# Patient Record
Sex: MALE | Race: WHITE | NOT HISPANIC OR LATINO | Employment: OTHER | URBAN - METROPOLITAN AREA
[De-identification: names, ages, dates, MRNs, and addresses within clinical notes are randomized per-mention and may not be internally consistent; named-entity substitution may affect disease eponyms.]

---

## 2019-12-03 ENCOUNTER — APPOINTMENT (INPATIENT)
Dept: RADIOLOGY | Facility: HOSPITAL | Age: 84
DRG: 871 | End: 2019-12-03
Payer: COMMERCIAL

## 2019-12-03 ENCOUNTER — APPOINTMENT (EMERGENCY)
Dept: RADIOLOGY | Facility: HOSPITAL | Age: 84
DRG: 871 | End: 2019-12-03
Payer: COMMERCIAL

## 2019-12-03 ENCOUNTER — HOSPITAL ENCOUNTER (INPATIENT)
Facility: HOSPITAL | Age: 84
LOS: 8 days | DRG: 871 | End: 2019-12-11
Attending: EMERGENCY MEDICINE | Admitting: ANESTHESIOLOGY
Payer: COMMERCIAL

## 2019-12-03 DIAGNOSIS — E44.0 MODERATE PROTEIN-CALORIE MALNUTRITION (HCC): ICD-10-CM

## 2019-12-03 DIAGNOSIS — R65.21 SEPTIC SHOCK (HCC): ICD-10-CM

## 2019-12-03 DIAGNOSIS — A41.9 SEPTIC SHOCK (HCC): ICD-10-CM

## 2019-12-03 DIAGNOSIS — J18.9 PNEUMONIA: ICD-10-CM

## 2019-12-03 DIAGNOSIS — R06.03 RESPIRATORY DISTRESS: ICD-10-CM

## 2019-12-03 DIAGNOSIS — I48.91 RAPID ATRIAL FIBRILLATION (HCC): ICD-10-CM

## 2019-12-03 DIAGNOSIS — J90 PLEURAL EFFUSION ON RIGHT: Primary | ICD-10-CM

## 2019-12-03 DIAGNOSIS — E87.1 HYPONATREMIA: ICD-10-CM

## 2019-12-03 DIAGNOSIS — J96.01 ACUTE RESPIRATORY FAILURE WITH HYPOXIA (HCC): ICD-10-CM

## 2019-12-03 PROBLEM — I48.20 CHRONIC ATRIAL FIBRILLATION (HCC): Status: ACTIVE | Noted: 2019-12-03

## 2019-12-03 PROBLEM — N17.9 AKI (ACUTE KIDNEY INJURY) (HCC): Status: ACTIVE | Noted: 2019-12-03

## 2019-12-03 PROBLEM — R79.89 POSITIVE D DIMER: Status: ACTIVE | Noted: 2019-12-03

## 2019-12-03 PROBLEM — R74.01 TRANSAMINITIS: Status: ACTIVE | Noted: 2019-12-03

## 2019-12-03 LAB
ALBUMIN SERPL BCP-MCNC: 2.1 G/DL (ref 3.5–5)
ALP SERPL-CCNC: 186 U/L (ref 46–116)
ALT SERPL W P-5'-P-CCNC: 18 U/L (ref 12–78)
AMORPH URATE CRY URNS QL MICRO: ABNORMAL /HPF
ANION GAP SERPL CALCULATED.3IONS-SCNC: 19 MMOL/L (ref 4–13)
APPEARANCE FLD: ABNORMAL
APTT PPP: 41 SECONDS (ref 23–37)
AST SERPL W P-5'-P-CCNC: 24 U/L (ref 5–45)
ATRIAL RATE: 166 BPM
BACTERIA UR QL AUTO: ABNORMAL /HPF
BASE EXCESS BLDA CALC-SCNC: -11 MMOL/L (ref -2–3)
BASOPHILS # BLD MANUAL: 0 THOUSAND/UL (ref 0–0.1)
BASOPHILS NFR MAR MANUAL: 0 % (ref 0–1)
BILIRUB DIRECT SERPL-MCNC: 2.03 MG/DL (ref 0–0.2)
BILIRUB SERPL-MCNC: 2.6 MG/DL (ref 0.2–1)
BILIRUB UR QL STRIP: ABNORMAL
BUN SERPL-MCNC: 55 MG/DL (ref 5–25)
CA-I BLD-SCNC: 1.03 MMOL/L (ref 1.12–1.32)
CALCIUM SERPL-MCNC: 8.9 MG/DL (ref 8.3–10.1)
CHLORIDE SERPL-SCNC: 90 MMOL/L (ref 100–108)
CLARITY UR: ABNORMAL
CO2 SERPL-SCNC: 19 MMOL/L (ref 21–32)
COLOR FLD: YELLOW
COLOR UR: ABNORMAL
CREAT SERPL-MCNC: 3.87 MG/DL (ref 0.6–1.3)
D DIMER PPP FEU-MCNC: 10 UG/ML FEU
EOSINOPHIL # BLD MANUAL: 0 THOUSAND/UL (ref 0–0.4)
EOSINOPHIL NFR BLD MANUAL: 0 % (ref 0–6)
ERYTHROCYTE [DISTWIDTH] IN BLOOD BY AUTOMATED COUNT: 13.6 % (ref 11.6–15.1)
FLUAV RNA NPH QL NAA+PROBE: NORMAL
FLUBV RNA NPH QL NAA+PROBE: NORMAL
GFR SERPL CREATININE-BSD FRML MDRD: 14 ML/MIN/1.73SQ M
GLUCOSE SERPL-MCNC: 119 MG/DL (ref 65–140)
GLUCOSE SERPL-MCNC: 120 MG/DL (ref 65–140)
GLUCOSE UR STRIP-MCNC: NEGATIVE MG/DL
HCO3 BLDA-SCNC: 11.6 MMOL/L (ref 24–30)
HCT VFR BLD AUTO: 54.9 % (ref 36.5–49.3)
HCT VFR BLD CALC: 52 % (ref 36.5–49.3)
HGB BLD-MCNC: 17.8 G/DL (ref 12–17)
HGB BLDA-MCNC: 17.7 G/DL (ref 12–17)
HGB UR QL STRIP.AUTO: ABNORMAL
HYALINE CASTS #/AREA URNS LPF: ABNORMAL /LPF
INR PPP: 2.09 (ref 0.84–1.19)
KETONES UR STRIP-MCNC: ABNORMAL MG/DL
L PNEUMO1 AG UR QL IA.RAPID: NEGATIVE
LACTATE SERPL-SCNC: 4.6 MMOL/L (ref 0.5–2)
LACTATE SERPL-SCNC: 6.7 MMOL/L (ref 0.5–2)
LEUKOCYTE ESTERASE UR QL STRIP: ABNORMAL
LYMPHOCYTES # BLD AUTO: 0.58 THOUSAND/UL (ref 0.6–4.47)
LYMPHOCYTES # BLD AUTO: 1 % (ref 14–44)
LYMPHOCYTES NFR BLD AUTO: 8 %
MAGNESIUM SERPL-MCNC: 2.2 MG/DL (ref 1.6–2.6)
MCH RBC QN AUTO: 31.4 PG (ref 26.8–34.3)
MCHC RBC AUTO-ENTMCNC: 32.4 G/DL (ref 31.4–37.4)
MCV RBC AUTO: 97 FL (ref 82–98)
MONOCYTES # BLD AUTO: 2.31 THOUSAND/UL (ref 0–1.22)
MONOCYTES NFR BLD: 4 % (ref 4–12)
MUCOUS THREADS UR QL AUTO: ABNORMAL
NEUTROPHILS # BLD MANUAL: 54.82 THOUSAND/UL (ref 1.85–7.62)
NEUTS BAND NFR BLD MANUAL: 28 % (ref 0–8)
NEUTS BAND NFR FLD MANUAL: 3 %
NEUTS SEG NFR BLD AUTO: 67 % (ref 43–75)
NEUTS SEG NFR BLD AUTO: 89 %
NITRITE UR QL STRIP: NEGATIVE
NON-SQ EPI CELLS URNS QL MICRO: ABNORMAL /HPF
NRBC BLD AUTO-RTO: 0 /100 WBCS
NT-PROBNP SERPL-MCNC: ABNORMAL PG/ML
PCO2 BLD: 12 MMOL/L (ref 21–32)
PCO2 BLD: 20.7 MM HG (ref 42–50)
PH BLD: 7.36 [PH] (ref 7.3–7.4)
PH BODY FLUID: 6.3
PH UR STRIP.AUTO: 5.5 [PH]
PLATELET # BLD AUTO: 342 THOUSANDS/UL (ref 149–390)
PLATELET BLD QL SMEAR: ADEQUATE
PMV BLD AUTO: 9.6 FL (ref 8.9–12.7)
PO2 BLD: 183 MM HG (ref 35–45)
POTASSIUM BLD-SCNC: 3.5 MMOL/L (ref 3.5–5.3)
POTASSIUM SERPL-SCNC: 4.2 MMOL/L (ref 3.5–5.3)
PROT SERPL-MCNC: 7.6 G/DL (ref 6.4–8.2)
PROT UR STRIP-MCNC: ABNORMAL MG/DL
PROTHROMBIN TIME: 23.7 SECONDS (ref 11.6–14.5)
QRS AXIS: 85 DEGREES
QRSD INTERVAL: 82 MS
QT INTERVAL: 300 MS
QTC INTERVAL: 479 MS
RBC # BLD AUTO: 5.66 MILLION/UL (ref 3.88–5.62)
RBC # FLD MANUAL: NORMAL /UL
RBC #/AREA URNS AUTO: ABNORMAL /HPF
RSV RNA NPH QL NAA+PROBE: NORMAL
S PNEUM AG UR QL: NEGATIVE
SAO2 % BLD FROM PO2: 100 % (ref 60–85)
SITE: ABNORMAL
SODIUM BLD-SCNC: 127 MMOL/L (ref 136–145)
SODIUM SERPL-SCNC: 128 MMOL/L (ref 136–145)
SP GR UR STRIP.AUTO: >=1.03 (ref 1–1.03)
SPECIMEN SOURCE: ABNORMAL
T WAVE AXIS: 74 DEGREES
TOTAL CELLS COUNTED SPEC: 100
TOTAL CELLS COUNTED SPEC: 100
TROPONIN I SERPL-MCNC: <0.02 NG/ML
UROBILINOGEN UR QL STRIP.AUTO: 1 E.U./DL
VENTRICULAR RATE: 153 BPM
WBC # BLD AUTO: 57.7 THOUSAND/UL (ref 4.31–10.16)
WBC # FLD MANUAL: ABNORMAL /UL
WBC #/AREA URNS AUTO: ABNORMAL /HPF

## 2019-12-03 PROCEDURE — 89050 BODY FLUID CELL COUNT: CPT | Performed by: EMERGENCY MEDICINE

## 2019-12-03 PROCEDURE — 84295 ASSAY OF SERUM SODIUM: CPT

## 2019-12-03 PROCEDURE — 87631 RESP VIRUS 3-5 TARGETS: CPT | Performed by: EMERGENCY MEDICINE

## 2019-12-03 PROCEDURE — 83880 ASSAY OF NATRIURETIC PEPTIDE: CPT | Performed by: EMERGENCY MEDICINE

## 2019-12-03 PROCEDURE — 80076 HEPATIC FUNCTION PANEL: CPT | Performed by: EMERGENCY MEDICINE

## 2019-12-03 PROCEDURE — 83605 ASSAY OF LACTIC ACID: CPT | Performed by: PHYSICIAN ASSISTANT

## 2019-12-03 PROCEDURE — 99291 CRITICAL CARE FIRST HOUR: CPT | Performed by: PHYSICIAN ASSISTANT

## 2019-12-03 PROCEDURE — 82803 BLOOD GASES ANY COMBINATION: CPT

## 2019-12-03 PROCEDURE — 87449 NOS EACH ORGANISM AG IA: CPT | Performed by: PHYSICIAN ASSISTANT

## 2019-12-03 PROCEDURE — 93010 ELECTROCARDIOGRAM REPORT: CPT | Performed by: INTERNAL MEDICINE

## 2019-12-03 PROCEDURE — 84484 ASSAY OF TROPONIN QUANT: CPT | Performed by: EMERGENCY MEDICINE

## 2019-12-03 PROCEDURE — 96360 HYDRATION IV INFUSION INIT: CPT

## 2019-12-03 PROCEDURE — 87070 CULTURE OTHR SPECIMN AEROBIC: CPT | Performed by: EMERGENCY MEDICINE

## 2019-12-03 PROCEDURE — 87205 SMEAR GRAM STAIN: CPT | Performed by: EMERGENCY MEDICINE

## 2019-12-03 PROCEDURE — 84157 ASSAY OF PROTEIN OTHER: CPT | Performed by: EMERGENCY MEDICINE

## 2019-12-03 PROCEDURE — 32554 ASPIRATE PLEURA W/O IMAGING: CPT | Performed by: EMERGENCY MEDICINE

## 2019-12-03 PROCEDURE — 84145 PROCALCITONIN (PCT): CPT | Performed by: PHYSICIAN ASSISTANT

## 2019-12-03 PROCEDURE — 87147 CULTURE TYPE IMMUNOLOGIC: CPT | Performed by: EMERGENCY MEDICINE

## 2019-12-03 PROCEDURE — 36415 COLL VENOUS BLD VENIPUNCTURE: CPT | Performed by: EMERGENCY MEDICINE

## 2019-12-03 PROCEDURE — 82330 ASSAY OF CALCIUM: CPT | Performed by: PHYSICIAN ASSISTANT

## 2019-12-03 PROCEDURE — 83986 ASSAY PH BODY FLUID NOS: CPT | Performed by: EMERGENCY MEDICINE

## 2019-12-03 PROCEDURE — 83615 LACTATE (LD) (LDH) ENZYME: CPT | Performed by: EMERGENCY MEDICINE

## 2019-12-03 PROCEDURE — 80048 BASIC METABOLIC PNL TOTAL CA: CPT | Performed by: EMERGENCY MEDICINE

## 2019-12-03 PROCEDURE — 80053 COMPREHEN METABOLIC PANEL: CPT | Performed by: PHYSICIAN ASSISTANT

## 2019-12-03 PROCEDURE — 94760 N-INVAS EAR/PLS OXIMETRY 1: CPT

## 2019-12-03 PROCEDURE — 87040 BLOOD CULTURE FOR BACTERIA: CPT | Performed by: EMERGENCY MEDICINE

## 2019-12-03 PROCEDURE — 84300 ASSAY OF URINE SODIUM: CPT | Performed by: PHYSICIAN ASSISTANT

## 2019-12-03 PROCEDURE — 94660 CPAP INITIATION&MGMT: CPT

## 2019-12-03 PROCEDURE — 85730 THROMBOPLASTIN TIME PARTIAL: CPT | Performed by: EMERGENCY MEDICINE

## 2019-12-03 PROCEDURE — 82945 GLUCOSE OTHER FLUID: CPT | Performed by: EMERGENCY MEDICINE

## 2019-12-03 PROCEDURE — 83605 ASSAY OF LACTIC ACID: CPT | Performed by: EMERGENCY MEDICINE

## 2019-12-03 PROCEDURE — 85379 FIBRIN DEGRADATION QUANT: CPT | Performed by: EMERGENCY MEDICINE

## 2019-12-03 PROCEDURE — 82570 ASSAY OF URINE CREATININE: CPT | Performed by: PHYSICIAN ASSISTANT

## 2019-12-03 PROCEDURE — 83735 ASSAY OF MAGNESIUM: CPT | Performed by: EMERGENCY MEDICINE

## 2019-12-03 PROCEDURE — 84132 ASSAY OF SERUM POTASSIUM: CPT

## 2019-12-03 PROCEDURE — 85027 COMPLETE CBC AUTOMATED: CPT | Performed by: EMERGENCY MEDICINE

## 2019-12-03 PROCEDURE — 89051 BODY FLUID CELL COUNT: CPT | Performed by: EMERGENCY MEDICINE

## 2019-12-03 PROCEDURE — 82330 ASSAY OF CALCIUM: CPT

## 2019-12-03 PROCEDURE — 82947 ASSAY GLUCOSE BLOOD QUANT: CPT

## 2019-12-03 PROCEDURE — 99285 EMERGENCY DEPT VISIT HI MDM: CPT

## 2019-12-03 PROCEDURE — 81001 URINALYSIS AUTO W/SCOPE: CPT | Performed by: EMERGENCY MEDICINE

## 2019-12-03 PROCEDURE — 71045 X-RAY EXAM CHEST 1 VIEW: CPT

## 2019-12-03 PROCEDURE — 85007 BL SMEAR W/DIFF WBC COUNT: CPT | Performed by: EMERGENCY MEDICINE

## 2019-12-03 PROCEDURE — 85014 HEMATOCRIT: CPT

## 2019-12-03 PROCEDURE — 99291 CRITICAL CARE FIRST HOUR: CPT | Performed by: EMERGENCY MEDICINE

## 2019-12-03 PROCEDURE — 85610 PROTHROMBIN TIME: CPT | Performed by: EMERGENCY MEDICINE

## 2019-12-03 PROCEDURE — 84100 ASSAY OF PHOSPHORUS: CPT | Performed by: PHYSICIAN ASSISTANT

## 2019-12-03 PROCEDURE — 93005 ELECTROCARDIOGRAM TRACING: CPT

## 2019-12-03 RX ORDER — HEPARIN SODIUM 5000 [USP'U]/ML
5000 INJECTION, SOLUTION INTRAVENOUS; SUBCUTANEOUS EVERY 8 HOURS SCHEDULED
Status: DISCONTINUED | OUTPATIENT
Start: 2019-12-03 | End: 2019-12-04

## 2019-12-03 RX ORDER — SODIUM CHLORIDE, SODIUM GLUCONATE, SODIUM ACETATE, POTASSIUM CHLORIDE, MAGNESIUM CHLORIDE, SODIUM PHOSPHATE, DIBASIC, AND POTASSIUM PHOSPHATE .53; .5; .37; .037; .03; .012; .00082 G/100ML; G/100ML; G/100ML; G/100ML; G/100ML; G/100ML; G/100ML
1000 INJECTION, SOLUTION INTRAVENOUS ONCE
Status: DISCONTINUED | OUTPATIENT
Start: 2019-12-03 | End: 2019-12-03

## 2019-12-03 RX ORDER — LIDOCAINE 40 MG/G
CREAM TOPICAL ONCE
Status: COMPLETED | OUTPATIENT
Start: 2019-12-03 | End: 2019-12-03

## 2019-12-03 RX ORDER — SODIUM CHLORIDE, SODIUM GLUCONATE, SODIUM ACETATE, POTASSIUM CHLORIDE, MAGNESIUM CHLORIDE, SODIUM PHOSPHATE, DIBASIC, AND POTASSIUM PHOSPHATE .53; .5; .37; .037; .03; .012; .00082 G/100ML; G/100ML; G/100ML; G/100ML; G/100ML; G/100ML; G/100ML
125 INJECTION, SOLUTION INTRAVENOUS CONTINUOUS
Status: DISCONTINUED | OUTPATIENT
Start: 2019-12-03 | End: 2019-12-08

## 2019-12-03 RX ORDER — AMOXICILLIN 250 MG
1 CAPSULE ORAL
Status: DISCONTINUED | OUTPATIENT
Start: 2019-12-03 | End: 2019-12-11 | Stop reason: HOSPADM

## 2019-12-03 RX ORDER — CHLORHEXIDINE GLUCONATE 0.12 MG/ML
15 RINSE ORAL EVERY 12 HOURS SCHEDULED
Status: DISCONTINUED | OUTPATIENT
Start: 2019-12-03 | End: 2019-12-11

## 2019-12-03 RX ADMIN — CEFEPIME HYDROCHLORIDE 2000 MG: 2 INJECTION, POWDER, FOR SOLUTION INTRAVENOUS at 18:23

## 2019-12-03 RX ADMIN — SODIUM CHLORIDE 1000 ML: 0.9 INJECTION, SOLUTION INTRAVENOUS at 16:42

## 2019-12-03 RX ADMIN — CHLORHEXIDINE GLUCONATE 0.12% ORAL RINSE 15 ML: 1.2 LIQUID ORAL at 23:55

## 2019-12-03 RX ADMIN — HEPARIN SODIUM 5000 UNITS: 5000 INJECTION INTRAVENOUS; SUBCUTANEOUS at 23:55

## 2019-12-03 RX ADMIN — AMIODARONE HYDROCHLORIDE 1 MG/MIN: 50 INJECTION, SOLUTION INTRAVENOUS at 21:02

## 2019-12-03 RX ADMIN — LIDOCAINE: 4 CREAM TOPICAL at 22:12

## 2019-12-03 RX ADMIN — NOREPINEPHRINE BITARTRATE 4 MCG/MIN: 1 INJECTION INTRAVENOUS at 20:52

## 2019-12-03 RX ADMIN — VANCOMYCIN HYDROCHLORIDE 1250 MG: 5 INJECTION, POWDER, LYOPHILIZED, FOR SOLUTION INTRAVENOUS at 18:56

## 2019-12-03 RX ADMIN — SODIUM CHLORIDE 2000 ML: 0.9 INJECTION, SOLUTION INTRAVENOUS at 18:26

## 2019-12-03 RX ADMIN — SODIUM CHLORIDE, SODIUM GLUCONATE, SODIUM ACETATE, POTASSIUM CHLORIDE, MAGNESIUM CHLORIDE, SODIUM PHOSPHATE, DIBASIC, AND POTASSIUM PHOSPHATE 125 ML/HR: .53; .5; .37; .037; .03; .012; .00082 INJECTION, SOLUTION INTRAVENOUS at 23:59

## 2019-12-03 RX ADMIN — DEXTROSE 150 MG: 50 INJECTION, SOLUTION INTRAVENOUS at 20:45

## 2019-12-03 NOTE — RESPIRATORY THERAPY NOTE
12/03/19 2610   Non-Invasive Information   Interface HFNC prongs   Non-Invasive Ventilation Mode HFNC   $ CPAP/BiPAP Charge - Initial & Daily Mgmt Yes   SpO2 91 %   $ Pulse Oximetry Spot Check Charge Completed   Resp Comments Initiated HFNC at md request   Non-Invasive Settings   FiO2 (%) 95   Flow (lpm) 55   Temperature (Set) 31   Non-Invasive Readings   Heater Temperature (Obs) 31   Skin Intervention Skin intact

## 2019-12-03 NOTE — ED NOTES
Critical care PA, Sofya Ascencio, informed to hold isolyte and levophed at this time       Milagro Cordon RN  12/03/19 Kody Rose

## 2019-12-03 NOTE — ED PROVIDER NOTES
History  Chief Complaint   Patient presents with    Shortness of Breath     pt was c/o cough, SOB for past few weeks  Pt went to PCP today and was reported to have "blue extremities" with 02 sat in low 80's on RA, with "HR in 160s" pt has hx of Afib      Patient is an 70-year-old male with past medical history of atrial fibrillation on Eliquis, hypothyroidism, hypertension, hyperlipidemia, presents to the emergency department by ambulance for respiratory distress  According to EMS, patient was at his PCPs office and 911 was called immediately due to patient being tachycardic with heart rate in the 150s and rhythm showing atrial fibrillation  He was hypoxic in the 70s to 80s on room air and when EMS arrived they placed him on a non-rebreather and his O2 sat improved into the high 80s  Per EMS, patient was diffusely cyanotic on their arrival however with administration of oxygen, the cyanosis had improved  He also gave a 1 L bolus which improved his heart rate  Per the patient's daughter and patient he has been sick with coughing for about 2 weeks but over the past 4 days he has been feeling progressively worsening dyspnea  He was unaware of any fevers, chills  He denies any headaches, dizziness or near syncope, other URI symptoms other than coughing, chest pain, palpitations, abdominal pain, nausea, vomiting, diarrhea, blood per rectum or melena, urinary symptoms, skin rash or color change, extremity swelling or pain, extremity weakness or paresthesia or other focal neurologic deficits  Daughter denies any known history of pneumonia  No prior history of heart failure  On arrival to the ED, patient tachycardic with heart rate in the 140-160 bpm range showing rapid A-fib on cardiac monitor  A 2nd IV was established and IVF were hung  He was hypoxic to 88% on NRB so respiratory therapy was called to start high-flow nasal cannula  Patient's O2 sat improved into the low 90s with high-flow oxygen    His blood pressure was soft around 624 systolic on arrival but at times would drop into the 91W systolic  Due to hypotension, rate control medication was not given at this time for rapid AFib  On lung exam, patient had clear lungs other than decreased breath sounds on the right side  On stat portable chest XR, there appears to be a large right pleural effusion and possible underlying pneumonia  History provided by:  Patient, EMS personnel and relative   used: No        None       Past Medical History:   Diagnosis Date    A-fib ([facility] [address] )     Disease of thyroid gland     Hyperlipidemia     Hypertension     Optic neuropathy        History reviewed  No pertinent surgical history  History reviewed  No pertinent family history  I have reviewed and agree with the history as documented  Social History     Tobacco Use    Smoking status: Former Smoker    Smokeless tobacco: Never Used   Substance Use Topics    Alcohol use: Yes     Frequency: Never     Comment: occasional    Drug use: Never        Review of Systems   Constitutional: Negative for chills and fever  HENT: Negative for congestion, ear pain, rhinorrhea and sore throat  Respiratory: Positive for cough and shortness of breath  Negative for chest tightness and wheezing  Cardiovascular: Negative for chest pain, palpitations and leg swelling  Gastrointestinal: Negative for abdominal pain, blood in stool, constipation, diarrhea, nausea and vomiting  Genitourinary: Negative for dysuria, flank pain, frequency and hematuria  Musculoskeletal: Negative for back pain, neck pain and neck stiffness  Skin: Negative for color change, pallor, rash and wound  Allergic/Immunologic: Negative for immunocompromised state  Neurological: Negative for dizziness, syncope, weakness, light-headedness, numbness and headaches  Hematological: Negative for adenopathy  Bruises/bleeds easily     Psychiatric/Behavioral: Negative for confusion, decreased concentration and sleep disturbance  All other systems reviewed and are negative  Physical Exam  Physical Exam   Constitutional: He is oriented to person, place, and time  He appears well-developed and well-nourished  He appears distressed  Patient has increased work of breathing appears to be in mild distress  HENT:   Head: Normocephalic and atraumatic  Mouth/Throat: Oropharynx is clear and moist  No oropharyngeal exudate  Eyes: Pupils are equal, round, and reactive to light  Conjunctivae and EOM are normal    Neck: Normal range of motion  Neck supple  No JVD present  Cardiovascular: Normal heart sounds and intact distal pulses  Exam reveals no gallop and no friction rub  No murmur heard  Irregularly irregular rhythm with heart rate in the 150's  2+ radial and DP pulses  Pulmonary/Chest: Breath sounds normal  He is in respiratory distress  He has no wheezes  He has no rales  He exhibits no tenderness  Patient presents in mild respiratory distress  He is on a non-rebreather and satting 88%  He has mild increased work of breathing and abdominal accessory muscle use only when lying flat, resolved when sitting upright  Decreased breath sounds right side  Cyanosis noted to the lips and fingertips  Abdominal: Soft  Bowel sounds are normal  He exhibits no distension  There is no tenderness  There is no rebound and no guarding  Musculoskeletal: Normal range of motion  He exhibits no edema or tenderness  Lymphadenopathy:     He has no cervical adenopathy  Neurological: He is alert and oriented to person, place, and time  No gross focal motor or sensory deficits  Skin: Skin is warm and dry  No rash noted  He is not diaphoretic  No pallor  Psychiatric: He has a normal mood and affect  His behavior is normal    Nursing note and vitals reviewed        Vital Signs  ED Triage Vitals   Temperature Pulse Respirations Blood Pressure SpO2   12/03/19 1619 12/03/19 1619 12/03/19 1619 12/03/19 1619 12/03/19 1619   (!) 96 7 °F (35 9 °C) (!) 127 (!) 28 113/73 (!) 88 %      Temp Source Heart Rate Source Patient Position - Orthostatic VS BP Location FiO2 (%)   12/03/19 1619 12/03/19 1619 12/03/19 1619 12/03/19 1619 12/03/19 1657   Rectal Monitor Lying Left arm 90      Pain Score       --                Vitals:    12/03/19 1845 12/03/19 1854 12/03/19 1856 12/03/19 1945   BP: 101/61 (!) 76/52 91/56 (!) 87/59   BP Location: Left arm Left arm Left arm Left arm   Pulse: (!) 144 (!) 151 (!) 139 (!) 137   Resp: (!) 26 (!) 28 (!) 27 (!) 28   Temp:       TempSrc:       SpO2: 96% 97% 97% 98%   Weight:           Visual Acuity      ED Medications  Medications   vancomycin (VANCOCIN) 1,250 mg in sodium chloride 0 9 % 250 mL IVPB (1,250 mg Intravenous New Bag 12/3/19 1856)   multi-electrolyte (PLASMALYTE-A/ISOLYTE-S PH 7 4) IV solution (has no administration in time range)   chlorhexidine (PERIDEX) 0 12 % oral rinse 15 mL (has no administration in time range)   heparin (porcine) subcutaneous injection 5,000 Units (has no administration in time range)   norepinephrine (LEVOPHED) 4 mg (STANDARD CONCENTRATION) IV in sodium chloride 0 9% 250 mL (has no administration in time range)   sodium chloride 0 9 % bolus 2,000 mL (2,000 mL Intravenous New Bag 12/3/19 1826)   amiodarone (CORDARONE) 900 mg in dextrose 5 % 500 mL infusion (has no administration in time range)   amiodarone 150 mg in dextrose 5 % 100 mL IV bolus (has no administration in time range)   sodium chloride 0 9 % bolus 1,000 mL (0 mL Intravenous Stopped 12/3/19 1817)   cefepime (MAXIPIME) 2,000 mg in dextrose 5 % 50 mL IVPB (0 mg Intravenous Stopped 12/3/19 1851)       Diagnostic Studies  Results Reviewed     Procedure Component Value Units Date/Time    Blood gas, arterial [[id number]]     Lab Status:  No result Specimen:  Blood, Arterial     Lactic acid, plasma [[id number]] Collected:  12/03/19 1952    Lab Status:   In process Specimen:  Blood from Arm, Right Updated:  12/03/19 1954    Body Fluid Diff [[id number]] Collected:  12/03/19 1811    Lab Status:  Final result Specimen: Body Fluid from Pleural, Right Updated:  12/03/19 1929     Total Counted 100     Neutrophils % (Fluid) 89 %      Lymphs % (Fluid) 8 %      Bands % (Fluid) 3 %     Narrative:       INNUMNERABLE BACTERIA SEEN: COCCI IN CHAINS    Influenza A/B and RSV PCR [[id number]]  (Normal) Collected:  12/03/19 1843    Lab Status:  Final result Specimen:  Nasopharyngeal Swab Updated:  12/03/19 1925     INFLUENZA A PCR None Detected     INFLUENZA B PCR None Detected     RSV PCR None Detected    Red Cell Count,Body Fluid [[id number]] Collected:  12/03/19 1811    Lab Status:  Final result Specimen: Body Fluid from Pleural, Right Updated:  12/03/19 1908     RBC, Fluid 140,000 /uL     Body fluid white cell count with differential [[id number]]  (Abnormal) Collected:  12/03/19 1811    Lab Status:  Final result Specimen: Body Fluid from Pleural, Right Updated:  12/03/19 1906     Site Pleural, Right      Color, Fluid Yellow     Clarity, Fluid Cloudy     WBC, Fluid 71,653 /ul     pH, body fluid [[id number]] Collected:  12/03/19 1811    Lab Status:  Final result Specimen: Body Fluid Updated:  12/03/19 1837     PH BODY FLUID 6 3    Body fluid culture (Pleural Fluid Culture) and Gram stain [[id number]] Collected:  12/03/19 1823    Lab Status: In process Specimen: Body Fluid from Pleural, Right Updated:  12/03/19 1824    Glucose, body fluid [[id number]] Collected:  12/03/19 1811    Lab Status: In process Specimen: Body Fluid Updated:  12/03/19 1811    Lactate dehydrogenase, body fluid [[id number]] Collected:  12/03/19 1811    Lab Status: In process Specimen: Body Fluid Updated:  12/03/19 1811    Protein, body fluid [[id number]] Collected:  12/03/19 1811    Lab Status: In process Specimen: Body Fluid Updated:  12/03/19 1811    Leukemia/Lymphoma flow cytometry [[id number]] Collected:  12/03/19 1811    Lab Status:   In process Specimen: Body Fluid from Pleural, Right Updated:  12/03/19 1811    Platelet count [[id number]]     Lab Status:  No result Specimen:  Blood     Strep Pneumoniae, Urine [[id number]]     Lab Status:  No result Specimen:  Urine     Legionella antigen, urine [[id number]]     Lab Status:  No result Specimen:  Urine     CBC and differential [[id number]]  (Abnormal) Collected:  12/03/19 1640    Lab Status:  Final result Specimen:  Blood from Arm, Left Updated:  12/03/19 1736     WBC 57 70 Thousand/uL      RBC 5 66 Million/uL      Hemoglobin 17 8 g/dL      Hematocrit 54 9 %      MCV 97 fL      MCH 31 4 pg      MCHC 32 4 g/dL      RDW 13 6 %      MPV 9 6 fL      Platelets 942 Thousands/uL      nRBC 0 /100 WBCs     Narrative: This is an appended report  These results have been appended to a previously verified report  Lactic acid, plasma [[id number]]  (Abnormal) Collected:  12/03/19 1640    Lab Status:  Final result Specimen:  Blood from Arm, Left Updated:  12/03/19 1726     LACTIC ACID 6 7 mmol/L     Narrative:       Result may be elevated if tourniquet was used during collection      NT-BNP PRO [[id number]]  (Abnormal) Collected:  12/03/19 1640    Lab Status:  Final result Specimen:  Blood from Arm, Left Updated:  12/03/19 1717     NT-proBNP 17,738 pg/mL     Basic metabolic panel [[id number]]  (Abnormal) Collected:  12/03/19 1640    Lab Status:  Final result Specimen:  Blood from Arm, Left Updated:  12/03/19 1717     Sodium 128 mmol/L      Potassium 4 2 mmol/L      Chloride 90 mmol/L      CO2 19 mmol/L      ANION GAP 19 mmol/L      BUN 55 mg/dL      Creatinine 3 87 mg/dL      Glucose 120 mg/dL      Calcium 8 9 mg/dL      eGFR 14 ml/min/1 73sq m     Narrative:       [place] guidelines for Chronic Kidney Disease (CKD):     Stage 1 with normal or high GFR (GFR > 90 mL/min/1 73 square meters)    Stage 2 Mild CKD (GFR = 60-89 mL/min/1 73 square meters)    Stage 3A Moderate CKD (GFR = 45-59 mL/min/1 73 square meters)    Stage 3B Moderate CKD (GFR = 30-44 mL/min/1 73 square meters)    Stage 4 Severe CKD (GFR = 15-29 mL/min/1 73 square meters)    Stage 5 End Stage CKD (GFR <15 mL/min/1 73 square meters)  Note: GFR calculation is accurate only with a steady state creatinine    Hepatic function panel [[id number]]  (Abnormal) Collected:  12/03/19 1640    Lab Status:  Final result Specimen:  Blood from Arm, Left Updated:  12/03/19 1717     Total Bilirubin 2 60 mg/dL      Bilirubin, Direct 2 03 mg/dL      Alkaline Phosphatase 186 U/L      AST 24 U/L      ALT 18 U/L      Total Protein 7 6 g/dL      Albumin 2 1 g/dL     Magnesium [[id number]]  (Normal) Collected:  12/03/19 1640    Lab Status:  Final result Specimen:  Blood from Arm, Left Updated:  12/03/19 1717     Magnesium 2 2 mg/dL     D-Dimer [[id number]]  (Abnormal) Collected:  12/03/19 1647    Lab Status:  Final result Specimen:  Blood Updated:  12/03/19 1714     D-Dimer, Quant 10 00 ug/ml FEU     Protime-INR [[id number]]  (Abnormal) Collected:  12/03/19 1640    Lab Status:  Final result Specimen:  Blood from Arm, Left Updated:  12/03/19 1710     Protime 23 7 seconds      INR 2 09    APTT [[id number]]  (Abnormal) Collected:  12/03/19 1640    Lab Status:  Final result Specimen:  Blood from Arm, Left Updated:  12/03/19 1710     PTT 41 seconds     Troponin I [[id number]]  (Normal) Collected:  12/03/19 1640    Lab Status:  Final result Specimen:  Blood from Arm, Left Updated:  12/03/19 1710     Troponin I <0 02 ng/mL     POCT Blood Gas (CG8+) [[id number]]  (Abnormal) Collected:  12/03/19 1653    Lab Status:  Final result Specimen:  Venous Updated:  12/03/19 1657     ph, [place] ISTAT 7 356     pCO2, [place] i-STAT 20 7 mm HG      pO2, [place] i-STAT 183 0 mm HG      BE, i-STAT -11 mmol/L      HCO3, [place] i-STAT 11 6 mmol/L      CO2, i-STAT 12 mmol/L      O2 Sat, i-STAT 100 %      SODIUM, I-STAT 127 mmol/l      Potassium, i-STAT 3 5 mmol/L      Calcium, Ionized i-STAT 1 03 mmol/L      Hct, i-STAT 52 %      Hgb, i-STAT 17 7 g/dl      Glucose, i-STAT 119 mg/dl      Specimen Type VENOUS    Blood culture #1 [[id number]] Collected:  12/03/19 1646    Lab Status: In process Specimen:  Blood from Arm, Right Updated:  12/03/19 1649    Blood culture #2 [[id number]] Collected:  12/03/19 1640    Lab Status: In process Specimen:  Blood from Arm, Left Updated:  12/03/19 1644    Blood gas, venous [[id number]]     Lab Status:  No result Specimen:  Blood     Blood gas, arterial [[id number]]     Lab Status:  No result Specimen:  Blood, Arterial     UA (URINE) with reflex to Scope [[id number]]     Lab Status:  No result Specimen:  Urine                  XR chest portable   ED Interpretation by Sherryle Mound, DO (12/03 1846)   Decreased size of right pleural effusion status post thoracentesis  No obvious pneumothorax  XR chest 1 view portable   Final Result by Tray Romo MD (12/03 1643)      Large right-sided pleural effusion with subjacent consolidation  Workstation performed: [id number]                    Procedures  ECG 12 Lead Documentation Only  Date/Time: 12/3/2019 6:28 PM  Performed by: Sherryle Mound, DO  Authorized by: Sherryle Mound, DO     ECG reviewed by me, the ED Provider: yes    Patient location:  ED  Previous ECG:     Previous ECG:  Unavailable  Interpretation:     Interpretation: abnormal    Rate:     ECG rate:  153    ECG rate assessment: tachycardic    Rhythm:     Rhythm: atrial fibrillation      Rhythm comment:  With RVR  Ectopy:     Ectopy: PVCs      PVCs:  Infrequent  QRS:     QRS axis:  Normal    QRS intervals:  Normal  Conduction:     Conduction: normal    ST segments:     ST segments:  Non-specific  T waves:     T waves: non-specific    Thoracentesis  Date/Time: 12/3/2019 7:10 PM  Performed by: Sherryle Mound, DO  Authorized by: Sherryle Mound, DO     Patient location:  ED  Other Assisting Provider: Yes (comment) (Dr Harley Hudson (IR attending)   Dr Keyonna Hernandez (ICU attending))    Consent:     Consent obtained:  Verbal and written    Consent given by:  Patient and healthcare agent (Patient's daughter signed consent form)    Risks discussed:  Bleeding, incomplete drainage, nerve damage, infection, pain and pneumothorax    Alternatives discussed:  Delayed treatment  Universal protocol:     Procedure explained and questions answered to patient or proxy's satisfaction: yes      Relevant documents present and verified: yes      Test results available and properly labeled: yes      Radiology Images displayed and confirmed  If images not available, report reviewed: yes      Required blood products, implants, devices and special equipment available: yes      Site/side marked: yes      Immediately prior to procedure a time out was called: yes      Patient identity confirmed:  Verbally with patient and arm band  Indications:     Procedure Purpose: therapeutic      Indications: pleural effusion    Anesthesia (see MAR for exact dosages): Anesthesia method:  Local infiltration    Local anesthetic:  Lidocaine 1% w/o epi  Procedure details:     Preparation: Patient was prepped and draped in usual sterile fashion      Standard thoracentesis cath kit used: Yes      Patient position:  Sitting    Laterality:  Right    Location:  Posterior    Puncture method:  Over-the-needle catheter    Ultrasound guidance: yes      Reason for ultrasound: Identify fluid collection and guide cathetar placement  Indwelling catheter placed: no      Number of attempts:  2    Needle gauge:  16    Drainage color:  Yellow    Drainage characteristics:  Purulent    Fluid removed amount:  1200 mL  Post-procedure details:     Chest x-ray performed: yes      Chest x-ray findings:  Pleural effusion improved    Patient tolerance of procedure:   Tolerated well, no immediate complications  CriticalCare Time  Performed by: Marcial Corrigan DO  Authorized by: Marcial Corrigan DO     Critical care provider statement: Critical care time (minutes):  75    Critical care time was exclusive of:  Separately billable procedures and treating other patients and teaching time    Critical care was necessary to treat or prevent imminent or life-threatening deterioration of the following conditions:  Circulatory failure, shock, sepsis, respiratory failure, renal failure, dehydration and cardiac failure    Critical care was time spent personally by me on the following activities:  Blood draw for specimens, obtaining history from patient or surrogate, development of treatment plan with patient or surrogate, discussions with consultants, discussions with primary provider, evaluation of patient's response to treatment, examination of patient, re-evaluation of patient's condition, ordering and review of radiographic studies, ordering and review of laboratory studies, ordering and performing treatments and interventions and interpretation of cardiac output measurements    I assumed direction of critical care for this patient from another provider in my specialty: no               ED Course  ED Course as of Dec 03 2024   Tue Dec 03, 2019   Pedro Turner Patient's blood pressure fluctuating between 80A and 887 systolic  Patient's current BP in the 70 systolic so will start patient on Levophed peripherally for short time until he gets to ICU  Initial Sepsis Screening     Row Name 12/03/19 4769                Is the patient's history suggestive of a new or worsening infection? (!) Yes (Proceed)  -MA        Suspected source of infection  pneumonia  -MA        Are two or more of the following signs & symptoms of infection both present and new to the patient? (!) Yes (Proceed)  -MA        Indicate SIRS criteria  Tachycardia > 90 bpm;Leukocytosis (WBC > 88269 IJL); Hypothermia < 36C (96 8F)  -MA        If the answer is yes to both questions, suspicion of sepsis is present          If severe sepsis is present AND tissue hypoperfusion perists in the hour after fluid resuscitation or lactate > 4, the patient meets criteria for SEPTIC SHOCK          Are any of the following organ dysfunction criteria present within 6 hours of suspected infection and SIRS criteria that are NOT considered to be chronic conditions? (!) Yes  -MA        Organ dysfunction  Creatinine > 2 0 mg/dL; Lactate >/equal 4 0 mmol/L  -MA        Date of presentation of severe sepsis  12/03/19  -MA        Time of presentation of severe sepsis  1851  -MA        Tissue hypoperfusion persists in the hour after crystalloid fluid administration, evidenced, by either:  SBP < 90 mm/Hg ( ___ mm/Hg in comment field)  -MA        Was hypotension present within one hour of the conclusion of crystalloid fluid administration?   Yes  -MA        Date of presentation of septic shock  12/03/19  -MA        Time of presentation of septic shock  1851  -MA          User Key  (r) = Recorded By, (t) = Taken By, (c) = Cosigned By    Initials Name Provider Type    LEONIE Warner DO Physician           Default Flowsheet Data (last 720 hours)      Sepsis Reassess     Row Name 12/03/19 1851                   Repeat Volume Status and Tissue Perfusion Assessment Performed    Repeat Volume Status and Tissue Perfusion Assessment Performed             Volume Status and Tissue Perfusion Post Fluid Resuscitation * Must Document All *    Vital Signs Reviewed (HR, RR, BP, T)  Yes  -MA        Shock Index Reviewed  Yes  -MA        Arterial Oxygen Saturation Reviewed (POx, SaO2 or SpO2)  Yes (comment %)  -MA        Cardio  (!) Tachycardia;Irregular rhythm  -MA        Pulmonary  Clear to auscultation  -MA        Capillary Refill  Sluggish  -MA        Peripheral Pulses  Radial;Dorsalis Pedis  -MA        Peripheral Pulse  +2  -MA        Dorsalis Pedis  +2  -MA        Skin  Warm  -MA        Urine output assessed  Decreased  -MA           *OR*   Intensive Monitoring- Must Document One of the Following Four *:    Vital Signs Reviewed          * Central Venous Pressure (CVP or RAP)          * Central Venous Oxygen (SVO2, ScvO2 or Oxygen saturation via central catheter)          * Bedside Cardiovascular US in IVC diameter and % collapse          * Passive Leg Raise OR Crystalloid Challenge            User Key  (r) = Recorded By, (t) = Taken By, (c) = Cosigned By    Initials Name Provider Type    LEONIE Yoder,  Physician                MDM  Number of Diagnoses or Management Options  Hyponatremia:   Pleural effusion on right:   Pneumonia:   Rapid atrial fibrillation [facility]):   Respiratory distress:   Septic shock [facility]):   Diagnosis management comments: 53-year-old male presents to the ED in acute respiratory distress  He presents in rapid atrial fibrillation, low/normal blood pressure, hypoxia despite non-rebreather  Patient transition to high-flow nasal cannula oxygen and has improvement in his O2 sat and work of breathing  He does get distressed when lying flat, consistent with right pleural effusion seen on stat portable chest x-ray  Most likely patient septic from pneumonia and will start broad-spectrum IV antibiotics as well as IV fluid resuscitation  Will perform urgent thoracentesis for right pleural effusion drainage due to respiratory distress and hypoxia  Patient to be admitted to ICU  Discussed code status with patient and daughter and patient is full code         Amount and/or Complexity of Data Reviewed  Clinical lab tests: ordered and reviewed  Tests in the radiology section of CPT®: ordered and reviewed  Tests in the medicine section of CPT®: ordered and reviewed  Obtain history from someone other than the patient: yes  Independent visualization of images, tracings, or specimens: yes          Disposition  Final diagnoses:   Pleural effusion on right   Respiratory distress   Pneumonia   Septic shock ([facility] [address] )   Rapid atrial fibrillation ([facility] [address] )   Hyponatremia     Time reflects when diagnosis was documented in both MDM as applicable and the Disposition within this note     Time User Action Codes Description Comment    12/3/2019  6:20 PM Rory Kenneth E Add [J90] Pleural effusion on right     12/3/2019  6:20 PM Rory Kenneth E Add [R06 03] Respiratory distress     12/3/2019  6:20 PM Rory Kenneth E Add [J18 9] Pneumonia     12/3/2019  6:20 PM Rory Kenneth E Add [A41 9,  R65 21] Septic shock ([facility] [address] )     12/3/2019  6:20 PM Rory Kenneth E Add [I48 91] Rapid atrial fibrillation ([facility] [address] )     12/3/2019  8:02 PM Rory Kenneth E Add [[id number]] Hyponatremia       ED Disposition     ED Disposition Condition Date/Time Comment    Admit Stable Tue Dec 3, 2019  6:20 PM Case was discussed with Hannah Good and the patient's admission status was agreed to be Admission Status: inpatient status to the service of Dr Hannah Good   Follow-up Information    None         Patient's Medications    No medications on file     No discharge procedures on file      ED Provider  Electronically Signed by           Ronel Pappas DO  12/03/19 2024

## 2019-12-03 NOTE — SEPSIS NOTE
Sepsis Note   Anitashanta Arnold 82 y o  male MRN: [id number]  Unit/Bed#: ED 08 Encounter: [id number]      qSOFA     Row Name 12/03/19 1845 12/03/19 1830 12/03/19 18:18:24 12/03/19 18:18:07 12/03/19 18:15:07    Altered mental status GCS < 15              Respiratory Rate > / =22  1  1  1  1  1    Systolic BP < / =840  0  0  0        Q Sofa Score  1  1  1  2  2    Row Name 12/03/19 18:12:07 12/03/19 18:09:07 12/03/19 18:06:07 12/03/19 18:02:47 12/03/19 18:00:07    Altered mental status GCS < 15              Respiratory Rate > / =22  1  1  1  1  1    Systolic BP < / =994    Nemia Sicard Sofa Score  2  2  2  2  2    Row Name 12/03/19 1800 12/03/19 17:56:47 12/03/19 17:54:07 12/03/19 1754 12/03/19 17:51:17    Altered mental status GCS < 15              Respiratory Rate > / =22    1  1    1    Systolic BP < / =711  1      1      Q Sofa Score  2  1  1  2  1    Row Name 12/03/19 17:48:47 12/03/19 1746 12/03/19 17:45:07 12/03/19 17:42:07 12/03/19 17:39:07    Altered mental status GCS < 15              Respiratory Rate > / =22  1    1  1  1    Systolic BP < / =908    1          Q Sofa Score  1  2  1  1  1    Row Name 12/03/19 17:38:26 12/03/19 17:36:17 12/03/19 17:33:47 12/03/19 1731 12/03/19 17:30:07    Altered mental status GCS < 15              Respiratory Rate > / =22  1  1  1    1    Systolic BP < / =712  0      0      Q Sofa Score  1  1  1  1  2    Row Name 12/03/19 17:27:07 12/03/19 1657 12/03/19 1627 12/03/19 1619       Altered mental status GCS < 15      0       Respiratory Rate > / =22  1  1    1     Systolic BP < / =181    1    0     Q Sofa Score  2  2  1  1         Initial Sepsis Screening     Row Name 12/03/19 7308                Is the patient's history suggestive of a new or worsening infection?   (!) Yes (Proceed)  -MA        Suspected source of infection  pneumonia  -MA        Are two or more of the following signs & symptoms of infection both present and new to the patient? (!) Yes (Proceed)  -MA        Indicate SIRS criteria  Tachycardia > 90 bpm;Leukocytosis (WBC > 85286 IJL); Hypothermia < 36C (96 8F)  -MA        If the answer is yes to both questions, suspicion of sepsis is present          If severe sepsis is present AND tissue hypoperfusion perists in the hour after fluid resuscitation or lactate > 4, the patient meets criteria for SEPTIC SHOCK          Are any of the following organ dysfunction criteria present within 6 hours of suspected infection and SIRS criteria that are NOT considered to be chronic conditions? (!) Yes  -MA        Organ dysfunction  Creatinine > 2 0 mg/dL; Lactate >/equal 4 0 mmol/L  -MA        Date of presentation of severe sepsis  12/03/19  -MA        Time of presentation of severe sepsis  1851  -MA        Tissue hypoperfusion persists in the hour after crystalloid fluid administration, evidenced, by either:  SBP < 90 mm/Hg ( ___ mm/Hg in comment field)  -MA        Was hypotension present within one hour of the conclusion of crystalloid fluid administration?   Yes  -MA        Date of presentation of septic shock  12/03/19  -MA        Time of presentation of septic shock  1851  -MA          User Key  (r) = Recorded By, (t) = Taken By, (c) = Cosigned By    [address] Name Provider Type    MA Ed Mow, DO Physician               Default Flowsheet Data (last 720 hours)      Sepsis Reassess     Row Name 12/03/19 1851                   Repeat Volume Status and Tissue Perfusion Assessment Performed    Repeat Volume Status and Tissue Perfusion Assessment Performed             Volume Status and Tissue Perfusion Post Fluid Resuscitation * Must Document All *    Vital Signs Reviewed (HR, RR, BP, T)  Yes  -MA        Shock Index Reviewed  Yes  -MA        Arterial Oxygen Saturation Reviewed (POx, SaO2 or SpO2)  Yes (comment %)  -MA        Cardio  (!) Tachycardia;Irregular rhythm  -MA        Pulmonary  Clear to auscultation  -MA        Capillary Refill  Sluggish  -MA        Peripheral Pulses  Radial;Dorsalis Pedis  -MA        Peripheral Pulse  +2  -MA        Dorsalis Pedis  +2  -MA        Skin  Warm  -MA        Urine output assessed  Decreased  -MA           *OR*   Intensive Monitoring- Must Document One of the Following Four *:    Vital Signs Reviewed          * Central Venous Pressure (CVP or RAP)          * Central Venous Oxygen (SVO2, ScvO2 or Oxygen saturation via central catheter)          * Bedside Cardiovascular US in IVC diameter and % collapse          * Passive Leg Raise OR Crystalloid Challenge            User Key  (r) = Recorded By, (t) = Taken By, (c) = Cosigned By    Initials Name Provider Type    LEONIE Acevedo DO Physician

## 2019-12-03 NOTE — SEPSIS NOTE
Sepsis Note   Raina Hardin 82 y o  male MRN: [id number]  Unit/Bed#: ED 08 Encounter: [id number]      qSOFA     Row Name 12/03/19 1845 12/03/19 1830 12/03/19 18:18:24 12/03/19 18:18:07 12/03/19 18:15:07    Altered mental status GCS < 15              Respiratory Rate > / =22  1  1  1  1  1    Systolic BP < / =700  0  0  0        Q Sofa Score  1  1  1  2  2    Row Name 12/03/19 18:12:07 12/03/19 18:09:07 12/03/19 18:06:07 12/03/19 18:02:47 12/03/19 18:00:07    Altered mental status GCS < 15              Respiratory Rate > / =22  1  1  1  1  1    Systolic BP < / =227    Patricia Ricky Sofa Score  2  2  2  2  2    Row Name 12/03/19 1800 12/03/19 17:56:47 12/03/19 17:54:07 12/03/19 1754 12/03/19 17:51:17    Altered mental status GCS < 15              Respiratory Rate > / =22    1  1    1    Systolic BP < / =483  1      1      Q Sofa Score  2  1  1  2  1    Row Name 12/03/19 17:48:47 12/03/19 1746 12/03/19 17:45:07 12/03/19 17:42:07 12/03/19 17:39:07    Altered mental status GCS < 15              Respiratory Rate > / =22  1    1  1  1    Systolic BP < / =502    1          Q Sofa Score  1  2  1  1  1    Row Name 12/03/19 17:38:26 12/03/19 17:36:17 12/03/19 17:33:47 12/03/19 1731 12/03/19 17:30:07    Altered mental status GCS < 15              Respiratory Rate > / =22  1  1  1    1    Systolic BP < / =799  0      0      Q Sofa Score  1  1  1  1  2    Row Name 12/03/19 17:27:07 12/03/19 1657 12/03/19 1627 12/03/19 1619       Altered mental status GCS < 15      0       Respiratory Rate > / =22  1  1    1     Systolic BP < / =794    1    0     Q Sofa Score  2  2  1  1         Initial Sepsis Screening     Row Name 12/03/19 8174                Is the patient's history suggestive of a new or worsening infection?   (!) Yes (Proceed)  -MA        Suspected source of infection  pneumonia  -MA        Are two or more of the following signs & symptoms of infection both present and new to the patient? (!) Yes (Proceed)  -MA        Indicate SIRS criteria  Tachycardia > 90 bpm;Leukocytosis (WBC > 89720 IJL); Hypothermia < 36C (96 8F)  -MA        If the answer is yes to both questions, suspicion of sepsis is present          If severe sepsis is present AND tissue hypoperfusion perists in the hour after fluid resuscitation or lactate > 4, the patient meets criteria for SEPTIC SHOCK          Are any of the following organ dysfunction criteria present within 6 hours of suspected infection and SIRS criteria that are NOT considered to be chronic conditions? (!) Yes  -MA        Organ dysfunction  Creatinine > 2 0 mg/dL; Lactate >/equal 4 0 mmol/L  -MA        Date of presentation of severe sepsis  12/03/19  -MA        Time of presentation of severe sepsis  1851  -MA        Tissue hypoperfusion persists in the hour after crystalloid fluid administration, evidenced, by either:  SBP < 90 mm/Hg ( ___ mm/Hg in comment field)  -MA        Was hypotension present within one hour of the conclusion of crystalloid fluid administration?   Yes  -MA        Date of presentation of septic shock  12/03/19  -MA        Time of presentation of septic shock  1851  -MA          User Key  (r) = Recorded By, (t) = Taken By, (c) = Cosigned By    [address] Name Provider Type    LEONIE Corrigan DO Physician

## 2019-12-04 ENCOUNTER — APPOINTMENT (INPATIENT)
Dept: CT IMAGING | Facility: HOSPITAL | Age: 84
DRG: 871 | End: 2019-12-04
Payer: COMMERCIAL

## 2019-12-04 ENCOUNTER — APPOINTMENT (INPATIENT)
Dept: RADIOLOGY | Facility: HOSPITAL | Age: 84
DRG: 871 | End: 2019-12-04
Payer: COMMERCIAL

## 2019-12-04 PROBLEM — E87.20 LACTIC ACIDOSIS: Status: ACTIVE | Noted: 2019-12-04

## 2019-12-04 PROBLEM — E87.2 LACTIC ACIDOSIS: Status: ACTIVE | Noted: 2019-12-04

## 2019-12-04 PROBLEM — E44.0 MODERATE PROTEIN-CALORIE MALNUTRITION (HCC): Status: ACTIVE | Noted: 2019-12-04

## 2019-12-04 LAB
ALBUMIN SERPL BCP-MCNC: 1.8 G/DL (ref 3.5–5)
ALBUMIN SERPL BCP-MCNC: 1.8 G/DL (ref 3.5–5)
ALP SERPL-CCNC: 131 U/L (ref 46–116)
ALP SERPL-CCNC: 153 U/L (ref 46–116)
ALT SERPL W P-5'-P-CCNC: 11 U/L (ref 12–78)
ALT SERPL W P-5'-P-CCNC: 15 U/L (ref 12–78)
ANION GAP SERPL CALCULATED.3IONS-SCNC: 18 MMOL/L (ref 4–13)
ANION GAP SERPL CALCULATED.3IONS-SCNC: 19 MMOL/L (ref 4–13)
APTT PPP: 40 SECONDS (ref 23–37)
AST SERPL W P-5'-P-CCNC: 19 U/L (ref 5–45)
AST SERPL W P-5'-P-CCNC: 22 U/L (ref 5–45)
BASE EXCESS BLDA CALC-SCNC: -11.6 MMOL/L
BASE EXCESS BLDA CALC-SCNC: -9.9 MMOL/L
BASOPHILS # BLD MANUAL: 0 THOUSAND/UL (ref 0–0.1)
BASOPHILS NFR MAR MANUAL: 0 % (ref 0–1)
BILIRUB SERPL-MCNC: 2.5 MG/DL (ref 0.2–1)
BILIRUB SERPL-MCNC: 2.7 MG/DL (ref 0.2–1)
BODY TEMPERATURE: 98.1 DEGREES FEHRENHEIT
BODY TEMPERATURE: 98.4 DEGREES FEHRENHEIT
BUN SERPL-MCNC: 59 MG/DL (ref 5–25)
BUN SERPL-MCNC: 59 MG/DL (ref 5–25)
CA-I BLD-SCNC: 0.93 MMOL/L (ref 1.12–1.32)
CA-I BLD-SCNC: 1.11 MMOL/L (ref 1.12–1.32)
CALCIUM SERPL-MCNC: 7.7 MG/DL (ref 8.3–10.1)
CALCIUM SERPL-MCNC: 8.2 MG/DL (ref 8.3–10.1)
CHLORIDE SERPL-SCNC: 96 MMOL/L (ref 100–108)
CHLORIDE SERPL-SCNC: 98 MMOL/L (ref 100–108)
CO2 SERPL-SCNC: 13 MMOL/L (ref 21–32)
CO2 SERPL-SCNC: 14 MMOL/L (ref 21–32)
CREAT SERPL-MCNC: 3.02 MG/DL (ref 0.6–1.3)
CREAT SERPL-MCNC: 3.26 MG/DL (ref 0.6–1.3)
CREAT UR-MCNC: 258 MG/DL
EOSINOPHIL # BLD MANUAL: 0 THOUSAND/UL (ref 0–0.4)
EOSINOPHIL NFR BLD MANUAL: 0 % (ref 0–6)
ERYTHROCYTE [DISTWIDTH] IN BLOOD BY AUTOMATED COUNT: 13.7 % (ref 11.6–15.1)
GFR SERPL CREATININE-BSD FRML MDRD: 17 ML/MIN/1.73SQ M
GFR SERPL CREATININE-BSD FRML MDRD: 18 ML/MIN/1.73SQ M
GLUCOSE FLD-MCNC: 5 MG/DL
GLUCOSE SERPL-MCNC: 107 MG/DL (ref 65–140)
GLUCOSE SERPL-MCNC: 87 MG/DL (ref 65–140)
HCO3 BLDA-SCNC: 11 MMOL/L (ref 22–28)
HCO3 BLDA-SCNC: 13 MMOL/L (ref 22–28)
HCT VFR BLD AUTO: 47.4 % (ref 36.5–49.3)
HFNC FLOW LPM: 45
HFNC FLOW LPM: 70
HGB BLD-MCNC: 16.2 G/DL (ref 12–17)
LACTATE SERPL-SCNC: 3.4 MMOL/L (ref 0.5–2)
LACTATE SERPL-SCNC: 3.8 MMOL/L (ref 0.5–2)
LACTATE SERPL-SCNC: 3.9 MMOL/L (ref 0.5–2)
LDH FLD L TO P-CCNC: NORMAL U/L
LYMPHOCYTES # BLD AUTO: 2.46 THOUSAND/UL (ref 0.6–4.47)
LYMPHOCYTES # BLD AUTO: 5 % (ref 14–44)
MAGNESIUM SERPL-MCNC: 1.9 MG/DL (ref 1.6–2.6)
MCH RBC QN AUTO: 32.5 PG (ref 26.8–34.3)
MCHC RBC AUTO-ENTMCNC: 34.2 G/DL (ref 31.4–37.4)
MCV RBC AUTO: 95 FL (ref 82–98)
METAMYELOCYTES NFR BLD MANUAL: 2 % (ref 0–1)
MONOCYTES # BLD AUTO: 2.46 THOUSAND/UL (ref 0–1.22)
MONOCYTES NFR BLD: 5 % (ref 4–12)
NEUTROPHILS # BLD MANUAL: 42.8 THOUSAND/UL (ref 1.85–7.62)
NEUTS BAND NFR BLD MANUAL: 12 % (ref 0–8)
NEUTS SEG NFR BLD AUTO: 75 % (ref 43–75)
NON VENT HFNC FIO2: 45
NON VENT HFNC FIO2: 75
NON VENT TYPE HFNC: ABNORMAL
NON VENT TYPE HFNC: ABNORMAL
NRBC BLD AUTO-RTO: 0 /100 WBCS
O2 CT BLDA-SCNC: 22.3 ML/DL (ref 16–23)
O2 CT BLDA-SCNC: 22.9 ML/DL (ref 16–23)
OXYHGB MFR BLDA: 94.6 % (ref 94–97)
OXYHGB MFR BLDA: 96.4 % (ref 94–97)
PCO2 BLDA: 19.9 MM HG (ref 36–44)
PCO2 BLDA: 23.3 MM HG (ref 36–44)
PH BLDA: 7.36 [PH] (ref 7.35–7.45)
PH BLDA: 7.36 [PH] (ref 7.35–7.45)
PHOSPHATE SERPL-MCNC: 4.4 MG/DL (ref 2.3–4.1)
PHOSPHATE SERPL-MCNC: 4.9 MG/DL (ref 2.3–4.1)
PLATELET # BLD AUTO: 300 THOUSANDS/UL (ref 149–390)
PLATELET BLD QL SMEAR: ADEQUATE
PMV BLD AUTO: 9.5 FL (ref 8.9–12.7)
PO2 BLDA: 79 MM HG (ref 75–129)
PO2 BLDA: 93.1 MM HG (ref 75–129)
POTASSIUM SERPL-SCNC: 3.9 MMOL/L (ref 3.5–5.3)
POTASSIUM SERPL-SCNC: 4 MMOL/L (ref 3.5–5.3)
PROCALCITONIN SERPL-MCNC: 6.55 NG/ML
PROCALCITONIN SERPL-MCNC: 7.02 NG/ML
PROT FLD-MCNC: 5.1 G/DL
PROT SERPL-MCNC: 6 G/DL (ref 6.4–8.2)
PROT SERPL-MCNC: 6.6 G/DL (ref 6.4–8.2)
RBC # BLD AUTO: 4.98 MILLION/UL (ref 3.88–5.62)
SODIUM 24H UR-SCNC: 18 MOL/L
SODIUM SERPL-SCNC: 128 MMOL/L (ref 136–145)
SODIUM SERPL-SCNC: 130 MMOL/L (ref 136–145)
SPECIMEN SOURCE: ABNORMAL
SPECIMEN SOURCE: ABNORMAL
TOTAL CELLS COUNTED SPEC: 100
VARIANT LYMPHS # BLD AUTO: 1 %
WBC # BLD AUTO: 49.19 THOUSAND/UL (ref 4.31–10.16)

## 2019-12-04 PROCEDURE — 87070 CULTURE OTHR SPECIMN AEROBIC: CPT | Performed by: PHYSICIAN ASSISTANT

## 2019-12-04 PROCEDURE — 85730 THROMBOPLASTIN TIME PARTIAL: CPT | Performed by: NURSE PRACTITIONER

## 2019-12-04 PROCEDURE — 4A133B1 MONITORING OF ARTERIAL PRESSURE, PERIPHERAL, PERCUTANEOUS APPROACH: ICD-10-PCS | Performed by: ANESTHESIOLOGY

## 2019-12-04 PROCEDURE — 99223 1ST HOSP IP/OBS HIGH 75: CPT | Performed by: INTERNAL MEDICINE

## 2019-12-04 PROCEDURE — 83735 ASSAY OF MAGNESIUM: CPT | Performed by: PHYSICIAN ASSISTANT

## 2019-12-04 PROCEDURE — 87081 CULTURE SCREEN ONLY: CPT | Performed by: PHYSICIAN ASSISTANT

## 2019-12-04 PROCEDURE — 82805 BLOOD GASES W/O2 SATURATION: CPT | Performed by: PHYSICIAN ASSISTANT

## 2019-12-04 PROCEDURE — 4A133J1 MONITORING OF ARTERIAL PULSE, PERIPHERAL, PERCUTANEOUS APPROACH: ICD-10-PCS | Performed by: ANESTHESIOLOGY

## 2019-12-04 PROCEDURE — 03HY32Z INSERTION OF MONITORING DEVICE INTO UPPER ARTERY, PERCUTANEOUS APPROACH: ICD-10-PCS | Performed by: ANESTHESIOLOGY

## 2019-12-04 PROCEDURE — 87205 SMEAR GRAM STAIN: CPT | Performed by: PHYSICIAN ASSISTANT

## 2019-12-04 PROCEDURE — 94760 N-INVAS EAR/PLS OXIMETRY 1: CPT

## 2019-12-04 PROCEDURE — 85007 BL SMEAR W/DIFF WBC COUNT: CPT | Performed by: PHYSICIAN ASSISTANT

## 2019-12-04 PROCEDURE — 87147 CULTURE TYPE IMMUNOLOGIC: CPT | Performed by: PHYSICIAN ASSISTANT

## 2019-12-04 PROCEDURE — 71250 CT THORAX DX C-: CPT

## 2019-12-04 PROCEDURE — NC001 PR NO CHARGE: Performed by: ANESTHESIOLOGY

## 2019-12-04 PROCEDURE — 71045 X-RAY EXAM CHEST 1 VIEW: CPT

## 2019-12-04 PROCEDURE — 36620 INSERTION CATHETER ARTERY: CPT | Performed by: PHYSICIAN ASSISTANT

## 2019-12-04 PROCEDURE — 99292 CRITICAL CARE ADDL 30 MIN: CPT | Performed by: ANESTHESIOLOGY

## 2019-12-04 PROCEDURE — 85027 COMPLETE CBC AUTOMATED: CPT | Performed by: PHYSICIAN ASSISTANT

## 2019-12-04 PROCEDURE — 94660 CPAP INITIATION&MGMT: CPT

## 2019-12-04 PROCEDURE — 82330 ASSAY OF CALCIUM: CPT | Performed by: PHYSICIAN ASSISTANT

## 2019-12-04 PROCEDURE — 0W9930Z DRAINAGE OF RIGHT PLEURAL CAVITY WITH DRAINAGE DEVICE, PERCUTANEOUS APPROACH: ICD-10-PCS | Performed by: ANESTHESIOLOGY

## 2019-12-04 PROCEDURE — 84100 ASSAY OF PHOSPHORUS: CPT | Performed by: PHYSICIAN ASSISTANT

## 2019-12-04 PROCEDURE — 83605 ASSAY OF LACTIC ACID: CPT | Performed by: PHYSICIAN ASSISTANT

## 2019-12-04 PROCEDURE — NC001 PR NO CHARGE: Performed by: PHYSICIAN ASSISTANT

## 2019-12-04 PROCEDURE — 74176 CT ABD & PELVIS W/O CONTRAST: CPT

## 2019-12-04 PROCEDURE — 99291 CRITICAL CARE FIRST HOUR: CPT | Performed by: ANESTHESIOLOGY

## 2019-12-04 PROCEDURE — 32551 INSERTION OF CHEST TUBE: CPT | Performed by: ANESTHESIOLOGY

## 2019-12-04 PROCEDURE — 87186 SC STD MICRODIL/AGAR DIL: CPT | Performed by: PHYSICIAN ASSISTANT

## 2019-12-04 PROCEDURE — 84145 PROCALCITONIN (PCT): CPT | Performed by: PHYSICIAN ASSISTANT

## 2019-12-04 PROCEDURE — 80053 COMPREHEN METABOLIC PANEL: CPT | Performed by: PHYSICIAN ASSISTANT

## 2019-12-04 PROCEDURE — 83605 ASSAY OF LACTIC ACID: CPT | Performed by: ANESTHESIOLOGY

## 2019-12-04 RX ORDER — POTASSIUM CHLORIDE 14.9 MG/ML
20 INJECTION INTRAVENOUS ONCE
Status: COMPLETED | OUTPATIENT
Start: 2019-12-04 | End: 2019-12-04

## 2019-12-04 RX ORDER — DIGOXIN 0.25 MG/ML
250 INJECTION INTRAMUSCULAR; INTRAVENOUS ONCE
Status: COMPLETED | OUTPATIENT
Start: 2019-12-04 | End: 2019-12-04

## 2019-12-04 RX ORDER — CEFAZOLIN SODIUM 1 G/50ML
1000 SOLUTION INTRAVENOUS EVERY 8 HOURS
Status: DISCONTINUED | OUTPATIENT
Start: 2019-12-04 | End: 2019-12-06

## 2019-12-04 RX ORDER — MIDAZOLAM HYDROCHLORIDE 2 MG/2ML
INJECTION, SOLUTION INTRAMUSCULAR; INTRAVENOUS
Status: COMPLETED
Start: 2019-12-04 | End: 2019-12-04

## 2019-12-04 RX ORDER — ALBUMIN, HUMAN INJ 5% 5 %
12.5 SOLUTION INTRAVENOUS ONCE
Status: COMPLETED | OUTPATIENT
Start: 2019-12-04 | End: 2019-12-04

## 2019-12-04 RX ORDER — FENTANYL CITRATE 50 UG/ML
INJECTION, SOLUTION INTRAMUSCULAR; INTRAVENOUS
Status: COMPLETED
Start: 2019-12-04 | End: 2019-12-04

## 2019-12-04 RX ORDER — CLINDAMYCIN PHOSPHATE 900 MG/50ML
900 INJECTION INTRAVENOUS EVERY 8 HOURS
Status: COMPLETED | OUTPATIENT
Start: 2019-12-04 | End: 2019-12-06

## 2019-12-04 RX ORDER — MIDAZOLAM HYDROCHLORIDE 2 MG/2ML
1 INJECTION, SOLUTION INTRAMUSCULAR; INTRAVENOUS ONCE
Status: COMPLETED | OUTPATIENT
Start: 2019-12-04 | End: 2019-12-04

## 2019-12-04 RX ORDER — LEVOTHYROXINE SODIUM 0.05 MG/1
75 TABLET ORAL
COMMUNITY

## 2019-12-04 RX ORDER — HEPARIN SODIUM 10000 [USP'U]/100ML
3-20 INJECTION, SOLUTION INTRAVENOUS
Status: DISCONTINUED | OUTPATIENT
Start: 2019-12-04 | End: 2019-12-11 | Stop reason: HOSPADM

## 2019-12-04 RX ORDER — DIGOXIN 0.25 MG/ML
125 INJECTION INTRAMUSCULAR; INTRAVENOUS EVERY 6 HOURS
Status: COMPLETED | OUTPATIENT
Start: 2019-12-04 | End: 2019-12-04

## 2019-12-04 RX ORDER — HYDROCHLOROTHIAZIDE 25 MG/1
25 TABLET ORAL DAILY
Status: ON HOLD | COMMUNITY
End: 2021-10-31 | Stop reason: CLARIF

## 2019-12-04 RX ORDER — LIDOCAINE HYDROCHLORIDE 10 MG/ML
INJECTION, SOLUTION EPIDURAL; INFILTRATION; INTRACAUDAL; PERINEURAL
Status: COMPLETED
Start: 2019-12-04 | End: 2019-12-04

## 2019-12-04 RX ORDER — FENTANYL CITRATE 50 UG/ML
50 INJECTION, SOLUTION INTRAMUSCULAR; INTRAVENOUS ONCE
Status: COMPLETED | OUTPATIENT
Start: 2019-12-04 | End: 2019-12-04

## 2019-12-04 RX ORDER — LIDOCAINE HYDROCHLORIDE 10 MG/ML
INJECTION, SOLUTION EPIDURAL; INFILTRATION; INTRACAUDAL; PERINEURAL
Status: DISPENSED
Start: 2019-12-04 | End: 2019-12-05

## 2019-12-04 RX ORDER — AMLODIPINE BESYLATE 10 MG/1
10 TABLET ORAL DAILY
Status: ON HOLD | COMMUNITY
End: 2021-10-31 | Stop reason: CLARIF

## 2019-12-04 RX ORDER — OMEGA-3-ACID ETHYL ESTERS 1 G/1
2 CAPSULE, LIQUID FILLED ORAL 2 TIMES DAILY
Status: ON HOLD | COMMUNITY
End: 2019-12-12 | Stop reason: ALTCHOICE

## 2019-12-04 RX ORDER — HEPARIN SODIUM 1000 [USP'U]/ML
4000 INJECTION, SOLUTION INTRAVENOUS; SUBCUTANEOUS AS NEEDED
Status: DISCONTINUED | OUTPATIENT
Start: 2019-12-04 | End: 2019-12-11 | Stop reason: HOSPADM

## 2019-12-04 RX ORDER — HEPARIN SODIUM 1000 [USP'U]/ML
2000 INJECTION, SOLUTION INTRAVENOUS; SUBCUTANEOUS AS NEEDED
Status: DISCONTINUED | OUTPATIENT
Start: 2019-12-04 | End: 2019-12-11 | Stop reason: HOSPADM

## 2019-12-04 RX ORDER — METOPROLOL SUCCINATE 50 MG/1
25 TABLET, EXTENDED RELEASE ORAL 2 TIMES DAILY
COMMUNITY

## 2019-12-04 RX ORDER — MAGNESIUM SULFATE HEPTAHYDRATE 40 MG/ML
2 INJECTION, SOLUTION INTRAVENOUS ONCE
Status: COMPLETED | OUTPATIENT
Start: 2019-12-04 | End: 2019-12-04

## 2019-12-04 RX ADMIN — DIGOXIN 125 MCG: 0.25 INJECTION INTRAMUSCULAR; INTRAVENOUS at 21:06

## 2019-12-04 RX ADMIN — CHLORHEXIDINE GLUCONATE 0.12% ORAL RINSE 15 ML: 1.2 LIQUID ORAL at 10:00

## 2019-12-04 RX ADMIN — SODIUM CHLORIDE 1000 ML: 0.9 INJECTION, SOLUTION INTRAVENOUS at 07:03

## 2019-12-04 RX ADMIN — FENTANYL CITRATE 50 MCG: 50 INJECTION, SOLUTION INTRAMUSCULAR; INTRAVENOUS at 17:18

## 2019-12-04 RX ADMIN — CLINDAMYCIN PHOSPHATE 900 MG: 900 INJECTION, SOLUTION INTRAVENOUS at 20:19

## 2019-12-04 RX ADMIN — POTASSIUM CHLORIDE 20 MEQ: 14.9 INJECTION, SOLUTION INTRAVENOUS at 01:57

## 2019-12-04 RX ADMIN — SENNOSIDES AND DOCUSATE SODIUM 1 TABLET: 8.6; 5 TABLET ORAL at 21:06

## 2019-12-04 RX ADMIN — DIGOXIN 250 MCG: 0.25 INJECTION INTRAMUSCULAR; INTRAVENOUS at 10:02

## 2019-12-04 RX ADMIN — CALCIUM GLUCONATE 3 G: 98 INJECTION, SOLUTION INTRAVENOUS at 01:57

## 2019-12-04 RX ADMIN — METRONIDAZOLE 500 MG: 500 INJECTION, SOLUTION INTRAVENOUS at 10:05

## 2019-12-04 RX ADMIN — CHLORHEXIDINE GLUCONATE 0.12% ORAL RINSE 15 ML: 1.2 LIQUID ORAL at 21:06

## 2019-12-04 RX ADMIN — CEFAZOLIN SODIUM 1000 MG: 1 SOLUTION INTRAVENOUS at 11:58

## 2019-12-04 RX ADMIN — MAGNESIUM SULFATE HEPTAHYDRATE 2 G: 40 INJECTION, SOLUTION INTRAVENOUS at 07:33

## 2019-12-04 RX ADMIN — CLINDAMYCIN PHOSPHATE 900 MG: 900 INJECTION, SOLUTION INTRAVENOUS at 13:08

## 2019-12-04 RX ADMIN — DIGOXIN 125 MCG: 0.25 INJECTION INTRAMUSCULAR; INTRAVENOUS at 17:15

## 2019-12-04 RX ADMIN — ALBUMIN (HUMAN) 12.5 G: 12.5 SOLUTION INTRAVENOUS at 02:53

## 2019-12-04 RX ADMIN — SODIUM CHLORIDE, SODIUM GLUCONATE, SODIUM ACETATE, POTASSIUM CHLORIDE, MAGNESIUM CHLORIDE, SODIUM PHOSPHATE, DIBASIC, AND POTASSIUM PHOSPHATE 125 ML/HR: .53; .5; .37; .037; .03; .012; .00082 INJECTION, SOLUTION INTRAVENOUS at 19:35

## 2019-12-04 RX ADMIN — CEFAZOLIN SODIUM 1000 MG: 1 SOLUTION INTRAVENOUS at 19:32

## 2019-12-04 RX ADMIN — LIDOCAINE HYDROCHLORIDE 20 MG: 10 INJECTION, SOLUTION EPIDURAL; INFILTRATION; INTRACAUDAL; PERINEURAL at 16:45

## 2019-12-04 RX ADMIN — HEPARIN SODIUM AND DEXTROSE 11.8 UNITS/KG/HR: 10000; 5 INJECTION INTRAVENOUS at 10:12

## 2019-12-04 RX ADMIN — MIDAZOLAM HYDROCHLORIDE 1 MG: 2 INJECTION, SOLUTION INTRAMUSCULAR; INTRAVENOUS at 17:17

## 2019-12-04 RX ADMIN — SODIUM CHLORIDE, SODIUM GLUCONATE, SODIUM ACETATE, POTASSIUM CHLORIDE, MAGNESIUM CHLORIDE, SODIUM PHOSPHATE, DIBASIC, AND POTASSIUM PHOSPHATE 125 ML/HR: .53; .5; .37; .037; .03; .012; .00082 INJECTION, SOLUTION INTRAVENOUS at 11:10

## 2019-12-04 RX ADMIN — MIDAZOLAM 1 MG: 1 INJECTION INTRAMUSCULAR; INTRAVENOUS at 17:17

## 2019-12-04 NOTE — RESPIRATORY THERAPY NOTE
RT Ventilator Management Note  Raina Hardin 82 y o  male MRN: [id number]  Unit/Bed#:  Encounter: [id number]      Daily Screen     No data found in the last 10 encounters  Physical Exam:          Resp Comments: pt remains on HFNC  found on 75% and 45L  No changes made at this time

## 2019-12-04 NOTE — RESPIRATORY THERAPY NOTE
Pt continues on HFNC of 70% and 45L, pt has not been weaned due to SpO2 of 94  Pt to go to CAT scan today and will have a chest xray at 0600  Continue to wean as tolerated         12/04/19 0420   Non-Invasive Information   Interface HFNC prongs   Non-Invasive Ventilation Mode HFNC   $ Pulse Oximetry Spot Check Charge Completed   Non-Invasive Settings   FiO2 (%) 70   Flow (lpm) 45   Temperature (Set) 31   Non-Invasive Readings   Heater Temperature (Obs) 31   Skin Intervention Skin intact

## 2019-12-04 NOTE — UTILIZATION REVIEW
Initial Clinical Review    Admission: Date/Time/Statement: Inpatient Admission Orders (From admission, onward)     Ordered        12/03/19 1748  Inpatient Admission  Once                   Orders Placed This Encounter   Procedures    Inpatient Admission     Standing Status:   Standing     Number of Occurrences:   1     Order Specific Question:   Admitting Physician     Answer:   Forest Watts [[id number]]     Order Specific Question:   Level of Care     Answer:   Critical Care [15]     Order Specific Question:   Estimated length of stay     Answer:   More than 2 Midnights     Order Specific Question:   Certification     Answer:   I certify that inpatient services are medically necessary for this patient for a duration of greater than two midnights  See H&P and MD Progress Notes for additional information about the patient's course of treatment  ED Arrival Information     Expected Arrival Acuity Means of Arrival Escorted By Service Admission Type    - 12/3/2019 16:07 Immediate Ambulance [address] Emergency    Arrival Complaint    SOB        Chief Complaint   Patient presents with    Shortness of Breath     pt was c/o cough, SOB for past few weeks  Pt went to PCP today and was reported to have "blue extremities" with 02 sat in low 80's on RA, with "HR in 160s" pt has hx of Afib      Assessment/Plan: 81 yo male to ED from home via EMS  w/ resp distress and septic shock   He had a large R pleural effusion for which bedside thoracentesis was performed that revealed dark yellow purulent fluid concerning for infection  Reports SOB and cough for 2 weeks  Admitted IP status to critical care unit w/ plueral effusion , acute resp failure, SHREYA and + d dimer  Will repeat CXR in am , tele, replete mg and K and monitor       PE : distressed, dry mm , scattered rhonchi   ED Triage Vitals   Temperature Pulse Respirations Blood Pressure SpO2   12/03/19 1619 12/03/19 1619 12/03/19 1619 12/03/19 1619 12/03/19 1619   (!) 96 7 °F (35 9 °C) (!) 127 (!) 28 113/73 (!) 88 %      Temp Source Heart Rate Source Patient Position - Orthostatic VS BP Location FiO2 (%)   12/03/19 1619 12/03/19 1619 12/03/19 1619 12/03/19 1619 12/03/19 1657   Rectal Monitor Lying Left arm 90      Pain Score       --               Wt Readings from Last 1 Encounters:   12/03/19 85 9 kg (189 lb 6 oz)     Additional Vital Signs:   12/04/19 1000  97 9 °F (36 6 °C)  128Abnormal   30Abnormal       103/65  77 mmHg  94 %       12/04/19 0800  97 5 °F (36 4 °C)  131Abnormal   25Abnormal   122/60  83  82/51  64 mmHg  93 %       12/04/19 0759  96 5 °F (35 8 °C)Abnormal   128Abnormal   24Abnormal   123/66        94 %  Simple mask  Lying   12/04/19 0754                93 %       12/04/19 0700  98 2 °F (36 8 °C)  139Abnormal   30Abnormal   109/58  77  103/63  77 mmHg  93 %       12/04/19 0600  98 2 °F (36 8 °C)  130Abnormal   28Abnormal   90/69  77  109/64  78 mmHg  94 %       12/04/19 0500  98 4 °F (36 9 °C)  140Abnormal   28Abnormal   101/63  77  95/58  68 mmHg  94 %       12/04/19 0400  97 9 °F (36 6 °C)  140Abnormal   30Abnormal       104/63  75 mmHg  90 %       12/04/19 0300  98 4 °F (36 9 °C)  139Abnormal   27Abnormal   94/76  81  88/52  63 mmHg  94 %       12/04/19 0200  98 4 °F (36 9 °C)  141Abnormal   29Abnormal       86/50  61 mmHg  94 %       12/04/19 0100  98 4 °F (36 9 °C)  141Abnormal   33Abnormal       81/43  55 mmHg  92 %       12/04/19 0000  99 °F (37 2 °C)  136Abnormal   30Abnormal       85/47  57 mmHg  92 %       12/03/19 2300  98 8 °F (37 1 °C)  143Abnormal   33Abnormal   90/49Abnormal         93 %       12/03/19 2200  98 8 °F (37 1 °C)  147Abnormal   35Abnormal   96/50  62      91 %       12/03/19 2100  98 8 °F (37 1 °C)  144Abnormal   32Abnormal   85/66Abnormal   72      92 %       12/03/19 2045  98 8 °F (37 1 °C)  147Abnormal   32Abnormal   74/55Abnormal   60      93 %     12/03/19 2031                93 %       12/03/19 1945    137Abnormal   28Abnormal   87/59Abnormal         98 %  High flow nasal cannula  Sitting   12/03/19 1856    139Abnormal   27Abnormal   91/56        97 %  None (Room air)  Sitting   12/03/19 1854    151Abnormal   28Abnormal   76/52Abnormal          97 %  High flow nasal cannula  Sitting   BP: pt's BP fluctuating  Dr Jairo Byrd made aware of low BP at this time   at 12/03/19 1854   12/03/19 1845    144Abnormal   26Abnormal   101/61        96 %  High flow nasal cannula  Sitting   12/03/19 1830    152Abnormal   28Abnormal   106/64        96 %  High flow nasal cannula  Sitting   12/03/19 18:18:24  97 °F (36 1 °C)Abnormal   144Abnormal   26Abnormal   104/60        96 %  High flow nasal cannula  Sitting   12/03/19 18:18:07    158Abnormal   23Abnormal                  12/03/19 18:15:07    148Abnormal   31Abnormal                  12/03/19 18:12:07    147Abnormal   35Abnormal                  12/03/19 18:09:07    142Abnormal   32Abnormal                  12/03/19 18:06:07    145Abnormal   34Abnormal                  12/03/19 18:02:47    145Abnormal   32Abnormal                  12/03/19 18:00:07    142Abnormal   33Abnormal                  12/03/19 1800        83/66Abnormal                12/03/19 17:56:47    152Abnormal   35Abnormal                  12/03/19 17:54:07    153Abnormal   32Abnormal     Gurpreet Cavazos       12/03/19 1754        83/62Abnormal                12/03/19 17:51:17    144Abnormal   33Abnormal                  12/03/19 17:48:47    145Abnormal   35Abnormal                  12/03/19 1746        84/55Abnormal                12/03/19 17:45:07    149Abnormal   34Abnormal                  12/03/19 17:42:07    155Abnormal   33Abnormal                  12/03/19 17:39:07    153Abnormal   35Abnormal      Carmen Groom       12/03/19 17:38:26    143Abnormal   28Abnormal   118/68        93 %       12/03/19 17:36:17    149Abnormal   32Abnormal                  12/03/19 17:33:47    153Abnormal   33Abnormal           93 %       12/03/19 1731        120/78        94 %  High flow nasal cannula  Sitting   12/03/19 17:30:07    150Abnormal   33Abnormal                  12/03/19 17:27:07    150Abnormal   30Abnormal     Carmen Groom         12/03/19 1657    145Abnormal   26Abnormal   97/57        91 %  High flow nasal cannula  Sitting   12/03/19 1630                           Pertinent Labs/Diagnostic Test Results:   12/3 PCXR Large right-sided pleural effusion with subjacent consolidation  12/3 CXR - 1  No significant pneumothorax  2   Decrease in size of right-sided pleural effusion and improved pulmonary vascular congestion  3   Improvement in right lower lung field opacity  4   Slightly increased opacity at the left lung base may be due to atelectasis or trace left pleural effusion    12/4 CXR - No significant interval change since yesterday   12/4 CT abd - pending   12/3 EKG- afib w    RVR   12/3 Thoracentesis 1200 ml removed   Results from last 7 days   Lab Units 12/04/19  0435 12/03/19  1653 12/03/19  1640   WBC Thousand/uL 49 19*  --  57 70*   HEMOGLOBIN g/dL 16 2  --  17 8*   I STAT HEMOGLOBIN g/dl  --  17 7*  --    HEMATOCRIT % 47 4  --  54 9*   HEMATOCRIT, ISTAT %  --  52*  --    PLATELETS Thousands/uL 300  --  342   BANDS PCT % 12*  --  28*     Results from last 7 days   Lab Units 12/04/19  0435 12/03/19  2348 12/03/19  1653 12/03/19  1640   SODIUM mmol/L 130* 128*  --  128*   POTASSIUM mmol/L 4 0 3 9  --  4 2   CHLORIDE mmol/L 98* 96*  --  90*   CO2 mmol/L 14* 13*  --  19*   CO2, I-STAT mmol/L  --   --  12*  --    ANION GAP mmol/L 18* 19*  --  19*   BUN mg/dL 59* 59*  --  55*   CREATININE mg/dL 3 02* 3 26*  --  3 87*   EGFR ml/min/1 73sq m 18 17  --  14   CALCIUM mg/dL 8  2* 7 7*  --  8 9   CALCIUM, IONIZED mmol/L 1 11* 0 93*  --   --    CALCIUM, IONIZED, ISTAT mmol/L  --   --  1 03*  --    MAGNESIUM mg/dL 1 9  --   --  2 2   PHOSPHORUS mg/dL 4 4* 4 9*  --   --      Results from last 7 days   Lab Units 12/04/19  0435 12/03/19  2348 12/03/19  1640   AST U/L 19 22 24   ALT U/L 11* 15 18   ALK PHOS U/L 131* 153* 186*   TOTAL PROTEIN g/dL 6 0* 6 6 7 6   ALBUMIN g/dL 1 8* 1 8* 2 1*   TOTAL BILIRUBIN mg/dL 2 50* 2 70* 2 60*   BILIRUBIN DIRECT mg/dL  --   --  2 03*     Results from last 7 days   Lab Units 12/04/19 0435 12/03/19  2348 12/03/19  1640   GLUCOSE RANDOM mg/dL 87 107 120      Results from last 7 days   Lab Units 12/04/19  0550 12/04/19  0209   PH ART  7 361 7 363   PCO2 ART mm Hg 19 9* 23 3*   PO2 ART mm Hg 93 1 79 0   HCO3 ART mmol/L 11 0* 13 0*   BASE EXC ART mmol/L -11 6 -9 9   O2 CONTENT ART mL/dL 22 3 22 9   O2 HGB, ARTERIAL % 96 4 94 6   ABG SOURCE  Line, Arterial Line, Arterial   NON VENT TYPE HFNC  HFNC Flow HFNC Flow   HFNC FLOW LPM  45 70     Results from last 7 days   Lab Units 12/03/19  1653   PH, OLIMPIA I-STAT  7 356   PCO2, OLIMPIA ISTAT mm HG 20 7*   PO2, OLIMPIA ISTAT mm  0*   HCO3, OLIMPIA ISTAT mmol/L 11 6*   I STAT BASE EXC mmol/L -11*   I STAT O2 SAT % 100*     Results from last 7 days   Lab Units 12/03/19  1640   TROPONIN I ng/mL <0 02     Results from last 7 days   Lab Units 12/03/19  1647   D-DIMER QUANTITATIVE ug/ml FEU 10 00*     Results from last 7 days   Lab Units 12/03/19  1640   PROTIME seconds 23 7*   INR  2 09*   PTT seconds 41*     Results from last 7 days   Lab Units 12/04/19  0435 12/03/19  2348   PROCALCITONIN ng/ml 6 55* 7 02*     Results from last 7 days   Lab Units 12/04/19  0435 12/04/19  0209 12/03/19  2348 12/03/19  1952 12/03/19  1640   LACTIC ACID mmol/L 3 4* 3 8* 3 9* 4 6* 6 7*     Results from last 7 days   Lab Units 12/03/19  1640   NT-PRO BNP pg/mL 17,738*     Results from last 7 days   Lab Units 12/03/19  2126   CLARITY UA  Cloudy   COLOR UA  Shae   SPEC GRAV UA  >=1 030   PH UA  5 5   GLUCOSE UA mg/dl Negative   KETONES UA mg/dl Trace*   BLOOD UA  Large*   PROTEIN UA mg/dl 100 (2+)*   NITRITE UA  Negative   BILIRUBIN UA  Moderate*   UROBILINOGEN UA E U /dl 1 0   LEUKOCYTES UA  Trace*   WBC UA /hpf 20-30*   RBC UA /hpf 4-10*   BACTERIA UA /hpf Occasional   EPITHELIAL CELLS WET PREP /hpf Occasional   MUCUS THREADS  Occasional*   SODIUM UR  18   CREATININE UR mg/dL 258 0     Results from last 7 days   Lab Units 12/03/19  2126 12/03/19  1843   STREP PNEUMONIAE ANTIGEN, URINE  Negative  --    LEGIONELLA URINARY ANTIGEN  Negative  --    INFLUENZA A PCR   --  None Detected   RSV PCR   --  None Detected     Results from last 7 days   Lab Units 12/03/19  1823 12/03/19  1646 12/03/19  1640   BLOOD CULTURE   --  Received in Microbiology Lab  Culture in Progress  Received in Microbiology Lab  Culture in Progress     GRAM STAIN RESULT  3+ Polys*  4+ Gram positive cocci in chains*  --   --      Results from last 7 days   Lab Units 12/04/19  0435 12/03/19  1811 12/03/19  1640   TOTAL COUNTED  100 100 100   WBC FLUID /ul  --  71,653  --        ED Treatment:   Medication Administration from 12/03/2019 1607 to 12/03/2019 2041       Date/Time Order Dose Route Action     12/03/2019 1642 sodium chloride 0 9 % bolus 1,000 mL 1,000 mL Intravenous New Bag     12/03/2019 1823 cefepime (MAXIPIME) 2,000 mg in dextrose 5 % 50 mL IVPB 2,000 mg Intravenous New Bag     12/03/2019 1856 vancomycin (VANCOCIN) 1,250 mg in sodium chloride 0 9 % 250 mL IVPB 1,250 mg Intravenous New Bag     12/03/2019 1826 sodium chloride 0 9 % bolus 2,000 mL 2,000 mL Intravenous New Bag        Past Medical History:   Diagnosis Date    [facility]     Disease of thyroid gland     Hyperlipidemia     Hypertension     Optic neuropathy      Present on Admission:   Acute respiratory failure with hypoxia (HCC)      Admitting Diagnosis: Hyponatremia [E87 1]  Pneumonia [J18 9]  Respiratory distress [R06 03]  SOB (shortness of breath) [R06 02]  Rapid atrial fibrillation (HCC) [I48 91]  Pleural effusion on right [J90]  Septic shock (HCC) [A41 9, R65 21]  Age/Sex: 82 y o  male  Admission Orders:  Scheduled Medications:    Medications:  cefepime 2,000 mg Intravenous Q24H   chlorhexidine 15 mL Swish & Spit Q12H [facility]   digoxin 125 mcg Intravenous Q6H   metroNIDAZOLE 500 mg Intravenous Q8H   senna-docusate sodium 1 tablet Oral HS   vancomycin 10 mg/kg Intravenous Q24H     Continuous IV Infusions:    heparin (porcine) 3-20 Units/kg/hr (Order-Specific) Intravenous Titrated   multi-electrolyte 125 mL/hr Intravenous Continuous   norepinephrine 1-30 mcg/min Intravenous Titrated     PRN Meds:    heparin (porcine) 2,000 Units Intravenous PRN   heparin (porcine) 4,000 Units Intravenous PRN     Clear liq diet   A-line  SCD  Neuro checks q4hr   I&O   Fall precautions  Tele   IP CONSULT TO CASE MANAGEMENT  IP CONSULT TO PHARMACY  IP CONSULT TO INFECTIOUS DISEASES    Network Utilization Review Department  [email or web address]  ATTENTION: Please call with any questions or concerns to [phone] and carefully listen to the prompts so that you are directed to the right person  All voicemails are confidential   Dmitri Castro all requests for admission clinical reviews, approved or denied determinations and any other requests to dedicated fax number below belonging to the campus where the patient is receiving treatment   List of dedicated fax numbers for the Facilities:  FACILITY NAME UR FAX NUMBER   ADMISSION DENIALS (Administrative/Medical Necessity) [phone]   PARENT CHILD HEALTH (Maternity/NICU/Pediatrics) [phone]   Krystal Jones [phone]   Philip Siddiqui [phone]   Van Love [address] [place] [address] [address] [phone] [address] [phone]

## 2019-12-04 NOTE — PLAN OF CARE
Problem: Prexisting or High Potential for Compromised Skin Integrity  Goal: Skin integrity is maintained or improved  Description  INTERVENTIONS:  - Identify patients at risk for skin breakdown  - Assess and monitor skin integrity  - Assess and monitor nutrition and hydration status  - Monitor labs   - Assess for incontinence   - Turn and reposition patient  - Assist with mobility/ambulation  - Relieve pressure over bony prominences  - Avoid friction and shearing  - Provide appropriate hygiene as needed including keeping skin clean and dry  - Evaluate need for skin moisturizer/barrier cream  - Collaborate with interdisciplinary team   - Patient/family teaching  - Consider wound care consult   Outcome: Progressing     Problem: Potential for Falls  Goal: Patient will remain free of falls  Description  INTERVENTIONS:  - Assess patient frequently for physical needs  -  Identify cognitive and physical deficits and behaviors that affect risk of falls    -  [place] fall precautions as indicated by assessment   - Educate patient/family on patient safety including physical limitations  - Instruct patient to call for assistance with activity based on assessment  - Modify environment to reduce risk of injury  - Consider OT/PT consult to assist with strengthening/mobility  Outcome: Progressing     Problem: CARDIOVASCULAR - ADULT  Goal: Maintains optimal cardiac output and hemodynamic stability  Description  INTERVENTIONS:  - Monitor I/O, vital signs and rhythm  - Monitor for S/S and trends of decreased cardiac output  - Administer and titrate ordered vasoactive medications to optimize hemodynamic stability  - Assess quality of pulses, skin color and temperature  - Assess for signs of decreased coronary artery perfusion  - Instruct patient to report change in severity of symptoms  Outcome: Progressing  Goal: Absence of cardiac dysrhythmias or at baseline rhythm  Description  INTERVENTIONS:  - Continuous cardiac monitoring, vital signs, obtain 12 lead EKG if ordered  - Administer antiarrhythmic and heart rate control medications as ordered  - Monitor electrolytes and administer replacement therapy as ordered  Outcome: Progressing     Problem: GENITOURINARY - ADULT  Goal: Maintains or returns to baseline urinary function  Description  INTERVENTIONS:  - Assess urinary function  - Encourage oral fluids to ensure adequate hydration if ordered  - Administer IV fluids as ordered to ensure adequate hydration  - Administer ordered medications as needed  - Offer frequent toileting  - Follow urinary retention protocol if ordered  Outcome: Progressing  Goal: Absence of urinary retention  Description  INTERVENTIONS:  - Assess patients ability to void and empty bladder  - Monitor I/O  - Bladder scan as needed  - Discuss with physician/AP medications to alleviate retention as needed  - Discuss catheterization for long term situations as appropriate  Outcome: Progressing  Goal: Urinary catheter remains patent  Description  INTERVENTIONS:  - Assess patency of urinary catheter  - If patient has a chronic le, consider changing catheter if non-functioning  - Follow guidelines for intermittent irrigation of non-functioning urinary catheter  Outcome: Progressing     Problem: Nutrition/Hydration-ADULT  Goal: Nutrient/Hydration intake appropriate for improving, restoring or maintaining nutritional needs  Description  Monitor and assess patient's nutrition/hydration status for malnutrition  Collaborate with interdisciplinary team and initiate plan and interventions as ordered  Monitor patient's weight and dietary intake as ordered or per policy  Utilize nutrition screening tool and intervene as necessary  Determine patient's food preferences and provide high-protein, high-caloric foods as appropriate       INTERVENTIONS:  - Monitor oral intake, urinary output, labs, and treatment plans  - Assess nutrition and hydration status and recommend course of action  - Evaluate amount of meals eaten  - Assist patient with eating if necessary   - Allow adequate time for meals  - Recommend/ encourage appropriate diets, oral nutritional supplements, and vitamin/mineral supplements  - Order, calculate, and assess calorie counts as needed  - Recommend, monitor, and adjust tube feedings based on assessed needs  - Assess need for intravenous fluids  - Provide nutrition/hydration education as appropriate  - Include patient/family/caregiver in decisions related to nutrition   Outcome: Progressing     Problem: RESPIRATORY - ADULT  Goal: Achieves optimal ventilation and oxygenation  Description  INTERVENTIONS:  - Assess for changes in respiratory status  - Assess for changes in mentation and behavior  - Position to facilitate oxygenation and minimize respiratory effort  - Oxygen administered by appropriate delivery if ordered  - Initiate smoking cessation education as indicated  - Encourage broncho-pulmonary hygiene including cough, deep breathe, Incentive Spirometry  - Assess the need for suctioning and aspirate as needed  - Assess and instruct to report SOB or any respiratory difficulty  - Respiratory Therapy support as indicated  Outcome: Progressing     Problem: HEMATOLOGIC - ADULT  Goal: Maintains hematologic stability  Description  INTERVENTIONS  - Assess for signs and symptoms of bleeding or hemorrhage  - Monitor labs  - Administer supportive blood products/factors as ordered and appropriate  Outcome: Progressing

## 2019-12-04 NOTE — ASSESSMENT & PLAN NOTE
- s/p thoracentesis in ED  - preliminary cx growth w WBCs, bandemia  - repeat CXR in AM  - can obtain CT c/a/p now that respiratory status has improved

## 2019-12-04 NOTE — CONSULTS
Consultation - Infectious Disease   Reymundo Aguirre 82 y o  male MRN: [id number]  Unit/Bed#:  Encounter: [id number]      IMPRESSION & RECOMMENDATIONS:     53-year-old male patient admitted for septic shock secondary to severe pneumonia and right-sided empyema    1- septic shock:  Hypotension, tachycardia and severe leukocytosis on admission, associated with  lactic acidosis and acute kidney injury  Septic shock is likely secondary to severe pneumonia and empyema  Consideration for associated bacteremia  - supportive care as per ICU team  - antibiotics as below  - repeat CBC in a m   - follow-up blood culture result collected 12/03/2019    2- severe GAS pneumonia complicated by empyema:  Two weeks of cough and shortness of breath  Large right-sided pleural effusion seen on chest x-ray on admission  WBC count [id number]  Thoracentesis was diagnostic for empyema; 1200 mL of purulent fluid was removed; fluid WBC count 71,000, LDH more than 10,000, pH 6 3, glucose 5 mg/dL  Fluid culture group A strep  - stop vancomycin, stop cefepime  - start cefazolin 1 g IV q 8h and clindamycin 900 mg IV q 8h  - follow-up blood culture result collected 12/03/2019  - repeat CBC in a m   - close clinical monitoring  - follow-up CT scan chest done today  Pleural fluid analysis shows some indicators that empyema might require drainage and chest tube insertion    Pulmonary follow-up    3- atrial fibrillation:  Patient remains tachycardic at 128/minute this morning  History cardia is related to his sepsis  - continue supportive care  - rate control and anticoagulation per ICU team    4- acute kidney injury  Creatinine now slowly improving, creatinine 3 02 mg/dL  - IV antibiotics doses mentioned above have been adjusted to creatinine clearance  - repeat BMP, avoid nephrotoxic medications      5- acute respiratory failure:  Secondary to problem #1 and 2  - continue antibiotics as above  - supportive care as per ICU team    Plan mentioned above discussed with patient  Case discussed with ICU team        HISTORY OF PRESENT ILLNESS:  Reason for Consult:  Acute respiratory failure, sepsis  HPI: Dianne Guerrero is a 82y o  year old male with history of atrial fibrillation, hypertension, admitted 12/03/2019 for 2 weeks of shortness of breath and fatigue  Patient reports being usually in good health  Two weeks ago started feeling tired, along with shortness of breath and dyspnea on exertion  Symptoms were associated with cough and yellow phlegm production  No preceding URI symptoms  Symptoms progressively worsened  On admission, patient was found tachycardic, hypotensive, with acute kidney injury, and severe leukocytosis  Patient was placed on high-flow nasal cannula, workup revealed a large right-sided pleural effusion  A thoracentesis was done showing pleural effusion consistent with empyema, with a pH of 6 3, WBC count of 71,000, very low glucose at 5 mg/dL, and an LDH more than 10,000  Patient started empirically on vancomycin cefepime, and admitted to the ICU  This morning, patient remains on high-flow nasal cannula  He reports significant improvement compared to yesterday  A CT scan abdomen chest abdomen pelvis was done this morning to evaluate for remaining effusion, fluid loculation, or abscess  Patient is afebrile today, hemodynamically stable, still complaining of cough and phlegm production  Patient lives with his granddaughter  No sick contacts, no recent travel  He recalls taking flu vaccine this year        REVIEW OF SYSTEMS:  A complete 12 point system-based review of systems is negative other than that noted in the HPI  PAST MEDICAL HISTORY:  Past Medical History:   Diagnosis Date    A-fib ([facility] [address] )     Disease of thyroid gland     Hyperlipidemia     Hypertension     Optic neuropathy      History reviewed  No pertinent surgical history      FAMILY HISTORY:  Non-contributory    SOCIAL HISTORY:  Social History   Social History     Substance and Sexual Activity   Alcohol Use Yes    Frequency: Never    Comment: occasional     Social History     Substance and Sexual Activity   Drug Use Never     Social History     Tobacco Use   Smoking Status Former Smoker   Smokeless Tobacco Never Used       ALLERGIES:  No Known Allergies    MEDICATIONS:  All current active medications have been reviewed  PHYSICAL EXAM:  Temp:  [96 5 °F (35 8 °C)-99 °F (37 2 °C)] 97 9 °F (36 6 °C)  HR:  [127-158] 128  Resp:  [23-35] 30  BP: ()/(49-78) 122/60  SpO2:  [88 %-98 %] 94 %  Temp (24hrs), Av 1 °F (36 7 °C), Min:96 5 °F (35 8 °C), Max:99 °F (37 2 °C)  Current: Temperature: 97 9 °F (36 6 °C)    Intake/Output Summary (Last 24 hours) at 2019 1109  Last data filed at 2019 0800  Gross per 24 hour   Intake 6433 75 ml   Output 310 ml   Net 6123 75 ml       General Appearance:  awake and alert, breathing through high-flow nasal cannula, looks tired  Head:  Normocephalic, without obvious abnormality, atraumatic   Eyes:  Conjunctiva pink and sclera anicteric, both eyes   Nose: Nares normal, mucosa normal, no drainage   Throat: Oropharynx moist without lesions   Neck: Supple, symmetrical, no adenopathy, no tenderness/mass/nodules   Back:   Symmetric, no curvature, ROM normal, no CVA tenderness   Lungs:   Decreased air entry at the right base, respirations unlabored   Chest Wall:  No tenderness or deformity   Heart:  RRR; no murmur, rub or gallop   Abdomen:   Soft, non-tender, non-distended, positive bowel sounds    Extremities: No cyanosis, clubbing or edema   Skin: No rashes or lesions  No draining wounds noted  Lymph nodes: Cervical, supraclavicular nodes normal   Neurologic: Alert and oriented times 3, extremity strength 5/5 and symmetric       LABS, IMAGING, & OTHER STUDIES:  Lab Results:  I have personally reviewed pertinent labs    Results from last 7 days   Lab Units 19  0435 19  1653 19  1640   WBC Thousand/uL 49 19*  --  57 70*   HEMOGLOBIN g/dL 16 2  --  17 8*   I STAT HEMOGLOBIN g/dl  --  17 7*  --    PLATELETS Thousands/uL 300  --  342     Results from last 7 days   Lab Units 12/04/19  0435 12/03/19  2348 12/03/19  1653 12/03/19  1640   SODIUM mmol/L 130* 128*  --  128*   POTASSIUM mmol/L 4 0 3 9  --  4 2   CHLORIDE mmol/L 98* 96*  --  90*   CO2 mmol/L 14* 13*  --  19*   CO2, I-STAT mmol/L  --   --  12*  --    BUN mg/dL 59* 59*  --  55*   CREATININE mg/dL 3 02* 3 26*  --  3 87*   EGFR ml/min/1 73sq m 18 17  --  14   GLUCOSE, ISTAT mg/dl  --   --  119  --    CALCIUM mg/dL 8 2* 7 7*  --  8 9   AST U/L 19 22  --  24   ALT U/L 11* 15  --  18   ALK PHOS U/L 131* 153*  --  186*     Results from last 7 days   Lab Units 12/03/19  2126 12/03/19  1823 12/03/19  1646 12/03/19  1640   BLOOD CULTURE   --   --  Received in Microbiology Lab  Culture in Progress  Received in Microbiology Lab  Culture in Progress  GRAM STAIN RESULT   --  3+ Polys*  4+ Gram positive cocci in chains*  --   --    BODY FLUID CULTURE, STERILE   --  4+ Growth of Beta Hemolytic Streptococcus Group A*  --   --    LEGIONELLA URINARY ANTIGEN  Negative  --   --   --      Results from last 7 days   Lab Units 12/04/19  0435 12/03/19  2348   PROCALCITONIN ng/ml 6 55* 7 02*       Imaging Studies:   I have personally reviewed pertinent imaging study reports and images in PACS  Chest x-ray done on admission showing large right-sided pleural effusion and adjacent consolidation    Other Studies:   I have personally reviewed pertinent reports

## 2019-12-04 NOTE — ASSESSMENT & PLAN NOTE
- wean HFNC, currently 70%, 45L  - pulmonary toilet  - respiratory protocol   - accepts intubation if needed

## 2019-12-04 NOTE — UTILIZATION REVIEW
Notification of Inpatient Admission/Inpatient Authorization Request   This is a Notification of Inpatient Admission for [address]  Be advised that this patient was admitted to our facility under Inpatient Status  Contact Rogelio Postal at [phone] for additional admission information  [address] [facility] DEPT DEDICATED Chris Soler [phone]  Patient Name:   Nakia Esquivel   YOB: 1935       [address]   [address]:   [address]    Tax ID: [id number]  NPI: [id number] Attending Provider/NPI: Anat Gonzalez [[id number]]   Place of Service Code: 24     [facility]   Start Date: 12/3/19 1748     Discharge Date & Time: No discharge date for patient encounter  Type of Admission: Inpatient Status Discharge Disposition   (if discharged): Final discharge disposition not confirmed   Patient Diagnoses: Hyponatremia [E87 1]  Pneumonia [J18 9]  Respiratory distress [R06 03]  SOB (shortness of breath) [R06 02]  Rapid atrial fibrillation (HCC) [I48 91]  Pleural effusion on right [J90]  Septic shock ([facility] [address] ) [A41 9, R65 21]     Orders: Admission Orders (From admission, onward)     Ordered        12/03/19 1748  Inpatient Admission  Once                    Assigned Utilization Review Contact: Rogelio Meléndez  Utilization   Network Utilization Review Department  Phone: [phone]; Fax [phone]  Email: [email or web address]   ATTENTION PAYERS: Please call the assigned Utilization  directly with any questions or concerns ALL voicemails in the department are confidential  Send all requests for admission clinical reviews, approved or denied determinations and any other requests to dedicated fax number belonging to the campus where the patient is receiving treatment

## 2019-12-04 NOTE — H&P
History and Physical Gale Sparks 1935, 82 y o  male MRN: [id number]    Unit/Bed#:  Encounter: [id number]    Primary Care Provider: No primary care provider on file  Date and time admitted to hospital: 12/3/2019  4:08 PM    81 yo M w PMH HTN, HLD, AF, thyroid disease admitted to ICU w septic shock, respiratory distress, AF w RVR, new large R pleural effusion s/p bedside thoracentesis       Positive D dimer  Assessment & Plan  - to be suspected given septic shock  - CXR supports etiology for his sx and hypoxia  - at this time hold on CTA given his SHREYA and difficulty to recline flat and tolerate CT     Transaminitis  Assessment & Plan  - monitor LFTs  - likely reactive secondary underlying sepsis  - no abdominal pain c/o or any pain on exam    Chronic atrial fibrillation  Assessment & Plan  - on eliquis per daughter  - hold for now as may require additional thoracentesis  - can transition to heparin gtt if additional procedures anticipated  - can start amio given hemodynamic instability    SHREYA (acute kidney injury) ([facility] [address] )  Assessment & Plan  - baseline Cr around 1 1 - 1 3  - today is tripled, likely 2/2 severe dehydration  - repeat labs now after receiving large volume fluid resuscitation   - replete Mg / K as needed  - telemetry / hemodynamic monitoring     Pleural effusion on right  Assessment & Plan  - s/p thoracentesis in ED  - likely infected, follow up culture growth  - albumin if needed  - repeat CXR in AM    Acute respiratory failure with hypoxia ([facility] [address] )  Assessment & Plan  - continue HFNC   - pulmonary toilet  - respiratory protocol   - code status discussed w pt and daughter; accepts intubation if needed, full code    -------------------------------------------------------------------------------------------------------------  Chief Complaint: shortness of breath    History of Present Illness   HX and PE limited by: not limited  Gale Sparks is a 82 y o  male w PMH chronic AF on eliquis, HTN, HLD, thyroid disease who presents to the ED today with respiratory distress and septic shock  He had a large R pleural effusion for which bedside thoracentesis was performed that revealed dark yellow purulent fluid concerning for infection  He was noted to be in AF w RVR w HR 140s - 150s, hypotensive, w severe leukocytosis and bandemia, SHREYA, transaminitis, and hyponatremia  He reports not eating or drinking for the past few days bc he has not been feeling well  Denies cp but reports shortness of breath with cough ongoing for about 2 weeks now  He reports subjective f/c over that time frame  History obtained from chart review, the patient and patients daughter  -------------------------------------------------------------------------------------------------------------  Dispo: Admit to Critical Care    Code Status: Level 1 - Full Code  --------------------------------------------------------------------------------------------------------------  Review of Systems   Constitutional: Positive for activity change, appetite change, chills, fatigue and fever  Negative for diaphoresis  HENT: Positive for congestion  Negative for rhinorrhea  Eyes: Negative for pain  Respiratory: Positive for cough and shortness of breath  Negative for chest tightness and wheezing  Cardiovascular: Negative for chest pain and palpitations  Gastrointestinal: Negative for abdominal distention, constipation, diarrhea, nausea and vomiting  Genitourinary: Negative for difficulty urinating and dysuria  Musculoskeletal: Negative for arthralgias and myalgias  Neurological: Positive for weakness  Negative for dizziness, light-headedness and headaches  Psychiatric/Behavioral: The patient is not nervous/anxious  A 12-point, complete review of systems was reviewed and negative except as stated above     Physical Exam   Constitutional: He is oriented to person, place, and time  He appears well-developed and well-nourished  He appears distressed  Overall appears sickly   HENT:   Head: Normocephalic and atraumatic  Severely dry mucous membranes   Eyes: Pupils are equal, round, and reactive to light  Neck: Neck supple  No tracheal deviation present  Cardiovascular: Intact distal pulses  Exam reveals no gallop and no friction rub  No murmur heard  IRIR HR 140s - 150s   Pulmonary/Chest: Effort normal and breath sounds normal  No respiratory distress  He has no wheezes  He has no rales  On HFNC, 90% FIO2  Reports feeling much more comfortable following thoracentesis  Some scattered rhonchi  Sitting upright to support breathing but able to lean forward w some assistance for pulmonary exam   Abdominal: Soft  Bowel sounds are normal  He exhibits no distension and no mass  There is no tenderness  There is no guarding  Musculoskeletal: He exhibits no edema or deformity  Neurological: He is alert and oriented to person, place, and time  Awake, alert and oriented, talkative, responsive to all questions   Skin: Skin is warm and dry  He is not diaphoretic  Extremely dry skin    Psychiatric: He has a normal mood and affect  His behavior is normal    Nursing note and vitals reviewed  --------------------------------------------------------------------------------------------------------------  Historical Information   Past Medical History:   Diagnosis Date    [facility]     Disease of thyroid gland     Hyperlipidemia     Hypertension     Optic neuropathy      History reviewed  No pertinent surgical history  Social History   Social History     Substance and Sexual Activity   Alcohol Use Yes    Frequency: Never    Comment: occasional     Social History     Substance and Sexual Activity   Drug Use Never     Social History     Tobacco Use   Smoking Status Former Smoker   Smokeless Tobacco Never Used     Exercise History: unknown  Family History:   History reviewed  No pertinent family history    Family history unknown    Vitals:   Vitals:    12/03/19 2300 12/04/19 0000 12/04/19 0100 12/04/19 0200   BP:       BP Location:       Pulse: (!) 143 (!) 136 (!) 141 (!) 141   Resp: (!) 33 (!) 30 (!) 33 (!) 29   Temp: 98 8 °F (37 1 °C) 99 °F (37 2 °C) 98 4 °F (36 9 °C) 98 4 °F (36 9 °C)   TempSrc:       SpO2: 93% 92% 92% 94%   Weight:       Height:         Temp  Min: 96 7 °F (35 9 °C)  Max: 99 °F (37 2 °C)  IBW: 70 7 kg  Height: 5' 9" (175 3 cm)  Body mass index is 27 97 kg/m²        Medications:  Current Facility-Administered Medications   Medication Dose Route Frequency    amiodarone (CORDARONE) 900 mg in dextrose 5 % 500 mL infusion  1 mg/min Intravenous Continuous    calcium gluconate 3 g in sodium chloride 0 9 % 100 mL IVPB  3 g Intravenous Once    cefepime (MAXIPIME) 2,000 mg in dextrose 5 % 50 mL IVPB  2,000 mg Intravenous Q24H    chlorhexidine (PERIDEX) 0 12 % oral rinse 15 mL  15 mL Swish & Spit Q12H [facility]    heparin (porcine) subcutaneous injection 5,000 Units  5,000 Units Subcutaneous Q8H [facility]    multi-electrolyte (PLASMALYTE-A/ISOLYTE-S PH 7 4) IV solution  125 mL/hr Intravenous Continuous    norepinephrine (LEVOPHED) 4 mg (STANDARD CONCENTRATION) IV in sodium chloride 0 9% 250 mL  1-30 mcg/min Intravenous Titrated    potassium chloride 20 mEq IVPB (premix)  20 mEq Intravenous Once    senna-docusate sodium (SENOKOT S) 8 6-50 mg per tablet 1 tablet  1 tablet Oral HS    vancomycin (VANCOCIN) IVPB (premix) 750 mg  10 mg/kg Intravenous Q24H     Home medications:  None     Allergies:  No Known Allergies      Laboratory and Diagnostics:  Results from last 7 days   Lab Units 12/03/19  1653 12/03/19  1640   WBC Thousand/uL  --  57 70*   HEMOGLOBIN g/dL  --  17 8*   I STAT HEMOGLOBIN g/dl 17 7*  --    HEMATOCRIT %  --  54 9*   HEMATOCRIT, ISTAT % 52*  --    PLATELETS Thousands/uL  --  342   BANDS PCT %  --  28*   MONO PCT %  --  4     Results from last 7 days   Lab Units 12/03/19  2348 12/03/19  1653 12/03/19  1640 SODIUM mmol/L 128*  --  128*   POTASSIUM mmol/L 3 9  --  4 2   CHLORIDE mmol/L 96*  --  90*   CO2 mmol/L 13*  --  19*   CO2, I-STAT mmol/L  --  12*  --    ANION GAP mmol/L 19*  --  19*   BUN mg/dL 59*  --  55*   CREATININE mg/dL 3 26*  --  3 87*   CALCIUM mg/dL 7 7*  --  8 9   GLUCOSE RANDOM mg/dL 107  --  120   ALT U/L 15  --  18   AST U/L 22  --  24   ALK PHOS U/L 153*  --  186*   ALBUMIN g/dL 1 8*  --  2 1*   TOTAL BILIRUBIN mg/dL 2 70*  --  2 60*     Results from last 7 days   Lab Units 12/03/19  2348 12/03/19  1640   MAGNESIUM mg/dL  --  2 2   PHOSPHORUS mg/dL 4 9*  --       Results from last 7 days   Lab Units 12/03/19  1640   INR  2 09*   PTT seconds 41*      Results from last 7 days   Lab Units 12/03/19  1640   TROPONIN I ng/mL <0 02     Results from last 7 days   Lab Units 12/03/19  2348 12/03/19  1952 12/03/19  1640   LACTIC ACID mmol/L 3 9* 4 6* 6 7*     ABG:  Results from last 7 days   Lab Units 12/04/19  0209   PH ART  7 363   PCO2 ART mm Hg 23 3*   PO2 ART mm Hg 79 0   HCO3 ART mmol/L 13 0*   BASE EXC ART mmol/L -9 9   ABG SOURCE  Line, Arterial     VBG:  Results from last 7 days   Lab Units 12/04/19  0209   ABG SOURCE  Line, Arterial           Micro:  Results from last 7 days   Lab Units 12/03/19 2126 12/03/19  1646 12/03/19  1640   BLOOD CULTURE   --  Received in Microbiology Lab  Culture in Progress  Received in Microbiology Lab  Culture in Progress  LEGIONELLA URINARY ANTIGEN  Negative  --   --    STREP PNEUMONIAE ANTIGEN, URINE  Negative  --   --          EKG: as ortiz telemetry is in AF   Imaging: I have personally reviewed pertinent reports     and I have personally reviewed pertinent films in PACS    ------------------------------------------------------------------------------------------------------------  Advance Directive and Living Will:      Power of :    POLST: ------------------------------------------------------------------------------------------------------------  Anticipated Length of Stay is > 2 midnights    Counseling / Coordination of Care  Total Critical Care time spent 48 minutes excluding procedures, teaching and family updates  Pedrito Grider PA-C        Portions of the record may have been created with voice recognition software  Occasional wrong word or "sound a like" substitutions may have occurred due to the inherent limitations of voice recognition software    Read the chart carefully and recognize, using context, where substitutions have occurred

## 2019-12-04 NOTE — MALNUTRITION/BMI
This medical record reflects one or more clinical indicators suggestive of malnutrition  Malnutrition Findings:   Malnutrition type: Chronic illness  Degree of Malnutrition: Malnutrition of moderate degree  Malnutrition Characteristics: Weight loss, Fat loss, Muscle loss(related to inadequate energy intake as evidenced by dark orbital, slightly depressed temples, 26 lb, 12% wt loss 5/21-12/3/19  Intervention: advance diet to regular diet, monitor need for medical food supplement  )    BMI Findings: Body mass index is 27 97 kg/m²  See Nutrition note dated 12/4/19 for additional details  Completed nutrition assessment is viewable in the nutrition documentation

## 2019-12-04 NOTE — PROCEDURES
Chest Tube Insertion  Date/Time: 12/4/2019 4:23 PM  Performed by: [employer], DO  Authorized by: [employer]      Patient location:  Bedside  Consent:     Consent obtained:  Written    Consent given by:  Patient and guardian    Risks discussed:  Bleeding, incomplete drainage, damage to surrounding structures, infection and pain    Alternatives discussed:  No treatment, delayed treatment, alternative treatment and referral  Universal protocol:     Procedure explained and questions answered to patient or proxy's satisfaction: yes      Relevant documents present and verified: yes      Test results available and properly labeled: yes      Radiology Images displayed and confirmed  If images not available, report reviewed: yes      Required blood products, implants, devices, and special equipment available: yes      Site/side marked: yes      Immediately prior to procedure a time out was called: yes      Patient identity confirmed:  Hospital-assigned identification number, verbally with patient, arm band and provided demographic data  Pre-procedure details:     Skin preparation:  ChloraPrep  Indications:     Indications: other (comment)    Sedation:     Sedation type: Anxiolysis  Anesthesia (see MAR for exact dosages):      Anesthesia method:  Local infiltration    Local anesthetic:  Lidocaine 1% w/o epi  Procedure details:     Placement location:  Lateral    Laterality:  Right    Approach:  Percutaneous    Thal-Quick Chest Tube Kit:  20FR    Tube size (Fr):  20    Ultrasound guidance: yes      Tension pneumothorax: no      Needle Decompression: no      Tube connected to:  Suction    Drainage characteristics:  Purulent, cloudy and yellow    Suture material:  0 silk    Dressing:  4x4 sterile gauze      CXR P

## 2019-12-04 NOTE — ASSESSMENT & PLAN NOTE
- s/p thoracentesis in ED  - likely infected, follow up culture growth  - albumin if needed  - repeat CXR in AM

## 2019-12-04 NOTE — ASSESSMENT & PLAN NOTE
- takes eliquis as outpatient, continue AC w either eliquis or heparin (if additional procedures planned)  - takes toprol XL 25mg QD per old records (need up to date med list)   - received 150 mg amio bolus, continue gtt   - hold BBs while hypotensive  - consider renal dosed digoxin  - has been cardioverted at least twice in the past, may require additional cardioversion today

## 2019-12-04 NOTE — PROGRESS NOTES
Progress Note - Danny Ponce 1935, 82 y o  male MRN: [id number]    Unit/Bed#:  Encounter: [id number]    Primary Care Provider: No primary care provider on file  Date and time admitted to hospital: 12/3/2019  4:08 PM    81 yo M w PMH chronic AF on eliquis, HTN, HLD, thyroid disease who is maintained in the ICU with septic shock 2/2 likely empyema w acute hypoxic respiratory failure and AF w RVR       Positive D dimer  Assessment & Plan  - to be suspected given septic shock  - CXR supports etiology for his sx and hypoxia  - hold on CTA given his SHREYA and alternate source to explain elevation    Transaminitis  Assessment & Plan  - monitor LFTs  - consider thoraco-biliary fistula  - slight RUQ pain on exam    Chronic atrial fibrillation  Assessment & Plan  - takes eliquis as outpatient, continue AC w either eliquis or heparin (if additional procedures planned)  - takes toprol XL 25mg QD per old records (need up to date med list)   - received 150 mg amio bolus, continue gtt   - hold BBs while hypotensive  - consider renal dosed digoxin  - has been cardioverted at least twice in the past, may require additional cardioversion today    SHREYA (acute kidney injury) ([facility] [address] )  Assessment & Plan  - baseline Cr around 1 1 - 1 3  - likely 2/2 severe dehydration, sepsis  - fluid resuscitate   - le placed for strict I/Os, UO slowly improving    Hyponatremia  Assessment & Plan  - normal Na is in the 130s   - continue isolyte infusion  - serial BMPs to monitor Na    Septic shock (HCC)  Assessment & Plan  - likely secondary to empyema, possible UTI  - follow up culture growth  - continue cefepime, vanc  - fluid resuscitate   - trend endpoints  - wean levo    Pleural effusion on right  Assessment & Plan  - s/p thoracentesis in ED  - preliminary cx growth w WBCs, bandemia  - repeat CXR in AM  - can obtain CT c/a/p now that respiratory status has improved    Acute respiratory failure with hypoxia ([facility] [address] )  Assessment & Plan  - wean HFNC, currently 70%, 45L  - pulmonary toilet  - respiratory protocol   - accepts intubation if needed      ----------------------------------------------------------------------------------------  HPI/24hr events: started on amio gtt, HFNC weaned slightly, patient feeling much improved     Disposition: Continue Critical Care   Code Status: Level 1 - Full Code  ---------------------------------------------------------------------------------------  SUBJECTIVE  Patient feels much better  Feels able to breathe more easily  Review of Systems   Constitutional: Negative for activity change, chills, diaphoresis, fatigue and fever  HENT: Negative for congestion and rhinorrhea  Eyes: Negative for pain  Respiratory: Positive for shortness of breath  Negative for cough, chest tightness and wheezing  Cardiovascular: Negative for chest pain and palpitations  Gastrointestinal: Negative for abdominal distention, constipation, diarrhea, nausea and vomiting  Genitourinary: Negative for difficulty urinating and dysuria  Musculoskeletal: Negative for arthralgias and myalgias  Neurological: Negative for dizziness, weakness, light-headedness and headaches  Psychiatric/Behavioral: The patient is not nervous/anxious  Review of systems was reviewed and negative unless stated above in HPI/24-hour events   ---------------------------------------------------------------------------------------  OBJECTIVE    Physical Exam   Constitutional: He is oriented to person, place, and time  He appears well-developed and well-nourished  No distress  HENT:   Head: Normocephalic and atraumatic  Eyes: Pupils are equal, round, and reactive to light  Neck: Normal range of motion  Neck supple  No tracheal deviation present  Cardiovascular: Normal heart sounds and intact distal pulses  Exam reveals no gallop and no friction rub  No murmur heard    IRIR, tachycardia    Pulmonary/Chest: Effort normal and breath sounds normal  No respiratory distress  He has no wheezes  He has no rales  Abdominal: Soft  Bowel sounds are normal  He exhibits no distension and no mass  There is no tenderness  There is no guarding  Genitourinary:   Genitourinary Comments: Ines bonilla UO 25cc/hr   Musculoskeletal: He exhibits no edema or deformity  Neurological: He is alert and oriented to person, place, and time  Alert, oriented, sitting up in bed, awake, talking and following commands   Skin: Skin is warm and dry  He is not diaphoretic  Psychiatric: He has a normal mood and affect  His behavior is normal    Nursing note and vitals reviewed  Vitals   Vitals:    19 0000 19 0100 19 0200 19 0300   BP:    94/76   BP Location:       Pulse: (!) 136 (!) 141 (!) 141 (!) 139   Resp: (!) 30 (!) 33 (!) 29 (!) 27   Temp: 99 °F (37 2 °C) 98 4 °F (36 9 °C) 98 4 °F (36 9 °C) 98 4 °F (36 9 °C)   TempSrc:       SpO2: 92% 92% 94% 94%   Weight:       Height:         Temp (24hrs), Av 3 °F (36 8 °C), Min:96 7 °F (35 9 °C), Max:99 °F (37 2 °C)  Current: Temperature: 98 4 °F (36 9 °C)  Arterial Line BP: 88/52  Arterial Line MAP (mmHg): 63 mmHg    Invasive/non-invasive ventilation settings   Respiratory    Lab Data (Last 4 hours)       0209            pH, Arterial       7 363           pCO2, Arterial       23 3           pO2, Arterial       79 0           HCO3, Arterial       13 0           Base Excess, Arterial       -9 9                O2/Vent Data           Most Recent        Non-Invasive Ventilation Mode   HFNC                  Height and Weights   Height: 5' 9" (175 3 cm)  IBW: 70 7 kg  Body mass index is 27 97 kg/m²    Weight (last 2 days)     Date/Time   Weight    19 1817   85 9 (189 38)                Intake and Output  I/O        07 -  0700  07 -  0700    I V  (mL/kg)  508 4 (5 9)    IV Piggyback  3357 8    Total Intake(mL/kg)  3866 2 (45)    Urine (mL/kg/hr)  120    Total Output  120    Net +3746 2                Nutrition       Diet Orders   (From admission, onward)             Start     Ordered    12/03/19 1749  Diet NPO  Diet effective now     Question Answer Comment   Diet Type NPO    RD to adjust diet per protocol?  Yes        12/03/19 1748                Laboratory and Diagnostics:  Results from last 7 days   Lab Units 12/03/19  1653 12/03/19  1640   WBC Thousand/uL  --  57 70*   HEMOGLOBIN g/dL  --  17 8*   I STAT HEMOGLOBIN g/dl 17 7*  --    HEMATOCRIT %  --  54 9*   HEMATOCRIT, ISTAT % 52*  --    PLATELETS Thousands/uL  --  342   BANDS PCT %  --  28*   MONO PCT %  --  4     Results from last 7 days   Lab Units 12/03/19  2348 12/03/19  1653 12/03/19  1640   SODIUM mmol/L 128*  --  128*   POTASSIUM mmol/L 3 9  --  4 2   CHLORIDE mmol/L 96*  --  90*   CO2 mmol/L 13*  --  19*   CO2, I-STAT mmol/L  --  12*  --    ANION GAP mmol/L 19*  --  19*   BUN mg/dL 59*  --  55*   CREATININE mg/dL 3 26*  --  3 87*   CALCIUM mg/dL 7 7*  --  8 9   GLUCOSE RANDOM mg/dL 107  --  120   ALT U/L 15  --  18   AST U/L 22  --  24   ALK PHOS U/L 153*  --  186*   ALBUMIN g/dL 1 8*  --  2 1*   TOTAL BILIRUBIN mg/dL 2 70*  --  2 60*     Results from last 7 days   Lab Units 12/03/19  2348 12/03/19  1640   MAGNESIUM mg/dL  --  2 2   PHOSPHORUS mg/dL 4 9*  --       Results from last 7 days   Lab Units 12/03/19  1640   INR  2 09*   PTT seconds 41*      Results from last 7 days   Lab Units 12/03/19  1640   TROPONIN I ng/mL <0 02     Results from last 7 days   Lab Units 12/04/19  0209 12/03/19  2348 12/03/19 1952 12/03/19  1640   LACTIC ACID mmol/L 3 8* 3 9* 4 6* 6 7*     ABG:  Results from last 7 days   Lab Units 12/04/19  0209   PH ART  7 363   PCO2 ART mm Hg 23 3*   PO2 ART mm Hg 79 0   HCO3 ART mmol/L 13 0*   BASE EXC ART mmol/L -9 9   ABG SOURCE  Line, Arterial     VBG:  Results from last 7 days   Lab Units 12/04/19  0209   ABG SOURCE  Line, Arterial           Micro  Results from last 7 days   Lab Units 12/03/19  9548 12/03/19  1646 12/03/19  1640   BLOOD CULTURE   --  Received in Microbiology Lab  Culture in Progress  Received in Microbiology Lab  Culture in Progress  LEGIONELLA URINARY ANTIGEN  Negative  --   --    STREP PNEUMONIAE ANTIGEN, URINE  Negative  --   --        EKG: as per tele AF w RVR   Imaging: I have personally reviewed pertinent reports  and I have personally reviewed pertinent films in PACS    Active Medications  Scheduled Meds:    Current Facility-Administered Medications:  amiodarone 1 mg/min Intravenous Continuous Sharmila Agosto PA-C Last Rate: 1 mg/min (12/03/19 2102)   calcium gluconate 3 g Intravenous Once Sharmila Agosto PA-C Last Rate: 3 g (12/04/19 0157)   cefepime 2,000 mg Intravenous Q24H Sharmila Agosto PA-C    chlorhexidine 15 mL Swish & Spit Q12H [facility] Juana Liu PA-C    heparin (porcine) 5,000 Units Subcutaneous [facility] Maldonado Hagan    multi-electrolyte 125 mL/hr Intravenous Continuous Maldonado Hagan Last Rate: 125 mL/hr (12/03/19 2359)   norepinephrine 1-30 mcg/min Intravenous Titrated Juana Liu PA-C Last Rate: 3 mcg/min (12/04/19 9988)   potassium chloride 20 mEq Intravenous Once Sharmila Agosto PA-C Last Rate: 20 mEq (12/04/19 0157)   senna-docusate sodium 1 tablet Oral HS Juana Liu PA-C    vancomycin 10 mg/kg Intravenous Q24H Juana Liu PA-C      Continuous Infusions:    amiodarone 1 mg/min Last Rate: 1 mg/min (12/03/19 2102)   multi-electrolyte 125 mL/hr Last Rate: 125 mL/hr (12/03/19 2359)   norepinephrine 1-30 mcg/min Last Rate: 3 mcg/min (12/04/19 1463)     PRN Meds:        ---------------------------------------------------------------------------------------  Advance Directive and Living Will:      Power of :    POLST:    ---------------------------------------------------------------------------------------  Counseling / Coordination of Care  Total Critical Care time spent 47 minutes excluding procedures, teaching and family updates          Sharmila Agosto, PA-C        Portions of the record may have been created with voice recognition software  Occasional wrong word or "sound a like" substitutions may have occurred due to the inherent limitations of voice recognition software    Read the chart carefully and recognize, using context, where substitutions have occurred

## 2019-12-04 NOTE — ASSESSMENT & PLAN NOTE
- continue HFNC   - pulmonary toilet  - respiratory protocol   - code status discussed w pt and daughter; accepts intubation if needed, full code

## 2019-12-04 NOTE — ASSESSMENT & PLAN NOTE
- likely secondary to empyema, possible UTI  - follow up culture growth  - continue cefepime, vanc  - fluid resuscitate   - trend endpoints  - wean levo

## 2019-12-04 NOTE — ASSESSMENT & PLAN NOTE
Malnutrition Findings:   Malnutrition type: Chronic illness  Degree of Malnutrition: Malnutrition of moderate degree    BMI Findings: Body mass index is 27 97 kg/m²       Will consult nutrition

## 2019-12-04 NOTE — SOCIAL WORK
CM met with pt at bedside  Pt lives in [facility] [place] with his granddaughter Mely Noriega  It is a bilevel house with 4 MAURO and then 7 steps with railing to reach the main level  Pt is able to navigate steps and is independent with ADL's  He uses no DME's  He has never been in rehab or used [place] services  Denies substance abuse or mental health issues  He quit smoking in 1964  His PCP is Dr Porfirio Núñez  Pt uses [facility] and has no problem with co-pays  He has an Advanced Directive and his POA is daughter Wilner Partida  Pt was working as a  up until 2 wks ago, but will not be returning to this job on discharge  Pt drives, but Mely Noriega will be transporting home when he is medically cleared  CM discussed d/c needs including HH, but pt does not feel this will be needed at this time  CM will continue to follow and assess needs as hospitalization progresses  CM reviewed discharge planning process including the following: identifying help at home, patient preference for discharge planning needs, pharmacy preference, and availability of treatment team to discuss questions or concerns patient and/or family may have regarding understanding medications and recognizing signs and symptoms once discharged  CM also encouraged patient to follow up with all recommended appointments after discharge  Patient advised of importance for patient and family to participate in managing patients medical well being

## 2019-12-04 NOTE — RESPIRATORY THERAPY NOTE
Received patient in the ICU  Pt was switched from NRB back to the HFNC at 70%/45L  Will continue to titrate the patient's settings down as tolerated, pt denies any pulmonary history or the use of any pulmonary medications at this time  Pt is diminished with some scattered coarseness at this time         12/03/19 2031   Non-Invasive Information   Interface HFNC prongs   Non-Invasive Ventilation Mode HFNC   SpO2 93 %   $ Pulse Oximetry Spot Check Charge Completed   Resp Comments pt was transported to the ICU on NRB, pt was placed back on HFNC at this time   Non-Invasive Settings   FiO2 (%) 70   Flow (lpm) 45   Temperature (Set) 31   Non-Invasive Readings   Heater Temperature (Obs) 31   Skin Intervention Skin intact

## 2019-12-04 NOTE — ASSESSMENT & PLAN NOTE
- to be suspected given septic shock  - CXR supports etiology for his sx and hypoxia  - hold on CTA given his SHREYA and alternate source to explain elevation

## 2019-12-04 NOTE — PROGRESS NOTES
Vancomycin Assessment    Autumn Espinoza is a 82 y o  male who is currently receiving vancomycin 1250 mg IV once for Pneumonia     Relevant clinical data and objective history reviewed:  Creatinine   Date Value Ref Range Status   12/03/2019 3 87 (H) 0 60 - 1 30 mg/dL Final     Comment:     Standardized to IDMS reference method     BP (!) 87/59 (BP Location: Left arm)   Pulse (!) 137   Temp (!) 97 °F (36 1 °C) (Rectal)   Resp (!) 28   Ht 5' 9" (1 753 m)   Wt 85 9 kg (189 lb 6 oz)   SpO2 93%   BMI 27 97 kg/m²   No intake/output data recorded  Lab Results   Component Value Date/Time    BUN 55 (H) 12/03/2019 04:40 PM    WBC 57 70 (HH) 12/03/2019 04:40 PM    HGB 17 7 (H) 12/03/2019 04:53 PM    HGB 17 8 (H) 12/03/2019 04:40 PM    HCT 52 (H) 12/03/2019 04:53 PM    HCT 54 9 (H) 12/03/2019 04:40 PM    MCV 97 12/03/2019 04:40 PM     12/03/2019 04:40 PM     Temp Readings from Last 3 Encounters:   12/03/19 (!) 97 °F (36 1 °C) (Rectal)     Vancomycin Days of Therapy: 1    Assessment/Plan  The patient is currently on vancomycin utilizing scheduled dosing based on actual body weight  Baseline risks associated with therapy include: pre-existing renal impairment, concomitant nephrotoxic medications, advanced age and dehydration  The patient is currently receiving 1250 mg IV once and after clinical evaluation will be changed to 750 mg IV q 24 H  Pharmacy will also follow closely for s/sx of nephrotoxicity, infusion reactions and appropriateness of therapy  BMP and CBC will be ordered per protocol  Plan for trough as patient approaches steady state, prior to the 4th  dose at approximately 1800 on 12/6/19  Due to infection severity, will target a trough of 15-20 (appropriate for most indications)   Pharmacy will continue to follow the patients culture results and clinical progress daily      Alethea Duncan, Pharmacist 14-Aug-2017 22:05

## 2019-12-04 NOTE — PROGRESS NOTES
Vancomycin IV Pharmacy-to-Dose Consultation    Gale Sparks is a 82 y o   who is currently receiving Vancomycin IV with management by the Pharmacy Consult service  Assessment/Plan:  The patient was reviewed  Renal function is stable and no signs or symptoms of nephrotoxicity and/or infusion reactions were documented in the chart  Based on todays assessment, continue current vancomycin day # 1 of regimen 750 mg q24h,  with a a plan for trough to be drawn at 1800 on 12/6  Total Days of Vancomycin therapy received to date: 2 days  We will continue to follow the patients culture results and clinical progress daily      Lilly Lawton, Pharmacist

## 2019-12-04 NOTE — PROCEDURES
Arterial Line Insertion  Date/Time: 12/4/2019 3:31 AM  Performed by: Daniel Lynch PA-C  Authorized by: Daniel Lynch PA-C     Patient location:  Bedside  Other Assisting Provider: No    Consent:     Consent obtained:  Verbal    Consent given by:  Patient    Risks discussed:  Bleeding, infection, pain, ischemia and repeat procedure  Universal protocol:     Procedure explained and questions answered to patient or proxy's satisfaction: yes      Immediately prior to procedure a time out was called: yes      Patient identity confirmed:  Verbally with patient  Indications:     Indications: hemodynamic monitoring, multiple ABGs, continuous blood pressure monitoring and frequent labs / infusion    Pre-procedure details:     Skin preparation:  Chlorhexidine    Preparation: Patient was prepped and draped in sterile fashion    Anesthesia (see MAR for exact dosages): Anesthesia method: LMX  Procedure details:     Location / Tip of Catheter:  Radial    Laterality:  Left    Needle gauge:  20 G    Placement technique:  Ultrasound guided    Number of attempts:  3    Successful placement: yes      Transducer: waveform confirmed    Post-procedure details:     Post-procedure:  Sterile dressing applied    CMS:  Normal    Patient tolerance of procedure: Tolerated well, no immediate complications  Comments:      First two attempts unsuccessful as unable to pass guidewire completely (only 75% length of wire passed) and was kinked upon removal  Evaluated with US and visible thrombus blocking catheter passage  Repeat attempt proximal to the occlusion was successful

## 2019-12-04 NOTE — ASSESSMENT & PLAN NOTE
- baseline Cr around 1 1 - 1 3  - likely 2/2 severe dehydration, sepsis  - fluid resuscitate   - le placed for strict I/Os, UO slowly improving

## 2019-12-05 ENCOUNTER — APPOINTMENT (INPATIENT)
Dept: NON INVASIVE DIAGNOSTICS | Facility: HOSPITAL | Age: 84
DRG: 871 | End: 2019-12-05
Payer: COMMERCIAL

## 2019-12-05 ENCOUNTER — APPOINTMENT (INPATIENT)
Dept: RADIOLOGY | Facility: HOSPITAL | Age: 84
DRG: 871 | End: 2019-12-05
Payer: COMMERCIAL

## 2019-12-05 PROBLEM — E87.2 LACTIC ACIDOSIS: Status: RESOLVED | Noted: 2019-12-04 | Resolved: 2019-12-05

## 2019-12-05 PROBLEM — E87.20 LACTIC ACIDOSIS: Status: RESOLVED | Noted: 2019-12-04 | Resolved: 2019-12-05

## 2019-12-05 LAB
ALBUMIN SERPL BCP-MCNC: 1.5 G/DL (ref 3.5–5)
ALP SERPL-CCNC: 117 U/L (ref 46–116)
ALT SERPL W P-5'-P-CCNC: 27 U/L (ref 12–78)
ANION GAP SERPL CALCULATED.3IONS-SCNC: 17 MMOL/L (ref 4–13)
APTT PPP: 50 SECONDS (ref 23–37)
APTT PPP: 52 SECONDS (ref 23–37)
APTT PPP: 56 SECONDS (ref 23–37)
AST SERPL W P-5'-P-CCNC: 78 U/L (ref 5–45)
BACTERIA SPEC BFLD CULT: ABNORMAL
BASOPHILS # BLD MANUAL: 0 THOUSAND/UL (ref 0–0.1)
BASOPHILS NFR MAR MANUAL: 0 % (ref 0–1)
BILIRUB DIRECT SERPL-MCNC: 2.38 MG/DL (ref 0–0.2)
BILIRUB SERPL-MCNC: 2.8 MG/DL (ref 0.2–1)
BUN SERPL-MCNC: 60 MG/DL (ref 5–25)
CALCIUM SERPL-MCNC: 7.5 MG/DL (ref 8.3–10.1)
CHLORIDE SERPL-SCNC: 97 MMOL/L (ref 100–108)
CO2 SERPL-SCNC: 16 MMOL/L (ref 21–32)
CREAT SERPL-MCNC: 2.11 MG/DL (ref 0.6–1.3)
EOSINOPHIL # BLD MANUAL: 0 THOUSAND/UL (ref 0–0.4)
EOSINOPHIL NFR BLD MANUAL: 0 % (ref 0–6)
ERYTHROCYTE [DISTWIDTH] IN BLOOD BY AUTOMATED COUNT: 13.7 % (ref 11.6–15.1)
GFR SERPL CREATININE-BSD FRML MDRD: 28 ML/MIN/1.73SQ M
GLUCOSE SERPL-MCNC: 106 MG/DL (ref 65–140)
GLUCOSE SERPL-MCNC: 113 MG/DL (ref 65–140)
GLUCOSE SERPL-MCNC: 89 MG/DL (ref 65–140)
GRAM STN SPEC: ABNORMAL
GRAM STN SPEC: ABNORMAL
HCT VFR BLD AUTO: 44.7 % (ref 36.5–49.3)
HGB BLD-MCNC: 15.3 G/DL (ref 12–17)
LACTATE SERPL-SCNC: 1.7 MMOL/L (ref 0.5–2)
LYMPHOCYTES # BLD AUTO: 1.3 THOUSAND/UL (ref 0.6–4.47)
LYMPHOCYTES # BLD AUTO: 4 % (ref 14–44)
MAGNESIUM SERPL-MCNC: 2.8 MG/DL (ref 1.6–2.6)
MCH RBC QN AUTO: 32.1 PG (ref 26.8–34.3)
MCHC RBC AUTO-ENTMCNC: 34.2 G/DL (ref 31.4–37.4)
MCV RBC AUTO: 94 FL (ref 82–98)
MONOCYTES # BLD AUTO: 1.3 THOUSAND/UL (ref 0–1.22)
MONOCYTES NFR BLD: 4 % (ref 4–12)
MRSA NOSE QL CULT: NORMAL
NEUTROPHILS # BLD MANUAL: 29.8 THOUSAND/UL (ref 1.85–7.62)
NEUTS BAND NFR BLD MANUAL: 5 % (ref 0–8)
NEUTS SEG NFR BLD AUTO: 87 % (ref 43–75)
NRBC BLD AUTO-RTO: 0 /100 WBCS
PLATELET # BLD AUTO: 256 THOUSANDS/UL (ref 149–390)
PLATELET BLD QL SMEAR: ADEQUATE
PMV BLD AUTO: 9.4 FL (ref 8.9–12.7)
POTASSIUM SERPL-SCNC: 2.8 MMOL/L (ref 3.5–5.3)
PROT SERPL-MCNC: 5.6 G/DL (ref 6.4–8.2)
RBC # BLD AUTO: 4.77 MILLION/UL (ref 3.88–5.62)
SODIUM SERPL-SCNC: 130 MMOL/L (ref 136–145)
TOTAL CELLS COUNTED SPEC: 100
WBC # BLD AUTO: 32.39 THOUSAND/UL (ref 4.31–10.16)

## 2019-12-05 PROCEDURE — 94760 N-INVAS EAR/PLS OXIMETRY 1: CPT

## 2019-12-05 PROCEDURE — 80048 BASIC METABOLIC PNL TOTAL CA: CPT | Performed by: NURSE PRACTITIONER

## 2019-12-05 PROCEDURE — 80076 HEPATIC FUNCTION PANEL: CPT | Performed by: PHYSICIAN ASSISTANT

## 2019-12-05 PROCEDURE — 99233 SBSQ HOSP IP/OBS HIGH 50: CPT | Performed by: INTERNAL MEDICINE

## 2019-12-05 PROCEDURE — 82948 REAGENT STRIP/BLOOD GLUCOSE: CPT

## 2019-12-05 PROCEDURE — 83605 ASSAY OF LACTIC ACID: CPT | Performed by: PHYSICIAN ASSISTANT

## 2019-12-05 PROCEDURE — 71045 X-RAY EXAM CHEST 1 VIEW: CPT

## 2019-12-05 PROCEDURE — 85007 BL SMEAR W/DIFF WBC COUNT: CPT | Performed by: PHYSICIAN ASSISTANT

## 2019-12-05 PROCEDURE — C8929 TTE W OR WO FOL WCON,DOPPLER: HCPCS

## 2019-12-05 PROCEDURE — 93306 TTE W/DOPPLER COMPLETE: CPT | Performed by: INTERNAL MEDICINE

## 2019-12-05 PROCEDURE — 85730 THROMBOPLASTIN TIME PARTIAL: CPT | Performed by: ANESTHESIOLOGY

## 2019-12-05 PROCEDURE — 83735 ASSAY OF MAGNESIUM: CPT | Performed by: PHYSICIAN ASSISTANT

## 2019-12-05 PROCEDURE — 85027 COMPLETE CBC AUTOMATED: CPT | Performed by: PHYSICIAN ASSISTANT

## 2019-12-05 PROCEDURE — 94660 CPAP INITIATION&MGMT: CPT

## 2019-12-05 PROCEDURE — 99233 SBSQ HOSP IP/OBS HIGH 50: CPT | Performed by: ANESTHESIOLOGY

## 2019-12-05 RX ORDER — POTASSIUM CHLORIDE 14.9 MG/ML
20 INJECTION INTRAVENOUS ONCE
Status: COMPLETED | OUTPATIENT
Start: 2019-12-05 | End: 2019-12-05

## 2019-12-05 RX ORDER — POTASSIUM CHLORIDE 20 MEQ/1
40 TABLET, EXTENDED RELEASE ORAL 3 TIMES DAILY
Status: COMPLETED | OUTPATIENT
Start: 2019-12-05 | End: 2019-12-05

## 2019-12-05 RX ADMIN — CEFAZOLIN SODIUM 1000 MG: 1 SOLUTION INTRAVENOUS at 12:07

## 2019-12-05 RX ADMIN — POTASSIUM CHLORIDE 40 MEQ: 1500 TABLET, EXTENDED RELEASE ORAL at 21:45

## 2019-12-05 RX ADMIN — SENNOSIDES AND DOCUSATE SODIUM 1 TABLET: 8.6; 5 TABLET ORAL at 21:45

## 2019-12-05 RX ADMIN — SODIUM CHLORIDE, SODIUM GLUCONATE, SODIUM ACETATE, POTASSIUM CHLORIDE, MAGNESIUM CHLORIDE, SODIUM PHOSPHATE, DIBASIC, AND POTASSIUM PHOSPHATE 125 ML/HR: .53; .5; .37; .037; .03; .012; .00082 INJECTION, SOLUTION INTRAVENOUS at 19:08

## 2019-12-05 RX ADMIN — CLINDAMYCIN PHOSPHATE 900 MG: 900 INJECTION, SOLUTION INTRAVENOUS at 03:52

## 2019-12-05 RX ADMIN — HEPARIN SODIUM AND DEXTROSE 15.8 UNITS/KG/HR: 10000; 5 INJECTION INTRAVENOUS at 11:27

## 2019-12-05 RX ADMIN — CLINDAMYCIN PHOSPHATE 900 MG: 900 INJECTION, SOLUTION INTRAVENOUS at 12:48

## 2019-12-05 RX ADMIN — CLINDAMYCIN PHOSPHATE 900 MG: 900 INJECTION, SOLUTION INTRAVENOUS at 20:58

## 2019-12-05 RX ADMIN — PERFLUTREN 0.6 ML/MIN: 6.52 INJECTION, SUSPENSION INTRAVENOUS at 15:05

## 2019-12-05 RX ADMIN — CHLORHEXIDINE GLUCONATE 0.12% ORAL RINSE 15 ML: 1.2 LIQUID ORAL at 21:45

## 2019-12-05 RX ADMIN — SODIUM CHLORIDE, SODIUM GLUCONATE, SODIUM ACETATE, POTASSIUM CHLORIDE, MAGNESIUM CHLORIDE, SODIUM PHOSPHATE, DIBASIC, AND POTASSIUM PHOSPHATE 125 ML/HR: .53; .5; .37; .037; .03; .012; .00082 INJECTION, SOLUTION INTRAVENOUS at 02:58

## 2019-12-05 RX ADMIN — SODIUM CHLORIDE 1000 ML: 0.9 INJECTION, SOLUTION INTRAVENOUS at 08:15

## 2019-12-05 RX ADMIN — HEPARIN SODIUM 2000 UNITS: 1000 INJECTION INTRAVENOUS; SUBCUTANEOUS at 19:06

## 2019-12-05 RX ADMIN — POTASSIUM CHLORIDE 40 MEQ: 1500 TABLET, EXTENDED RELEASE ORAL at 10:52

## 2019-12-05 RX ADMIN — HEPARIN SODIUM 2000 UNITS: 1000 INJECTION INTRAVENOUS; SUBCUTANEOUS at 11:45

## 2019-12-05 RX ADMIN — NOREPINEPHRINE BITARTRATE 5 MCG/MIN: 1 INJECTION INTRAVENOUS at 12:14

## 2019-12-05 RX ADMIN — CHLORHEXIDINE GLUCONATE 0.12% ORAL RINSE 15 ML: 1.2 LIQUID ORAL at 09:15

## 2019-12-05 RX ADMIN — POTASSIUM CHLORIDE 20 MEQ: 14.9 INJECTION, SOLUTION INTRAVENOUS at 08:14

## 2019-12-05 RX ADMIN — CEFAZOLIN SODIUM 1000 MG: 1 SOLUTION INTRAVENOUS at 20:13

## 2019-12-05 RX ADMIN — CEFAZOLIN SODIUM 1000 MG: 1 SOLUTION INTRAVENOUS at 02:45

## 2019-12-05 RX ADMIN — POTASSIUM CHLORIDE 40 MEQ: 1500 TABLET, EXTENDED RELEASE ORAL at 17:00

## 2019-12-05 RX ADMIN — HEPARIN SODIUM 2000 UNITS: 1000 INJECTION INTRAVENOUS; SUBCUTANEOUS at 02:46

## 2019-12-05 NOTE — RESPIRATORY THERAPY NOTE
12/05/19 1113   Non-Invasive Information   Interface Full face mask   Non-Invasive Ventilation Mode BiPAP   $ Pulse Oximetry Spot Check Charge Completed   Resp Comments transitioned pt to bipap to assist in lung recruitment   Non-Invasive Settings   FiO2 (%) 50   Temperature (Set) 31   IPAP (cm) 12 cm   EPAP (cm) 10 cm   Rate (Set) 14   Pressure Support (cm H2O) 2   Rise Time 2   Inspiratory Time (Set) 1   Non-Invasive Readings   Heater Temperature (Obs) 31   Skin Intervention Skin intact   Total Rate 23   MV (Mech) 13 8   Peak Pressure (Obs) 14   Spontaneous Vt (mL) 506   Leak (lpm) 69

## 2019-12-05 NOTE — ASSESSMENT & PLAN NOTE
- baseline Cr around 1 1 - 1 3  - Cr improving  - UO improving  - continue to monitor  - likely 2/2 dehydration / sepsis

## 2019-12-05 NOTE — PROGRESS NOTES
Progress Note - Infectious Disease   Tita Gerardo 82 y o  male MRN: [id number]  Unit/Bed#:  Encounter: [id number]      Impression/Plan:  22-year-old male patient admitted for septic shock secondary to severe group A strep pneumonia and right-sided empyema     1- septic shock:  Hypotension, tachycardia and severe leukocytosis on admission, associated with  lactic acidosis and acute kidney injury  Septic shock is likely secondary to severe pneumonia and empyema  Consideration for associated bacteremia  - supportive care as per ICU team  - antibiotics as below  - repeat CBC in a m   - follow-up blood culture result collected 12/03/2019; blood culture is negative to date     2- severe GAS pneumonia complicated by empyema:  Two weeks of cough and shortness of breath  Large right-sided pleural effusion seen on chest x-ray on admission  WBC count [id number]  Thoracentesis was diagnostic for empyema; 1200 mL of purulent fluid was removed; fluid WBC count 71,000, LDH more than 10,000, pH 6 3, glucose 5 mg/dL  Fluid culture group A strep  Patient has a chest tube inserted yesterday 12/04/2019  His leukocytosis is trending down  He reports feeling better this morning    He remains on high-flow nasal cannula  -  continue cefazolin 1 g IV q 8h and clindamycin 900 mg IV q 8h  - follow-up blood culture result collected 12/03/2019  - repeat CBC in a m , trend procalcitonin  - close clinical monitoring  -  chest tube management as per pulmonary team  - if patient continues to improve over the next 24-48 hours, will stop clindamycin and continue cefazolin     3- atrial fibrillation:  Tachycardia has resolved  - continue supportive care  - rate control and anticoagulation per ICU team     4- acute kidney injury  Creatinine now slowly improving, creatinine 2 1 mg/dL  - IV antibiotics doses mentioned above have been adjusted to creatinine clearance  - repeat BMP, avoid nephrotoxic medications        5- acute respiratory failure:  Secondary to problem #1 and 2  - continue antibiotics as above  - supportive care as per ICU team     Plan mentioned above discussed with patient  Case discussed with ICU team    Antibiotics:  Cefazolin day 2  Clindamycin day 2    Subjective:  Patient has no fever, chills, sweats; no nausea, vomiting, diarrhea; no cough, shortness of breath; no pain  No new symptoms  Objective:  Vitals:  Temp:  [95 2 °F (35 1 °C)-98 1 °F (36 7 °C)] 97 7 °F (36 5 °C)  HR:  [68-95] 70  Resp:  [16-28] 23  SpO2:  [91 %-98 %] 95 %  Temp (24hrs), Av 2 °F (36 2 °C), Min:95 2 °F (35 1 °C), Max:98 1 °F (36 7 °C)  Current: Temperature: 97 7 °F (36 5 °C)    Physical Exam:   General Appearance:  Alert, interactive, nontoxic, no acute distress  Throat: Oropharynx moist without lesions  Lungs:   Clear to auscultation bilaterally; no wheezes, rhonchi or rales; respirations unlabored  Right side chest tube in place, minimal tenderness surrounding exit site, no erythema   Heart:  RRR; no murmur, rub or gallop   Abdomen:   Soft, non-tender, non-distended, positive bowel sounds  Extremities: No clubbing, cyanosis or edema   Skin: No new rashes or lesions  No draining wounds noted         Labs, Imaging, & Other studies:   All pertinent labs and imaging studies were personally reviewed  Results from last 7 days   Lab Units 19  0508 19  0435 19  1653 19  1640   WBC Thousand/uL 32 39* 49 19*  --  57 70*   HEMOGLOBIN g/dL 15 3 16 2  --  17 8*   I STAT HEMOGLOBIN g/dl  --   --  17 7*  --    PLATELETS Thousands/uL 256 300  --  342     Results from last 7 days   Lab Units 19  0508 19  0435 19  2348 19  1653   SODIUM mmol/L 130* 130* 128*  --    POTASSIUM mmol/L 2 8* 4 0 3 9  --    CHLORIDE mmol/L 97* 98* 96*  --    CO2 mmol/L 16* 14* 13*  --    CO2, I-STAT mmol/L  --   --   --  12*   BUN mg/dL 60* 59* 59*  --    CREATININE mg/dL 2 11* 3 02* 3 26*  --    EGFR ml/min/1 73sq m 28 18 17  -- GLUCOSE, ISTAT mg/dl  --   --   --  119   CALCIUM mg/dL 7 5* 8 2* 7 7*  --    AST U/L 78* 19 22  --    ALT U/L 27 11* 15  --    ALK PHOS U/L 117* 131* 153*  --      Results from last 7 days   Lab Units 12/03/19  2126 12/03/19  1823 12/03/19  1646 12/03/19  1640   BLOOD CULTURE   --   --  No Growth at 24 hrs  No Growth at 24 hrs     GRAM STAIN RESULT   --  3+ Polys*  4+ Gram positive cocci in chains*  --   --    BODY FLUID CULTURE, STERILE   --  4+ Growth of Beta Hemolytic Streptococcus Group A*  --   --    LEGIONELLA URINARY ANTIGEN  Negative  --   --   --      Results from last 7 days   Lab Units 12/04/19  0435 12/03/19  2348   PROCALCITONIN ng/ml 6 55* 7 02*

## 2019-12-05 NOTE — RESPIRATORY THERAPY NOTE
Attempting to slowly wean patient's HFNC  Pt states he feels much better  SpO2 greatly improved post chest tube   Continue to titrate HFNC as tolerated to maintain a sat greater than 95        12/05/19 0426   Non-Invasive Information   Interface HFNC prongs   Non-Invasive Ventilation Mode HFNC   $ Pulse Oximetry Spot Check Charge Completed   Resp Comments water bag changed, flow and FiO2 titrated slightly down   Non-Invasive Settings   FiO2 (%) 55   Flow (lpm) 40   Temperature (Set) 31   Non-Invasive Readings   Heater Temperature (Obs) 31   Skin Intervention Skin intact

## 2019-12-05 NOTE — ASSESSMENT & PLAN NOTE
- s/p bedside ED thoracentesis (12/3)  - s/p R CT placement (12/4)  - pleural fluid cx w GAS  - AM CXR  - pleural rind on imaging, thoracics involvement if inadequate CT drainage

## 2019-12-05 NOTE — ASSESSMENT & PLAN NOTE
- likely secondary to empyema, possible UTI  - follow up culture growth  - abx deescalated to cefepime and clind  -monitor hemodynamics closely off levo

## 2019-12-05 NOTE — PROGRESS NOTES
Progress Note - Lucy Preston 1935, 82 y o  male MRN: [id number]    Unit/Bed#:  Encounter: [id number]    Primary Care Provider: No primary care provider on file  Date and time admitted to hospital: 12/3/2019  4:08 PM    81 yo M w PMH HTN, HLD, AF on eliquis, toprol, thyroid disease maintained in ICU w acute hypoxic respiratory fx and sepsis 2/2 empyema w associated shock liver, SHREYA, AF w RVR       Moderate protein-calorie malnutrition ([facility] [address] )  Assessment & Plan  - nutrition consultation  - advance diet as able      Lactic acidosis  Assessment & Plan  - monitor   - fluid resuscitate     Positive D dimer  Assessment & Plan  - to be suspected given septic shock  - CXR supports etiology for his sx and hypoxia  - hold on CTA given his SHREYA and alternate source to explain elevation    Transaminitis  Assessment & Plan  - monitor LFTs  - suspect shock liver  - no sx thoraco-abd fistula on imaging    Chronic atrial fibrillation  Assessment & Plan  - takes eliquis as outpatient, held now given need for procedures  - heparin gtt   - takes toprol XL 50mg QD   - received gtt d/c   - successful dig load  - cardiology consultation once able to transfer to [facility]    SHREYA (acute kidney injury) ([facility] [address] )  Assessment & Plan  - baseline Cr around 1 1 - 1 3  - Cr improving  - UO improving  - continue to monitor  - likely 2/2 dehydration / sepsis    Hyponatremia  Assessment & Plan  - normal Na is in the 130s   - improving slowly w fluid resuscitation     Septic shock ([facility] [address] )  Assessment & Plan  - likely secondary to empyema, possible UTI  - follow up culture growth  - abx deescalated to cefepime and clind  -monitor hemodynamics closely off levo    Pleural effusion on right  Assessment & Plan  - s/p bedside ED thoracentesis (12/3)  - s/p R CT placement (12/4)  - pleural fluid cx w GAS  - AM CXR  - pleural rind on imaging, thoracics involvement if inadequate CT drainage    * Acute respiratory failure with hypoxia (HCC)  Assessment & Plan  - wean HFNC, currently 60%, 45L  - pulmonary toilet  - respiratory protocol         ----------------------------------------------------------------------------------------  HPI/24hr events: CT placed yesterday for additional drainage, abx de-escalated     Disposition: Continue Critical Care   Code Status: Level 1 - Full Code  ---------------------------------------------------------------------------------------  SUBJECTIVE  No complaints, feels much better  Review of Systems   Constitutional: Negative for activity change, chills, diaphoresis, fatigue and fever  HENT: Negative for congestion and rhinorrhea  Eyes: Negative for pain  Respiratory: Negative for cough, chest tightness, shortness of breath and wheezing  Cardiovascular: Negative for chest pain and palpitations  Gastrointestinal: Negative for abdominal distention, constipation, diarrhea, nausea and vomiting  Genitourinary: Negative for difficulty urinating and dysuria  Musculoskeletal: Negative for arthralgias and myalgias  Neurological: Negative for dizziness, weakness, light-headedness and headaches  Psychiatric/Behavioral: The patient is not nervous/anxious  Review of systems was reviewed and negative unless stated above in HPI/24-hour events   ---------------------------------------------------------------------------------------  OBJECTIVE    Physical Exam   Constitutional: He is oriented to person, place, and time  He appears well-developed and well-nourished  No distress  Comfortable, resting   HENT:   Head: Normocephalic and atraumatic  Eyes: Pupils are equal, round, and reactive to light  Neck: Normal range of motion  Neck supple  No tracheal deviation present  Cardiovascular: Normal rate, regular rhythm, normal heart sounds and intact distal pulses  Exam reveals no gallop and no friction rub  No murmur heard    Irregular   Rate now controlled    Pulmonary/Chest: Effort normal and breath sounds normal  No respiratory distress  He has no wheezes  He has no rales  CT w 120 cc dark yellow output  HFNC 60% and 45L   Abdominal: Soft  Bowel sounds are normal  He exhibits no distension and no mass  There is no tenderness  There is no guarding  Genitourinary:   Genitourinary Comments: [place] w bright yellow urine   Musculoskeletal: He exhibits no edema or deformity  Neurological: He is alert and oriented to person, place, and time  Skin: Skin is warm and dry  He is not diaphoretic  Psychiatric: He has a normal mood and affect  His behavior is normal    Nursing note and vitals reviewed  Vitals   Vitals:    19 0000 19 0100 19 0200 19 0300   BP:       BP Location:       Pulse: 75 76 75 74   Resp: 18 16 18 21   Temp: (!) 97 3 °F (36 3 °C) (!) 97 2 °F (36 2 °C) (!) 97 °F (36 1 °C) (!) 96 6 °F (35 9 °C)   TempSrc: Bladder Bladder Bladder Bladder   SpO2: 97% 97% 96% 97%   Weight:       Height:         Temp (24hrs), Av 6 °F (36 4 °C), Min:96 5 °F (35 8 °C), Max:98 4 °F (36 9 °C)  Current: Temperature: (!) 96 6 °F (35 9 °C)  Arterial Line BP: 100/50  Arterial Line MAP (mmHg): 65 mmHg    Invasive/non-invasive ventilation settings   Respiratory    Lab Data (Last 4 hours)    None         O2/Vent Data (Last 4 hours)       0426        Non-Invasive Ventilation Mode HFNC HFNC                  Height and Weights   Height: 5' 9" (175 3 cm)  IBW: 70 7 kg  Body mass index is 27 97 kg/m²    Weight (last 2 days)     Date/Time   Weight    19 1817   85 9 (189 38)                Intake and Output  I/O       701 -  07 -  07    I V  (mL/kg) 923 9 (10 8) 3029 2 (35 3)    IV Piggyback 4009 8 1193 3    Total Intake(mL/kg) 4933 8 (57 4) 4222 5 (49 2)    Urine (mL/kg/hr) 310 1725 (0 8)    Chest Tube  120    Total Output 310 1845    Net +4623 8 +2377 5                Nutrition       Diet Orders   (From admission, onward)             Start     Ordered 12/04/19 0920  Diet Clear Liquid  Diet effective now     Question Answer Comment   Diet Type Clear Liquid    RD to adjust diet per protocol?  Yes        12/04/19 0919                Laboratory and Diagnostics:  Results from last 7 days   Lab Units 12/04/19  0435 12/03/19  1653 12/03/19  1640   WBC Thousand/uL 49 19*  --  57 70*   HEMOGLOBIN g/dL 16 2  --  17 8*   I STAT HEMOGLOBIN g/dl  --  17 7*  --    HEMATOCRIT % 47 4  --  54 9*   HEMATOCRIT, ISTAT %  --  52*  --    PLATELETS Thousands/uL 300  --  342   BANDS PCT % 12*  --  28*   MONO PCT % 5  --  4     Results from last 7 days   Lab Units 12/04/19  0435 12/03/19  2348 12/03/19  1653 12/03/19  1640   SODIUM mmol/L 130* 128*  --  128*   POTASSIUM mmol/L 4 0 3 9  --  4 2   CHLORIDE mmol/L 98* 96*  --  90*   CO2 mmol/L 14* 13*  --  19*   CO2, I-STAT mmol/L  --   --  12*  --    ANION GAP mmol/L 18* 19*  --  19*   BUN mg/dL 59* 59*  --  55*   CREATININE mg/dL 3 02* 3 26*  --  3 87*   CALCIUM mg/dL 8 2* 7 7*  --  8 9   GLUCOSE RANDOM mg/dL 87 107  --  120   ALT U/L 11* 15  --  18   AST U/L 19 22  --  24   ALK PHOS U/L 131* 153*  --  186*   ALBUMIN g/dL 1 8* 1 8*  --  2 1*   TOTAL BILIRUBIN mg/dL 2 50* 2 70*  --  2 60*     Results from last 7 days   Lab Units 12/04/19  0435 12/03/19  2348 12/03/19  1640   MAGNESIUM mg/dL 1 9  --  2 2   PHOSPHORUS mg/dL 4 4* 4 9*  --       Results from last 7 days   Lab Units 12/05/19  0136 12/04/19  1745 12/03/19  1640   INR   --   --  2 09*   PTT seconds 50* 40* 41*      Results from last 7 days   Lab Units 12/03/19  1640   TROPONIN I ng/mL <0 02     Results from last 7 days   Lab Units 12/04/19  0435 12/04/19  0209 12/03/19  2348 12/03/19  1952 12/03/19  1640   LACTIC ACID mmol/L 3 4* 3 8* 3 9* 4 6* 6 7*     ABG:  Results from last 7 days   Lab Units 12/04/19  0550   PH ART  7 361   PCO2 ART mm Hg 19 9*   PO2 ART mm Hg 93 1   HCO3 ART mmol/L 11 0*   BASE EXC ART mmol/L -11 6   ABG SOURCE  Line, Arterial     VBG:  Results from last 7 days   Lab Units 12/04/19  0550   ABG SOURCE  Line, Arterial     Results from last 7 days   Lab Units 12/04/19  0435 12/03/19  2348   PROCALCITONIN ng/ml 6 55* 7 02*       Micro  Results from last 7 days   Lab Units 12/03/19  2126 12/03/19  1823 12/03/19  1646 12/03/19  1640   BLOOD CULTURE   --   --  No Growth at 24 hrs  No Growth at 24 hrs  GRAM STAIN RESULT   --  3+ Polys*  4+ Gram positive cocci in chains*  --   --    BODY FLUID CULTURE, STERILE   --  4+ Growth of Beta Hemolytic Streptococcus Group A*  --   --    LEGIONELLA URINARY ANTIGEN  Negative  --   --   --    STREP PNEUMONIAE ANTIGEN, URINE  Negative  --   --   --        EKG: as per tele AF w controlled VR  Imaging: I have personally reviewed pertinent reports     and I have personally reviewed pertinent films in PACS    Active Medications  Scheduled Meds:  Current Facility-Administered Medications:  cefazolin 1,000 mg Intravenous Q8H Mario Alberto Toro MD Last Rate: Stopped (12/05/19 0329)   chlorhexidine 15 mL Swish & Spit Q12H [address] Margaux Haro PA-C    clindamycin 900 mg Intravenous Q8H Lawrence Calderón MD Last Rate: 900 mg (12/05/19 0352)   heparin (porcine) 3-20 Units/kg/hr (Order-Specific) Intravenous Titrated ANNA MARIE Chowdary Last Rate: 13 8 Units/kg/hr (12/05/19 0246)   heparin (porcine) 2,000 Units Intravenous PRN ANNA MARIE Chowdary    heparin (porcine) 4,000 Units Intravenous PRN ANNA MARIE Chowdary    multi-electrolyte 125 mL/hr Intravenous Continuous Margaux Haro PA-C Last Rate: 125 mL/hr (12/05/19 0258)   senna-docusate sodium 1 tablet Oral HS Margaux Haro PA-C      Continuous Infusions:    heparin (porcine) 3-20 Units/kg/hr (Order-Specific) Last Rate: 13 8 Units/kg/hr (12/05/19 0246)   multi-electrolyte 125 mL/hr Last Rate: 125 mL/hr (12/05/19 0258)     PRN Meds:     heparin (porcine) 2,000 Units PRN   heparin (porcine) 4,000 Units PRN ---------------------------------------------------------------------------------------  Advance Directive and Living Will:      Power of :    POLST:    ---------------------------------------------------------------------------------------  Counseling / Coordination of Care  Total Critical Care time spent 21 minutes excluding procedures, teaching and family updates  Pedrito Grider PA-C        Portions of the record may have been created with voice recognition software  Occasional wrong word or "sound a like" substitutions may have occurred due to the inherent limitations of voice recognition software    Read the chart carefully and recognize, using context, where substitutions have occurred

## 2019-12-05 NOTE — RESPIRATORY THERAPY NOTE
12/05/19 1606   Non-Invasive Information   Interface Full face mask   Non-Invasive Ventilation Mode BiPAP   SpO2 97 %   $ Pulse Oximetry Spot Check Charge Completed   Resp Comments Pt remains on BiPAP 12/10, 50% at this time  Pt tolerating BiPAP well  Will continue to monitor pt  Plan: continue current support until further orders     Non-Invasive Settings   FiO2 (%) 50   Flow (lpm) 64 1   Temperature (Set) 31   IPAP (cm) 12 cm   EPAP (cm) 10 cm   Rate (Set) 8   Pressure Support (cm H2O) 2   Rise Time 2   Inspiratory Time (Set) 1   Humidification   (heater)   Non-Invasive Readings   Heater Temperature (Obs) 31 4   Skin Intervention Skin intact   Total Rate 24   MV (Mech) 15 4   Peak Pressure (Obs) 13   Spontaneous Vt (mL) 642   Leak (lpm) 50   Non-Invasive Alarms   Insp Pressure High (cm H20) 25   Insp Pressure Low (cm H20) 5   Low Insp Pressure Time (sec) 20 sec   MV High (L/min) 3   Vt High (mL) 1200   Vt Low (mL) 200   High Resp Rate (BPM) 50 BPM   Low Resp Rate (BPM) 8 BPM   Apnea Interval (sec) 20   Apnea Rate 8   Apnea Pressure 12

## 2019-12-05 NOTE — PLAN OF CARE
Problem: Prexisting or High Potential for Compromised Skin Integrity  Goal: Skin integrity is maintained or improved  Description  INTERVENTIONS:  - Identify patients at risk for skin breakdown  - Assess and monitor skin integrity  - Assess and monitor nutrition and hydration status  - Monitor labs   - Assess for incontinence   - Turn and reposition patient  - Assist with mobility/ambulation  - Relieve pressure over bony prominences  - Avoid friction and shearing  - Provide appropriate hygiene as needed including keeping skin clean and dry  - Evaluate need for skin moisturizer/barrier cream  - Collaborate with interdisciplinary team   - Patient/family teaching  - Consider wound care consult   Outcome: Progressing     Problem: Potential for Falls  Goal: Patient will remain free of falls  Description  INTERVENTIONS:  - Assess patient frequently for physical needs  -  Identify cognitive and physical deficits and behaviors that affect risk of falls    -  [place] fall precautions as indicated by assessment   - Educate patient/family on patient safety including physical limitations  - Instruct patient to call for assistance with activity based on assessment  - Modify environment to reduce risk of injury  - Consider OT/PT consult to assist with strengthening/mobility  Outcome: Progressing     Problem: CARDIOVASCULAR - ADULT  Goal: Maintains optimal cardiac output and hemodynamic stability  Description  INTERVENTIONS:  - Monitor I/O, vital signs and rhythm  - Monitor for S/S and trends of decreased cardiac output  - Administer and titrate ordered vasoactive medications to optimize hemodynamic stability  - Assess quality of pulses, skin color and temperature  - Assess for signs of decreased coronary artery perfusion  - Instruct patient to report change in severity of symptoms  Outcome: Progressing  Goal: Absence of cardiac dysrhythmias or at baseline rhythm  Description  INTERVENTIONS:  - Continuous cardiac monitoring, vital signs, obtain 12 lead EKG if ordered  - Administer antiarrhythmic and heart rate control medications as ordered  - Monitor electrolytes and administer replacement therapy as ordered  Outcome: Progressing     Problem: GENITOURINARY - ADULT  Goal: Maintains or returns to baseline urinary function  Description  INTERVENTIONS:  - Assess urinary function  - Encourage oral fluids to ensure adequate hydration if ordered  - Administer IV fluids as ordered to ensure adequate hydration  - Administer ordered medications as needed  - Offer frequent toileting  - Follow urinary retention protocol if ordered  Outcome: Progressing  Goal: Absence of urinary retention  Description  INTERVENTIONS:  - Assess patients ability to void and empty bladder  - Monitor I/O  - Bladder scan as needed  - Discuss with physician/AP medications to alleviate retention as needed  - Discuss catheterization for long term situations as appropriate  Outcome: Progressing  Goal: Urinary catheter remains patent  Description  INTERVENTIONS:  - Assess patency of urinary catheter  - If patient has a chronic le, consider changing catheter if non-functioning  - Follow guidelines for intermittent irrigation of non-functioning urinary catheter  Outcome: Progressing     Problem: Nutrition/Hydration-ADULT  Goal: Nutrient/Hydration intake appropriate for improving, restoring or maintaining nutritional needs  Description  Monitor and assess patient's nutrition/hydration status for malnutrition  Collaborate with interdisciplinary team and initiate plan and interventions as ordered  Monitor patient's weight and dietary intake as ordered or per policy  Utilize nutrition screening tool and intervene as necessary  Determine patient's food preferences and provide high-protein, high-caloric foods as appropriate       INTERVENTIONS:  - Monitor oral intake, urinary output, labs, and treatment plans  - Assess nutrition and hydration status and recommend course of action  - Evaluate amount of meals eaten  - Assist patient with eating if necessary   - Allow adequate time for meals  - Recommend/ encourage appropriate diets, oral nutritional supplements, and vitamin/mineral supplements  - Order, calculate, and assess calorie counts as needed  - Recommend, monitor, and adjust tube feedings based on assessed needs  - Assess need for intravenous fluids  - Provide nutrition/hydration education as appropriate  - Include patient/family/caregiver in decisions related to nutrition   Outcome: Progressing     Problem: RESPIRATORY - ADULT  Goal: Achieves optimal ventilation and oxygenation  Description  INTERVENTIONS:  - Assess for changes in respiratory status  - Assess for changes in mentation and behavior  - Position to facilitate oxygenation and minimize respiratory effort  - Oxygen administered by appropriate delivery if ordered  - Encourage broncho-pulmonary hygiene including cough, deep breathe, Incentive Spirometry  - Assess the need for suctioning and aspirate as needed  - Assess and instruct to report SOB or any respiratory difficulty  - Respiratory Therapy support as indicated  Outcome: Progressing     Problem: HEMATOLOGIC - ADULT  Goal: Maintains hematologic stability  Description  INTERVENTIONS  - Assess for signs and symptoms of bleeding or hemorrhage  - Monitor labs  - Administer supportive blood products/factors as ordered and appropriate  Outcome: Progressing

## 2019-12-05 NOTE — ASSESSMENT & PLAN NOTE
- takes eliquis as outpatient, held now given need for procedures  - heparin gtt   - takes toprol XL 50mg QD   - received gtt d/c   - successful dig load  - cardiology consultation once able to transfer to [facility] [address]

## 2019-12-05 NOTE — RESPIRATORY THERAPY NOTE
Pt remains on HFNC at this time, pt now has a chest tube  Will continue to titrate HFNC settings as tolerated by the patient          12/04/19 1926   Non-Invasive Information   Interface HFNC prongs   Non-Invasive Ventilation Mode HFNC   $ Pulse Oximetry Spot Check Charge Completed   Resp Comments pt remains on HFNC at this time, titrating settings as tolerated   Non-Invasive Settings   FiO2 (%) 60   Flow (lpm) 45   Temperature (Set) 31   Non-Invasive Readings   Heater Temperature (Obs) 31   Skin Intervention Skin intact

## 2019-12-06 ENCOUNTER — APPOINTMENT (INPATIENT)
Dept: RADIOLOGY | Facility: HOSPITAL | Age: 84
DRG: 871 | End: 2019-12-06
Payer: COMMERCIAL

## 2019-12-06 ENCOUNTER — APPOINTMENT (INPATIENT)
Dept: ULTRASOUND IMAGING | Facility: HOSPITAL | Age: 84
DRG: 871 | End: 2019-12-06
Payer: COMMERCIAL

## 2019-12-06 LAB
ALBUMIN SERPL BCP-MCNC: 1.5 G/DL (ref 3.5–5)
ALP SERPL-CCNC: 141 U/L (ref 46–116)
ALT SERPL W P-5'-P-CCNC: 52 U/L (ref 12–78)
ANION GAP SERPL CALCULATED.3IONS-SCNC: 13 MMOL/L (ref 4–13)
APTT PPP: 63 SECONDS (ref 23–37)
APTT PPP: 73 SECONDS (ref 23–37)
AST SERPL W P-5'-P-CCNC: 161 U/L (ref 5–45)
BACTERIA SPT RESP CULT: ABNORMAL
BACTERIA SPT RESP CULT: ABNORMAL
BILIRUB SERPL-MCNC: 3.6 MG/DL (ref 0.2–1)
BUN SERPL-MCNC: 46 MG/DL (ref 5–25)
CALCIUM SERPL-MCNC: 7.1 MG/DL (ref 8.3–10.1)
CHLORIDE SERPL-SCNC: 100 MMOL/L (ref 100–108)
CO2 SERPL-SCNC: 19 MMOL/L (ref 21–32)
CREAT SERPL-MCNC: 1.68 MG/DL (ref 0.6–1.3)
ERYTHROCYTE [DISTWIDTH] IN BLOOD BY AUTOMATED COUNT: 13.7 % (ref 11.6–15.1)
GFR SERPL CREATININE-BSD FRML MDRD: 37 ML/MIN/1.73SQ M
GLUCOSE SERPL-MCNC: 105 MG/DL (ref 65–140)
GLUCOSE SERPL-MCNC: 110 MG/DL (ref 65–140)
GRAM STN SPEC: ABNORMAL
HCT VFR BLD AUTO: 46.3 % (ref 36.5–49.3)
HGB BLD-MCNC: 16 G/DL (ref 12–17)
MCH RBC QN AUTO: 32.3 PG (ref 26.8–34.3)
MCHC RBC AUTO-ENTMCNC: 34.6 G/DL (ref 31.4–37.4)
MCV RBC AUTO: 94 FL (ref 82–98)
PLATELET # BLD AUTO: 254 THOUSANDS/UL (ref 149–390)
PMV BLD AUTO: 9.4 FL (ref 8.9–12.7)
POTASSIUM SERPL-SCNC: 3.4 MMOL/L (ref 3.5–5.3)
PROT SERPL-MCNC: 5.9 G/DL (ref 6.4–8.2)
RBC # BLD AUTO: 4.95 MILLION/UL (ref 3.88–5.62)
SODIUM SERPL-SCNC: 132 MMOL/L (ref 136–145)
VANCOMYCIN TROUGH SERPL-MCNC: 3.6 UG/ML (ref 10–20)
WBC # BLD AUTO: 30.29 THOUSAND/UL (ref 4.31–10.16)

## 2019-12-06 PROCEDURE — 99291 CRITICAL CARE FIRST HOUR: CPT | Performed by: NURSE PRACTITIONER

## 2019-12-06 PROCEDURE — 85730 THROMBOPLASTIN TIME PARTIAL: CPT | Performed by: ANESTHESIOLOGY

## 2019-12-06 PROCEDURE — 76705 ECHO EXAM OF ABDOMEN: CPT

## 2019-12-06 PROCEDURE — 85027 COMPLETE CBC AUTOMATED: CPT | Performed by: NURSE PRACTITIONER

## 2019-12-06 PROCEDURE — 80053 COMPREHEN METABOLIC PANEL: CPT | Performed by: NURSE PRACTITIONER

## 2019-12-06 PROCEDURE — 80202 ASSAY OF VANCOMYCIN: CPT | Performed by: PHYSICIAN ASSISTANT

## 2019-12-06 PROCEDURE — 82533 TOTAL CORTISOL: CPT | Performed by: NURSE PRACTITIONER

## 2019-12-06 PROCEDURE — 99233 SBSQ HOSP IP/OBS HIGH 50: CPT | Performed by: INTERNAL MEDICINE

## 2019-12-06 PROCEDURE — 94760 N-INVAS EAR/PLS OXIMETRY 1: CPT

## 2019-12-06 PROCEDURE — 71045 X-RAY EXAM CHEST 1 VIEW: CPT

## 2019-12-06 PROCEDURE — 85730 THROMBOPLASTIN TIME PARTIAL: CPT | Performed by: NURSE PRACTITIONER

## 2019-12-06 PROCEDURE — 82948 REAGENT STRIP/BLOOD GLUCOSE: CPT

## 2019-12-06 RX ORDER — CEFAZOLIN SODIUM 2 G/50ML
2000 SOLUTION INTRAVENOUS EVERY 8 HOURS
Status: DISCONTINUED | OUTPATIENT
Start: 2019-12-06 | End: 2019-12-11 | Stop reason: HOSPADM

## 2019-12-06 RX ORDER — MIDODRINE HYDROCHLORIDE 5 MG/1
5 TABLET ORAL
Status: DISCONTINUED | OUTPATIENT
Start: 2019-12-06 | End: 2019-12-08

## 2019-12-06 RX ADMIN — CLINDAMYCIN PHOSPHATE 900 MG: 900 INJECTION, SOLUTION INTRAVENOUS at 12:49

## 2019-12-06 RX ADMIN — CHLORHEXIDINE GLUCONATE 0.12% ORAL RINSE 15 ML: 1.2 LIQUID ORAL at 10:00

## 2019-12-06 RX ADMIN — CEFAZOLIN SODIUM 2000 MG: 2 SOLUTION INTRAVENOUS at 19:42

## 2019-12-06 RX ADMIN — MIDODRINE HYDROCHLORIDE 5 MG: 5 TABLET ORAL at 18:00

## 2019-12-06 RX ADMIN — CEFAZOLIN SODIUM 2000 MG: 2 SOLUTION INTRAVENOUS at 11:18

## 2019-12-06 RX ADMIN — HEPARIN SODIUM AND DEXTROSE 15.1 UNITS/KG/HR: 10000; 5 INJECTION INTRAVENOUS at 08:03

## 2019-12-06 RX ADMIN — SODIUM CHLORIDE, SODIUM GLUCONATE, SODIUM ACETATE, POTASSIUM CHLORIDE, MAGNESIUM CHLORIDE, SODIUM PHOSPHATE, DIBASIC, AND POTASSIUM PHOSPHATE 125 ML/HR: .53; .5; .37; .037; .03; .012; .00082 INJECTION, SOLUTION INTRAVENOUS at 12:52

## 2019-12-06 RX ADMIN — CLINDAMYCIN PHOSPHATE 900 MG: 900 INJECTION, SOLUTION INTRAVENOUS at 20:15

## 2019-12-06 RX ADMIN — SODIUM CHLORIDE, SODIUM GLUCONATE, SODIUM ACETATE, POTASSIUM CHLORIDE, MAGNESIUM CHLORIDE, SODIUM PHOSPHATE, DIBASIC, AND POTASSIUM PHOSPHATE 125 ML/HR: .53; .5; .37; .037; .03; .012; .00082 INJECTION, SOLUTION INTRAVENOUS at 04:12

## 2019-12-06 RX ADMIN — SODIUM CHLORIDE, SODIUM GLUCONATE, SODIUM ACETATE, POTASSIUM CHLORIDE, MAGNESIUM CHLORIDE, SODIUM PHOSPHATE, DIBASIC, AND POTASSIUM PHOSPHATE 125 ML/HR: .53; .5; .37; .037; .03; .012; .00082 INJECTION, SOLUTION INTRAVENOUS at 22:26

## 2019-12-06 RX ADMIN — CHLORHEXIDINE GLUCONATE 0.12% ORAL RINSE 15 ML: 1.2 LIQUID ORAL at 21:17

## 2019-12-06 RX ADMIN — HEPARIN SODIUM AND DEXTROSE 17.8 UNITS/KG/HR: 10000; 5 INJECTION INTRAVENOUS at 23:25

## 2019-12-06 RX ADMIN — NOREPINEPHRINE BITARTRATE 6 MCG/MIN: 1 INJECTION INTRAVENOUS at 12:26

## 2019-12-06 RX ADMIN — CEFAZOLIN SODIUM 1000 MG: 1 SOLUTION INTRAVENOUS at 03:16

## 2019-12-06 RX ADMIN — NOREPINEPHRINE BITARTRATE 6 MCG/MIN: 1 INJECTION INTRAVENOUS at 00:22

## 2019-12-06 RX ADMIN — CLINDAMYCIN PHOSPHATE 900 MG: 900 INJECTION, SOLUTION INTRAVENOUS at 04:12

## 2019-12-06 RX ADMIN — SENNOSIDES AND DOCUSATE SODIUM 1 TABLET: 8.6; 5 TABLET ORAL at 21:17

## 2019-12-06 NOTE — RESPIRATORY THERAPY NOTE
12/06/19 0810   Oxygen Therapy/Pulse Ox   O2 Device High flow nasal cannula   O2 Therapy Oxygen humidified   FiO2 (%) 43  (found pt on 43%)   O2 Flow Rate (L/min) 40 L/min  (pt found on 40L)   SpO2 96 %   SpO2 Activity At Rest   $ Pulse Oximetry Spot Check Charge Completed   RT Ventilator Management Note  Sacha Gill 82 y o  male MRN: [id number]  Unit/Bed#:  Encounter: [id number]      Daily Screen     No data found in the last 10 encounters              Physical Exam:

## 2019-12-06 NOTE — PLAN OF CARE
Problem: Prexisting or High Potential for Compromised Skin Integrity  Goal: Skin integrity is maintained or improved  Description  INTERVENTIONS:  - Identify patients at risk for skin breakdown  - Assess and monitor skin integrity  - Assess and monitor nutrition and hydration status  - Monitor labs   - Assess for incontinence   - Turn and reposition patient  - Assist with mobility/ambulation  - Relieve pressure over bony prominences  - Avoid friction and shearing  - Provide appropriate hygiene as needed including keeping skin clean and dry  - Evaluate need for skin moisturizer/barrier cream  - Collaborate with interdisciplinary team   - Patient/family teaching  - Consider wound care consult   Outcome: Progressing     Problem: Potential for Falls  Goal: Patient will remain free of falls  Description  INTERVENTIONS:  - Assess patient frequently for physical needs  -  Identify cognitive and physical deficits and behaviors that affect risk of falls    -  [place] fall precautions as indicated by assessment   - Educate patient/family on patient safety including physical limitations  - Instruct patient to call for assistance with activity based on assessment  - Modify environment to reduce risk of injury  - Consider OT/PT consult to assist with strengthening/mobility  Outcome: Progressing     Problem: CARDIOVASCULAR - ADULT  Goal: Maintains optimal cardiac output and hemodynamic stability  Description  INTERVENTIONS:  - Monitor I/O, vital signs and rhythm  - Monitor for S/S and trends of decreased cardiac output  - Administer and titrate ordered vasoactive medications to optimize hemodynamic stability  - Assess quality of pulses, skin color and temperature  - Assess for signs of decreased coronary artery perfusion  - Instruct patient to report change in severity of symptoms  Outcome: Progressing  Goal: Absence of cardiac dysrhythmias or at baseline rhythm  Description  INTERVENTIONS:  - Continuous cardiac monitoring, vital signs, obtain 12 lead EKG if ordered  - Administer antiarrhythmic and heart rate control medications as ordered  - Monitor electrolytes and administer replacement therapy as ordered  Outcome: Progressing     Problem: GENITOURINARY - ADULT  Goal: Maintains or returns to baseline urinary function  Description  INTERVENTIONS:  - Assess urinary function  - Encourage oral fluids to ensure adequate hydration if ordered  - Administer IV fluids as ordered to ensure adequate hydration  - Administer ordered medications as needed  - Offer frequent toileting  - Follow urinary retention protocol if ordered  Outcome: Progressing  Goal: Absence of urinary retention  Description  INTERVENTIONS:  - Assess patients ability to void and empty bladder  - Monitor I/O  - Bladder scan as needed  - Discuss with physician/AP medications to alleviate retention as needed  - Discuss catheterization for long term situations as appropriate  Outcome: Progressing  Goal: Urinary catheter remains patent  Description  INTERVENTIONS:  - Assess patency of urinary catheter  - If patient has a chronic le, consider changing catheter if non-functioning  - Follow guidelines for intermittent irrigation of non-functioning urinary catheter  Outcome: Progressing     Problem: Nutrition/Hydration-ADULT  Goal: Nutrient/Hydration intake appropriate for improving, restoring or maintaining nutritional needs  Description  Monitor and assess patient's nutrition/hydration status for malnutrition  Collaborate with interdisciplinary team and initiate plan and interventions as ordered  Monitor patient's weight and dietary intake as ordered or per policy  Determine patient's food preferences and provide high-protein, high-caloric foods as appropriate       INTERVENTIONS:  - Monitor oral intake, urinary output, labs, and treatment plans  - Assess nutrition and hydration status and recommend course of action  - Evaluate amount of meals eaten  - Assist patient with eating if necessary   - Allow adequate time for meals  - Recommend/ encourage appropriate diets, oral nutritional supplements, and vitamin/mineral supplements  - Order, calculate, and assess calorie counts as needed  - Recommend, monitor, and adjust tube feedings based on assessed needs  - Assess need for intravenous fluids  - Provide nutrition/hydration education as appropriate  - Include patient/family/caregiver in decisions related to nutrition    Outcome: Progressing     Problem: RESPIRATORY - ADULT  Goal: Achieves optimal ventilation and oxygenation  Description  INTERVENTIONS:  - Assess for changes in respiratory status  - Assess for changes in mentation and behavior  - Position to facilitate oxygenation and minimize respiratory effort  - Oxygen administered by appropriate delivery if ordered  - Initiate smoking cessation education as indicated  - Encourage broncho-pulmonary hygiene including cough, deep breathe, Incentive Spirometry  - Assess the need for suctioning and aspirate as needed  - Assess and instruct to report SOB or any respiratory difficulty  - Respiratory Therapy support as indicated  Outcome: Progressing     Problem: HEMATOLOGIC - ADULT  Goal: Maintains hematologic stability  Description  INTERVENTIONS  - Assess for signs and symptoms of bleeding or hemorrhage  - Monitor labs  - Administer supportive blood products/factors as ordered and appropriate  Outcome: Progressing

## 2019-12-06 NOTE — PROGRESS NOTES
Progress Note - Infectious Disease   Nakia Esquivel 82 y o  male MRN: [id number]  Unit/Bed#:  Encounter: [id number]      Impression/Plan:    72-year-old male patient admitted for septic shock secondary to severe group A strep pneumonia and right-sided empyema     1- septic shock:  Hypotension, tachycardia and severe leukocytosis on admission, associated with  lactic acidosis and acute kidney injury  Septic shock is secondary to severe GAS pneumonia and empyema  Patient now on Levophed  - supportive care as per ICU team  - antibiotics as below  - repeat CBC and procalcitonin  - follow-up blood culture result collected 12/03/2019; blood culture is negative to date     2- severe GAS pneumonia complicated by empyema:  Two weeks of cough and shortness of breath  Large right-sided pleural effusion seen on chest x-ray on admission  WBC count [id number]  Thoracentesis was diagnostic for empyema; 1200 mL of purulent fluid was removed; fluid WBC count 71,000, LDH more than 10,000, pH 6 3, glucose 5 mg/dL   Fluid culture group A strep  Patient has a chest tube inserted yesterday 12/04/2019  His leukocytosis is trending down  He reports feeling better this morning    He remains on high-flow nasal cannula; though he had an episode of hypotension overnight requiring initiation of Levophed  -  continue cefazolin 2 g IV q 8h and clindamycin 900 mg IV q 8h  - follow-up blood culture result collected 12/03/2019  - repeat CBC in a m , trend procalcitonin  - close clinical monitoring  -  chest tube management as per pulmonary team  - if patient continues to improve over the next 24-48 hours, will stop clindamycin and continue cefazolin     3- atrial fibrillation:  Tachycardia has resolved  - continue supportive care  - rate control and anticoagulation per ICU team     4- acute kidney injury  Creatinine now slowly improving, creatinine 1 68 mg/dL  - IV antibiotics doses mentioned above have been adjusted to creatinine clearance  - repeat BMP, avoid nephrotoxic medications        5- acute respiratory failure:  Secondary to problem #1 and 2  - continue antibiotics as above  - supportive care as per ICU team     Plan mentioned above discussed with patient  Case discussed with ICU team     Antibiotics:  Cefazolin day 3  Clindamycin day 3      Subjective:  Patient has no fever, chills, sweats; no nausea, vomiting, diarrhea;  Patient denies pain, denies new symptoms  Patient had an episode of hypotension overnight requiring initiation of Levophed     Objective:  Vitals:  Temp:  [97 4 °F (36 3 °C)-98 1 °F (36 7 °C)] 97 5 °F (36 4 °C)  HR:  [] 79  Resp:  [18-46] 26  BP: (100-114)/(54-62) 114/58  SpO2:  [95 %-98 %] 96 %  Temp (24hrs), Av 7 °F (36 5 °C), Min:97 4 °F (36 3 °C), Max:98 1 °F (36 7 °C)  Current: Temperature: 97 5 °F (36 4 °C)    Physical Exam:   General Appearance:  Alert, interactive, nontoxic, no acute distress  Throat: Oropharynx moist without lesions  Lungs:   Clear to auscultation bilaterally; no wheezes, rhonchi or rales; respirations unlabored  Chest tube in place draining right-sided pleural effusion  Output of more than 750 mL in past 24 hours   Heart:  RRR; no murmur, rub or gallop   Abdomen:   Soft, non-tender, non-distended, positive bowel sounds  Extremities: No clubbing, cyanosis or edema   Skin: No new rashes or lesions  No draining wounds noted         Labs, Imaging, & Other studies:   All pertinent labs and imaging studies were personally reviewed  Results from last 7 days   Lab Units 19  0540 19  0508 19  0435   WBC Thousand/uL 30 29* 32 39* 49 19*   HEMOGLOBIN g/dL 16 0 15 3 16 2   PLATELETS Thousands/uL 254 256 300     Results from last 7 days   Lab Units 19  0540 19  0508 19  0435  19  1653   SODIUM mmol/L 132* 130* 130*   < >  --    POTASSIUM mmol/L 3 4* 2 8* 4 0   < >  --    CHLORIDE mmol/L 100 97* 98*   < >  --    CO2 mmol/L 19* 16* 14*   < > --    CO2, I-STAT mmol/L  --   --   --   --  12*   BUN mg/dL 46* 60* 59*   < >  --    CREATININE mg/dL 1 68* 2 11* 3 02*   < >  --    EGFR ml/min/1 73sq m 37 28 18   < >  --    GLUCOSE, ISTAT mg/dl  --   --   --   --  119   CALCIUM mg/dL 7 1* 7 5* 8 2*   < >  --    AST U/L 161* 78* 19   < >  --    ALT U/L 52 27 11*   < >  --    ALK PHOS U/L 141* 117* 131*   < >  --     < > = values in this interval not displayed  Results from last 7 days   Lab Units 12/04/19  1931 12/04/19  1154 12/03/19  2126 12/03/19  1823 12/03/19  1646 12/03/19  1640   BLOOD CULTURE   --   --   --   --  No Growth at 48 hrs  No Growth at 48 hrs     SPUTUM CULTURE  2+ Growth of Staphylococcus aureus*  3+ Growth of Beta Hemolytic Streptococcus Group A*  --   --   --   --   --    GRAM STAIN RESULT  2+ Epithelial cells per low power field*  1+ Polys*  2+ Gram positive cocci in pairs, chains and clusters*  1+ Gram positive rods*  1+ Gram negative rods*  --   --  3+ Polys*  4+ Gram positive cocci in chains*  --   --    BODY FLUID CULTURE, STERILE   --   --   --  4+ Growth of Beta Hemolytic Streptococcus Group A*  --   --    MRSA CULTURE ONLY   --  No Methicillin Resistant Staphlyococcus aureus (MRSA) isolated  --   --   --   --    LEGIONELLA URINARY ANTIGEN   --   --  Negative  --   --   --      Results from last 7 days   Lab Units 12/04/19  0435 12/03/19  2348   PROCALCITONIN ng/ml 6 55* 7 02*

## 2019-12-06 NOTE — RESPIRATORY THERAPY NOTE
12/06/19 1241   Oxygen Therapy/Pulse Ox   O2 Device Nasal cannula  (pt placed on 6L NC; HFNC remains stanby at bedside)   O2 Therapy Oxygen humidified   O2 Flow Rate (L/min) 6 L/min   SpO2 96 %   SpO2 Activity At Rest   Oxygen On/Off (RCP) Continued     Pt appears to be in no distress at this time  Will continue to monitor

## 2019-12-06 NOTE — ASSESSMENT & PLAN NOTE
-s/p thoracentesis and 12 hours of BIPAP therapy    -wean HFNC, currently 60%, 45L  -pulmonary toilet  -respiratory protocol

## 2019-12-06 NOTE — RESPIRATORY THERAPY NOTE
Pt currently on HFNC 40L 43%  No distress noted  SpO2 96%  Water bag for humidification purposes full  Will continue to monitor

## 2019-12-06 NOTE — PROGRESS NOTES
Progress Note - Gale Sparks 1935, 82 y o  male MRN: [id number]    Unit/Bed#:  Encounter: [id number]    Primary Care Provider: Kwabena Veronica MD   Date and time admitted to hospital: 12/3/2019  4:08 PM    Moderate protein-calorie malnutrition ([facility] [address] )  Assessment & Plan  - nutrition consultation  - advance diet as able      Positive D dimer  Assessment & Plan  - to be suspected given septic shock  - CXR supports etiology for his sx and hypoxia  - hold on CTA given his SHREYA and alternate source to explain elevation    Transaminitis  Assessment & Plan  - monitor LFTs  - suspect shock liver      Chronic atrial fibrillation  Assessment & Plan  - takes eliquis as outpatient, held now given need for procedures  - heparin gtt     SHREYA (acute kidney injury) ([facility] )  Assessment & Plan  - baseline Cr around 1 1 - 1 3  - Cr improving  - UO improving  - continue to monitor  - likely 2/2 dehydration / sepsis    Hyponatremia  Assessment & Plan  - normal Na is in the 130s   - improving slowly w fluid resuscitation     Septic shock ([facility] [address] )  Assessment & Plan  - likely secondary to empyema, possible UTI  - follow up culture growth  - abx deescalated to cefepime and clind  -monitor hemodynamics closely off levo    Pleural effusion on right  Assessment & Plan  - s/p bedside ED thoracentesis (12/3)  - s/p R CT placement (12/4)  - pleural fluid cx w GAS  - AM CXR  - pleural rind on imaging, thoracics involvement if inadequate CT drainage    * Acute respiratory failure with hypoxia (HCC)  Assessment & Plan  -s/p thoracentesis and 12 hours of BIPAP therapy    -wean HFNC, currently 60%, 45L  -pulmonary toilet  -respiratory protocol         ----------------------------------------------------------------------------------------  HPI/24hr events: 12 hour elective BIPAP for recruitment, transitioned back to high flow nasal cannula at 10 pm  No acute events overnight      Disposition: Continue Critical Care   Code Status: Level 1 - Full Code  ---------------------------------------------------------------------------------------  SUBJECTIVE  Coughing overnight     Review of Systems  Review of systems was reviewed and negative unless stated above in HPI/24-hour events   ---------------------------------------------------------------------------------------  OBJECTIVE    Physical Exam   Constitutional: He is oriented to person, place, and time  He appears well-developed and well-nourished  He appears ill  HENT:   Head: Normocephalic and atraumatic  Eyes: Pupils are equal, round, and reactive to light  Conjunctivae, EOM and lids are normal  Lids are everted and swept, no foreign bodies found  Neck: Trachea normal, normal range of motion and full passive range of motion without pain  Cardiovascular: Normal rate, normal heart sounds and normal pulses  An irregular rhythm present  Pulmonary/Chest: He has rhonchi  Abdominal: Soft  Normal appearance  Neurological: He is alert and oriented to person, place, and time  GCS eye subscore is 4  GCS verbal subscore is 5  GCS motor subscore is 6  Vitals   Vitals:    19 0000 19 0100 19 0200 19 0300   BP:       BP Location:       Pulse: 90 100 91 89   Resp: (!) 23 (!) 26 (!) 26 (!) 46   Temp:       TempSrc:       SpO2: 95% 95% 95% 95%   Weight:       Height:         Temp (24hrs), Av 4 °F (36 3 °C), Min:95 2 °F (35 1 °C), Max:98 1 °F (36 7 °C)  Current: Temperature: 98 °F (36 7 °C)  Arterial Line BP: 106/58  Arterial Line MAP (mmHg): 74 mmHg    Invasive/non-invasive ventilation settings   Respiratory    Lab Data (Last 4 hours)    None         O2/Vent Data (Last 4 hours)       0447          Non-Invasive Ventilation Mode HFNC                   Height and Weights   Height: 5' 9" (175 3 cm)  IBW: 70 7 kg  Body mass index is 27 97 kg/m²  Weight (last 2 days)     None            Intake and Output  I/O       701 -  07 -  07    I  V  (mL/kg) 3582 6 (41 7) 3020 9 (35 2)    IV Piggyback 1253 3 1050    Total Intake(mL/kg) 4835 9 (56 3) 4070 9 (47 4)    Urine (mL/kg/hr) 1925 (0 9) 1100 (0 5)    Chest Tube 160 575    Total Output 2085 1675    Net +2750 9 +2395 9                Nutrition       Diet Orders   (From admission, onward)             Start     Ordered    12/05/19 1321  Dietary nutrition supplements  Once     Question Answer Comment   Select Supplement: Ensure Compact-Vanilla    Frequency Breakfast, Dinner        12/05/19 1320    12/05/19 0446  Diet Regular; Regular House  Diet effective now     Question Answer Comment   Diet Type Regular    Regular Regular House    RD to adjust diet per protocol?  Yes        12/05/19 0446                Laboratory and Diagnostics:  Results from last 7 days   Lab Units 12/05/19  0508 12/04/19  0435 12/03/19  1653 12/03/19  1640   WBC Thousand/uL 32 39* 49 19*  --  57 70*   HEMOGLOBIN g/dL 15 3 16 2  --  17 8*   I STAT HEMOGLOBIN g/dl  --   --  17 7*  --    HEMATOCRIT % 44 7 47 4  --  54 9*   HEMATOCRIT, ISTAT %  --   --  52*  --    PLATELETS Thousands/uL 256 300  --  342   BANDS PCT % 5 12*  --  28*   MONO PCT % 4 5  --  4     Results from last 7 days   Lab Units 12/05/19  0508 12/04/19  0435 12/03/19  2348 12/03/19  1653 12/03/19  1640   SODIUM mmol/L 130* 130* 128*  --  128*   POTASSIUM mmol/L 2 8* 4 0 3 9  --  4 2   CHLORIDE mmol/L 97* 98* 96*  --  90*   CO2 mmol/L 16* 14* 13*  --  19*   CO2, I-STAT mmol/L  --   --   --  12*  --    ANION GAP mmol/L 17* 18* 19*  --  19*   BUN mg/dL 60* 59* 59*  --  55*   CREATININE mg/dL 2 11* 3 02* 3 26*  --  3 87*   CALCIUM mg/dL 7 5* 8 2* 7 7*  --  8 9   GLUCOSE RANDOM mg/dL 89 87 107  --  120   ALT U/L 27 11* 15  --  18   AST U/L 78* 19 22  --  24   ALK PHOS U/L 117* 131* 153*  --  186*   ALBUMIN g/dL 1 5* 1 8* 1 8*  --  2 1*   TOTAL BILIRUBIN mg/dL 2 80* 2 50* 2 70*  --  2 60*     Results from last 7 days   Lab Units 12/05/19  0508 12/04/19  0435 12/03/19  2341 12/03/19  1640   MAGNESIUM mg/dL 2 8* 1 9  --  2 2   PHOSPHORUS mg/dL  --  4 4* 4 9*  --       Results from last 7 days   Lab Units 12/06/19  0058 12/05/19  1811 12/05/19  1040 12/05/19  0136 12/04/19  1745 12/03/19  1640   INR   --   --   --   --   --  2 09*   PTT seconds 73* 56* 52* 50* 40* 41*      Results from last 7 days   Lab Units 12/03/19  1640   TROPONIN I ng/mL <0 02     Results from last 7 days   Lab Units 12/05/19  0508 12/04/19  0435 12/04/19  0209 12/03/19  2348 12/03/19  1952 12/03/19  1640   LACTIC ACID mmol/L 1 7 3 4* 3 8* 3 9* 4 6* 6 7*     ABG:  Results from last 7 days   Lab Units 12/04/19  0550   PH ART  7 361   PCO2 ART mm Hg 19 9*   PO2 ART mm Hg 93 1   HCO3 ART mmol/L 11 0*   BASE EXC ART mmol/L -11 6   ABG SOURCE  Line, Arterial     VBG:  Results from last 7 days   Lab Units 12/04/19  0550   ABG SOURCE  Line, Arterial     Results from last 7 days   Lab Units 12/04/19  0435 12/03/19  2348   PROCALCITONIN ng/ml 6 55* 7 02*       Micro  Results from last 7 days   Lab Units 12/04/19  1931 12/04/19  1154 12/03/19  2126 12/03/19  1823 12/03/19  1646 12/03/19  [facility]   BLOOD CULTURE   --   --   --   --  No Growth at 48 hrs  No Growth at 48 hrs     SPUTUM CULTURE  Culture too young- will reincubate  --   --   --   --   --    GRAM STAIN RESULT  2+ Epithelial cells per low power field*  1+ Polys*  2+ Gram positive cocci in pairs, chains and clusters*  1+ Gram positive rods*  1+ Gram negative rods*  --   --  3+ Polys*  4+ Gram positive cocci in chains*  --   --    BODY FLUID CULTURE, STERILE   --   --   --  4+ Growth of Beta Hemolytic Streptococcus Group A*  --   --    MRSA CULTURE ONLY   --  No Methicillin Resistant Staphlyococcus aureus (MRSA) isolated  --   --   --   --    LEGIONELLA URINARY ANTIGEN   --   --  Negative  --   --   --    STREP PNEUMONIAE ANTIGEN, URINE   --   --  Negative  --   --   --        EKG: atrial fibrillation  Imaging:   XR chest portable ICU   Final Result      Persistent central vascular congestion small right and possible left pleural effusions  No pneumothorax  Workstation performed: [id number]         XR chest portable ICU   Final Result      Placement of right sided chest tube with partially diminished size of right pleural effusion  Minimal lateral pleural space air  Workstation performed: [id number]         CT chest abdomen pelvis wo contrast   Final Result      Small to moderate layering right pleural effusion with subtle thickening of pleura throughout the right hemithorax suggests the possibility that there is empyema in this patient with sepsis  Nonspecific mediastinal and hilar lymphadenopathy slightly more notable on the right than the left, possibly reactive  Consolidative airspace opacity at the lung bases more notable on the right than on the left with a CT appearance is more suspicious for compressive atelectasis than for pneumonia  The possibility of pneumonia, especially at the right lung base,    difficult to exclude with certainty  Findings suggestive of mild pulmonary vascular interstitial edema  Cardiomegaly  Mild thickening of the pericardium with pericardial calcification is a nonspecific finding but can indicate the sequela of prior viral pericarditis  Nodular hepatic contour suggestive of hepatic cirrhosis  The study was marked in EPIC for significant notification  Workstation performed: [id number]         XR chest portable ICU   Final Result      No significant interval change since yesterday            Workstation performed: [id number]         XR chest portable   ED Interpretation   Decreased size of right pleural effusion status post thoracentesis  No obvious pneumothorax  Final Result      1  No significant pneumothorax  2   Decrease in size of right-sided pleural effusion and improved pulmonary vascular congestion  3   Improvement in right lower lung field opacity     4  Slightly increased opacity at the left lung base may be due to atelectasis or trace left pleural effusion  Workstation performed: [id number]         XR chest 1 view portable   Final Result      Large right-sided pleural effusion with subjacent consolidation              Workstation performed: [id number]         XR chest portable ICU    (Results Pending)       Active Medications  Scheduled Meds:  Current Facility-Administered Medications:  cefazolin 1,000 mg Intravenous Q8H Lawrence Calderón MD Last Rate: 1,000 mg (12/06/19 0316)   chlorhexidine 15 mL Swish & Spit Q12H [address] Justus Cancel, PA-C    clindamycin 900 mg Intravenous Q8H Lawrence Calderón MD Last Rate: 900 mg (12/06/19 0412)   heparin (porcine) 3-20 Units/kg/hr (Order-Specific) Intravenous Titrated Hildy Reus, CRNP Last Rate: 17 8 Units/kg/hr (12/05/19 1900)   heparin (porcine) 2,000 Units Intravenous PRN Hildy Reus, CRNP    heparin (porcine) 4,000 Units Intravenous PRN Hildy Reus, CRNP    multi-electrolyte 125 mL/hr Intravenous Continuous Justus Cancel, PA-C Last Rate: 125 mL/hr (12/06/19 0412)   norepinephrine 1-30 mcg/min Intravenous Titrated Hildy Reus, CRNP Last Rate: 6 mcg/min (12/06/19 0022)   senna-docusate sodium 1 tablet Oral HS Justus Cancel, PA-C      Continuous Infusions:    heparin (porcine) 3-20 Units/kg/hr (Order-Specific) Last Rate: 17 8 Units/kg/hr (12/05/19 1900)   multi-electrolyte 125 mL/hr Last Rate: 125 mL/hr (12/06/19 0412)   norepinephrine 1-30 mcg/min Last Rate: 6 mcg/min (12/06/19 0022)     PRN Meds:     heparin (porcine) 2,000 Units PRN   heparin (porcine) 4,000 Units PRN       ---------------------------------------------------------------------------------------  Advance Directive and Living Will:      Power of :    POLST:    ---------------------------------------------------------------------------------------  Counseling / Coordination of Care  Total Critical Care time spent 36 minutes excluding procedures, teaching and family updates  ANNA MARIE Reece        Portions of the record may have been created with voice recognition software  Occasional wrong word or "sound a like" substitutions may have occurred due to the inherent limitations of voice recognition software    Read the chart carefully and recognize, using context, where substitutions have occurred

## 2019-12-06 NOTE — RESPIRATORY THERAPY NOTE
Pt remains on HFNC  Pt is tolerating well  Continue to titrate patient as tolerated          12/06/19 7345   Non-Invasive Information   Interface HFNC prongs   Non-Invasive Ventilation Mode HFNC   $ Pulse Oximetry Spot Check Charge Completed   Non-Invasive Settings   FiO2 (%) 50   Flow (lpm) 40   Temperature (Set) 31   Non-Invasive Readings   Heater Temperature (Obs) 30 6   Skin Intervention Skin intact

## 2019-12-07 ENCOUNTER — APPOINTMENT (INPATIENT)
Dept: RADIOLOGY | Facility: HOSPITAL | Age: 84
DRG: 871 | End: 2019-12-07
Payer: COMMERCIAL

## 2019-12-07 ENCOUNTER — APPOINTMENT (INPATIENT)
Dept: CT IMAGING | Facility: HOSPITAL | Age: 84
DRG: 871 | End: 2019-12-07
Payer: COMMERCIAL

## 2019-12-07 LAB
ALBUMIN SERPL BCP-MCNC: 1.5 G/DL (ref 3.5–5)
ALP SERPL-CCNC: 180 U/L (ref 46–116)
ALT SERPL W P-5'-P-CCNC: 24 U/L (ref 12–78)
ANION GAP SERPL CALCULATED.3IONS-SCNC: 12 MMOL/L (ref 4–13)
APTT PPP: 80 SECONDS (ref 23–37)
AST SERPL W P-5'-P-CCNC: 62 U/L (ref 5–45)
BASOPHILS # BLD MANUAL: 0 THOUSAND/UL (ref 0–0.1)
BASOPHILS NFR MAR MANUAL: 0 % (ref 0–1)
BILIRUB SERPL-MCNC: 3.3 MG/DL (ref 0.2–1)
BUN SERPL-MCNC: 34 MG/DL (ref 5–25)
CA-I BLD-SCNC: 0.98 MMOL/L (ref 1.12–1.32)
CALCIUM SERPL-MCNC: 7.2 MG/DL (ref 8.3–10.1)
CHLORIDE SERPL-SCNC: 98 MMOL/L (ref 100–108)
CO2 SERPL-SCNC: 23 MMOL/L (ref 21–32)
CORTIS SERPL-MCNC: 22.5 UG/DL
CREAT SERPL-MCNC: 1.3 MG/DL (ref 0.6–1.3)
EOSINOPHIL # BLD MANUAL: 0 THOUSAND/UL (ref 0–0.4)
EOSINOPHIL NFR BLD MANUAL: 0 % (ref 0–6)
ERYTHROCYTE [DISTWIDTH] IN BLOOD BY AUTOMATED COUNT: 14 % (ref 11.6–15.1)
GFR SERPL CREATININE-BSD FRML MDRD: 51 ML/MIN/1.73SQ M
GLUCOSE SERPL-MCNC: 109 MG/DL (ref 65–140)
HCT VFR BLD AUTO: 46.7 % (ref 36.5–49.3)
HGB BLD-MCNC: 16 G/DL (ref 12–17)
INR PPP: 1.58 (ref 0.84–1.19)
LYMPHOCYTES # BLD AUTO: 22 % (ref 14–44)
LYMPHOCYTES # BLD AUTO: 6.38 THOUSAND/UL (ref 0.6–4.47)
MAGNESIUM SERPL-MCNC: 2.4 MG/DL (ref 1.6–2.6)
MCH RBC QN AUTO: 31.9 PG (ref 26.8–34.3)
MCHC RBC AUTO-ENTMCNC: 34.3 G/DL (ref 31.4–37.4)
MCV RBC AUTO: 93 FL (ref 82–98)
MONOCYTES # BLD AUTO: 1.45 THOUSAND/UL (ref 0–1.22)
MONOCYTES NFR BLD: 5 % (ref 4–12)
NEUTROPHILS # BLD MANUAL: 21.18 THOUSAND/UL (ref 1.85–7.62)
NEUTS SEG NFR BLD AUTO: 73 % (ref 43–75)
NRBC BLD AUTO-RTO: 0 /100 WBCS
PHOSPHATE SERPL-MCNC: 2.8 MG/DL (ref 2.3–4.1)
PLATELET # BLD AUTO: 231 THOUSANDS/UL (ref 149–390)
PLATELET BLD QL SMEAR: ADEQUATE
PMV BLD AUTO: 9.3 FL (ref 8.9–12.7)
POTASSIUM SERPL-SCNC: 3 MMOL/L (ref 3.5–5.3)
PROT SERPL-MCNC: 6 G/DL (ref 6.4–8.2)
PROTHROMBIN TIME: 18.9 SECONDS (ref 11.6–14.5)
RBC # BLD AUTO: 5.02 MILLION/UL (ref 3.88–5.62)
SODIUM SERPL-SCNC: 133 MMOL/L (ref 136–145)
TOTAL CELLS COUNTED SPEC: 100
WBC # BLD AUTO: 29.02 THOUSAND/UL (ref 4.31–10.16)

## 2019-12-07 PROCEDURE — 85610 PROTHROMBIN TIME: CPT | Performed by: NURSE PRACTITIONER

## 2019-12-07 PROCEDURE — 85007 BL SMEAR W/DIFF WBC COUNT: CPT | Performed by: NURSE PRACTITIONER

## 2019-12-07 PROCEDURE — 83735 ASSAY OF MAGNESIUM: CPT | Performed by: NURSE PRACTITIONER

## 2019-12-07 PROCEDURE — 84100 ASSAY OF PHOSPHORUS: CPT | Performed by: NURSE PRACTITIONER

## 2019-12-07 PROCEDURE — 82330 ASSAY OF CALCIUM: CPT | Performed by: NURSE PRACTITIONER

## 2019-12-07 PROCEDURE — 71250 CT THORAX DX C-: CPT

## 2019-12-07 PROCEDURE — 71045 X-RAY EXAM CHEST 1 VIEW: CPT

## 2019-12-07 PROCEDURE — 02HV33Z INSERTION OF INFUSION DEVICE INTO SUPERIOR VENA CAVA, PERCUTANEOUS APPROACH: ICD-10-PCS | Performed by: NURSE PRACTITIONER

## 2019-12-07 PROCEDURE — NC001 PR NO CHARGE: Performed by: NURSE PRACTITIONER

## 2019-12-07 PROCEDURE — 99291 CRITICAL CARE FIRST HOUR: CPT | Performed by: NURSE PRACTITIONER

## 2019-12-07 PROCEDURE — 85730 THROMBOPLASTIN TIME PARTIAL: CPT | Performed by: NURSE PRACTITIONER

## 2019-12-07 PROCEDURE — 36556 INSERT NON-TUNNEL CV CATH: CPT | Performed by: NURSE PRACTITIONER

## 2019-12-07 PROCEDURE — 85027 COMPLETE CBC AUTOMATED: CPT | Performed by: NURSE PRACTITIONER

## 2019-12-07 PROCEDURE — 80053 COMPREHEN METABOLIC PANEL: CPT | Performed by: NURSE PRACTITIONER

## 2019-12-07 RX ORDER — LANOLIN ALCOHOL/MO/W.PET/CERES
3 CREAM (GRAM) TOPICAL
Status: DISCONTINUED | OUTPATIENT
Start: 2019-12-07 | End: 2019-12-11 | Stop reason: HOSPADM

## 2019-12-07 RX ORDER — CLINDAMYCIN PHOSPHATE 900 MG/50ML
900 INJECTION INTRAVENOUS EVERY 8 HOURS
Status: DISCONTINUED | OUTPATIENT
Start: 2019-12-07 | End: 2019-12-11

## 2019-12-07 RX ADMIN — SENNOSIDES AND DOCUSATE SODIUM 1 TABLET: 8.6; 5 TABLET ORAL at 21:26

## 2019-12-07 RX ADMIN — CHLORHEXIDINE GLUCONATE 0.12% ORAL RINSE 15 ML: 1.2 LIQUID ORAL at 09:36

## 2019-12-07 RX ADMIN — NYSTATIN 500000 UNITS: 100000 SUSPENSION ORAL at 14:45

## 2019-12-07 RX ADMIN — NOREPINEPHRINE BITARTRATE 6 MCG/MIN: 1 INJECTION INTRAVENOUS at 00:22

## 2019-12-07 RX ADMIN — CLINDAMYCIN PHOSPHATE 900 MG: 900 INJECTION, SOLUTION INTRAVENOUS at 14:00

## 2019-12-07 RX ADMIN — SODIUM CHLORIDE, SODIUM GLUCONATE, SODIUM ACETATE, POTASSIUM CHLORIDE, MAGNESIUM CHLORIDE, SODIUM PHOSPHATE, DIBASIC, AND POTASSIUM PHOSPHATE 125 ML/HR: .53; .5; .37; .037; .03; .012; .00082 INJECTION, SOLUTION INTRAVENOUS at 06:28

## 2019-12-07 RX ADMIN — CEFAZOLIN SODIUM 2000 MG: 2 SOLUTION INTRAVENOUS at 11:53

## 2019-12-07 RX ADMIN — MIDODRINE HYDROCHLORIDE 5 MG: 5 TABLET ORAL at 17:00

## 2019-12-07 RX ADMIN — NYSTATIN 500000 UNITS: 100000 SUSPENSION ORAL at 17:55

## 2019-12-07 RX ADMIN — CHLORHEXIDINE GLUCONATE 0.12% ORAL RINSE 15 ML: 1.2 LIQUID ORAL at 21:26

## 2019-12-07 RX ADMIN — MIDODRINE HYDROCHLORIDE 5 MG: 5 TABLET ORAL at 09:36

## 2019-12-07 RX ADMIN — NYSTATIN 500000 UNITS: 100000 SUSPENSION ORAL at 21:27

## 2019-12-07 RX ADMIN — CEFAZOLIN SODIUM 2000 MG: 2 SOLUTION INTRAVENOUS at 04:10

## 2019-12-07 RX ADMIN — NOREPINEPHRINE BITARTRATE 6 MCG/MIN: 1 INJECTION INTRAVENOUS at 23:25

## 2019-12-07 RX ADMIN — MIDODRINE HYDROCHLORIDE 5 MG: 5 TABLET ORAL at 11:57

## 2019-12-07 RX ADMIN — MELATONIN 3 MG: 3 TAB ORAL at 21:26

## 2019-12-07 RX ADMIN — NOREPINEPHRINE BITARTRATE 6 MCG/MIN: 1 INJECTION INTRAVENOUS at 12:31

## 2019-12-07 RX ADMIN — CLINDAMYCIN PHOSPHATE 900 MG: 900 INJECTION, SOLUTION INTRAVENOUS at 21:27

## 2019-12-07 RX ADMIN — HEPARIN SODIUM AND DEXTROSE 17.8 UNITS/KG/HR: 10000; 5 INJECTION INTRAVENOUS at 22:29

## 2019-12-07 RX ADMIN — CEFAZOLIN SODIUM 2000 MG: 2 SOLUTION INTRAVENOUS at 20:22

## 2019-12-07 RX ADMIN — SODIUM CHLORIDE, SODIUM GLUCONATE, SODIUM ACETATE, POTASSIUM CHLORIDE, MAGNESIUM CHLORIDE, SODIUM PHOSPHATE, DIBASIC, AND POTASSIUM PHOSPHATE 125 ML/HR: .53; .5; .37; .037; .03; .012; .00082 INJECTION, SOLUTION INTRAVENOUS at 15:30

## 2019-12-07 RX ADMIN — SODIUM CHLORIDE, SODIUM GLUCONATE, SODIUM ACETATE, POTASSIUM CHLORIDE, MAGNESIUM CHLORIDE, SODIUM PHOSPHATE, DIBASIC, AND POTASSIUM PHOSPHATE 125 ML/HR: .53; .5; .37; .037; .03; .012; .00082 INJECTION, SOLUTION INTRAVENOUS at 22:28

## 2019-12-07 RX ADMIN — HEPARIN SODIUM AND DEXTROSE 17.8 UNITS/KG/HR: 10000; 5 INJECTION INTRAVENOUS at 15:49

## 2019-12-07 NOTE — ASSESSMENT & PLAN NOTE
- s/p bedside ED thoracentesis (12/3)  - s/p R CT placement (12/4)  - pleural fluid cx w GAS  - CT scan pending

## 2019-12-07 NOTE — PROGRESS NOTES
Progress Note - Danny Ponce 1935, 82 y o  male MRN: [id number]    Unit/Bed#:  Encounter: [id number]    Primary Care Provider: Lady Vilma MD   Date and time admitted to hospital: 12/3/2019  4:08 PM        Moderate protein-calorie malnutrition ([facility] [address] )  Assessment & Plan  - nutrition consultation  - continue diet      Positive D dimer  Assessment & Plan  - to be suspected given septic shock  - CXR supports etiology for his sx and hypoxia  - hold on CTA given his SHREYA and alternate source to explain elevation    Transaminitis  Assessment & Plan  - monitor LFTs  - suspect shock liver  -RUQ US-no acute cholecystitis, hepatomegaly      Chronic atrial fibrillation  Assessment & Plan  - takes eliquis as outpatient, held now given need for procedures  - heparin gtt     SHREYA (acute kidney injury) ([facility] )  Assessment & Plan  - baseline Cr around 1 1 - 1 3  - Cr improving  - UO improving  - continue to monitor  - likely 2/2 dehydration / sepsis    Hyponatremia  Assessment & Plan  - normal Na is in the 130s   - improving slowly w fluid resuscitation     Septic shock ([facility] [address] )  Assessment & Plan  - likely secondary to empyema, possible UTI  - follow up culture growth  - abx deescalated to cefepime and clinda  -monitor hemodynamics closely     Pleural effusion on right  Assessment & Plan  - s/p bedside ED thoracentesis (12/3)  - s/p R CT placement (12/4)  - pleural fluid cx w GAS  - CT scan pending      * Acute respiratory failure with hypoxia (HCC)  Assessment & Plan  -s/p thoracentesis and 12 hours of BIPAP therapy    -HFNC off, wean NC as able  -pulmonary toilet  -respiratory protocol           ----------------------------------------------------------------------------------------  HPI/24hr events: WEaned off HFNC      Disposition: Cont ICU level care  Code Status: Level 1 - Full Code  ---------------------------------------------------------------------------------------  SUBJECTIVE  Pt resting comfortably OOB in chair    Review of Systems   Constitutional: Negative for chills and fever  HENT: Negative  Eyes: Negative  Respiratory: Negative for cough, shortness of breath, wheezing and stridor  Cardiovascular: Negative for chest pain, palpitations and leg swelling  Gastrointestinal: Negative for abdominal distention, abdominal pain, blood in stool, diarrhea, nausea and vomiting  Endocrine: Negative  Genitourinary: Negative for dysuria, flank pain, frequency and urgency  Musculoskeletal: Negative  Skin: Negative for rash and wound  Allergic/Immunologic: Negative  Neurological: Negative for dizziness, syncope, facial asymmetry, weakness, light-headedness, numbness and headaches  Hematological: Negative for adenopathy  Does not bruise/bleed easily  Psychiatric/Behavioral: Negative  Review of systems was reviewed and negative unless stated above in HPI/24-hour events   ---------------------------------------------------------------------------------------  OBJECTIVE    Physical Exam   Constitutional: He is oriented to person, place, and time  He appears well-developed and well-nourished  No distress  HENT:   Head: Normocephalic and atraumatic  Eyes: Pupils are equal, round, and reactive to light  EOM are normal    Neck: Normal range of motion  Neck supple  No JVD present  Cardiovascular: Normal rate, normal heart sounds and intact distal pulses  An irregular rhythm present  No murmur heard  Pulmonary/Chest: Effort normal  No respiratory distress  He has decreased breath sounds in the right upper field, the right middle field, the right lower field and the left lower field  R sided CT with small amount serosang drainage   Abdominal: Soft  Bowel sounds are normal  He exhibits no distension  There is no tenderness  Musculoskeletal: Normal range of motion  Lymphadenopathy:     He has no cervical adenopathy  Neurological: He is alert and oriented to person, place, and time   No cranial nerve deficit  Skin: Skin is warm and dry  No rash noted  Psychiatric: He has a normal mood and affect  Nursing note and vitals reviewed  Vitals   Vitals:    19 0500 19 0600 19 0718 19 1106   BP:   111/57 103/57   BP Location:    Left arm   Pulse: 72 78 74 74   Resp:  (!) 23   Temp:   97 6 °F (36 4 °C) (!) 97 4 °F (36 3 °C)   TempSrc:   Oral Oral   SpO2: 95% 96% 95% 94%   Weight:  95 7 kg (210 lb 15 7 oz)     Height:         Temp (24hrs), Av 6 °F (36 4 °C), Min:97 4 °F (36 3 °C), Max:97 7 °F (36 5 °C)  Current: Temperature: (!) 97 4 °F (36 3 °C)  Arterial Line BP: 109/53  Arterial Line MAP (mmHg): 73 mmHg    Invasive/non-invasive ventilation settings   Respiratory    Lab Data (Last 4 hours)    None         O2/Vent Data (Last 4 hours)    None                Height and Weights   Height: 5' 9" (175 3 cm)  IBW: 70 7 kg  Body mass index is 31 16 kg/m²  Weight (last 2 days)     Date/Time   Weight    19 0600   95 7 (210 98)    19 0543   96 9 (213 63)                Intake and Output  I/O        07 -  0700  07 -  0700  07 -  0700    P  O   120     I V  (mL/kg) 3712 (38 3) 3866 5 (40 4)     IV Piggyback 1050 50     Total Intake(mL/kg) 4762 (49 1) 4036 5 (42 2)     Urine (mL/kg/hr) 1300 (0 6) 2100 (0 9) 150 (0 2)    Chest Tube 775 305     Total Output 2075 2405 150    Net +2687 +1631 5 -150                 Nutrition       Diet Orders   (From admission, onward)             Start     Ordered    19 1321  Dietary nutrition supplements  Once     Question Answer Comment   Select Supplement: Ensure Compact-Vanilla    Frequency Breakfast, Dinner        19 1320    19 0446  Diet Regular; Regular House  Diet effective now     Question Answer Comment   Diet Type Regular    Regular Regular House    RD to adjust diet per protocol?  Yes        19 6606                Laboratory and Diagnostics:  Results from last 7 days   Lab Units 12/07/19  0535 12/06/19  0540 12/05/19  0508 12/04/19  0435 12/03/19  1653 12/03/19  1640   WBC Thousand/uL 29 02* 30 29* 32 39* 49 19*  --  57 70*   HEMOGLOBIN g/dL 16 0 16 0 15 3 16 2  --  17 8*   I STAT HEMOGLOBIN g/dl  --   --   --   --  17 7*  --    HEMATOCRIT % 46 7 46 3 44 7 47 4  --  54 9*   HEMATOCRIT, ISTAT %  --   --   --   --  52*  --    PLATELETS Thousands/uL 231 254 256 300  --  342   BANDS PCT %  --   --  5 12*  --  28*   MONO PCT % 5  --  4 5  --  4     Results from last 7 days   Lab Units 12/07/19  0535 12/06/19  0540 12/05/19  0508 12/04/19  0435 12/03/19  2348 12/03/19  1653 12/03/19  1640   SODIUM mmol/L 133* 132* 130* 130* 128*  --  128*   POTASSIUM mmol/L 3 0* 3 4* 2 8* 4 0 3 9  --  4 2   CHLORIDE mmol/L 98* 100 97* 98* 96*  --  90*   CO2 mmol/L 23 19* 16* 14* 13*  --  19*   CO2, I-STAT mmol/L  --   --   --   --   --  12*  --    ANION GAP mmol/L 12 13 17* 18* 19*  --  19*   BUN mg/dL 34* 46* 60* 59* 59*  --  55*   CREATININE mg/dL 1 30 1 68* 2 11* 3 02* 3 26*  --  3 87*   CALCIUM mg/dL 7 2* 7 1* 7 5* 8 2* 7 7*  --  8 9   GLUCOSE RANDOM mg/dL 109 105 89 87 107  --  120   ALT U/L 24 52 27 11* 15  --  18   AST U/L 62* 161* 78* 19 22  --  24   ALK PHOS U/L 180* 141* 117* 131* 153*  --  186*   ALBUMIN g/dL 1 5* 1 5* 1 5* 1 8* 1 8*  --  2 1*   TOTAL BILIRUBIN mg/dL 3 30* 3 60* 2 80* 2 50* 2 70*  --  2 60*     Results from last 7 days   Lab Units 12/07/19  0535 12/05/19  0508 12/04/19  0435 12/03/19  2348 12/03/19  1640   MAGNESIUM mg/dL 2 4 2 8* 1 9  --  2 2   PHOSPHORUS mg/dL 2 8  --  4 4* 4 9*  --       Results from last 7 days   Lab Units 12/07/19  0535 12/06/19  0809 12/06/19  0058 12/05/19  1811 12/05/19  1040 12/05/19  0136 12/04/19  1745 12/03/19  1640   INR  1 58*  --   --   --   --   --   --  2 09*   PTT seconds 80* 63* 73* 56* 52* 50* 40* 41*      Results from last 7 days   Lab Units 12/03/19  1640   TROPONIN I ng/mL <0 02     Results from last 7 days   Lab Units 12/05/19  0508 12/04/19  0435 12/04/19  0209 12/03/19  2348 12/03/19  1952 12/03/19  1640   LACTIC ACID mmol/L 1 7 3 4* 3 8* 3 9* 4 6* 6 7*     ABG:  Results from last 7 days   Lab Units 12/04/19  0550   PH ART  7 361   PCO2 ART mm Hg 19 9*   PO2 ART mm Hg 93 1   HCO3 ART mmol/L 11 0*   BASE EXC ART mmol/L -11 6   ABG SOURCE  Line, Arterial     VBG:  Results from last 7 days   Lab Units 12/04/19  0550   ABG SOURCE  Line, Arterial     Results from last 7 days   Lab Units 12/04/19  0435 12/03/19  2348   PROCALCITONIN ng/ml 6 55* 7 02*       Micro  Results from last 7 days   Lab Units 12/04/19  1931 12/04/19  1154 12/03/19  2126 12/03/19  1823 12/03/19  1646 12/03/19  1640   BLOOD CULTURE   --   --   --   --  No Growth at 72 hrs  No Growth at 72 hrs  SPUTUM CULTURE  2+ Growth of Staphylococcus aureus*  3+ Growth of Beta Hemolytic Streptococcus Group A*  --   --   --   --   --    GRAM STAIN RESULT  2+ Epithelial cells per low power field*  1+ Polys*  2+ Gram positive cocci in pairs, chains and clusters*  1+ Gram positive rods*  1+ Gram negative rods*  --   --  3+ Polys*  4+ Gram positive cocci in chains*  --   --    BODY FLUID CULTURE, STERILE   --   --   --  4+ Growth of Beta Hemolytic Streptococcus Group A*  --   --    MRSA CULTURE ONLY   --  No Methicillin Resistant Staphlyococcus aureus (MRSA) isolated  --   --   --   --    LEGIONELLA URINARY ANTIGEN   --   --  Negative  --   --   --    STREP PNEUMONIAE ANTIGEN, URINE   --   --  Negative  --   --   --        EKG: afib  Imaging: I have personally reviewed pertinent reports        Active Medications  Scheduled Meds:  Current Facility-Administered Medications:  cefazolin 2,000 mg Intravenous Q8H Lawrence Calderón MD Last Rate: 2,000 mg (12/07/19 1153)   chlorhexidine 15 mL Swish & Spit Q12H [address] Donnie Weiss PA-C    clindamycin 900 mg Intravenous Q8H ANNA MARIE Etienne Last Rate: 900 mg (12/07/19 1400)   heparin (porcine) 3-20 Units/kg/hr (Order-Specific) Intravenous Titrated ANNA MARIE Menard Last Rate: 17 8 Units/kg/hr (12/07/19 1549)   heparin (porcine) 2,000 Units Intravenous PRN ANNA MARIE Menard    heparin (porcine) 4,000 Units Intravenous PRN ANNA MARIE Menard    melatonin 3 mg Oral HS ANNA MARIE Etienne    midodrine 5 mg Oral TID AC ANNA MARIE Woodward    multi-electrolyte 125 mL/hr Intravenous Continuous Trudy Ba PA-C Last Rate: 125 mL/hr (12/07/19 1530)   norepinephrine 1-30 mcg/min Intravenous Titrated ANNA MARIE Menard Last Rate: 6 mcg/min (12/07/19 1231)   nystatin 500,000 Units Swish & Swallow 4x Daily ANNA MARIE Etienne    senna-docusate sodium 1 tablet Oral HS Trudy Ba PA-C      Continuous Infusions:    heparin (porcine) 3-20 Units/kg/hr (Order-Specific) Last Rate: 17 8 Units/kg/hr (12/07/19 1549)   multi-electrolyte 125 mL/hr Last Rate: 125 mL/hr (12/07/19 1530)   norepinephrine 1-30 mcg/min Last Rate: 6 mcg/min (12/07/19 1231)     PRN Meds:     heparin (porcine) 2,000 Units PRN   heparin (porcine) 4,000 Units PRN       ---------------------------------------------------------------------------------------  Advance Directive and Living Will:      Power of :    POLST:    ---------------------------------------------------------------------------------------  Counseling / Coordination of Care  Total Critical Care time spent 31 minutes excluding procedures, teaching and family updates  ANNA MARIE Woodward        Portions of the record may have been created with voice recognition software  Occasional wrong word or "sound a like" substitutions may have occurred due to the inherent limitations of voice recognition software    Read the chart carefully and recognize, using context, where substitutions have occurred

## 2019-12-07 NOTE — RESPIRATORY THERAPY NOTE
12/06/19 1946   Oxygen Therapy/Pulse Ox   O2 Device Nasal cannula   O2 Therapy Oxygen humidified   O2 Flow Rate (L/min) 4 L/min  (decreased from 5l/m rn made aware, fabian well HFNC on BY )   SpO2 96 %   SpO2 Activity At Rest   $ Pulse Oximetry Spot Check Charge Completed

## 2019-12-07 NOTE — ASSESSMENT & PLAN NOTE
-s/p thoracentesis and 12 hours of BIPAP therapy    -HFNC off, wean NC as able  -pulmonary toilet  -respiratory protocol

## 2019-12-07 NOTE — ASSESSMENT & PLAN NOTE
- likely secondary to empyema, possible UTI  - follow up culture growth  - abx deescalated to cefepime and clinda  -monitor hemodynamics closely

## 2019-12-07 NOTE — PLAN OF CARE
Problem: Prexisting or High Potential for Compromised Skin Integrity  Goal: Skin integrity is maintained or improved  Description  INTERVENTIONS:  - Identify patients at risk for skin breakdown  - Assess and monitor skin integrity  - Assess and monitor nutrition and hydration status  - Monitor labs   - Assess for incontinence   - Turn and reposition patient  - Assist with mobility/ambulation  - Relieve pressure over bony prominences  - Avoid friction and shearing  - Provide appropriate hygiene as needed including keeping skin clean and dry  - Evaluate need for skin moisturizer/barrier cream  - Collaborate with interdisciplinary team   - Patient/family teaching  - Consider wound care consult   Outcome: Progressing     Problem: Potential for Falls  Goal: Patient will remain free of falls  Description  INTERVENTIONS:  - Assess patient frequently for physical needs  -  Identify cognitive and physical deficits and behaviors that affect risk of falls    -  [place] fall precautions as indicated by assessment   - Educate patient/family on patient safety including physical limitations  - Instruct patient to call for assistance with activity based on assessment  - Modify environment to reduce risk of injury  - Consider OT/PT consult to assist with strengthening/mobility  Outcome: Progressing     Problem: CARDIOVASCULAR - ADULT  Goal: Maintains optimal cardiac output and hemodynamic stability  Description  INTERVENTIONS:  - Monitor I/O, vital signs and rhythm  - Monitor for S/S and trends of decreased cardiac output  - Administer and titrate ordered vasoactive medications to optimize hemodynamic stability  - Assess quality of pulses, skin color and temperature  - Assess for signs of decreased coronary artery perfusion  - Instruct patient to report change in severity of symptoms  Outcome: Progressing  Goal: Absence of cardiac dysrhythmias or at baseline rhythm  Description  INTERVENTIONS:  - Continuous cardiac monitoring, vital signs, obtain 12 lead EKG if ordered  - Administer antiarrhythmic and heart rate control medications as ordered  - Monitor electrolytes and administer replacement therapy as ordered  Outcome: Progressing     Problem: GENITOURINARY - ADULT  Goal: Maintains or returns to baseline urinary function  Description  INTERVENTIONS:  - Assess urinary function  - Encourage oral fluids to ensure adequate hydration if ordered  - Administer IV fluids as ordered to ensure adequate hydration  - Administer ordered medications as needed  - Offer frequent toileting  - Follow urinary retention protocol if ordered  Outcome: Progressing  Goal: Absence of urinary retention  Description  INTERVENTIONS:  - Assess patients ability to void and empty bladder  - Monitor I/O  - Bladder scan as needed  - Discuss with physician/AP medications to alleviate retention as needed  - Discuss catheterization for long term situations as appropriate  Outcome: Progressing  Goal: Urinary catheter remains patent  Description  INTERVENTIONS:  - Assess patency of urinary catheter  - If patient has a chronic le, consider changing catheter if non-functioning  - Follow guidelines for intermittent irrigation of non-functioning urinary catheter  Outcome: Progressing     Problem: Nutrition/Hydration-ADULT  Goal: Nutrient/Hydration intake appropriate for improving, restoring or maintaining nutritional needs  Description  Monitor and assess patient's nutrition/hydration status for malnutrition  Collaborate with interdisciplinary team and initiate plan and interventions as ordered  Monitor patient's weight and dietary intake as ordered or per policy  Determine patient's food preferences and provide high-protein, high-caloric foods as appropriate       INTERVENTIONS:  - Monitor oral intake, urinary output, labs, and treatment plans  - Assess nutrition and hydration status and recommend course of action  - Evaluate amount of meals eaten  - Assist patient with eating if necessary   - Allow adequate time for meals  - Recommend/ encourage appropriate diets, oral nutritional supplements, and vitamin/mineral supplements  - Order, calculate, and assess calorie counts as needed  - Recommend, monitor, and adjust tube feedings based on assessed needs  - Assess need for intravenous fluids  - Provide nutrition/hydration education as appropriate  - Include patient/family/caregiver in decisions related to nutrition    Outcome: Progressing     Problem: RESPIRATORY - ADULT  Goal: Achieves optimal ventilation and oxygenation  Description  INTERVENTIONS:  - Assess for changes in respiratory status  - Assess for changes in mentation and behavior  - Position to facilitate oxygenation and minimize respiratory effort  - Oxygen administered by appropriate delivery if ordered  - Initiate smoking cessation education as indicated  - Encourage broncho-pulmonary hygiene including cough, deep breathe, Incentive Spirometry  - Assess the need for suctioning and aspirate as needed  - Assess and instruct to report SOB or any respiratory difficulty  - Respiratory Therapy support as indicated  Outcome: Progressing     Problem: HEMATOLOGIC - ADULT  Goal: Maintains hematologic stability  Description  INTERVENTIONS  - Assess for signs and symptoms of bleeding or hemorrhage  - Monitor labs  - Administer supportive blood products/factors as ordered and appropriate  Outcome: Progressing

## 2019-12-08 ENCOUNTER — APPOINTMENT (INPATIENT)
Dept: ULTRASOUND IMAGING | Facility: HOSPITAL | Age: 84
DRG: 871 | End: 2019-12-08
Payer: COMMERCIAL

## 2019-12-08 LAB
ALBUMIN SERPL BCP-MCNC: 1.4 G/DL (ref 3.5–5)
ALP SERPL-CCNC: 194 U/L (ref 46–116)
ALT SERPL W P-5'-P-CCNC: 12 U/L (ref 12–78)
ANION GAP SERPL CALCULATED.3IONS-SCNC: 11 MMOL/L (ref 4–13)
ANION GAP SERPL CALCULATED.3IONS-SCNC: 9 MMOL/L (ref 4–13)
ANISOCYTOSIS BLD QL SMEAR: PRESENT
APTT PPP: 101 SECONDS (ref 23–37)
APTT PPP: 81 SECONDS (ref 23–37)
APTT PPP: 85 SECONDS (ref 23–37)
AST SERPL W P-5'-P-CCNC: 35 U/L (ref 5–45)
BACTERIA BLD CULT: NORMAL
BACTERIA BLD CULT: NORMAL
BASOPHILS # BLD MANUAL: 0 THOUSAND/UL (ref 0–0.1)
BASOPHILS NFR MAR MANUAL: 0 % (ref 0–1)
BILIRUB SERPL-MCNC: 2.9 MG/DL (ref 0.2–1)
BUN SERPL-MCNC: 19 MG/DL (ref 5–25)
BUN SERPL-MCNC: 23 MG/DL (ref 5–25)
CA-I BLD-SCNC: 0.99 MMOL/L (ref 1.12–1.32)
CALCIUM SERPL-MCNC: 6.9 MG/DL (ref 8.3–10.1)
CALCIUM SERPL-MCNC: 7 MG/DL (ref 8.3–10.1)
CHLORIDE SERPL-SCNC: 97 MMOL/L (ref 100–108)
CHLORIDE SERPL-SCNC: 97 MMOL/L (ref 100–108)
CO2 SERPL-SCNC: 24 MMOL/L (ref 21–32)
CO2 SERPL-SCNC: 24 MMOL/L (ref 21–32)
CREAT SERPL-MCNC: 1.04 MG/DL (ref 0.6–1.3)
CREAT SERPL-MCNC: 1.09 MG/DL (ref 0.6–1.3)
EOSINOPHIL # BLD MANUAL: 0 THOUSAND/UL (ref 0–0.4)
EOSINOPHIL NFR BLD MANUAL: 0 % (ref 0–6)
ERYTHROCYTE [DISTWIDTH] IN BLOOD BY AUTOMATED COUNT: 14.2 % (ref 11.6–15.1)
GFR SERPL CREATININE-BSD FRML MDRD: 63 ML/MIN/1.73SQ M
GFR SERPL CREATININE-BSD FRML MDRD: 67 ML/MIN/1.73SQ M
GLUCOSE SERPL-MCNC: 114 MG/DL (ref 65–140)
GLUCOSE SERPL-MCNC: 124 MG/DL (ref 65–140)
HCT VFR BLD AUTO: 45.6 % (ref 36.5–49.3)
HGB BLD-MCNC: 15.7 G/DL (ref 12–17)
LYMPHOCYTES # BLD AUTO: 2.52 THOUSAND/UL (ref 0.6–4.47)
LYMPHOCYTES # BLD AUTO: 9 % (ref 14–44)
MAGNESIUM SERPL-MCNC: 1.8 MG/DL (ref 1.6–2.6)
MAGNESIUM SERPL-MCNC: 2 MG/DL (ref 1.6–2.6)
MCH RBC QN AUTO: 32 PG (ref 26.8–34.3)
MCHC RBC AUTO-ENTMCNC: 34.4 G/DL (ref 31.4–37.4)
MCV RBC AUTO: 93 FL (ref 82–98)
MONOCYTES # BLD AUTO: 0.56 THOUSAND/UL (ref 0–1.22)
MONOCYTES NFR BLD: 2 % (ref 4–12)
MYELOCYTES NFR BLD MANUAL: 5 % (ref 0–1)
NEUTROPHILS # BLD MANUAL: 21.82 THOUSAND/UL (ref 1.85–7.62)
NEUTS BAND NFR BLD MANUAL: 4 % (ref 0–8)
NEUTS SEG NFR BLD AUTO: 74 % (ref 43–75)
NRBC BLD AUTO-RTO: 0 /100 WBCS
PHOSPHATE SERPL-MCNC: 2.8 MG/DL (ref 2.3–4.1)
PLATELET # BLD AUTO: 226 THOUSANDS/UL (ref 149–390)
PLATELET BLD QL SMEAR: ADEQUATE
PMV BLD AUTO: 9.6 FL (ref 8.9–12.7)
POLYCHROMASIA BLD QL SMEAR: PRESENT
POTASSIUM SERPL-SCNC: 3 MMOL/L (ref 3.5–5.3)
POTASSIUM SERPL-SCNC: 3.7 MMOL/L (ref 3.5–5.3)
PROT SERPL-MCNC: 5.9 G/DL (ref 6.4–8.2)
RBC # BLD AUTO: 4.91 MILLION/UL (ref 3.88–5.62)
SODIUM SERPL-SCNC: 130 MMOL/L (ref 136–145)
SODIUM SERPL-SCNC: 132 MMOL/L (ref 136–145)
TOTAL CELLS COUNTED SPEC: 100
VARIANT LYMPHS # BLD AUTO: 6 %
WBC # BLD AUTO: 27.97 THOUSAND/UL (ref 4.31–10.16)

## 2019-12-08 PROCEDURE — 93971 EXTREMITY STUDY: CPT

## 2019-12-08 PROCEDURE — 85027 COMPLETE CBC AUTOMATED: CPT | Performed by: NURSE PRACTITIONER

## 2019-12-08 PROCEDURE — 82330 ASSAY OF CALCIUM: CPT | Performed by: NURSE PRACTITIONER

## 2019-12-08 PROCEDURE — 85007 BL SMEAR W/DIFF WBC COUNT: CPT | Performed by: NURSE PRACTITIONER

## 2019-12-08 PROCEDURE — 84100 ASSAY OF PHOSPHORUS: CPT | Performed by: NURSE PRACTITIONER

## 2019-12-08 PROCEDURE — 85730 THROMBOPLASTIN TIME PARTIAL: CPT | Performed by: NURSE PRACTITIONER

## 2019-12-08 PROCEDURE — 99291 CRITICAL CARE FIRST HOUR: CPT | Performed by: ANESTHESIOLOGY

## 2019-12-08 PROCEDURE — 85730 THROMBOPLASTIN TIME PARTIAL: CPT | Performed by: PHYSICIAN ASSISTANT

## 2019-12-08 PROCEDURE — 80053 COMPREHEN METABOLIC PANEL: CPT | Performed by: NURSE PRACTITIONER

## 2019-12-08 PROCEDURE — 83735 ASSAY OF MAGNESIUM: CPT | Performed by: NURSE PRACTITIONER

## 2019-12-08 PROCEDURE — 80048 BASIC METABOLIC PNL TOTAL CA: CPT | Performed by: NURSE PRACTITIONER

## 2019-12-08 PROCEDURE — 93005 ELECTROCARDIOGRAM TRACING: CPT

## 2019-12-08 RX ORDER — POTASSIUM CHLORIDE 14.9 MG/ML
20 INJECTION INTRAVENOUS ONCE
Status: COMPLETED | OUTPATIENT
Start: 2019-12-08 | End: 2019-12-08

## 2019-12-08 RX ORDER — MAGNESIUM SULFATE HEPTAHYDRATE 40 MG/ML
2 INJECTION, SOLUTION INTRAVENOUS ONCE
Status: COMPLETED | OUTPATIENT
Start: 2019-12-08 | End: 2019-12-09

## 2019-12-08 RX ORDER — MIDODRINE HYDROCHLORIDE 5 MG/1
10 TABLET ORAL
Status: DISCONTINUED | OUTPATIENT
Start: 2019-12-08 | End: 2019-12-10

## 2019-12-08 RX ORDER — POTASSIUM CHLORIDE 20 MEQ/1
40 TABLET, EXTENDED RELEASE ORAL
Status: COMPLETED | OUTPATIENT
Start: 2019-12-08 | End: 2019-12-08

## 2019-12-08 RX ORDER — ZOLPIDEM TARTRATE 5 MG/1
5 TABLET ORAL ONCE
Status: COMPLETED | OUTPATIENT
Start: 2019-12-08 | End: 2019-12-08

## 2019-12-08 RX ORDER — DIGOXIN 0.25 MG/ML
125 INJECTION INTRAMUSCULAR; INTRAVENOUS DAILY
Status: DISCONTINUED | OUTPATIENT
Start: 2019-12-10 | End: 2019-12-10

## 2019-12-08 RX ORDER — ZOLPIDEM TARTRATE 5 MG/1
5 TABLET ORAL
Status: DISCONTINUED | OUTPATIENT
Start: 2019-12-08 | End: 2019-12-11 | Stop reason: HOSPADM

## 2019-12-08 RX ORDER — TAMSULOSIN HYDROCHLORIDE 0.4 MG/1
0.4 CAPSULE ORAL
Status: DISCONTINUED | OUTPATIENT
Start: 2019-12-08 | End: 2019-12-11 | Stop reason: HOSPADM

## 2019-12-08 RX ORDER — POTASSIUM CHLORIDE 14.9 MG/ML
20 INJECTION INTRAVENOUS
Status: COMPLETED | OUTPATIENT
Start: 2019-12-08 | End: 2019-12-09

## 2019-12-08 RX ORDER — DIGOXIN 0.25 MG/ML
250 INJECTION INTRAMUSCULAR; INTRAVENOUS EVERY 6 HOURS
Status: COMPLETED | OUTPATIENT
Start: 2019-12-08 | End: 2019-12-09

## 2019-12-08 RX ADMIN — CHLORHEXIDINE GLUCONATE 0.12% ORAL RINSE 15 ML: 1.2 LIQUID ORAL at 21:38

## 2019-12-08 RX ADMIN — CLINDAMYCIN PHOSPHATE 900 MG: 900 INJECTION, SOLUTION INTRAVENOUS at 05:27

## 2019-12-08 RX ADMIN — MIDODRINE HYDROCHLORIDE 10 MG: 5 TABLET ORAL at 11:44

## 2019-12-08 RX ADMIN — POTASSIUM CHLORIDE 20 MEQ: 14.9 INJECTION, SOLUTION INTRAVENOUS at 08:00

## 2019-12-08 RX ADMIN — CLINDAMYCIN PHOSPHATE 900 MG: 900 INJECTION, SOLUTION INTRAVENOUS at 22:38

## 2019-12-08 RX ADMIN — NYSTATIN 500000 UNITS: 100000 SUSPENSION ORAL at 09:04

## 2019-12-08 RX ADMIN — DIGOXIN 250 MCG: 0.25 INJECTION INTRAMUSCULAR; INTRAVENOUS at 22:01

## 2019-12-08 RX ADMIN — MIDODRINE HYDROCHLORIDE 10 MG: 5 TABLET ORAL at 17:34

## 2019-12-08 RX ADMIN — CLINDAMYCIN PHOSPHATE 900 MG: 900 INJECTION, SOLUTION INTRAVENOUS at 14:00

## 2019-12-08 RX ADMIN — NOREPINEPHRINE BITARTRATE 6 MCG/MIN: 1 INJECTION INTRAVENOUS at 09:59

## 2019-12-08 RX ADMIN — SENNOSIDES AND DOCUSATE SODIUM 1 TABLET: 8.6; 5 TABLET ORAL at 21:38

## 2019-12-08 RX ADMIN — CEFAZOLIN SODIUM 2000 MG: 2 SOLUTION INTRAVENOUS at 11:44

## 2019-12-08 RX ADMIN — ZOLPIDEM TARTRATE 5 MG: 5 TABLET, FILM COATED ORAL at 21:38

## 2019-12-08 RX ADMIN — CHLORHEXIDINE GLUCONATE 0.12% ORAL RINSE 15 ML: 1.2 LIQUID ORAL at 09:00

## 2019-12-08 RX ADMIN — ZOLPIDEM TARTRATE 5 MG: 5 TABLET, FILM COATED ORAL at 00:38

## 2019-12-08 RX ADMIN — MAGNESIUM SULFATE HEPTAHYDRATE 2 G: 40 INJECTION, SOLUTION INTRAVENOUS at 23:20

## 2019-12-08 RX ADMIN — NYSTATIN 500000 UNITS: 100000 SUSPENSION ORAL at 17:34

## 2019-12-08 RX ADMIN — MIDODRINE HYDROCHLORIDE 5 MG: 5 TABLET ORAL at 09:03

## 2019-12-08 RX ADMIN — WATER 10 MG: 1 INJECTION INTRAMUSCULAR; INTRAVENOUS; SUBCUTANEOUS at 12:36

## 2019-12-08 RX ADMIN — POTASSIUM CHLORIDE 40 MEQ: 1500 TABLET, EXTENDED RELEASE ORAL at 11:00

## 2019-12-08 RX ADMIN — MELATONIN 3 MG: 3 TAB ORAL at 21:38

## 2019-12-08 RX ADMIN — TAMSULOSIN HYDROCHLORIDE 0.4 MG: 0.4 CAPSULE ORAL at 17:00

## 2019-12-08 RX ADMIN — POTASSIUM CHLORIDE 40 MEQ: 1500 TABLET, EXTENDED RELEASE ORAL at 08:59

## 2019-12-08 RX ADMIN — WATER 5 MG: 1 INJECTION INTRAMUSCULAR; INTRAVENOUS; SUBCUTANEOUS at 12:35

## 2019-12-08 RX ADMIN — CEFAZOLIN SODIUM 2000 MG: 2 SOLUTION INTRAVENOUS at 03:51

## 2019-12-08 RX ADMIN — NYSTATIN 500000 UNITS: 100000 SUSPENSION ORAL at 11:50

## 2019-12-08 RX ADMIN — HEPARIN SODIUM AND DEXTROSE 15.8 UNITS/KG/HR: 10000; 5 INJECTION INTRAVENOUS at 11:56

## 2019-12-08 RX ADMIN — POTASSIUM CHLORIDE 20 MEQ: 14.9 INJECTION, SOLUTION INTRAVENOUS at 23:26

## 2019-12-08 RX ADMIN — NYSTATIN 500000 UNITS: 100000 SUSPENSION ORAL at 21:38

## 2019-12-08 RX ADMIN — CEFAZOLIN SODIUM 2000 MG: 2 SOLUTION INTRAVENOUS at 21:37

## 2019-12-08 NOTE — ASSESSMENT & PLAN NOTE
-s/p thoracentesis, right tube thoracostomy    Persistent effusion, may require VATS  -tolerating conventional nasal cannula, supplemental O2 to maintain saturations greater than 92%  -pulmonary toilet  -respiratory protocol

## 2019-12-08 NOTE — ASSESSMENT & PLAN NOTE
- to be expected given septic shock  - CXR supports etiology for his sx and hypoxia  - hold on CTA given his SHREYA and alternate source to explain elevation

## 2019-12-08 NOTE — PROGRESS NOTES
Progress Note - Devora Parker 1935, 82 y o  male MRN: [id number]    Unit/Bed#:  Encounter: [id number]    Primary Care Provider: Flores Garcia MD   Date and time admitted to hospital: 12/3/2019  4:08 PM        Moderate protein-calorie malnutrition ([facility] [address] )  Assessment & Plan  - nutrition consultation  - continue diet  -encourage p o  Intake    Positive D dimer  Assessment & Plan  - to be expected given septic shock  - CXR supports etiology for his sx and hypoxia  - hold on CTA given his SHREYA and alternate source to explain elevation    Transaminitis  Assessment & Plan  - monitor LFTs  - suspect shock liver  -RUQ US-no acute cholecystitis, hepatomegaly      Chronic atrial fibrillation  Assessment & Plan  - takes eliquis as outpatient, held now given need for procedures  - heparin gtt   -transition back once taking p o   -rate controlled    SHREYA (acute kidney injury) ([facility] [address] )  Assessment & Plan  - baseline Cr around 1 1 - 1 3  -continue to monitor BUN, creatinine, urinary output  - likely 2/2 dehydration / sepsis    Hyponatremia  Assessment & Plan  - normal Na is in the 130s   - improving slowly w fluid resuscitation     Septic shock ([facility] [address] )  Assessment & Plan  - likely secondary to empyema, possible UTI  - follow up culture growth  - abx deescalated to Ancef and clinda  -monitor hemodynamics closely, remains on Levophed  Central line placed last evening for continued infusion   -may require VATS for source control    Pleural effusion on right  Assessment & Plan  - s/p bedside ED thoracentesis (12/3)  - s/p R CT placement (12/4)  - pleural fluid cx w GAS  - CT scan with dense collection, may need VATS      * Acute respiratory failure with hypoxia (HCC)  Assessment & Plan  -s/p thoracentesis, right tube thoracostomy    Persistent effusion, may require VATS  -tolerating conventional nasal cannula, supplemental O2 to maintain saturations greater than 92%  -pulmonary toilet  -respiratory protocol       Daily Progress Note - Critical Care   Anita Arnold 82 y o  male MRN: [id number]  Unit/Bed#:  Encounter: [id number]        ----------------------------------------------------------------------------------------  HPI/24hr events:  Continues to require low-dose Levophed  Central line placed last evening for continued infusion  Patient without complaints of shortness of breath, tolerating nasal cannula overnight     Given persistent, density fusion may require VATS  ---------------------------------------------------------------------------------------  SUBJECTIVE  No complaints offered    Review of Systems  Review of systems was reviewed and negative unless stated above in HPI/24-hour events   ---------------------------------------------------------------------------------------  Disposition: Continue Critical Care   Code Status: Level 1 - Full Code  ---------------------------------------------------------------------------------------  ICU CORE MEASURES    PT/OT when appropriate  Encourage IS, early mobilization to prevent atelectasis    Prophylaxis   VTE Pharmacologic Prophylaxis: Heparin Drip  VTE Mechanical Prophylaxis: sequential compression device  Stress Ulcer Prophylaxis: Prophylaxis Not Indicated     ABCDE Protocol (if indicated)  Plan to perform spontaneous awakening trial today? Not applicable  Plan to perform spontaneous breathing trial today? Not applicable  Obvious barriers to extubation? Not applicable  CAM-ICU: Negative    Invasive Devices Review  Invasive Devices     Central Venous Catheter Line            CVC Central Lines 12/07/19 Triple 16cm less than 1 day          Arterial Line            Arterial Line 12/03/19 Left Radial 4 days          Drain            Chest Tube 1 Right 3 days    Chest Tube Right 3 days              Can any invasive devices be discontinued today?  No (provide explanation):  Central line required for Levophed infusion, chest tube required  ---------------------------------------------------------------------------------------  OBJECTIVE    Physical Exam   Constitutional: He is oriented to person, place, and time  He appears well-developed and well-nourished  No distress  HENT:   Head: Normocephalic and atraumatic  Eyes: Pupils are equal, round, and reactive to light  Conjunctivae and EOM are normal    Neck: Normal range of motion  Neck supple  No JVD present  No tracheal deviation present  Cardiovascular: Normal rate, regular rhythm, normal heart sounds and intact distal pulses  Exam reveals no gallop and no friction rub  No murmur heard  Pulmonary/Chest: Effort normal  No respiratory distress  He exhibits no tenderness  Diminished in the bases bilaterally, right side to mid field  Chest tube intact to water seal, no air leak  Serous drainage with sediment and fibrous matter draining  No subcu air or crepitus noted  Symmetrical excursion  Abdominal: Soft  Bowel sounds are normal  He exhibits no distension  There is no rebound and no guarding  Musculoskeletal: Normal range of motion  Neurological: He is alert and oriented to person, place, and time  No cranial nerve deficit  Coordination normal    Skin: Skin is warm and dry  Capillary refill takes less than 2 seconds  No pallor  Psychiatric: He has a normal mood and affect  His behavior is normal  Judgment and thought content normal    Nursing note and vitals reviewed        Vitals   Vitals:    19 0000 19 0100 19 0200 19 0300   BP:       BP Location:       Pulse: 77 74 76 72   Resp: 18 (!)    Temp:    98 °F (36 7 °C)   TempSrc:       SpO2: 94% 93% 93% 94%   Weight:       Height:         Temp (24hrs), Av 7 °F (36 5 °C), Min:97 4 °F (36 3 °C), Max:98 °F (36 7 °C)  Current: Temperature: 98 °F (36 7 °C)  /66   Pulse 72   Temp 98 °F (36 7 °C)   Resp 19   Ht 5' 9" (1 753 m)   Wt 95 7 kg (210 lb 15 7 oz)   SpO2 94%   BMI 31 16 kg/m²      Invasive/non-invasive ventilation settings   Respiratory    Lab Data (Last 4 hours)    None         O2/Vent Data (Last 4 hours)    None                Height and Weights   Height: 5' 9" (175 3 cm)  IBW: 70 7 kg  Body mass index is 31 16 kg/m²  Weight (last 2 days)     Date/Time   Weight    12/07/19 0600   95 7 (210 98)    12/06/19 0543   96 9 (213 63)                Intake and Output  I/O       12/06 0701 - 12/07 0700 12/07 0701 - 12/08 0700    P  O  120     I V  (mL/kg) 3866 5 (40 4) 3428 1 (35 8)    IV Piggyback 50 100    Total Intake(mL/kg) 4036 5 (42 2) 3528 1 (36 9)    Urine (mL/kg/hr) 2100 (0 9) 1290 (0 6)    Stool  0    Chest Tube 305 175    Total Output 2405 1465    Net +1631 5 +2063 1          Unmeasured Stool Occurrence  1 x            Nutrition       Diet Orders   (From admission, onward)             Start     Ordered    12/05/19 1321  Dietary nutrition supplements  Once     Question Answer Comment   Select Supplement: Ensure Compact-Vanilla    Frequency Breakfast, Dinner        12/05/19 1320    12/05/19 0446  Diet Regular; Regular House  Diet effective now     Question Answer Comment   Diet Type Regular    Regular Regular House    RD to adjust diet per protocol?  Yes        12/05/19 0446                  Laboratory and Diagnostics:  Results from last 7 days   Lab Units 12/07/19  0535 12/06/19  0540 12/05/19  0508 12/04/19  0435 12/03/19  1653 12/03/19  1640   WBC Thousand/uL 29 02* 30 29* 32 39* 49 19*  --  57 70*   HEMOGLOBIN g/dL 16 0 16 0 15 3 16 2  --  17 8*   I STAT HEMOGLOBIN g/dl  --   --   --   --  17 7*  --    HEMATOCRIT % 46 7 46 3 44 7 47 4  --  54 9*   HEMATOCRIT, ISTAT %  --   --   --   --  52*  --    PLATELETS Thousands/uL 231 254 256 300  --  342   BANDS PCT %  --   --  5 12*  --  28*   MONO PCT % 5  --  4 5  --  4     Results from last 7 days   Lab Units 12/07/19  0535 12/06/19  0540 12/05/19  0508 12/04/19  0435 12/03/19  2348 12/03/19  1653 12/03/19  1640   SODIUM mmol/L 133* 132* 130* 130* 128*  --  128*   POTASSIUM mmol/L 3 0* 3 4* 2 8* 4 0 3 9  --  4 2   CHLORIDE mmol/L 98* 100 97* 98* 96*  --  90*   CO2 mmol/L 23 19* 16* 14* 13*  --  19*   CO2, I-STAT mmol/L  --   --   --   --   --  12*  --    ANION GAP mmol/L 12 13 17* 18* 19*  --  19*   BUN mg/dL 34* 46* 60* 59* 59*  --  55*   CREATININE mg/dL 1 30 1 68* 2 11* 3 02* 3 26*  --  3 87*   CALCIUM mg/dL 7 2* 7 1* 7 5* 8 2* 7 7*  --  8 9   GLUCOSE RANDOM mg/dL 109 105 89 87 107  --  120   ALT U/L 24 52 27 11* 15  --  18   AST U/L 62* 161* 78* 19 22  --  24   ALK PHOS U/L 180* 141* 117* 131* 153*  --  186*   ALBUMIN g/dL 1 5* 1 5* 1 5* 1 8* 1 8*  --  2 1*   TOTAL BILIRUBIN mg/dL 3 30* 3 60* 2 80* 2 50* 2 70*  --  2 60*     Results from last 7 days   Lab Units 12/07/19  0535 12/05/19  0508 12/04/19  0435 12/03/19  2348 12/03/19  1640   MAGNESIUM mg/dL 2 4 2 8* 1 9  --  2 2   PHOSPHORUS mg/dL 2 8  --  4 4* 4 9*  --       Results from last 7 days   Lab Units 12/07/19  0535 12/06/19  0809 12/06/19  0058 12/05/19  1811 12/05/19  1040 12/05/19  0136 12/04/19  1745 12/03/19  1640   INR  1 58*  --   --   --   --   --   --  2 09*   PTT seconds 80* 63* 73* 56* 52* 50* 40* 41*      Results from last 7 days   Lab Units 12/03/19  1640   TROPONIN I ng/mL <0 02     Results from last 7 days   Lab Units 12/05/19  0508 12/04/19  0435 12/04/19  0209 12/03/19  2348 12/03/19  1952 12/03/19  1640   LACTIC ACID mmol/L 1 7 3 4* 3 8* 3 9* 4 6* 6 7*     ABG:  Results from last 7 days   Lab Units 12/04/19  0550   PH ART  7 361   PCO2 ART mm Hg 19 9*   PO2 ART mm Hg 93 1   HCO3 ART mmol/L 11 0*   BASE EXC ART mmol/L -11 6   ABG SOURCE  Line, Arterial     VBG:  Results from last 7 days   Lab Units 12/04/19  0550   ABG SOURCE  Line, Arterial     Results from last 7 days   Lab Units 12/04/19  0435 12/03/19  2348   PROCALCITONIN ng/ml 6 55* 7 02*       Micro  Results from last 7 days   Lab Units 12/04/19  1931 12/04/19  1154 12/03/19  2126 12/03/19  1823 12/03/19  1646 12/03/19  1640   BLOOD CULTURE   --   --   --   --  No Growth After 4 Days  No Growth After 4 Days  SPUTUM CULTURE  2+ Growth of Staphylococcus aureus*  3+ Growth of Beta Hemolytic Streptococcus Group A*  --   --   --   --   --    GRAM STAIN RESULT  2+ Epithelial cells per low power field*  1+ Polys*  2+ Gram positive cocci in pairs, chains and clusters*  1+ Gram positive rods*  1+ Gram negative rods*  --   --  3+ Polys*  4+ Gram positive cocci in chains*  --   --    BODY FLUID CULTURE, STERILE   --   --   --  4+ Growth of Beta Hemolytic Streptococcus Group A*  --   --    MRSA CULTURE ONLY   --  No Methicillin Resistant Staphlyococcus aureus (MRSA) isolated  --   --   --   --    LEGIONELLA URINARY ANTIGEN   --   --  Negative  --   --   --    STREP PNEUMONIAE ANTIGEN, URINE   --   --  Negative  --   --   --        EKG:  Atrial fibrillation, controlled rate  Imaging: I have personally reviewed pertinent reports        Active Medications  Scheduled Meds:  Current Facility-Administered Medications:  cefazolin 2,000 mg Intravenous Q8H Lawrence Calderón MD Last Rate: 2,000 mg (12/08/19 0351)   chlorhexidine 15 mL Swish & Spit Q12H [address] Juana Liu PA-C    clindamycin 900 mg Intravenous Q8H ANNA MARIE Etienne Last Rate: Stopped (12/07/19 2157)   heparin (porcine) 3-20 Units/kg/hr (Order-Specific) Intravenous Titrated Nonda Maureen, CRNP Last Rate: 17 8 Units/kg/hr (12/07/19 2229)   heparin (porcine) 2,000 Units Intravenous PRN Nonda Maureen, CRNP    heparin (porcine) 4,000 Units Intravenous PRN Nonda Maureen, CRNP    melatonin 3 mg Oral HS ANNA MARIE Etienne    midodrine 5 mg Oral TID AC Excell Viridiana, ANNA MARIE    multi-electrolyte 125 mL/hr Intravenous Continuous Juana Liu PA-C Last Rate: 125 mL/hr (12/07/19 2228)   norepinephrine 1-30 mcg/min Intravenous Titrated Nonda Maureen, CRNP Last Rate: 6 mcg/min (12/08/19 0354)   nystatin 500,000 Units Swish & Swallow 4x Daily Excell Viridiana, CRNP    senna-docusate sodium 1 tablet Oral HS Joel Emery PA-C      Continuous Infusions:    heparin (porcine) 3-20 Units/kg/hr (Order-Specific) Last Rate: 17 8 Units/kg/hr (12/07/19 2229)   multi-electrolyte 125 mL/hr Last Rate: 125 mL/hr (12/07/19 2228)   norepinephrine 1-30 mcg/min Last Rate: 6 mcg/min (12/08/19 0354)     PRN Meds:     heparin (porcine) 2,000 Units PRN   heparin (porcine) 4,000 Units PRN       Allergies   No Known Allergies    Advance Directive and Living Will:      Power of :    POLST:        Counseling / Coordination of Care  Total Critical Care time spent 36 minutes excluding procedures, teaching and family updates  ANNA MARIE Barrera        Portions of the record may have been created with voice recognition software  Occasional wrong word or "sound a like" substitutions may have occurred due to the inherent limitations of voice recognition software    Read the chart carefully and recognize, using context, where substitutions have occurred

## 2019-12-08 NOTE — ASSESSMENT & PLAN NOTE
- takes eliquis as outpatient, held now given need for procedures  - heparin gtt   -transition back once taking p o   -rate controlled

## 2019-12-08 NOTE — PLAN OF CARE
Problem: Prexisting or High Potential for Compromised Skin Integrity  Goal: Skin integrity is maintained or improved  Description  INTERVENTIONS:  - Identify patients at risk for skin breakdown  - Assess and monitor skin integrity  - Assess and monitor nutrition and hydration status  - Monitor labs   - Assess for incontinence   - Turn and reposition patient  - Assist with mobility/ambulation  - Relieve pressure over bony prominences  - Avoid friction and shearing  - Provide appropriate hygiene as needed including keeping skin clean and dry  - Evaluate need for skin moisturizer/barrier cream  - Collaborate with interdisciplinary team   - Patient/family teaching  - Consider wound care consult   Outcome: Progressing     Problem: Potential for Falls  Goal: Patient will remain free of falls  Description  INTERVENTIONS:  - Assess patient frequently for physical needs  -  Identify cognitive and physical deficits and behaviors that affect risk of falls    -  [place] fall precautions as indicated by assessment   - Educate patient/family on patient safety including physical limitations  - Instruct patient to call for assistance with activity based on assessment  - Modify environment to reduce risk of injury  - Consider OT/PT consult to assist with strengthening/mobility  Outcome: Progressing     Problem: CARDIOVASCULAR - ADULT  Goal: Maintains optimal cardiac output and hemodynamic stability  Description  INTERVENTIONS:  - Monitor I/O, vital signs and rhythm  - Monitor for S/S and trends of decreased cardiac output  - Administer and titrate ordered vasoactive medications to optimize hemodynamic stability  - Assess quality of pulses, skin color and temperature  - Assess for signs of decreased coronary artery perfusion  - Instruct patient to report change in severity of symptoms  Outcome: Progressing  Goal: Absence of cardiac dysrhythmias or at baseline rhythm  Description  INTERVENTIONS:  - Continuous cardiac monitoring, vital signs, obtain 12 lead EKG if ordered  - Administer antiarrhythmic and heart rate control medications as ordered  - Monitor electrolytes and administer replacement therapy as ordered  Outcome: Progressing     Problem: GENITOURINARY - ADULT  Goal: Maintains or returns to baseline urinary function  Description  INTERVENTIONS:  - Assess urinary function  - Encourage oral fluids to ensure adequate hydration if ordered  - Administer IV fluids as ordered to ensure adequate hydration  - Administer ordered medications as needed  - Offer frequent toileting  - Follow urinary retention protocol if ordered  Outcome: Progressing  Goal: Absence of urinary retention  Description  INTERVENTIONS:  - Assess patients ability to void and empty bladder  - Monitor I/O  - Bladder scan as needed  - Discuss with physician/AP medications to alleviate retention as needed  - Discuss catheterization for long term situations as appropriate  Outcome: Progressing  Goal: Urinary catheter remains patent  Description  INTERVENTIONS:  - Assess patency of urinary catheter  - If patient has a chronic le, consider changing catheter if non-functioning  - Follow guidelines for intermittent irrigation of non-functioning urinary catheter  Outcome: Progressing     Problem: Nutrition/Hydration-ADULT  Goal: Nutrient/Hydration intake appropriate for improving, restoring or maintaining nutritional needs  Description  Monitor and assess patient's nutrition/hydration status for malnutrition  Collaborate with interdisciplinary team and initiate plan and interventions as ordered  Monitor patient's weight and dietary intake as ordered or per policy  Determine patient's food preferences and provide high-protein, high-caloric foods as appropriate       INTERVENTIONS:  - Monitor oral intake, urinary output, labs, and treatment plans  - Assess nutrition and hydration status and recommend course of action  - Evaluate amount of meals eaten  - Assist patient with eating if necessary   - Allow adequate time for meals  - Recommend/ encourage appropriate diets, oral nutritional supplements, and vitamin/mineral supplements  - Order, calculate, and assess calorie counts as needed  - Recommend, monitor, and adjust tube feedings based on assessed needs  - Assess need for intravenous fluids  - Provide nutrition/hydration education as appropriate  - Include patient/family/caregiver in decisions related to nutrition    Outcome: Progressing     Problem: RESPIRATORY - ADULT  Goal: Achieves optimal ventilation and oxygenation  Description  INTERVENTIONS:  - Assess for changes in respiratory status  - Assess for changes in mentation and behavior  - Position to facilitate oxygenation and minimize respiratory effort  - Oxygen administered by appropriate delivery if ordered  - Initiate smoking cessation education as indicated  - Encourage broncho-pulmonary hygiene including cough, deep breathe, Incentive Spirometry  - Assess the need for suctioning and aspirate as needed  - Assess and instruct to report SOB or any respiratory difficulty  - Respiratory Therapy support as indicated  Outcome: Progressing     Problem: HEMATOLOGIC - ADULT  Goal: Maintains hematologic stability  Description  INTERVENTIONS  - Assess for signs and symptoms of bleeding or hemorrhage  - Monitor labs  - Administer supportive blood products/factors as ordered and appropriate  Outcome: Progressing

## 2019-12-08 NOTE — UTILIZATION REVIEW
Continued Stay Review    Date:    12/8/2019                          Current Patient Class:   Inpatient  Current Level of Care:   Med surg    HPI:84 y o  male initially admitted on   12/3/2019   With pleural effusion,  Acute respiratory failure and  + D Dimer  Assessment/Plan:   12/8:  Cont  ICU  LOC  Central line placed   12/7  Cont  On low dose  Levophed  Chest  Tube  Inserted  12/4, remains  In  May require  VATS  Remains  On IV  Heparin      Pertinent Labs/Diagnostic Results:   Results from last 7 days   Lab Units 12/08/19  0532 12/07/19  0535 12/06/19  0540 12/05/19  0508 12/04/19  0435   WBC Thousand/uL 27 97* 29 02* 30 29* 32 39* 49 19*   HEMOGLOBIN g/dL 15 7 16 0 16 0 15 3 16 2   HEMATOCRIT % 45 6 46 7 46 3 44 7 47 4   PLATELETS Thousands/uL 226 231 254 256 300   BANDS PCT % 4  --   --  5 12*         Results from last 7 days   Lab Units 12/08/19  0532 12/07/19  0535 12/06/19  0540 12/05/19  0508 12/04/19  0435 12/03/19  2348 12/03/19  1653 12/03/19  1640   SODIUM mmol/L 132* 133* 132* 130* 130* 128*  --  128*   POTASSIUM mmol/L 3 0* 3 0* 3 4* 2 8* 4 0 3 9  --  4 2   CHLORIDE mmol/L 97* 98* 100 97* 98* 96*  --  90*   CO2 mmol/L 24 23 19* 16* 14* 13*  --  19*   CO2, I-STAT mmol/L  --   --   --   --   --   --  12*  --    ANION GAP mmol/L 11 12 13 17* 18* 19*  --  19*   BUN mg/dL 23 34* 46* 60* 59* 59*  --  55*   CREATININE mg/dL 1 09 1 30 1 68* 2 11* 3 02* 3 26*  --  3 87*   EGFR ml/min/1 73sq m 63 51 37 28 18 17  --  14   CALCIUM mg/dL 6 9* 7 2* 7 1* 7 5* 8 2* 7 7*  --  8 9   CALCIUM, IONIZED mmol/L 0 99* 0 98*  --   --  1 11* 0 93*  --   --    CALCIUM, IONIZED, ISTAT mmol/L  --   --   --   --   --   --  1 03*  --    MAGNESIUM mg/dL 2 0 2 4  --  2 8* 1 9  --   --  2 2   PHOSPHORUS mg/dL 2 8 2 8  --   --  4 4* 4 9*  --   --      Results from last 7 days   Lab Units 12/08/19  0532 12/07/19  0535 12/06/19  0540 12/05/19  0508 12/04/19  0435  12/03/19  1640   AST U/L 35 62* 161* 78* 19   < > 24   ALT U/L 12 24 52 27 11*   < > 18   ALK PHOS U/L 194* 180* 141* 117* 131*   < > 186*   TOTAL PROTEIN g/dL 5 9* 6 0* 5 9* 5 6* 6 0*   < > 7 6   ALBUMIN g/dL 1 4* 1 5* 1 5* 1 5* 1 8*   < > 2 1*   TOTAL BILIRUBIN mg/dL 2 90* 3 30* 3 60* 2 80* 2 50*   < > 2 60*   BILIRUBIN DIRECT mg/dL  --   --   --  2 38*  --   --  2 03*    < > = values in this interval not displayed  Results from last 7 days   Lab Units 12/06/19  0018 12/05/19  1748 12/05/19  1152   POC GLUCOSE mg/dl 110 113 106     Results from last 7 days   Lab Units 12/08/19  0532 12/07/19  0535 12/06/19  0540 12/05/19  0508 12/04/19  0435 12/03/19  2348 12/03/19  1640   GLUCOSE RANDOM mg/dL 114 109 105 89 87 107 120          Results from last 7 days   Lab Units 12/04/19  0550 12/04/19  0209   PH ART  7 361 7 363   PCO2 ART mm Hg 19 9* 23 3*   PO2 ART mm Hg 93 1 79 0   HCO3 ART mmol/L 11 0* 13 0*   BASE EXC ART mmol/L -11 6 -9 9   O2 CONTENT ART mL/dL 22 3 22 9   O2 HGB, ARTERIAL % 96 4 94 6   ABG SOURCE  Line, Arterial Line, Arterial   NON VENT TYPE HFNC  HFNC Flow HFNC Flow   HFNC FLOW LPM  45 70         Results from last 7 days   Lab Units 12/03/19  1653   PH, OLIMPIA I-STAT  7 356   PCO2, OLIMPIA ISTAT mm HG 20 7*   PO2, OLIMPIA ISTAT mm  0*   HCO3, OLIMPIA ISTAT mmol/L 11 6*   I STAT BASE EXC mmol/L -11*   I STAT O2 SAT % 100*         Results from last 7 days   Lab Units 12/03/19  1640   TROPONIN I ng/mL <0 02     Results from last 7 days   Lab Units 12/03/19  1647   D-DIMER QUANTITATIVE ug/ml FEU 10 00*     Results from last 7 days   Lab Units 12/08/19  1241 12/08/19  0532 12/07/19  0535  12/03/19  1640   PROTIME seconds  --   --  18 9*  --  23 7*   INR   --   --  1 58*  --  2 09*   PTT seconds 81* 101* 80*   < > 41*    < > = values in this interval not displayed           Results from last 7 days   Lab Units 12/04/19  0435 12/03/19 2348   PROCALCITONIN ng/ml 6 55* 7 02*     Results from last 7 days   Lab Units 12/05/19  0508 12/04/19  0435 12/04/19  0209 12/03/19  2348 12/03/19 1952   LACTIC ACID mmol/L 1 7 3 4* 3 8* 3 9* 4 6*             Results from last 7 days   Lab Units 12/03/19  1640   NT-PRO BNP pg/mL 17,738*           Results from last 7 days   Lab Units 12/03/19 2126   CLARITY UA  Cloudy   COLOR UA  Shae   SPEC GRAV UA  >=1 030   PH UA  5 5   GLUCOSE UA mg/dl Negative   KETONES UA mg/dl Trace*   BLOOD UA  Large*   PROTEIN UA mg/dl 100 (2+)*   NITRITE UA  Negative   BILIRUBIN UA  Moderate*   UROBILINOGEN UA E U /dl 1 0   LEUKOCYTES UA  Trace*   WBC UA /hpf 20-30*   RBC UA /hpf 4-10*   BACTERIA UA /hpf Occasional   EPITHELIAL CELLS WET PREP /hpf Occasional   MUCUS THREADS  Occasional*   SODIUM UR  18   CREATININE UR mg/dL 258 0     Results from last 7 days   Lab Units 12/03/19  2126 12/03/19  1843   STREP PNEUMONIAE ANTIGEN, URINE  Negative  --    LEGIONELLA URINARY ANTIGEN  Negative  --    INFLUENZA A PCR   --  None Detected   RSV PCR   --  None Detected                         Results from last 7 days   Lab Units 12/04/19  1931 12/03/19  1823 12/03/19  1646 12/03/19  1640   BLOOD CULTURE   --   --  No Growth After 4 Days  No Growth After 4 Days     SPUTUM CULTURE  2+ Growth of Staphylococcus aureus*  3+ Growth of Beta Hemolytic Streptococcus Group A*  --   --   --    GRAM STAIN RESULT  2+ Epithelial cells per low power field*  1+ Polys*  2+ Gram positive cocci in pairs, chains and clusters*  1+ Gram positive rods*  1+ Gram negative rods* 3+ Polys*  4+ Gram positive cocci in chains*  --   --    BODY FLUID CULTURE, STERILE   --  4+ Growth of Beta Hemolytic Streptococcus Group A*  --   --      Results from last 7 days   Lab Units 12/08/19  0532 12/07/19  0535 12/05/19  0508 12/04/19  0435 12/03/19  1811 12/03/19  1640   TOTAL COUNTED  100 100 100 100 100 100   WBC FLUID /ul  --   --   --   --  36,975  --            Vital Signs:   97 2 °F (36 2 °C)Abnormal   94  19  106/52          Nasal cannula         Medications:   Scheduled Medications:    Medications:  cefazolin 2,000 mg Intravenous Q8H   chlorhexidine 15 mL Swish & Spit Q12H [address]   clindamycin 900 mg Intravenous Q8H   melatonin 3 mg Oral HS   midodrine 10 mg Oral TID AC   nystatin 500,000 Units Swish & Swallow 4x Daily   senna-docusate sodium 1 tablet Oral HS   tamsulosin 0 4 mg Oral Daily With Dinner     Continuous IV Infusions:    heparin (porcine) 3-20 Units/kg/hr (Order-Specific) Intravenous Titrated   norepinephrine 1-30 mcg/min Intravenous Titrated     PRN Meds:    heparin (porcine) 2,000 Units Intravenous PRN   heparin (porcine) 4,000 Units Intravenous PRN       Discharge Plan:    TBD    Network Utilization Review Department  [email or web address]  ATTENTION: Please call with any questions or concerns to [phone] and carefully listen to the prompts so that you are directed to the right person  All voicemails are confidential   Mat Bicker all requests for admission clinical reviews, approved or denied determinations and any other requests to dedicated fax number below belonging to the campus where the patient is receiving treatment   List of dedicated fax numbers for the Facilities:  [address] DENIALS (Administrative/Medical Necessity) [phone]   [address] [place] [address] (Maternity/NICU/Pediatrics) [phone]   Tashi Richard [phone]   Cherelle Promise [phone]   [facility] [phone]   145 [facility] 1525 [facility] [phone]   Betty Quant [phone]   Charanjit Bounds [address] 1000 [facility] [phone]

## 2019-12-08 NOTE — ASSESSMENT & PLAN NOTE
- s/p bedside ED thoracentesis (12/3)  - s/p R CT placement (12/4)  - pleural fluid cx w GAS  - CT scan with dense collection, may need VATS no dysuria, no frequency, and no hematuria.

## 2019-12-08 NOTE — ASSESSMENT & PLAN NOTE
- likely secondary to empyema, possible UTI  - follow up culture growth  - abx deescalated to Ancef and clinda  -monitor hemodynamics closely, remains on Levophed    Central line placed last evening for continued infusion   -may require VATS for source control

## 2019-12-08 NOTE — PROCEDURES
Central Line Insertion  Date/Time: 12/7/2019 10:12 PM  Performed by: ANNA MARIE Sesay  Authorized by: ANNA MARIE Sesay     Patient location:  Bedside  Consent:     Consent obtained:  Verbal    Consent given by:  Patient    Risks discussed:  Arterial puncture, bleeding, incorrect placement, infection, nerve damage and pneumothorax    Alternatives discussed:  No treatment  Universal protocol:     Procedure explained and questions answered to patient or proxy's satisfaction: yes      Relevant documents present and verified: yes      Test results available and properly labeled: yes      Radiology Images displayed and confirmed  If images not available, report reviewed: yes      Required blood products, implants, devices, and special equipment available: yes      Site/side marked: yes      Immediately prior to procedure, a time out was called: yes      Patient identity confirmed:  Verbally with patient  Pre-procedure details:     Hand hygiene: Hand hygiene performed prior to insertion      Sterile barrier technique: All elements of maximal sterile technique followed      Skin preparation:  2% chlorhexidine    Skin preparation agent: Skin preparation agent completely dried prior to procedure    Indications:     Central line indications: medications requiring central line, hemodynamic monitoring and no peripheral vascular access    Anesthesia (see MAR for exact dosages):      Anesthesia method:  Local infiltration    Local anesthetic:  Lidocaine 1% w/o epi  Procedure details:     Location:  Right internal jugular    Vessel type: vein      Laterality:  Right    Approach: percutaneous technique used      Patient position:  Flat    Catheter type:  Triple lumen 16cm    Catheter size:  7 Fr    Landmarks identified: yes      Ultrasound guidance: yes      Sterile ultrasound techniques: Sterile gel and sterile probe covers were used      Number of attempts:  1    Successful placement: yes Vessel of catheter tip end:  Cavoatrial junction  Post-procedure details:     Post-procedure:  Dressing applied and line sutured    Assessment:  Blood return through all ports, free fluid flow and placement verified by x-ray    Post-procedure complications: none      Patient tolerance of procedure: Tolerated well, no immediate complications    Observer: Yes      Observer name:  Nicho Varela RN

## 2019-12-09 ENCOUNTER — APPOINTMENT (INPATIENT)
Dept: RADIOLOGY | Facility: HOSPITAL | Age: 84
DRG: 871 | End: 2019-12-09
Payer: COMMERCIAL

## 2019-12-09 PROBLEM — R79.89 POSITIVE D DIMER: Status: RESOLVED | Noted: 2019-12-03 | Resolved: 2019-12-09

## 2019-12-09 LAB
ALBUMIN SERPL BCP-MCNC: 1.4 G/DL (ref 3.5–5)
ALP SERPL-CCNC: 203 U/L (ref 46–116)
ALT SERPL W P-5'-P-CCNC: 7 U/L (ref 12–78)
ANION GAP SERPL CALCULATED.3IONS-SCNC: 7 MMOL/L (ref 4–13)
APTT PPP: 84 SECONDS (ref 23–37)
AST SERPL W P-5'-P-CCNC: 34 U/L (ref 5–45)
ATRIAL RATE: 147 BPM
BASE EX.OXY STD BLDV CALC-SCNC: 83.2 % (ref 60–80)
BASE EXCESS BLDV CALC-SCNC: -1.6 MMOL/L
BASOPHILS # BLD MANUAL: 0 THOUSAND/UL (ref 0–0.1)
BASOPHILS NFR MAR MANUAL: 0 % (ref 0–1)
BILIRUB SERPL-MCNC: 2.9 MG/DL (ref 0.2–1)
BUN SERPL-MCNC: 15 MG/DL (ref 5–25)
CA-I BLD-SCNC: 0.96 MMOL/L (ref 1.12–1.32)
CA-I BLD-SCNC: 0.99 MMOL/L (ref 1.12–1.32)
CALCIUM SERPL-MCNC: 7.2 MG/DL (ref 8.3–10.1)
CHLORIDE SERPL-SCNC: 98 MMOL/L (ref 100–108)
CO2 SERPL-SCNC: 22 MMOL/L (ref 21–32)
CREAT SERPL-MCNC: 1.07 MG/DL (ref 0.6–1.3)
EOSINOPHIL # BLD MANUAL: 0.32 THOUSAND/UL (ref 0–0.4)
EOSINOPHIL NFR BLD MANUAL: 1 % (ref 0–6)
ERYTHROCYTE [DISTWIDTH] IN BLOOD BY AUTOMATED COUNT: 14.6 % (ref 11.6–15.1)
GFR SERPL CREATININE-BSD FRML MDRD: 64 ML/MIN/1.73SQ M
GLUCOSE SERPL-MCNC: 158 MG/DL (ref 65–140)
HCO3 BLDV-SCNC: 21.5 MMOL/L (ref 24–30)
HCT VFR BLD AUTO: 43.8 % (ref 36.5–49.3)
HCT VFR BLD AUTO: 46.2 % (ref 36.5–49.3)
HCT VFR BLD AUTO: 47 % (ref 36.5–49.3)
HGB BLD-MCNC: 15.2 G/DL (ref 12–17)
HGB BLD-MCNC: 15.8 G/DL (ref 12–17)
HGB BLD-MCNC: 15.9 G/DL (ref 12–17)
LACTATE SERPL-SCNC: 1.5 MMOL/L (ref 0.5–2)
LG PLATELETS BLD QL SMEAR: PRESENT
LYMPHOCYTES # BLD AUTO: 1.62 THOUSAND/UL (ref 0.6–4.47)
LYMPHOCYTES # BLD AUTO: 5 % (ref 14–44)
MAGNESIUM SERPL-MCNC: 2.2 MG/DL (ref 1.6–2.6)
MCH RBC QN AUTO: 31.8 PG (ref 26.8–34.3)
MCHC RBC AUTO-ENTMCNC: 33.6 G/DL (ref 31.4–37.4)
MCV RBC AUTO: 95 FL (ref 82–98)
METAMYELOCYTES NFR BLD MANUAL: 2 % (ref 0–1)
MONOCYTES # BLD AUTO: 1.62 THOUSAND/UL (ref 0–1.22)
MONOCYTES NFR BLD: 5 % (ref 4–12)
MYELOCYTES NFR BLD MANUAL: 2 % (ref 0–1)
NEUTROPHILS # BLD MANUAL: 26.56 THOUSAND/UL (ref 1.85–7.62)
NEUTS BAND NFR BLD MANUAL: 4 % (ref 0–8)
NEUTS SEG NFR BLD AUTO: 78 % (ref 43–75)
NRBC BLD AUTO-RTO: 0 /100 WBCS
O2 CT BLDV-SCNC: 19.1 ML/DL
OSMOLALITY UR: 604 MMOL/KG
PCO2 BLDV: 32.5 MM HG (ref 42–50)
PH BLDV: 7.44 [PH] (ref 7.3–7.4)
PHOSPHATE SERPL-MCNC: 3.1 MG/DL (ref 2.3–4.1)
PLATELET # BLD AUTO: 206 THOUSANDS/UL (ref 149–390)
PLATELET BLD QL SMEAR: ADEQUATE
PMV BLD AUTO: 9.7 FL (ref 8.9–12.7)
PO2 BLDV: 46.9 MM HG (ref 35–45)
POTASSIUM SERPL-SCNC: 4.9 MMOL/L (ref 3.5–5.3)
PROT SERPL-MCNC: 6 G/DL (ref 6.4–8.2)
QRS AXIS: 70 DEGREES
QRSD INTERVAL: 98 MS
QT INTERVAL: 346 MS
QTC INTERVAL: 476 MS
RBC # BLD AUTO: 4.97 MILLION/UL (ref 3.88–5.62)
SODIUM 24H UR-SCNC: 15 MOL/L
SODIUM SERPL-SCNC: 127 MMOL/L (ref 136–145)
T WAVE AXIS: -71 DEGREES
TOTAL CELLS COUNTED SPEC: 100
VARIANT LYMPHS # BLD AUTO: 3 %
VENTRICULAR RATE: 114 BPM
WBC # BLD AUTO: 32.39 THOUSAND/UL (ref 4.31–10.16)

## 2019-12-09 PROCEDURE — 85014 HEMATOCRIT: CPT | Performed by: PHYSICIAN ASSISTANT

## 2019-12-09 PROCEDURE — 82330 ASSAY OF CALCIUM: CPT | Performed by: NURSE PRACTITIONER

## 2019-12-09 PROCEDURE — 85018 HEMOGLOBIN: CPT | Performed by: PHYSICIAN ASSISTANT

## 2019-12-09 PROCEDURE — 85027 COMPLETE CBC AUTOMATED: CPT | Performed by: NURSE PRACTITIONER

## 2019-12-09 PROCEDURE — 83605 ASSAY OF LACTIC ACID: CPT | Performed by: NURSE PRACTITIONER

## 2019-12-09 PROCEDURE — 85007 BL SMEAR W/DIFF WBC COUNT: CPT | Performed by: NURSE PRACTITIONER

## 2019-12-09 PROCEDURE — 32561 LYSE CHEST FIBRIN INIT DAY: CPT | Performed by: PHYSICIAN ASSISTANT

## 2019-12-09 PROCEDURE — 85014 HEMATOCRIT: CPT | Performed by: NURSE PRACTITIONER

## 2019-12-09 PROCEDURE — 80053 COMPREHEN METABOLIC PANEL: CPT | Performed by: NURSE PRACTITIONER

## 2019-12-09 PROCEDURE — 85018 HEMOGLOBIN: CPT | Performed by: NURSE PRACTITIONER

## 2019-12-09 PROCEDURE — 82805 BLOOD GASES W/O2 SATURATION: CPT | Performed by: NURSE PRACTITIONER

## 2019-12-09 PROCEDURE — 83935 ASSAY OF URINE OSMOLALITY: CPT | Performed by: PHYSICIAN ASSISTANT

## 2019-12-09 PROCEDURE — 84300 ASSAY OF URINE SODIUM: CPT | Performed by: PHYSICIAN ASSISTANT

## 2019-12-09 PROCEDURE — 3E05317 INTRODUCTION OF OTHER THROMBOLYTIC INTO PERIPHERAL ARTERY, PERCUTANEOUS APPROACH: ICD-10-PCS | Performed by: PHYSICIAN ASSISTANT

## 2019-12-09 PROCEDURE — 93010 ELECTROCARDIOGRAM REPORT: CPT | Performed by: INTERNAL MEDICINE

## 2019-12-09 PROCEDURE — 85730 THROMBOPLASTIN TIME PARTIAL: CPT | Performed by: NURSE PRACTITIONER

## 2019-12-09 PROCEDURE — 93971 EXTREMITY STUDY: CPT | Performed by: SURGERY

## 2019-12-09 PROCEDURE — 99233 SBSQ HOSP IP/OBS HIGH 50: CPT | Performed by: INTERNAL MEDICINE

## 2019-12-09 PROCEDURE — 84100 ASSAY OF PHOSPHORUS: CPT | Performed by: NURSE PRACTITIONER

## 2019-12-09 PROCEDURE — 83735 ASSAY OF MAGNESIUM: CPT | Performed by: NURSE PRACTITIONER

## 2019-12-09 PROCEDURE — 99291 CRITICAL CARE FIRST HOUR: CPT | Performed by: ANESTHESIOLOGY

## 2019-12-09 PROCEDURE — 71045 X-RAY EXAM CHEST 1 VIEW: CPT

## 2019-12-09 RX ADMIN — VASOPRESSIN 0.04 UNITS/MIN: 20 INJECTION INTRAVENOUS at 08:27

## 2019-12-09 RX ADMIN — HYDROCORTISONE SODIUM SUCCINATE 100 MG: 100 INJECTION, POWDER, FOR SOLUTION INTRAMUSCULAR; INTRAVENOUS at 16:02

## 2019-12-09 RX ADMIN — CALCIUM GLUCONATE 2 G: 98 INJECTION, SOLUTION INTRAVENOUS at 01:22

## 2019-12-09 RX ADMIN — CEFAZOLIN SODIUM 2000 MG: 2 SOLUTION INTRAVENOUS at 20:11

## 2019-12-09 RX ADMIN — DIGOXIN 250 MCG: 0.25 INJECTION INTRAMUSCULAR; INTRAVENOUS at 03:46

## 2019-12-09 RX ADMIN — MELATONIN 3 MG: 3 TAB ORAL at 21:24

## 2019-12-09 RX ADMIN — CLINDAMYCIN PHOSPHATE 900 MG: 900 INJECTION, SOLUTION INTRAVENOUS at 04:34

## 2019-12-09 RX ADMIN — SENNOSIDES AND DOCUSATE SODIUM 1 TABLET: 8.6; 5 TABLET ORAL at 21:24

## 2019-12-09 RX ADMIN — MIDODRINE HYDROCHLORIDE 10 MG: 5 TABLET ORAL at 11:52

## 2019-12-09 RX ADMIN — CEFAZOLIN SODIUM 2000 MG: 2 SOLUTION INTRAVENOUS at 03:46

## 2019-12-09 RX ADMIN — NYSTATIN 500000 UNITS: 100000 SUSPENSION ORAL at 21:24

## 2019-12-09 RX ADMIN — CEFAZOLIN SODIUM 2000 MG: 2 SOLUTION INTRAVENOUS at 11:52

## 2019-12-09 RX ADMIN — WATER 10 MG: 1 INJECTION INTRAMUSCULAR; INTRAVENOUS; SUBCUTANEOUS at 12:45

## 2019-12-09 RX ADMIN — DIGOXIN 250 MCG: 0.25 INJECTION INTRAMUSCULAR; INTRAVENOUS at 10:01

## 2019-12-09 RX ADMIN — CALCIUM GLUCONATE 2 G: 98 INJECTION, SOLUTION INTRAVENOUS at 05:24

## 2019-12-09 RX ADMIN — HYDROCORTISONE SODIUM SUCCINATE 100 MG: 100 INJECTION, POWDER, FOR SOLUTION INTRAMUSCULAR; INTRAVENOUS at 21:24

## 2019-12-09 RX ADMIN — HYDROCORTISONE SODIUM SUCCINATE 100 MG: 100 INJECTION, POWDER, FOR SOLUTION INTRAMUSCULAR; INTRAVENOUS at 05:29

## 2019-12-09 RX ADMIN — VASOPRESSIN 0.04 UNITS/MIN: 20 INJECTION INTRAVENOUS at 01:42

## 2019-12-09 RX ADMIN — MIDODRINE HYDROCHLORIDE 10 MG: 5 TABLET ORAL at 16:02

## 2019-12-09 RX ADMIN — NOREPINEPHRINE BITARTRATE 12 MCG/MIN: 1 INJECTION INTRAVENOUS at 00:00

## 2019-12-09 RX ADMIN — HYDROCORTISONE SODIUM SUCCINATE 100 MG: 100 INJECTION, POWDER, FOR SOLUTION INTRAMUSCULAR; INTRAVENOUS at 01:37

## 2019-12-09 RX ADMIN — CHLORHEXIDINE GLUCONATE 0.12% ORAL RINSE 15 ML: 1.2 LIQUID ORAL at 20:11

## 2019-12-09 RX ADMIN — NYSTATIN 500000 UNITS: 100000 SUSPENSION ORAL at 11:52

## 2019-12-09 RX ADMIN — CLINDAMYCIN PHOSPHATE 900 MG: 900 INJECTION, SOLUTION INTRAVENOUS at 21:04

## 2019-12-09 RX ADMIN — WATER 5 MG: 1 INJECTION INTRAMUSCULAR; INTRAVENOUS; SUBCUTANEOUS at 12:45

## 2019-12-09 RX ADMIN — CLINDAMYCIN PHOSPHATE 900 MG: 900 INJECTION, SOLUTION INTRAVENOUS at 13:20

## 2019-12-09 RX ADMIN — CHLORHEXIDINE GLUCONATE 0.12% ORAL RINSE 15 ML: 1.2 LIQUID ORAL at 08:01

## 2019-12-09 RX ADMIN — MIDODRINE HYDROCHLORIDE 10 MG: 5 TABLET ORAL at 08:00

## 2019-12-09 RX ADMIN — NOREPINEPHRINE BITARTRATE 6 MCG/MIN: 1 INJECTION INTRAVENOUS at 12:24

## 2019-12-09 RX ADMIN — NYSTATIN 500000 UNITS: 100000 SUSPENSION ORAL at 08:03

## 2019-12-09 RX ADMIN — HEPARIN SODIUM AND DEXTROSE 3 UNITS/KG/HR: 10000; 5 INJECTION INTRAVENOUS at 07:44

## 2019-12-09 RX ADMIN — TAMSULOSIN HYDROCHLORIDE 0.4 MG: 0.4 CAPSULE ORAL at 16:02

## 2019-12-09 RX ADMIN — POTASSIUM CHLORIDE 20 MEQ: 14.9 INJECTION, SOLUTION INTRAVENOUS at 00:22

## 2019-12-09 NOTE — PROGRESS NOTES
Progress Note - Infectious Disease   Regian Hearing 82 y o  male MRN: [id number]  Unit/Bed#:  Encounter: [id number]      Impression/Plan:    82-year-old male patient admitted for septic shock secondary to severe group A strep pneumonia and right-sided empyema     1- Septic shock:  Hypotension, tachycardia and severe leukocytosis on admission, associated with  lactic acidosis and acute kidney injury  Septic shock is secondary to severe GAS pneumonia and empyema  Patient still on Levophed and vasopressin this morning  He has right-sided chest tube in place; repeat CT scan chest showing decreased right-sided pleural effusion  Patient remains afebrile, WBC count stable around 32,000   - supportive care as per ICU team  - antibiotics as below  - repeat CBC and procalcitonin   - pulmonary and CT surgery follow-up; patient is planned for 3 days of tPA, if not improving, possibly needs VATS     2- severe GAS pneumonia complicated by empyema:  Two weeks of cough and shortness of breath  Large right-sided pleural effusion seen on chest x-ray on admission  WBC count 81078  Thoracentesis was diagnostic of empyema; 1200 mL of purulent fluid was removed; fluid WBC count 71,000, LDH more than 10,000, pH 6 3, glucose 5 mg/dL   Fluid culture group A strep  Patient has a chest tube inserted 12/04/2019    His leukocytosis is trending down   He reports feeling better this morning   But he remains on vasopressors and he still have significant leukocytosis of 32,000    -  continue cefazolin 2 g IV q 8h and clindamycin 900 mg IV q 8h  - repeat CBC in a m , trend procalcitonin  - close clinical monitoring  -  chest tube management as per pulmonary team  - will need to monitor closely chest tube output, respiratory status, chest x-ray; if no improvement, patient will need VATS     3- atrial fibrillation:  Tachycardia has resolved  - continue supportive care  - rate control and anticoagulation per ICU team     4- acute kidney injury  Creatinine significantly improved, creatinine today 1 07 mg/dL  - IV antibiotics doses mentioned above have been adjusted to creatinine clearance  - repeat BMP, avoid nephrotoxic medications        5- acute respiratory failure:  Secondary to problem #1 and 2;  ARF improved, patient is now on 2 L/min nasal cannula  - continue antibiotics as above  - supportive care as per ICU team     Plan mentioned above discussed with patient  Case discussed with ICU team     Antibiotics:  Cefazolin day 6  Clindamycin day 6      Subjective:  Patient has no fever, chills, sweats; no nausea, vomiting, diarrhea; no cough, shortness of breath; no pain  No new symptoms  Patient reports poor appetite, he denies pain  He denies cough or phlegm    Objective:  Vitals:  Temp:  [97 2 °F (36 2 °C)-97 6 °F (36 4 °C)] 97 6 °F (36 4 °C)  HR:  [] 101  Resp:  [15-32] 25  BP: ()/(52) 94/52  SpO2:  [92 %-96 %] 95 %  Temp (24hrs), Av 4 °F (36 3 °C), Min:97 2 °F (36 2 °C), Max:97 6 °F (36 4 °C)  Current: Temperature: 97 6 °F (36 4 °C)    Physical Exam:   General Appearance:  Alert, interactive, nontoxic, no acute distress  Throat: Oropharynx moist without lesions  Lungs:   Clear to auscultation bilaterally; no wheezes, rhonchi or rales; respirations unlabored  Right chest tube in place, output of around 1 L of fluid in the past 24 hours   Heart:  RRR; no murmur, rub or gallop   Abdomen:   Soft, non-tender, non-distended, positive bowel sounds  Extremities: No clubbing, cyanosis; lower extremity edema present   Skin: No new rashes or lesions  No draining wounds noted         Labs, Imaging, & Other studies:   All pertinent labs and imaging studies were personally reviewed  Results from last 7 days   Lab Units 19  0433 19  0000 19  0532 19  0535   WBC Thousand/uL 32 39*  --  27 97* 29 02*   HEMOGLOBIN g/dL 15 8 15 9 15 7 16 0   PLATELETS Thousands/uL 206  --  226 231     Results from last 7 days Lab Units 12/09/19  0433 12/08/19  2146 12/08/19  0532 12/07/19  0535  12/03/19  1653   SODIUM mmol/L 127* 130* 132* 133*   < >  --    POTASSIUM mmol/L 4 9 3 7 3 0* 3 0*   < >  --    CHLORIDE mmol/L 98* 97* 97* 98*   < >  --    CO2 mmol/L 22 24 24 23   < >  --    CO2, I-STAT mmol/L  --   --   --   --   --  12*   BUN mg/dL 15 19 23 34*   < >  --    CREATININE mg/dL 1 07 1 04 1 09 1 30   < >  --    EGFR ml/min/1 73sq m 64 67 63 51   < >  --    GLUCOSE, ISTAT mg/dl  --   --   --   --   --  119   CALCIUM mg/dL 7 2* 7 0* 6 9* 7 2*   < >  --    AST U/L 34  --  35 62*   < >  --    ALT U/L 7*  --  12 24   < >  --    ALK PHOS U/L 203*  --  194* 180*   < >  --     < > = values in this interval not displayed  Results from last 7 days   Lab Units 12/04/19  1931 12/04/19  1154 12/03/19  2126 12/03/19  1823 12/03/19  1646 12/03/19  1640   BLOOD CULTURE   --   --   --   --  No Growth After 5 Days  No Growth After 5 Days     SPUTUM CULTURE  2+ Growth of Staphylococcus aureus*  3+ Growth of Beta Hemolytic Streptococcus Group A*  --   --   --   --   --    GRAM STAIN RESULT  2+ Epithelial cells per low power field*  1+ Polys*  2+ Gram positive cocci in pairs, chains and clusters*  1+ Gram positive rods*  1+ Gram negative rods*  --   --  3+ Polys*  4+ Gram positive cocci in chains*  --   --    BODY FLUID CULTURE, STERILE   --   --   --  4+ Growth of Beta Hemolytic Streptococcus Group A*  --   --    MRSA CULTURE ONLY   --  No Methicillin Resistant Staphlyococcus aureus (MRSA) isolated  --   --   --   --    LEGIONELLA URINARY ANTIGEN   --   --  Negative  --   --   --      Results from last 7 days   Lab Units 12/04/19  0435 12/03/19  2348   PROCALCITONIN ng/ml 6 55* 7 02*

## 2019-12-09 NOTE — ASSESSMENT & PLAN NOTE
- baseline Cr around 1 1 - 1 3  -continue to monitor BUN, creatinine, urinary output    - likely 2/2 dehydration / sepsis

## 2019-12-09 NOTE — ASSESSMENT & PLAN NOTE
- s/p bedside ED thoracentesis (12/3)  - s/p R CT placement (12/4)  - pleural fluid cx w GAS  - CT scan with dense collection, tPA/Pulmozyme improving drainage    Chest tube back to suction

## 2019-12-09 NOTE — SOCIAL WORK
CM spoke to ICU  They are recommending conservative measure for 3 days and tomorrow is the 3rd day  Pending outcome, pt may have to be transferred to -B  CM will continue to follow and assess needs as hospitalization progresses

## 2019-12-09 NOTE — ASSESSMENT & PLAN NOTE
-s/p thoracentesis, right tube thoracostomy    Persistent effusion, continue drain through chest tube  -tolerating conventional nasal cannula, supplemental O2 to maintain saturations greater than 92%  -pulmonary toilet  -respiratory protocol

## 2019-12-09 NOTE — QUICK NOTE
I instilled 10 mg tPA and 5 mg Pulmozyme the patient's chest tube  Patient tolerated procedure well  Chest tube clamped proximally, will remain clamped for 6 hours, and will unclamp at 1900  Nursing aware of order for clamping and unclamp time      Francisco Ham PA-C

## 2019-12-09 NOTE — ASSESSMENT & PLAN NOTE
- takes eliquis as outpatient, held now given need for procedures  - heparin gtt   -transition back once taking p o   -rate uncontrolled overnight, digoxin loaded

## 2019-12-09 NOTE — ASSESSMENT & PLAN NOTE
- likely secondary to empyema, pneumonia present on admission, possible UTI  - follow up culture growth  - abx deescalated to Ancef and clinda  -monitor hemodynamics closely, pressor requirements increasing overnight  Added vasopressin and stress dose steroids  - tPA with Pulmozyme to right chest tube with improvement of drainage    Continue daily

## 2019-12-09 NOTE — PLAN OF CARE
Problem: Prexisting or High Potential for Compromised Skin Integrity  Goal: Skin integrity is maintained or improved  Description  INTERVENTIONS:  - Identify patients at risk for skin breakdown  - Assess and monitor skin integrity  - Assess and monitor nutrition and hydration status  - Monitor labs   - Assess for incontinence   - Turn and reposition patient  - Assist with mobility/ambulation  - Relieve pressure over bony prominences  - Avoid friction and shearing  - Provide appropriate hygiene as needed including keeping skin clean and dry  - Evaluate need for skin moisturizer/barrier cream  - Collaborate with interdisciplinary team   - Patient/family teaching  - Consider wound care consult   Outcome: Progressing     Problem: Potential for Falls  Goal: Patient will remain free of falls  Description  INTERVENTIONS:  - Assess patient frequently for physical needs  -  Identify cognitive and physical deficits and behaviors that affect risk of falls    -  [place] fall precautions as indicated by assessment   - Educate patient/family on patient safety including physical limitations  - Instruct patient to call for assistance with activity based on assessment  - Modify environment to reduce risk of injury  - Consider OT/PT consult to assist with strengthening/mobility  Outcome: Progressing     Problem: CARDIOVASCULAR - ADULT  Goal: Maintains optimal cardiac output and hemodynamic stability  Description  INTERVENTIONS:  - Monitor I/O, vital signs and rhythm  - Monitor for S/S and trends of decreased cardiac output  - Administer and titrate ordered vasoactive medications to optimize hemodynamic stability  - Assess quality of pulses, skin color and temperature  - Assess for signs of decreased coronary artery perfusion  - Instruct patient to report change in severity of symptoms  Outcome: Progressing  Goal: Absence of cardiac dysrhythmias or at baseline rhythm  Description  INTERVENTIONS:  - Continuous cardiac monitoring, vital signs, obtain 12 lead EKG if ordered  - Administer antiarrhythmic and heart rate control medications as ordered  - Monitor electrolytes and administer replacement therapy as ordered  Outcome: Progressing     Problem: GENITOURINARY - ADULT  Goal: Maintains or returns to baseline urinary function  Description  INTERVENTIONS:  - Assess urinary function  - Encourage oral fluids to ensure adequate hydration if ordered  - Administer IV fluids as ordered to ensure adequate hydration  - Administer ordered medications as needed  - Offer frequent toileting  - Follow urinary retention protocol if ordered  Outcome: Progressing  Goal: Absence of urinary retention  Description  INTERVENTIONS:  - Assess patients ability to void and empty bladder  - Monitor I/O  - Bladder scan as needed  - Discuss with physician/AP medications to alleviate retention as needed  - Discuss catheterization for long term situations as appropriate  Outcome: Progressing  Goal: Urinary catheter remains patent  Description  INTERVENTIONS:  - Assess patency of urinary catheter  - If patient has a chronic le, consider changing catheter if non-functioning  - Follow guidelines for intermittent irrigation of non-functioning urinary catheter  Outcome: Progressing     Problem: Nutrition/Hydration-ADULT  Goal: Nutrient/Hydration intake appropriate for improving, restoring or maintaining nutritional needs  Description  Monitor and assess patient's nutrition/hydration status for malnutrition  Collaborate with interdisciplinary team and initiate plan and interventions as ordered  Monitor patient's weight and dietary intake as ordered or per policy  Determine patient's food preferences and provide high-protein, high-caloric foods as appropriate       INTERVENTIONS:  - Monitor oral intake, urinary output, labs, and treatment plans  - Assess nutrition and hydration status and recommend course of action  - Evaluate amount of meals eaten  - Assist patient with eating if necessary   - Allow adequate time for meals  - Recommend/ encourage appropriate diets, oral nutritional supplements, and vitamin/mineral supplements  - Order, calculate, and assess calorie counts as needed  - Recommend, monitor, and adjust tube feedings based on assessed needs  - Assess need for intravenous fluids  - Provide nutrition/hydration education as appropriate  - Include patient/family/caregiver in decisions related to nutrition    Outcome: Progressing     Problem: RESPIRATORY - ADULT  Goal: Achieves optimal ventilation and oxygenation  Description  INTERVENTIONS:  - Assess for changes in respiratory status  - Assess for changes in mentation and behavior  - Position to facilitate oxygenation and minimize respiratory effort  - Oxygen administered by appropriate delivery if ordered  - Initiate smoking cessation education as indicated  - Encourage broncho-pulmonary hygiene including cough, deep breathe, Incentive Spirometry  - Assess the need for suctioning and aspirate as needed  - Assess and instruct to report SOB or any respiratory difficulty  - Respiratory Therapy support as indicated  Outcome: Progressing     Problem: HEMATOLOGIC - ADULT  Goal: Maintains hematologic stability  Description  INTERVENTIONS  - Assess for signs and symptoms of bleeding or hemorrhage  - Monitor labs  - Administer supportive blood products/factors as ordered and appropriate  Outcome: Progressing

## 2019-12-09 NOTE — PROGRESS NOTES
Progress Note - Danae Dinh 1935, 82 y o  male MRN: [id number]    Unit/Bed#:  Encounter: [id number]    Primary Care Provider: Lilibeth Zafar MD   Date and time admitted to hospital: 12/3/2019  4:08 PM        Moderate protein-calorie malnutrition [facility])  Assessment & Plan  Appreciate nutrition recommendations  - continue diet  -encourage p o  Intake    Transaminitis  Assessment & Plan  - monitor LFTs  - suspect sequelae of shock liver  -RUQ US-no acute cholecystitis, hepatomegaly      Chronic atrial fibrillation  Assessment & Plan  - takes eliquis as outpatient, held now given need for procedures  - heparin gtt   -transition back once taking p o   -rate uncontrolled overnight, digoxin loaded  SHREYA (acute kidney injury) ([facility] [address] )  Assessment & Plan  - baseline Cr around 1 1 - 1 3  -continue to monitor BUN, creatinine, urinary output  - likely 2/2 dehydration / sepsis    Hyponatremia  Assessment & Plan  - baseline Na is in the 130s   - continue to monitor    Septic shock (HCC)  Assessment & Plan  - likely secondary to empyema, pneumonia present on admission, possible UTI  - follow up culture growth  - abx deescalated to Ancef and clinda  -monitor hemodynamics closely, pressor requirements increasing overnight  Added vasopressin and stress dose steroids  - tPA with Pulmozyme to right chest tube with improvement of drainage  Continue daily    Pleural effusion on right  Assessment & Plan  - s/p bedside ED thoracentesis (12/3)  - s/p R CT placement (12/4)  - pleural fluid cx w GAS  - CT scan with dense collection, tPA/Pulmozyme improving drainage  Chest tube back to suction      * Acute respiratory failure with hypoxia [facility])  Assessment & Plan  -s/p thoracentesis, right tube thoracostomy    Persistent effusion, continue drain through chest tube  -tolerating conventional nasal cannula, supplemental O2 to maintain saturations greater than 92%  -pulmonary toilet  -respiratory protocol       Positive D dimerresolved as of 12/9/2019  Assessment & Plan  - to be expected given septic shock  - CXR supports etiology for his sx and hypoxia          Daily Progress Note - Critical Care   Braden Mohamud 82 y o  male MRN: [id number]  Unit/Bed#:  Encounter: [id number]        ----------------------------------------------------------------------------------------  HPI/24hr events:  TPA/Pulmozyme instilled and chest tube yesterday with improved chest tube output  Remained dependent on Levophed, with increasing pressor requirements overnight and associated tachycardia  Digoxin loaded  Added vasopressin and stress dose steroids overnight with decrease in Levophed requirements and marked improvement of heart rate     ---------------------------------------------------------------------------------------  SUBJECTIVE  Denies complaints this morning  Denies chest pain or shortness of breath    Review of Systems  Review of systems was reviewed and negative unless stated above in HPI/24-hour events   ---------------------------------------------------------------------------------------  Disposition: Continue Critical Care   Code Status: Level 1 - Full Code  ---------------------------------------------------------------------------------------  ICU CORE MEASURES    PT/OT when appropriate  Encourage IS, early mobilization to prevent atelectasis    Prophylaxis   VTE Pharmacologic Prophylaxis: Heparin Drip  VTE Mechanical Prophylaxis: sequential compression device  Stress Ulcer Prophylaxis: Prophylaxis Not Indicated     ABCDE Protocol (if indicated)  Plan to perform spontaneous awakening trial today? Not applicable  Plan to perform spontaneous breathing trial today? Not applicable  Obvious barriers to extubation?  Not applicable  CAM-ICU: Negative    Invasive Devices Review  Invasive Devices     Central Venous Catheter Line            CVC Central Lines 12/07/19 Triple 16cm 1 day          Arterial Line            Arterial Line 12/03/19 Left Radial 5 days          Drain            Chest Tube 1 Right 4 days    Chest Tube Right 4 days              Can any invasive devices be discontinued today? No (provide explanation):  Requires Ch[place] catheter and central/arterial lines due to shock  ---------------------------------------------------------------------------------------  OBJECTIVE    Physical Exam   Constitutional: He is oriented to person, place, and time  He appears well-developed and well-nourished  No distress  Ill appearing   HENT:   Head: Normocephalic and atraumatic  Eyes: Pupils are equal, round, and reactive to light  Conjunctivae and EOM are normal    Neck: Normal range of motion  Neck supple  No JVD present  No tracheal deviation present  Cardiovascular: Normal rate, normal heart sounds and intact distal pulses  Exam reveals no gallop and no friction rub  No murmur heard  Irregular   Pulmonary/Chest: Effort normal  No respiratory distress  He has wheezes  He exhibits no tenderness  Right-sided chest tube with pink serous drainage, fibrous threads  No subcu air or crepitus noted  Able to complete full sentences without difficulty  Symmetrical excursion  Abdominal: Soft  Bowel sounds are normal  He exhibits no distension  There is no tenderness  There is no rebound and no guarding  Musculoskeletal: Normal range of motion  He exhibits edema  Neurological: He is alert and oriented to person, place, and time  No cranial nerve deficit  Coordination normal    Skin: Skin is warm and dry  Capillary refill takes less than 2 seconds  No pallor  Psychiatric: He has a normal mood and affect  His behavior is normal  Judgment and thought content normal    Nursing note and vitals reviewed        Vitals   Vitals:    12/09/19 0000 12/09/19 0100 12/09/19 0200 12/09/19 0300   BP:       BP Location:       Pulse: (!) 112 (!) 114 100 (!) 122   Resp: 15 22 18 (!) 32   Temp:       TempSrc:       SpO2: 93% 96% 93% 95%   Weight: Height:         Temp (24hrs), Av 4 °F (36 3 °C), Min:97 2 °F (36 2 °C), Max:97 6 °F (36 4 °C)  Current: Temperature: 97 6 °F (36 4 °C)  BP 94/52 Comment (BP Location): A line  Pulse (!) 122   Temp 97 6 °F (36 4 °C) (Axillary)   Resp (!) 32   Ht 5' 9" (1 753 m)   Wt 96 9 kg (213 lb 10 oz)   SpO2 95%   BMI 31 55 kg/m²      Invasive/non-invasive ventilation settings   Respiratory    Lab Data (Last 4 hours)    None         O2/Vent Data (Last 4 hours)    None                Height and Weights   Height: 5' 9" (175 3 cm)  IBW: 70 7 kg  Body mass index is 31 55 kg/m²  Weight (last 2 days)     Date/Time   Weight    19 0532   96 9 (213 63)    19 0600   95 7 (210 98)                Intake and Output  I/O        07 -  07 07 -  0700    I V  (mL/kg) 3550 6 (36 6) 852 2 (8 8)    IV Piggyback 100 450    Total Intake(mL/kg) 3650 6 (37 7) 1302 2 (13 4)    Urine (mL/kg/hr) 1410 (0 6) 1239 (0 5)    Stool 0 0    Chest Tube 245 850    Total Output 1655 2089    Net +1995 6 -786  8          Unmeasured Stool Occurrence 1 x 2 x            Nutrition       Diet Orders   (From admission, onward)             Start     Ordered    19 1321  Dietary nutrition supplements  Once     Question Answer Comment   Select Supplement: Ensure Compact-Vanilla    Frequency Breakfast, Dinner        19 1320    19 0446  Diet Regular; Regular House  Diet effective now     Question Answer Comment   Diet Type Regular    Regular Regular House    RD to adjust diet per protocol?  Yes        19 0446                  Laboratory and Diagnostics:  Results from last 7 days   Lab Units 19  0000 19  0532 19  0535 19  0540 19  0508 19  0435 19  1653 19  1640   WBC Thousand/uL  --  27 97* 29 02* 30 29* 32 39* 49 19*  --  57 70*   HEMOGLOBIN g/dL 15 9 15 7 16 0 16 0 15 3 16 2  --  17 8*   I STAT HEMOGLOBIN g/dl  --   --   --   --   --   --  17 7*  -- HEMATOCRIT % 46 2 45 6 46 7 46 3 44 7 47 4  --  54 9*   HEMATOCRIT, ISTAT %  --   --   --   --   --   --  52*  --    PLATELETS Thousands/uL  --  226 231 254 256 300  --  342   BANDS PCT %  --  4  --   --  5 12*  --  28*   MONO PCT %  --  2* 5  --  4 5  --  4     Results from last 7 days   Lab Units 12/08/19  2146 12/08/19  0532 12/07/19  0535 12/06/19  0540 12/05/19  0508 12/04/19  0435 12/03/19  2348  12/03/19  1640   SODIUM mmol/L 130* 132* 133* 132* 130* 130* 128*  --  128*   POTASSIUM mmol/L 3 7 3 0* 3 0* 3 4* 2 8* 4 0 3 9  --  4 2   CHLORIDE mmol/L 97* 97* 98* 100 97* 98* 96*  --  90*   CO2 mmol/L 24 24 23 19* 16* 14* 13*  --  19*   CO2, I-STAT   --   --   --   --   --   --   --    < >  --    ANION GAP mmol/L 9 11 12 13 17* 18* 19*  --  19*   BUN mg/dL 19 23 34* 46* 60* 59* 59*  --  55*   CREATININE mg/dL 1 04 1 09 1 30 1 68* 2 11* 3 02* 3 26*  --  3 87*   CALCIUM mg/dL 7 0* 6 9* 7 2* 7 1* 7 5* 8 2* 7 7*  --  8 9   GLUCOSE RANDOM mg/dL 124 114 109 105 89 87 107  --  120   ALT U/L  --  12 24 52 27 11* 15  --  18   AST U/L  --  35 62* 161* 78* 19 22  --  24   ALK PHOS U/L  --  194* 180* 141* 117* 131* 153*  --  186*   ALBUMIN g/dL  --  1 4* 1 5* 1 5* 1 5* 1 8* 1 8*  --  2 1*   TOTAL BILIRUBIN mg/dL  --  2 90* 3 30* 3 60* 2 80* 2 50* 2 70*  --  2 60*    < > = values in this interval not displayed  Results from last 7 days   Lab Units 12/08/19 2146 12/08/19  0532 12/07/19  0535 12/05/19  0508 12/04/19  0435 12/03/19  2348 12/03/19  1640   MAGNESIUM mg/dL 1 8 2 0 2 4 2 8* 1 9  --  2 2   PHOSPHORUS mg/dL  --  2 8 2 8  --  4 4* 4 9*  --       Results from last 7 days   Lab Units 12/08/19  1826 12/08/19  1241 12/08/19  0532 12/07/19  0535 12/06/19  0809 12/06/19  0058 12/05/19  1811  12/03/19  1640   INR   --   --   --  1 58*  --   --   --   --  2 09*   PTT seconds 85* 81* 101* 80* 63* 73* 56*   < > 41*    < > = values in this interval not displayed        Results from last 7 days   Lab Units 12/03/19  1640 TROPONIN I ng/mL <0 02     Results from last 7 days   Lab Units 12/09/19  0000 12/05/19  0508 12/04/19  0435 12/04/19  0209 12/03/19  2348 12/03/19  1952 12/03/19  1640   LACTIC ACID mmol/L 1 5 1 7 3 4* 3 8* 3 9* 4 6* 6 7*     ABG:  Results from last 7 days   Lab Units 12/04/19  0550   PH ART  7 361   PCO2 ART mm Hg 19 9*   PO2 ART mm Hg 93 1   HCO3 ART mmol/L 11 0*   BASE EXC ART mmol/L -11 6   ABG SOURCE  Line, Arterial     VBG:  Results from last 7 days   Lab Units 12/09/19  0000 12/04/19  0550   PH OLIMPIA  7 438*  --    PCO2 OLIMPIA mm Hg 32 5*  --    PO2 OLIMPIA mm Hg 46 9*  --    HCO3 OLIMPIA mmol/L 21 5*  --    BASE EXC OLIMPIA mmol/L -1 6  --    ABG SOURCE   --  Line, Arterial     Results from last 7 days   Lab Units 12/04/19  0435 12/03/19  2348   PROCALCITONIN ng/ml 6 55* 7 02*       Micro  Results from last 7 days   Lab Units 12/04/19  1931 12/04/19  1154 12/03/19  2126 12/03/19  1823 12/03/19  1646 12/03/19  1640   BLOOD CULTURE   --   --   --   --  No Growth After 5 Days  No Growth After 5 Days  SPUTUM CULTURE  2+ Growth of Staphylococcus aureus*  3+ Growth of Beta Hemolytic Streptococcus Group A*  --   --   --   --   --    GRAM STAIN RESULT  2+ Epithelial cells per low power field*  1+ Polys*  2+ Gram positive cocci in pairs, chains and clusters*  1+ Gram positive rods*  1+ Gram negative rods*  --   --  3+ Polys*  4+ Gram positive cocci in chains*  --   --    BODY FLUID CULTURE, STERILE   --   --   --  4+ Growth of Beta Hemolytic Streptococcus Group A*  --   --    MRSA CULTURE ONLY   --  No Methicillin Resistant Staphlyococcus aureus (MRSA) isolated  --   --   --   --    LEGIONELLA URINARY ANTIGEN   --   --  Negative  --   --   --    STREP PNEUMONIAE ANTIGEN, URINE   --   --  Negative  --   --   --        EKG:  Atrial fib  Imaging: I have personally reviewed pertinent reports        Active Medications  Scheduled Meds:  Current Facility-Administered Medications:  cefazolin 2,000 mg Intravenous Q8H Lawrence MD Stephane Last Rate: 2,000 mg (12/09/19 0346)   chlorhexidine 15 mL Swish & Spit Q12H [address] Cooper Remy PA-C    clindamycin 900 mg Intravenous Q8H ANNA MARIE Etienne Last Rate: 900 mg (12/08/19 2238)   [START ON 12/10/2019] digoxin 125 mcg Intravenous Daily ANNA MARIE Rogers    digoxin 250 mcg Intravenous Q6H ANNA MARIE Ewing    heparin (porcine) 3-20 Units/kg/hr (Order-Specific) Intravenous Titrated ANNA MARIE [place] Last Rate: 15 8 Units/kg/hr (12/08/19 1156)   heparin (porcine) 2,000 Units Intravenous PRN ANNA MARIE [place]    heparin (porcine) 4,000 Units Intravenous PRN ANNA MARIE [place]    hydrocortisone sodium succinate 100 mg Intravenous Q8H [address] ANNA MARIE Ewing    melatonin 3 mg Oral HS ANNA MARIE Etienne    midodrine 10 mg Oral TID AC Cooper Remy PA-C    norepinephrine 1-30 mcg/min Intravenous Titrated ANNA MARIE [place] Last Rate: 5 mcg/min (12/09/19 0351)   nystatin 500,000 Units Swish & Swallow 4x Daily ANNA MARIE Etienne    senna-docusate sodium 1 tablet Oral HS Cooper Remy PA-C    tamsulosin 0 4 mg Oral Daily With [employer] gonzales    vasopressin (PITRESSIN) in 0 9 % sodium chloride 100 mL 0 04 Units/min Intravenous Continuous ANNA MARIE Rogers Last Rate: 0 04 Units/min (12/09/19 0142)   zolpidem 5 mg Oral HS PRN ANNA MARIE Rogers      Continuous Infusions:    heparin (porcine) 3-20 Units/kg/hr (Order-Specific) Last Rate: 15 8 Units/kg/hr (12/08/19 1156)   norepinephrine 1-30 mcg/min Last Rate: 5 mcg/min (12/09/19 0351)   vasopressin (PITRESSIN) in 0 9 % sodium chloride 100 mL 0 04 Units/min Last Rate: 0 04 Units/min (12/09/19 0142)     PRN Meds:     heparin (porcine) 2,000 Units PRN   heparin (porcine) 4,000 Units PRN   zolpidem 5 mg HS PRN       Allergies   No Known Allergies    Advance Directive and Living Will:      Power of :    POLST:        Counseling / Coordination of Care  Total Critical Care time spent 32 minutes excluding procedures, teaching and family updates  Critical care time inclusive of frequent assessments and titration of therapies due to multi-system organ failure, and high potential for life-threatening deterioration  Time is exclusive of procedures or time spent by attending physician  ANNA MARIE Green        Portions of the record may have been created with voice recognition software  Occasional wrong word or "sound a like" substitutions may have occurred due to the inherent limitations of voice recognition software    Read the chart carefully and recognize, using context, where substitutions have occurred

## 2019-12-10 ENCOUNTER — APPOINTMENT (INPATIENT)
Dept: RADIOLOGY | Facility: HOSPITAL | Age: 84
DRG: 871 | End: 2019-12-10
Payer: COMMERCIAL

## 2019-12-10 PROBLEM — N17.9 AKI (ACUTE KIDNEY INJURY) (HCC): Status: RESOLVED | Noted: 2019-12-03 | Resolved: 2019-12-10

## 2019-12-10 LAB
ANION GAP SERPL CALCULATED.3IONS-SCNC: 7 MMOL/L (ref 4–13)
ANION GAP SERPL CALCULATED.3IONS-SCNC: 8 MMOL/L (ref 4–13)
APTT PPP: 132 SECONDS (ref 23–37)
APTT PPP: 59 SECONDS (ref 23–37)
APTT PPP: 74 SECONDS (ref 23–37)
BASE EX.OXY STD BLDV CALC-SCNC: 75.7 % (ref 60–80)
BASE EXCESS BLDV CALC-SCNC: 1 MMOL/L
BASOPHILS # BLD MANUAL: 0 THOUSAND/UL (ref 0–0.1)
BASOPHILS NFR MAR MANUAL: 0 % (ref 0–1)
BUN SERPL-MCNC: 21 MG/DL (ref 5–25)
BUN SERPL-MCNC: 27 MG/DL (ref 5–25)
CALCIUM SERPL-MCNC: 7.5 MG/DL (ref 8.3–10.1)
CALCIUM SERPL-MCNC: 7.6 MG/DL (ref 8.3–10.1)
CHLORIDE SERPL-SCNC: 97 MMOL/L (ref 100–108)
CHLORIDE SERPL-SCNC: 97 MMOL/L (ref 100–108)
CO2 SERPL-SCNC: 25 MMOL/L (ref 21–32)
CO2 SERPL-SCNC: 27 MMOL/L (ref 21–32)
CREAT SERPL-MCNC: 1.16 MG/DL (ref 0.6–1.3)
CREAT SERPL-MCNC: 1.24 MG/DL (ref 0.6–1.3)
DIGOXIN SERPL-MCNC: 1.4 NG/ML (ref 0.8–2)
DIGOXIN SERPL-MCNC: 1.5 NG/ML (ref 0.8–2)
EOSINOPHIL # BLD MANUAL: 0 THOUSAND/UL (ref 0–0.4)
EOSINOPHIL NFR BLD MANUAL: 0 % (ref 0–6)
ERYTHROCYTE [DISTWIDTH] IN BLOOD BY AUTOMATED COUNT: 14.6 % (ref 11.6–15.1)
GFR SERPL CREATININE-BSD FRML MDRD: 54 ML/MIN/1.73SQ M
GFR SERPL CREATININE-BSD FRML MDRD: 58 ML/MIN/1.73SQ M
GLUCOSE SERPL-MCNC: 107 MG/DL (ref 65–140)
GLUCOSE SERPL-MCNC: 171 MG/DL (ref 65–140)
HCO3 BLDV-SCNC: 25.3 MMOL/L (ref 24–30)
HCT VFR BLD AUTO: 42.4 % (ref 36.5–49.3)
HGB BLD-MCNC: 14.5 G/DL (ref 12–17)
LYMPHOCYTES # BLD AUTO: 0.85 THOUSAND/UL (ref 0.6–4.47)
LYMPHOCYTES # BLD AUTO: 3 % (ref 14–44)
MAGNESIUM SERPL-MCNC: 2 MG/DL (ref 1.6–2.6)
MAGNESIUM SERPL-MCNC: 2.1 MG/DL (ref 1.6–2.6)
MCH RBC QN AUTO: 32.2 PG (ref 26.8–34.3)
MCHC RBC AUTO-ENTMCNC: 34.2 G/DL (ref 31.4–37.4)
MCV RBC AUTO: 94 FL (ref 82–98)
METAMYELOCYTES NFR BLD MANUAL: 3 % (ref 0–1)
MONOCYTES # BLD AUTO: 0.85 THOUSAND/UL (ref 0–1.22)
MONOCYTES NFR BLD: 3 % (ref 4–12)
MYELOCYTES NFR BLD MANUAL: 1 % (ref 0–1)
NEUTROPHILS # BLD MANUAL: 25.48 THOUSAND/UL (ref 1.85–7.62)
NEUTS BAND NFR BLD MANUAL: 3 % (ref 0–8)
NEUTS SEG NFR BLD AUTO: 87 % (ref 43–75)
NRBC BLD AUTO-RTO: 0 /100 WBCS
O2 CT BLDV-SCNC: 16.1 ML/DL
PCO2 BLDV: 39.3 MM HG (ref 42–50)
PH BLDV: 7.43 [PH] (ref 7.3–7.4)
PHOSPHATE SERPL-MCNC: 4 MG/DL (ref 2.3–4.1)
PHOSPHATE SERPL-MCNC: 4.1 MG/DL (ref 2.3–4.1)
PLATELET # BLD AUTO: 210 THOUSANDS/UL (ref 149–390)
PLATELET BLD QL SMEAR: ADEQUATE
PMV BLD AUTO: 9.8 FL (ref 8.9–12.7)
PO2 BLDV: 41.7 MM HG (ref 35–45)
POTASSIUM SERPL-SCNC: 4.5 MMOL/L (ref 3.5–5.3)
POTASSIUM SERPL-SCNC: 4.8 MMOL/L (ref 3.5–5.3)
PROCALCITONIN SERPL-MCNC: 0.46 NG/ML
RBC # BLD AUTO: 4.51 MILLION/UL (ref 3.88–5.62)
SODIUM SERPL-SCNC: 130 MMOL/L (ref 136–145)
SODIUM SERPL-SCNC: 131 MMOL/L (ref 136–145)
TOTAL CELLS COUNTED SPEC: 100
TOXIC GRANULES BLD QL SMEAR: PRESENT
WBC # BLD AUTO: 28.31 THOUSAND/UL (ref 4.31–10.16)

## 2019-12-10 PROCEDURE — 99232 SBSQ HOSP IP/OBS MODERATE 35: CPT | Performed by: INTERNAL MEDICINE

## 2019-12-10 PROCEDURE — 84100 ASSAY OF PHOSPHORUS: CPT | Performed by: PHYSICIAN ASSISTANT

## 2019-12-10 PROCEDURE — 85730 THROMBOPLASTIN TIME PARTIAL: CPT | Performed by: ANESTHESIOLOGY

## 2019-12-10 PROCEDURE — 83735 ASSAY OF MAGNESIUM: CPT | Performed by: PHYSICIAN ASSISTANT

## 2019-12-10 PROCEDURE — 71045 X-RAY EXAM CHEST 1 VIEW: CPT

## 2019-12-10 PROCEDURE — 93005 ELECTROCARDIOGRAM TRACING: CPT

## 2019-12-10 PROCEDURE — 80048 BASIC METABOLIC PNL TOTAL CA: CPT | Performed by: PHYSICIAN ASSISTANT

## 2019-12-10 PROCEDURE — 85007 BL SMEAR W/DIFF WBC COUNT: CPT | Performed by: PHYSICIAN ASSISTANT

## 2019-12-10 PROCEDURE — 85730 THROMBOPLASTIN TIME PARTIAL: CPT | Performed by: NURSE PRACTITIONER

## 2019-12-10 PROCEDURE — 80162 ASSAY OF DIGOXIN TOTAL: CPT | Performed by: PHYSICIAN ASSISTANT

## 2019-12-10 PROCEDURE — 32562 LYSE CHEST FIBRIN SUBQ DAY: CPT | Performed by: PHYSICIAN ASSISTANT

## 2019-12-10 PROCEDURE — 99291 CRITICAL CARE FIRST HOUR: CPT | Performed by: ANESTHESIOLOGY

## 2019-12-10 PROCEDURE — 85027 COMPLETE CBC AUTOMATED: CPT | Performed by: PHYSICIAN ASSISTANT

## 2019-12-10 PROCEDURE — 82805 BLOOD GASES W/O2 SATURATION: CPT | Performed by: PHYSICIAN ASSISTANT

## 2019-12-10 PROCEDURE — 80162 ASSAY OF DIGOXIN TOTAL: CPT | Performed by: NURSE PRACTITIONER

## 2019-12-10 PROCEDURE — 84145 PROCALCITONIN (PCT): CPT | Performed by: PHYSICIAN ASSISTANT

## 2019-12-10 RX ORDER — MIDODRINE HYDROCHLORIDE 5 MG/1
15 TABLET ORAL
Status: DISCONTINUED | OUTPATIENT
Start: 2019-12-10 | End: 2019-12-11

## 2019-12-10 RX ORDER — ALBUMIN (HUMAN) 12.5 G/50ML
25 SOLUTION INTRAVENOUS EVERY 6 HOURS
Status: COMPLETED | OUTPATIENT
Start: 2019-12-10 | End: 2019-12-11

## 2019-12-10 RX ORDER — DIGOXIN 125 MCG
62.5 TABLET ORAL DAILY
Status: DISCONTINUED | OUTPATIENT
Start: 2019-12-11 | End: 2019-12-11 | Stop reason: HOSPADM

## 2019-12-10 RX ADMIN — HYDROCORTISONE SODIUM SUCCINATE 100 MG: 100 INJECTION, POWDER, FOR SOLUTION INTRAMUSCULAR; INTRAVENOUS at 05:01

## 2019-12-10 RX ADMIN — VASOPRESSIN 0.04 UNITS/MIN: 20 INJECTION INTRAVENOUS at 00:41

## 2019-12-10 RX ADMIN — MIDODRINE HYDROCHLORIDE 15 MG: 5 TABLET ORAL at 17:48

## 2019-12-10 RX ADMIN — MELATONIN 3 MG: 3 TAB ORAL at 22:10

## 2019-12-10 RX ADMIN — ALBUMIN (HUMAN) 25 G: 12.5 SOLUTION INTRAVENOUS at 17:52

## 2019-12-10 RX ADMIN — HYDROCORTISONE SODIUM SUCCINATE 100 MG: 100 INJECTION, POWDER, FOR SOLUTION INTRAMUSCULAR; INTRAVENOUS at 22:10

## 2019-12-10 RX ADMIN — HYDROCORTISONE SODIUM SUCCINATE 100 MG: 100 INJECTION, POWDER, FOR SOLUTION INTRAMUSCULAR; INTRAVENOUS at 13:33

## 2019-12-10 RX ADMIN — DIGOXIN 125 MCG: 0.25 INJECTION INTRAMUSCULAR; INTRAVENOUS at 08:02

## 2019-12-10 RX ADMIN — MIDODRINE HYDROCHLORIDE 10 MG: 5 TABLET ORAL at 07:59

## 2019-12-10 RX ADMIN — WATER 5 MG: 1 INJECTION INTRAMUSCULAR; INTRAVENOUS; SUBCUTANEOUS at 12:11

## 2019-12-10 RX ADMIN — CHLORHEXIDINE GLUCONATE 0.12% ORAL RINSE 15 ML: 1.2 LIQUID ORAL at 08:00

## 2019-12-10 RX ADMIN — CEFAZOLIN SODIUM 2000 MG: 2 SOLUTION INTRAVENOUS at 03:46

## 2019-12-10 RX ADMIN — SENNOSIDES AND DOCUSATE SODIUM 1 TABLET: 8.6; 5 TABLET ORAL at 22:10

## 2019-12-10 RX ADMIN — CLINDAMYCIN PHOSPHATE 900 MG: 900 INJECTION, SOLUTION INTRAVENOUS at 21:14

## 2019-12-10 RX ADMIN — CEFAZOLIN SODIUM 2000 MG: 2 SOLUTION INTRAVENOUS at 11:39

## 2019-12-10 RX ADMIN — WATER 10 MG: 1 INJECTION INTRAMUSCULAR; INTRAVENOUS; SUBCUTANEOUS at 12:10

## 2019-12-10 RX ADMIN — NYSTATIN 500000 UNITS: 100000 SUSPENSION ORAL at 17:49

## 2019-12-10 RX ADMIN — ZOLPIDEM TARTRATE 5 MG: 5 TABLET, FILM COATED ORAL at 22:10

## 2019-12-10 RX ADMIN — ALBUMIN (HUMAN) 25 G: 12.5 SOLUTION INTRAVENOUS at 22:10

## 2019-12-10 RX ADMIN — ALBUMIN (HUMAN) 25 G: 12.5 SOLUTION INTRAVENOUS at 10:46

## 2019-12-10 RX ADMIN — TAMSULOSIN HYDROCHLORIDE 0.4 MG: 0.4 CAPSULE ORAL at 17:48

## 2019-12-10 RX ADMIN — NYSTATIN 500000 UNITS: 100000 SUSPENSION ORAL at 11:38

## 2019-12-10 RX ADMIN — HEPARIN SODIUM 2000 UNITS: 1000 INJECTION INTRAVENOUS; SUBCUTANEOUS at 21:34

## 2019-12-10 RX ADMIN — CLINDAMYCIN PHOSPHATE 900 MG: 900 INJECTION, SOLUTION INTRAVENOUS at 04:38

## 2019-12-10 RX ADMIN — MIDODRINE HYDROCHLORIDE 15 MG: 5 TABLET ORAL at 11:38

## 2019-12-10 RX ADMIN — NYSTATIN 500000 UNITS: 100000 SUSPENSION ORAL at 22:27

## 2019-12-10 RX ADMIN — CEFAZOLIN SODIUM 2000 MG: 2 SOLUTION INTRAVENOUS at 20:20

## 2019-12-10 RX ADMIN — NYSTATIN 500000 UNITS: 100000 SUSPENSION ORAL at 08:00

## 2019-12-10 RX ADMIN — CLINDAMYCIN PHOSPHATE 900 MG: 900 INJECTION, SOLUTION INTRAVENOUS at 13:25

## 2019-12-10 RX ADMIN — HEPARIN SODIUM AND DEXTROSE 15.8 UNITS/KG/HR: 10000; 5 INJECTION INTRAVENOUS at 02:38

## 2019-12-10 RX ADMIN — VASOPRESSIN 0.04 UNITS/MIN: 20 INJECTION INTRAVENOUS at 10:34

## 2019-12-10 NOTE — ASSESSMENT & PLAN NOTE
-s/p thoracentesis, right tube thoracostomy    Persistent effusion, continue drain through chest tube with daily tPA/DNA-ase  -tolerating conventional nasal cannula, supplemental O2 to maintain saturations greater than 92%  -pulmonary toilet  -respiratory protocol

## 2019-12-10 NOTE — ASSESSMENT & PLAN NOTE
- likely secondary to empyema, pneumonia present on admission, possible UTI  - follow up culture growth  - abx deescalated to Ancef and clinda  - initially was set to complete clindamycin yesterday, however due to decompensation, id suggested resuming  -monitor hemodynamics closely, pressor requirements increasing overnight  Added vasopressin and stress dose steroids  - tPA with Pulmozyme to right chest tube with improvement of drainage    Continue daily

## 2019-12-10 NOTE — QUICK NOTE
I instilled 10 mg tPA and 5 mg Pulmozyme into patient's chest tube  Patient tolerated procedure well  Chest tube clamped proximally and will remain clamped for 6 hours  Will be unclamped at 1800  Nursing aware of order for clamp time and unclamp time      Tyler Farfan PA-C

## 2019-12-10 NOTE — ASSESSMENT & PLAN NOTE
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

## 2019-12-10 NOTE — PROGRESS NOTES
Progress Note - Infectious Disease   Anita Arnold 82 y o  male MRN: [id number]  Unit/Bed#:  Encounter: [id number]      Impression/Plan:    60-year-old male patient admitted for septic shock secondary to severe group A strep pneumonia and right-sided empyema     1- Septic shock:  Hypotension, tachycardia and severe leukocytosis on admission, associated with  lactic acidosis and acute kidney injury  Septic shock is secondary to severe GAS pneumonia and empyema  Patient still on vasopressin this morning  He has right-sided chest tube in place; repeat CT scan chest showing decreased right-sided pleural effusion  Patient remains afebrile, WBC slowly decreasing, WBC count today 28,000  - supportive care as per ICU team  - antibiotics as below  - repeat CBC and procalcitonin   - pulmonary and CT surgery follow-up; patient is planned for 3 days of tPA through chest tube; patient is planned for repeat CT scan chest tomorrow  if not improving, possibly needs VATS     2- severe GAS pneumonia complicated by empyema:  Two weeks of cough and shortness of breath  Large right-sided pleural effusion seen on chest x-ray on admission  WBC count [id number]  Thoracentesis was diagnostic of empyema; 1200 mL of purulent fluid was removed; fluid WBC count 71,000, LDH more than 10,000, pH 6 3, glucose 5 mg/dL   Fluid culture group A strep  Patient has a chest tube inserted 12/04/2019  His leukocytosis is trending down   He reports feeling better this morning   But he remains on vasopressin and he still have significant leukocytosis of 28,000     -  continue cefazolin 2 g IV q 8h and clindamycin 900 mg IV q 8h  - repeat CBC in a m , follow-up procalcitonin level ordered for today  - close clinical monitoring  -  chest tube management as per pulmonary team  - will need to monitor closely chest tube output, respiratory status, repeat CT chest; if no improvement, patient will need VATS;   Once patient is weaned off pressors, will stop clindamycin continue cefazolin     3- atrial fibrillation:  Tachycardia has resolved  - continue supportive care  - rate control and anticoagulation per ICU team     4- acute kidney injury  Creatinine significantly improved, creatinine today 1 16 mg/dL  - IV antibiotics doses mentioned above have been adjusted to creatinine clearance  - repeat BMP, avoid nephrotoxic medications        5- acute respiratory failure:  Secondary to problem #1 and 2;  ARF improved, patient is now on oxygen supplementation through nasal cannula  - continue antibiotics as above  - supportive care as per ICU team     Plan mentioned above discussed with patient  Case discussed with ICU team     Antibiotics:  Cefazolin day 7  Clindamycin day 7    Subjective:  Patient has no fever, chills, sweats; no nausea, vomiting, diarrhea; no cough, shortness of breath; no pain  No new symptoms  Objective:  Vitals:  Temp:  [96 8 °F (36 °C)-97 8 °F (36 6 °C)] 97 8 °F (36 6 °C)  HR:  [] 71  Resp:  [16-44] 27  BP: ()/(55-77) 117/61  SpO2:  [90 %-95 %] 94 %  Temp (24hrs), Av 4 °F (36 3 °C), Min:96 8 °F (36 °C), Max:97 8 °F (36 6 °C)  Current: Temperature: 97 8 °F (36 6 °C)    Physical Exam:   General Appearance:  Alert, interactive, nontoxic, no acute distress  Throat: Oropharynx moist without lesions  Lungs:   Clear to auscultation bilaterally; no wheezes, rhonchi or rales; respirations unlabored  Right-sided Chest tube in place, output of 340 mL in past 24 hours   Heart:  RRR; no murmur, rub or gallop   Abdomen:   Soft, non-tender, non-distended, positive bowel sounds  Extremities: No clubbing, cyanosis or edema   Skin: No new rashes or lesions  No draining wounds noted         Labs, Imaging, & Other studies:   All pertinent labs and imaging studies were personally reviewed  Results from last 7 days   Lab Units 12/10/19  0507 19  1922 19  0433  19  0532   WBC Thousand/uL 28 31*  --  32 39*  --  27 97* HEMOGLOBIN g/dL 14 5 15 2 15 8   < > 15 7   PLATELETS Thousands/uL 210  --  206  --  226    < > = values in this interval not displayed  Results from last 7 days   Lab Units 12/10/19  0507 12/09/19  0433 12/08/19  2146 12/08/19  0532 12/07/19  0535  12/03/19  1653   SODIUM mmol/L 130* 127* 130* 132* 133*   < >  --    POTASSIUM mmol/L 4 8 4 9 3 7 3 0* 3 0*   < >  --    CHLORIDE mmol/L 97* 98* 97* 97* 98*   < >  --    CO2 mmol/L 25 22 24 24 23   < >  --    CO2, I-STAT mmol/L  --   --   --   --   --   --  12*   BUN mg/dL 21 15 19 23 34*   < >  --    CREATININE mg/dL 1 16 1 07 1 04 1 09 1 30   < >  --    EGFR ml/min/1 73sq m 58 64 67 63 51   < >  --    GLUCOSE, ISTAT mg/dl  --   --   --   --   --   --  119   CALCIUM mg/dL 7 5* 7 2* 7 0* 6 9* 7 2*   < >  --    AST U/L  --  34  --  35 62*   < >  --    ALT U/L  --  7*  --  12 24   < >  --    ALK PHOS U/L  --  203*  --  194* 180*   < >  --     < > = values in this interval not displayed  Results from last 7 days   Lab Units 12/04/19  1931 12/04/19  1154 12/03/19  2126 12/03/19  1823 12/03/19  1646 12/03/19  1640   BLOOD CULTURE   --   --   --   --  No Growth After 5 Days  No Growth After 5 Days     SPUTUM CULTURE  2+ Growth of Staphylococcus aureus*  3+ Growth of Beta Hemolytic Streptococcus Group A*  --   --   --   --   --    GRAM STAIN RESULT  2+ Epithelial cells per low power field*  1+ Polys*  2+ Gram positive cocci in pairs, chains and clusters*  1+ Gram positive rods*  1+ Gram negative rods*  --   --  3+ Polys*  4+ Gram positive cocci in chains*  --   --    BODY FLUID CULTURE, STERILE   --   --   --  4+ Growth of Beta Hemolytic Streptococcus Group A*  --   --    MRSA CULTURE ONLY   --  No Methicillin Resistant Staphlyococcus aureus (MRSA) isolated  --   --   --   --    LEGIONELLA URINARY ANTIGEN   --   --  Negative  --   --   --      Results from last 7 days   Lab Units 12/04/19  0435 12/03/19  2348   PROCALCITONIN ng/ml 6 55* 7 02*

## 2019-12-10 NOTE — PLAN OF CARE
Problem: Prexisting or High Potential for Compromised Skin Integrity  Goal: Skin integrity is maintained or improved  Description  INTERVENTIONS:  - Identify patients at risk for skin breakdown  - Assess and monitor skin integrity  - Assess and monitor nutrition and hydration status  - Monitor labs   - Assess for incontinence   - Turn and reposition patient  - Assist with mobility/ambulation  - Relieve pressure over bony prominences  - Avoid friction and shearing  - Provide appropriate hygiene as needed including keeping skin clean and dry  - Evaluate need for skin moisturizer/barrier cream  - Collaborate with interdisciplinary team   - Patient/family teaching  - Consider wound care consult   Outcome: Progressing     Problem: Potential for Falls  Goal: Patient will remain free of falls  Description  INTERVENTIONS:  - Assess patient frequently for physical needs  -  Identify cognitive and physical deficits and behaviors that affect risk of falls    -  [place] fall precautions as indicated by assessment   - Educate patient/family on patient safety including physical limitations  - Instruct patient to call for assistance with activity based on assessment  - Modify environment to reduce risk of injury  - Consider OT/PT consult to assist with strengthening/mobility  Outcome: Progressing     Problem: CARDIOVASCULAR - ADULT  Goal: Maintains optimal cardiac output and hemodynamic stability  Description  INTERVENTIONS:  - Monitor I/O, vital signs and rhythm  - Monitor for S/S and trends of decreased cardiac output  - Administer and titrate ordered vasoactive medications to optimize hemodynamic stability  - Assess quality of pulses, skin color and temperature  - Assess for signs of decreased coronary artery perfusion  - Instruct patient to report change in severity of symptoms  Outcome: Progressing  Goal: Absence of cardiac dysrhythmias or at baseline rhythm  Description  INTERVENTIONS:  - Continuous cardiac monitoring, vital signs, obtain 12 lead EKG if ordered  - Administer antiarrhythmic and heart rate control medications as ordered  - Monitor electrolytes and administer replacement therapy as ordered  Outcome: Progressing     Problem: GENITOURINARY - ADULT  Goal: Maintains or returns to baseline urinary function  Description  INTERVENTIONS:  - Assess urinary function  - Encourage oral fluids to ensure adequate hydration if ordered  - Administer IV fluids as ordered to ensure adequate hydration  - Administer ordered medications as needed  - Offer frequent toileting  - Follow urinary retention protocol if ordered  Outcome: Progressing  Goal: Absence of urinary retention  Description  INTERVENTIONS:  - Assess patients ability to void and empty bladder  - Monitor I/O  - Bladder scan as needed  - Discuss with physician/AP medications to alleviate retention as needed  - Discuss catheterization for long term situations as appropriate  Outcome: Progressing  Goal: Urinary catheter remains patent  Description  INTERVENTIONS:  - Assess patency of urinary catheter  - If patient has a chronic le, consider changing catheter if non-functioning  - Follow guidelines for intermittent irrigation of non-functioning urinary catheter  Outcome: Progressing     Problem: Nutrition/Hydration-ADULT  Goal: Nutrient/Hydration intake appropriate for improving, restoring or maintaining nutritional needs  Description  Monitor and assess patient's nutrition/hydration status for malnutrition  Collaborate with interdisciplinary team and initiate plan and interventions as ordered  Monitor patient's weight and dietary intake as ordered or per policy  Determine patient's food preferences and provide high-protein, high-caloric foods as appropriate       INTERVENTIONS:  - Monitor oral intake, urinary output, labs, and treatment plans  - Assess nutrition and hydration status and recommend course of action  - Evaluate amount of meals eaten  - Assist patient with eating if necessary   - Allow adequate time for meals  - Recommend/ encourage appropriate diets, oral nutritional supplements, and vitamin/mineral supplements  - Order, calculate, and assess calorie counts as needed  - Recommend, monitor, and adjust tube feedings based on assessed needs  - Assess need for intravenous fluids  - Provide nutrition/hydration education as appropriate  - Include patient/family/caregiver in decisions related to nutrition    Outcome: Progressing     Problem: RESPIRATORY - ADULT  Goal: Achieves optimal ventilation and oxygenation  Description  INTERVENTIONS:  - Assess for changes in respiratory status  - Assess for changes in mentation and behavior  - Position to facilitate oxygenation and minimize respiratory effort  - Oxygen administered by appropriate delivery if ordered  - Initiate smoking cessation education as indicated  - Encourage broncho-pulmonary hygiene including cough, deep breathe, Incentive Spirometry  - Assess the need for suctioning and aspirate as needed  - Assess and instruct to report SOB or any respiratory difficulty  - Respiratory Therapy support as indicated  Outcome: Progressing     Problem: HEMATOLOGIC - ADULT  Goal: Maintains hematologic stability  Description  INTERVENTIONS  - Assess for signs and symptoms of bleeding or hemorrhage  - Monitor labs  - Administer supportive blood products/factors as ordered and appropriate  Outcome: Progressing     Problem: PAIN - ADULT  Goal: Verbalizes/displays adequate comfort level or baseline comfort level  Description  Interventions:  - Encourage patient to monitor pain and request assistance  - Assess pain using appropriate pain scale  - Administer analgesics based on type and severity of pain and evaluate response  - Implement non-pharmacological measures as appropriate and evaluate response  - Consider cultural and social influences on pain and pain management  - Notify physician/advanced practitioner if interventions unsuccessful or patient reports new pain  Outcome: Progressing     Problem: INFECTION - ADULT  Goal: Absence or prevention of progression during hospitalization  Description  INTERVENTIONS:  - Assess and monitor for signs and symptoms of infection  - Monitor lab/diagnostic results  - Monitor all insertion sites, i e  indwelling lines, tubes, and drains  - Monitor endotracheal if appropriate and nasal secretions for changes in amount and color  - [place] appropriate cooling/warming therapies per order  - Administer medications as ordered  - Instruct and encourage patient and family to use good hand hygiene technique  - Identify and instruct in appropriate isolation precautions for identified infection/condition  Outcome: Progressing     Problem: SAFETY ADULT  Goal: Maintain or return mobility status to optimal level  Description  INTERVENTIONS:  - Assess patient's baseline mobility status (ambulation, transfers, stairs, etc )    - Identify cognitive and physical deficits and behaviors that affect mobility  - Identify mobility aids required to assist with transfers and/or ambulation (gait belt, sit-to-stand, lift, walker, cane, etc )  - [place] fall precautions as indicated by assessment  - Record patient progress and toleration of activity level on Mobility SBAR; progress patient to next Phase/Stage  - Instruct patient to call for assistance with activity based on assessment  - Consider rehabilitation consult to assist with strengthening/weightbearing, etc   Outcome: Progressing

## 2019-12-10 NOTE — PROGRESS NOTES
Progress Note - Dianne Guerrero 1935, 82 y o  male MRN: [id number]    Unit/Bed#:  Encounter: [id number]    Primary Care Provider: Bety Villalobos MD   Date and time admitted to hospital: 12/3/2019  4:08 PM        Moderate protein-calorie malnutrition ([facility] [address] )  Assessment & Plan  - Appreciate nutrition recommendations  - continue diet  -encourage p o  Intake    Transaminitis  Assessment & Plan  -improving  - suspect sequelae of shock liver  -RUQ US-no acute cholecystitis, hepatomegaly      Chronic atrial fibrillation  Assessment & Plan  - takes eliquis as outpatient, held now given need for procedures  - heparin gtt   -transition back once taking p o   -patient loaded with digoxin, continue daily    Hyponatremia  Assessment & Plan  - baseline Na is in the 130s   - patient with low urine sodium showing likely absolute Salt deficiency    [facility] )  Assessment & Plan  - likely secondary to empyema, pneumonia present on admission, possible UTI  - follow up culture growth  - abx deescalated to Ancef and clinda  - initially was set to complete clindamycin yesterday, however due to decompensation, id suggested resuming  -monitor hemodynamics closely, pressor requirements increasing overnight  Added vasopressin and stress dose steroids  - tPA with Pulmozyme to right chest tube with improvement of drainage  Continue daily    Pleural effusion on right  Assessment & Plan  - s/p bedside ED thoracentesis (12/3)  - s/p R CT placement (12/4)  - pleural fluid cx w GAS  - CT scan with dense collection, tPA/Pulmozyme improving drainage  Chest tube back to suction  - thoracic surgery aware and following      * Acute respiratory failure with hypoxia ([facility] [address] )  Assessment & Plan  -s/p thoracentesis, right tube thoracostomy    Persistent effusion, continue drain through chest tube with daily tPA/DNA-ase  -tolerating conventional nasal cannula, supplemental O2 to maintain saturations greater than 92%  -pulmonary toilet  -respiratory protocol       SHREYA (acute kidney injury) (HCC)resolved as of 12/10/2019  Assessment & Plan  - baseline Cr around 1 1 - 1 3  -continue to monitor BUN, creatinine, urinary output  - likely 2/2 dehydration / sepsis      ----------------------------------------------------------------------------------------  HPI/24hr events:  No acute events  Rate much better controlled after digoxin load  Able to come off of Levophed to vasopressin  Much less significant drainage from the chest tube following tPA, only 250 cc  Chest x-ray looks approximately the same  Disposition: Continue Critical Care   Code Status: Level 1 - Full Code  ---------------------------------------------------------------------------------------  SUBJECTIVE  Patient has no complaints  He is eating and drinking without difficulty  He has no pain  Review of Systems  Review of systems was reviewed and negative unless stated above in HPI/24-hour events   ---------------------------------------------------------------------------------------  OBJECTIVE    Physical Exam   Constitutional: He is oriented to person, place, and time  Elderly male appears approximately stated age  HENT:   Head: Normocephalic and atraumatic  Eyes: Pupils are equal, round, and reactive to light  EOM are normal    Neck: Neck supple  No JVD present  Cardiovascular: Normal rate, regular rhythm and normal heart sounds  No murmur heard  Pulmonary/Chest: Effort normal and breath sounds normal  No respiratory distress  Chest tube with sanguinous drainage, no air leak or tidaling   Abdominal: Soft  Bowel sounds are normal  He exhibits no distension  There is no tenderness  Genitourinary:   Genitourinary Comments: Deferred   Musculoskeletal: Normal range of motion  He exhibits edema (Trace bilateral pedal)  Neurological: He is alert and oriented to person, place, and time  Skin: Skin is warm and dry   Capillary refill takes less than 2 seconds  Nursing note and vitals reviewed  Vitals   Vitals:    12/10/19 0200 12/10/19 0300 12/10/19 0500 12/10/19 0600   BP: 141/77 124/60 118/66 115/63   Pulse: 72 70 68 73   Resp: 18 16 18 16   Temp: 97 5 °F (36 4 °C)      TempSrc: Oral      SpO2: 94% 91% 92% 92%   Weight:    95 9 kg (211 lb 6 7 oz)   Height:         Temp (24hrs), Av 3 °F (36 3 °C), Min:96 8 °F (36 °C), Max:97 5 °F (36 4 °C)  Current: Temperature: 97 5 °F (36 4 °C)  Arterial Line BP: 119/61  Arterial Line MAP (mmHg): 83 mmHg    Invasive/non-invasive ventilation settings   Respiratory    Lab Data (Last 4 hours)    None         O2/Vent Data (Last 4 hours)    None                Height and Weights   Height: 5' 9" (175 3 cm)  IBW: 70 7 kg  Body mass index is 31 22 kg/m²  Weight (last 2 days)     Date/Time   Weight    12/10/19 0600   95 9 (211 42)    19 0532   96 9 (213 63)                Intake and Output  I/O       701 -  0700  07 - 12/10 0700    I V  (mL/kg) 1043 3 (10 8) 573 (6)    IV Piggyback 550 80    Total Intake(mL/kg) 1593 3 (16 4) 653 (6 8)    Urine (mL/kg/hr) 1239 (0 5) 1175 (0 5)    Stool 0 0    Chest Tube 1000 340    Total Output 2239 1515    Net -645 7 -862          Unmeasured Stool Occurrence 2 x 2 x          Nutrition       Diet Orders   (From admission, onward)             Start     Ordered    19 1321  Dietary nutrition supplements  Once     Question Answer Comment   Select Supplement: Ensure Compact-Vanilla    Frequency Breakfast, Dinner        19 1320    19 0446  Diet Regular; Regular House  Diet effective now     Question Answer Comment   Diet Type Regular    Regular Regular House    RD to adjust diet per protocol?  Yes        19 0446                Laboratory and Diagnostics:  Results from last 7 days   Lab Units 12/10/19  0507 19  1922 19  0433 19  0000 19  0532 19  0535 19  0540 19  0508 19  0435  19  1640   WBC Thousand/uL 28 31*  --  32 39*  --  27 97* 29 02* 30 29* 32 39* 49 19*  --  57 70*   HEMOGLOBIN g/dL 14 5 15 2 15 8 15 9 15 7 16 0 16 0 15 3 16 2  --  17 8*   I STAT HEMOGLOBIN   --   --   --   --   --   --   --   --   --    < >  --    HEMATOCRIT % 42 4 43 8 47 0 46 2 45 6 46 7 46 3 44 7 47 4  --  54 9*   HEMATOCRIT, ISTAT   --   --   --   --   --   --   --   --   --    < >  --    PLATELETS Thousands/uL 210  --  206  --  226 231 254 256 300  --  342   BANDS PCT % 3  --  4  --  4  --   --  5 12*  --  28*   MONO PCT % 3*  --  5  --  2* 5  --  4 5  --  4    < > = values in this interval not displayed       Results from last 7 days   Lab Units 12/10/19  0507 12/09/19  0433 12/08/19  2146 12/08/19  0532 12/07/19  0535 12/06/19  0540 12/05/19  0508 12/04/19  0435 12/03/19  2348   SODIUM mmol/L 130* 127* 130* 132* 133* 132* 130* 130* 128*   POTASSIUM mmol/L 4 8 4 9 3 7 3 0* 3 0* 3 4* 2 8* 4 0 3 9   CHLORIDE mmol/L 97* 98* 97* 97* 98* 100 97* 98* 96*   CO2 mmol/L 25 22 24 24 23 19* 16* 14* 13*   ANION GAP mmol/L 8 7 9 11 12 13 17* 18* 19*   BUN mg/dL 21 15 19 23 34* 46* 60* 59* 59*   CREATININE mg/dL 1 16 1 07 1 04 1 09 1 30 1 68* 2 11* 3 02* 3 26*   CALCIUM mg/dL 7 5* 7 2* 7 0* 6 9* 7 2* 7 1* 7 5* 8 2* 7 7*   GLUCOSE RANDOM mg/dL 171* 158* 124 114 109 105 89 87 107   ALT U/L  --  7*  --  12 24 52 27 11* 15   AST U/L  --  34  --  35 62* 161* 78* 19 22   ALK PHOS U/L  --  203*  --  194* 180* 141* 117* 131* 153*   ALBUMIN g/dL  --  1 4*  --  1 4* 1 5* 1 5* 1 5* 1 8* 1 8*   TOTAL BILIRUBIN mg/dL  --  2 90*  --  2 90* 3 30* 3 60* 2 80* 2 50* 2 70*     Results from last 7 days   Lab Units 12/10/19  0507 12/09/19  0433 12/08/19  2146 12/08/19  0532 12/07/19  0535 12/05/19  0508 12/04/19  0435 12/03/19  2348   MAGNESIUM mg/dL 2 0 2 2 1 8 2 0 2 4 2 8* 1 9  --    PHOSPHORUS mg/dL 4 0 3 1  --  2 8 2 8  --  4 4* 4 9*      Results from last 7 days   Lab Units 12/10/19  0507 12/09/19  0433 12/08/19  1826 12/08/19  1241 12/08/19  0532 12/07/19  0535 12/06/19  0809  12/03/19  1640   INR   --   --   --   --   --  1 58*  --   --  2 09*   PTT seconds 132* 84* 85* 81* 101* 80* 63*   < > 41*    < > = values in this interval not displayed  Results from last 7 days   Lab Units 12/03/19  1640   TROPONIN I ng/mL <0 02     Results from last 7 days   Lab Units 12/09/19  0000 12/05/19  0508 12/04/19  0435 12/04/19  0209 12/03/19  2348 12/03/19  1952 12/03/19  1640   LACTIC ACID mmol/L 1 5 1 7 3 4* 3 8* 3 9* 4 6* 6 7*     ABG:  Results from last 7 days   Lab Units 12/04/19  0550   PH ART  7 361   PCO2 ART mm Hg 19 9*   PO2 ART mm Hg 93 1   HCO3 ART mmol/L 11 0*   BASE EXC ART mmol/L -11 6   ABG SOURCE  Line, Arterial     VBG:  Results from last 7 days   Lab Units 12/10/19  0507  12/04/19  0550   PH OLIMPIA  7 426*   < >  --    PCO2 OLIMPIA mm Hg 39 3*   < >  --    PO2 OLIMPIA mm Hg 41 7   < >  --    HCO3 OLIMPIA mmol/L 25 3   < >  --    BASE EXC OLIMPIA mmol/L 1 0   < >  --    ABG SOURCE   --   --  Line, Arterial    < > = values in this interval not displayed  Results from last 7 days   Lab Units 12/04/19  0435 12/03/19  2348   PROCALCITONIN ng/ml 6 55* 7 02*       Micro  Results from last 7 days   Lab Units 12/04/19  1931 12/04/19  1154 12/03/19  2126 12/03/19  1823 12/03/19  1646 12/03/19  1640   BLOOD CULTURE   --   --   --   --  No Growth After 5 Days  No Growth After 5 Days     SPUTUM CULTURE  2+ Growth of Staphylococcus aureus*  3+ Growth of Beta Hemolytic Streptococcus Group A*  --   --   --   --   --    GRAM STAIN RESULT  2+ Epithelial cells per low power field*  1+ Polys*  2+ Gram positive cocci in pairs, chains and clusters*  1+ Gram positive rods*  1+ Gram negative rods*  --   --  3+ Polys*  4+ Gram positive cocci in chains*  --   --    BODY FLUID CULTURE, STERILE   --   --   --  4+ Growth of Beta Hemolytic Streptococcus Group A*  --   --    MRSA CULTURE ONLY   --  No Methicillin Resistant Staphlyococcus aureus (MRSA) isolated  --   --   --   -- LEGIONELLA URINARY ANTIGEN   --   --  Negative  --   --   --    STREP PNEUMONIAE ANTIGEN, URINE   --   --  Negative  --   --   --        Imaging: I have personally reviewed pertinent films in PACS    Active Medications  Scheduled Meds:  Current Facility-Administered Medications:  cefazolin 2,000 mg Intravenous Q8H Lawrence Calderón MD Last Rate: 2,000 mg (12/10/19 0346)   chlorhexidine 15 mL Swish & Spit Q12H [address] Abraham Christopher PA-C    clindamycin 900 mg Intravenous Q8H Sandra Plasencia MD Last Rate: Stopped (12/10/19 0525)   digoxin 125 mcg Intravenous Daily [facility], NP    heparin (porcine) 3-20 Units/kg/hr (Order-Specific) Intravenous Titrated Creston Nickel, CRNP Last Rate: 12 8 Units/kg/hr (12/10/19 0650)   heparin (porcine) 2,000 Units Intravenous PRN Creston Nickel, CRNP    heparin (porcine) 4,000 Units Intravenous PRN Creston Nickel, CRNP    hydrocortisone sodium succinate 100 mg Intravenous Q8H [address] ANNA MARIE Ewing    melatonin 3 mg Oral HS ANNA MARIE Etienne    midodrine 10 mg Oral TID AC Abraham Christopher PA-C    norepinephrine 1-30 mcg/min Intravenous Titrated Creston Nickel, CRNP Last Rate: Stopped (12/10/19 0203)   nystatin 500,000 Units Swish & Swallow 4x Daily ANNA MARIE Etienne    senna-docusate sodium 1 tablet Oral HS Abraham Christopher PA-C    tamsulosin 0 4 mg Oral Daily With [employer] gonzales    vasopressin (PITRESSIN) in 0 9 % sodium chloride 100 mL 0 04 Units/min Intravenous Continuous [facility], CRNP Last Rate: 0 04 Units/min (12/10/19 0109)   zolpidem 5 mg Oral HS PRN [facility], CRNP      Continuous Infusions:    heparin (porcine) 3-20 Units/kg/hr (Order-Specific) Last Rate: 12 8 Units/kg/hr (12/10/19 0650)   norepinephrine 1-30 mcg/min Last Rate: Stopped (12/10/19 0203)   vasopressin (PITRESSIN) in 0 9 % sodium chloride 100 mL 0 04 Units/min Last Rate: 0 04 Units/min (12/10/19 0109)     PRN Meds:     heparin (porcine) 2,000 Units PRN   heparin (porcine) 4,000 Units PRN   zolpidem 5 mg HS PRN       ---------------------------------------------------------------------------------------  Advance Directive and Living Will:      Power of :    POLST:    ---------------------------------------------------------------------------------------  Counseling / Coordination of Care  Total Critical Care time spent 0 minutes excluding procedures, teaching and family updates  Ariadna Breaux PA-C        Portions of the record may have been created with voice recognition software  Occasional wrong word or "sound a like" substitutions may have occurred due to the inherent limitations of voice recognition software    Read the chart carefully and recognize, using context, where substitutions have occurred

## 2019-12-10 NOTE — ASSESSMENT & PLAN NOTE
- s/p bedside ED thoracentesis (12/3)  - s/p R CT placement (12/4)  - pleural fluid cx w GAS  - CT scan with dense collection, tPA/Pulmozyme improving drainage    Chest tube back to suction  - thoracic surgery aware and following

## 2019-12-10 NOTE — ASSESSMENT & PLAN NOTE
- takes eliquis as outpatient, held now given need for procedures  - heparin gtt   -transition back once taking p o   -patient loaded with digoxin, continue daily

## 2019-12-11 ENCOUNTER — HOSPITAL ENCOUNTER (INPATIENT)
Facility: HOSPITAL | Age: 84
LOS: 5 days | Discharge: HOME | DRG: 853 | End: 2019-12-16
Attending: INTERNAL MEDICINE | Admitting: THORACIC SURGERY (CARDIOTHORACIC VASCULAR SURGERY)
Payer: COMMERCIAL

## 2019-12-11 ENCOUNTER — ANESTHESIA EVENT (INPATIENT)
Dept: PERIOP | Facility: HOSPITAL | Age: 84
DRG: 853 | End: 2019-12-11
Payer: COMMERCIAL

## 2019-12-11 ENCOUNTER — APPOINTMENT (INPATIENT)
Dept: RADIOLOGY | Facility: HOSPITAL | Age: 84
DRG: 871 | End: 2019-12-11
Payer: COMMERCIAL

## 2019-12-11 ENCOUNTER — APPOINTMENT (INPATIENT)
Dept: CT IMAGING | Facility: HOSPITAL | Age: 84
DRG: 871 | End: 2019-12-11
Payer: COMMERCIAL

## 2019-12-11 VITALS
BODY MASS INDEX: 31.71 KG/M2 | HEIGHT: 69 IN | HEART RATE: 92 BPM | TEMPERATURE: 98.1 F | OXYGEN SATURATION: 93 % | SYSTOLIC BLOOD PRESSURE: 126 MMHG | DIASTOLIC BLOOD PRESSURE: 67 MMHG | RESPIRATION RATE: 20 BRPM | WEIGHT: 214.07 LBS

## 2019-12-11 DIAGNOSIS — J90 PLEURAL EFFUSION: ICD-10-CM

## 2019-12-11 DIAGNOSIS — J86.9 EMPYEMA (HCC): ICD-10-CM

## 2019-12-11 DIAGNOSIS — J90 PLEURAL EFFUSION ON RIGHT: Primary | ICD-10-CM

## 2019-12-11 PROBLEM — I42.9 CARDIOMYOPATHY (HCC): Status: ACTIVE | Noted: 2019-12-11

## 2019-12-11 LAB
ANION GAP SERPL CALCULATED.3IONS-SCNC: 8 MMOL/L (ref 4–13)
APTT PPP: 47 SECONDS (ref 23–37)
APTT PPP: 50 SECONDS (ref 23–37)
APTT PPP: 51 SECONDS (ref 23–37)
APTT PPP: 98 SECONDS (ref 23–37)
ATRIAL RATE: 0 BPM
ATRIAL RATE: 53 BPM
BASOPHILS # BLD MANUAL: 0 THOUSAND/UL (ref 0–0.1)
BASOPHILS NFR MAR MANUAL: 0 % (ref 0–1)
BUN SERPL-MCNC: 28 MG/DL (ref 5–25)
CALCIUM SERPL-MCNC: 7.5 MG/DL (ref 8.3–10.1)
CHLORIDE SERPL-SCNC: 98 MMOL/L (ref 100–108)
CO2 SERPL-SCNC: 25 MMOL/L (ref 21–32)
CREAT SERPL-MCNC: 1.12 MG/DL (ref 0.6–1.3)
EOSINOPHIL # BLD MANUAL: 0 THOUSAND/UL (ref 0–0.4)
EOSINOPHIL NFR BLD MANUAL: 0 % (ref 0–6)
ERYTHROCYTE [DISTWIDTH] IN BLOOD BY AUTOMATED COUNT: 14.9 % (ref 11.6–15.1)
GFR SERPL CREATININE-BSD FRML MDRD: 61 ML/MIN/1.73SQ M
GLUCOSE SERPL-MCNC: 118 MG/DL (ref 65–140)
HCT VFR BLD AUTO: 40.5 % (ref 36.5–49.3)
HGB BLD-MCNC: 13.6 G/DL (ref 12–17)
LYMPHOCYTES # BLD AUTO: 1.74 THOUSAND/UL (ref 0.6–4.47)
LYMPHOCYTES # BLD AUTO: 8 % (ref 14–44)
MAGNESIUM SERPL-MCNC: 2 MG/DL (ref 1.6–2.6)
MCH RBC QN AUTO: 32.2 PG (ref 26.8–34.3)
MCHC RBC AUTO-ENTMCNC: 33.6 G/DL (ref 31.4–37.4)
MCV RBC AUTO: 96 FL (ref 82–98)
METAMYELOCYTES NFR BLD MANUAL: 3 % (ref 0–1)
MONOCYTES # BLD AUTO: 1.3 THOUSAND/UL (ref 0–1.22)
MONOCYTES NFR BLD: 6 % (ref 4–12)
MYELOCYTES NFR BLD MANUAL: 2 % (ref 0–1)
NEUTROPHILS # BLD MANUAL: 17.59 THOUSAND/UL (ref 1.85–7.62)
NEUTS SEG NFR BLD AUTO: 81 % (ref 43–75)
NRBC BLD AUTO-RTO: 0 /100 WBCS
PHOSPHATE SERPL-MCNC: 4.1 MG/DL (ref 2.3–4.1)
PLATELET # BLD AUTO: 203 THOUSANDS/UL (ref 149–390)
PLATELET BLD QL SMEAR: ADEQUATE
PMV BLD AUTO: 10 FL (ref 8.9–12.7)
POTASSIUM SERPL-SCNC: 4.1 MMOL/L (ref 3.5–5.3)
QRS AXIS: 64 DEGREES
QRS AXIS: 64 DEGREES
QRSD INTERVAL: 86 MS
QRSD INTERVAL: 88 MS
QT INTERVAL: 468 MS
QT INTERVAL: 500 MS
QTC INTERVAL: 471 MS
QTC INTERVAL: 520 MS
RBC # BLD AUTO: 4.22 MILLION/UL (ref 3.88–5.62)
SODIUM SERPL-SCNC: 131 MMOL/L (ref 136–145)
T WAVE AXIS: -64 DEGREES
T WAVE AXIS: 257 DEGREES
TOTAL CELLS COUNTED SPEC: 100
TOXIC GRANULES BLD QL SMEAR: PRESENT
VENTRICULAR RATE: 61 BPM
VENTRICULAR RATE: 65 BPM
WBC # BLD AUTO: 21.72 THOUSAND/UL (ref 4.31–10.16)

## 2019-12-11 PROCEDURE — 99238 HOSP IP/OBS DSCHRG MGMT 30/<: CPT | Performed by: NURSE PRACTITIONER

## 2019-12-11 PROCEDURE — 87040 BLOOD CULTURE FOR BACTERIA: CPT | Performed by: NURSE PRACTITIONER

## 2019-12-11 PROCEDURE — 36569 INSJ PICC 5 YR+ W/O IMAGING: CPT

## 2019-12-11 PROCEDURE — 71250 CT THORAX DX C-: CPT

## 2019-12-11 PROCEDURE — NC001 PR NO CHARGE: Performed by: ANESTHESIOLOGY

## 2019-12-11 PROCEDURE — 83735 ASSAY OF MAGNESIUM: CPT | Performed by: PHYSICIAN ASSISTANT

## 2019-12-11 PROCEDURE — 84100 ASSAY OF PHOSPHORUS: CPT | Performed by: PHYSICIAN ASSISTANT

## 2019-12-11 PROCEDURE — 85027 COMPLETE CBC AUTOMATED: CPT | Performed by: PHYSICIAN ASSISTANT

## 2019-12-11 PROCEDURE — 99233 SBSQ HOSP IP/OBS HIGH 50: CPT | Performed by: INTERNAL MEDICINE

## 2019-12-11 PROCEDURE — 85730 THROMBOPLASTIN TIME PARTIAL: CPT | Performed by: INTERNAL MEDICINE

## 2019-12-11 PROCEDURE — 93010 ELECTROCARDIOGRAM REPORT: CPT | Performed by: INTERNAL MEDICINE

## 2019-12-11 PROCEDURE — C1751 CATH, INF, PER/CENT/MIDLINE: HCPCS

## 2019-12-11 PROCEDURE — 71045 X-RAY EXAM CHEST 1 VIEW: CPT

## 2019-12-11 PROCEDURE — 99223 1ST HOSP IP/OBS HIGH 75: CPT | Performed by: INTERNAL MEDICINE

## 2019-12-11 PROCEDURE — 85730 THROMBOPLASTIN TIME PARTIAL: CPT | Performed by: ANESTHESIOLOGY

## 2019-12-11 PROCEDURE — NC001 PR NO CHARGE: Performed by: THORACIC SURGERY (CARDIOTHORACIC VASCULAR SURGERY)

## 2019-12-11 PROCEDURE — 80048 BASIC METABOLIC PNL TOTAL CA: CPT | Performed by: PHYSICIAN ASSISTANT

## 2019-12-11 PROCEDURE — 85007 BL SMEAR W/DIFF WBC COUNT: CPT | Performed by: PHYSICIAN ASSISTANT

## 2019-12-11 RX ORDER — SACCHAROMYCES BOULARDII 250 MG
250 CAPSULE ORAL 2 TIMES DAILY
Status: DISCONTINUED | OUTPATIENT
Start: 2019-12-11 | End: 2019-12-16 | Stop reason: HOSPADM

## 2019-12-11 RX ORDER — LEVOTHYROXINE SODIUM 0.05 MG/1
50 TABLET ORAL
Status: CANCELLED | OUTPATIENT
Start: 2019-12-12

## 2019-12-11 RX ORDER — LANOLIN ALCOHOL/MO/W.PET/CERES
3 CREAM (GRAM) TOPICAL
Status: CANCELLED | OUTPATIENT
Start: 2019-12-11

## 2019-12-11 RX ORDER — TAMSULOSIN HYDROCHLORIDE 0.4 MG/1
0.4 CAPSULE ORAL
Status: DISCONTINUED | OUTPATIENT
Start: 2019-12-12 | End: 2019-12-16 | Stop reason: HOSPADM

## 2019-12-11 RX ORDER — DIGOXIN 125 MCG
62.5 TABLET ORAL DAILY
Status: CANCELLED | OUTPATIENT
Start: 2019-12-12

## 2019-12-11 RX ORDER — HEPARIN SODIUM 1000 [USP'U]/ML
2000 INJECTION, SOLUTION INTRAVENOUS; SUBCUTANEOUS AS NEEDED
Status: CANCELLED | OUTPATIENT
Start: 2019-12-11

## 2019-12-11 RX ORDER — AMOXICILLIN 250 MG
1 CAPSULE ORAL
Status: DISCONTINUED | OUTPATIENT
Start: 2019-12-11 | End: 2019-12-16 | Stop reason: HOSPADM

## 2019-12-11 RX ORDER — HEPARIN SODIUM 10000 [USP'U]/100ML
3-20 INJECTION, SOLUTION INTRAVENOUS
Status: CANCELLED | OUTPATIENT
Start: 2019-12-11

## 2019-12-11 RX ORDER — CEFAZOLIN SODIUM 2 G/50ML
2000 SOLUTION INTRAVENOUS EVERY 8 HOURS
Status: CANCELLED | OUTPATIENT
Start: 2019-12-11

## 2019-12-11 RX ORDER — MIDODRINE HYDROCHLORIDE 5 MG/1
5 TABLET ORAL
Status: CANCELLED | OUTPATIENT
Start: 2019-12-11

## 2019-12-11 RX ORDER — AMOXICILLIN 250 MG
1 CAPSULE ORAL
Status: CANCELLED | OUTPATIENT
Start: 2019-12-11

## 2019-12-11 RX ORDER — HEPARIN SODIUM 1000 [USP'U]/ML
4000 INJECTION, SOLUTION INTRAVENOUS; SUBCUTANEOUS AS NEEDED
Status: DISCONTINUED | OUTPATIENT
Start: 2019-12-11 | End: 2019-12-11

## 2019-12-11 RX ORDER — CEFAZOLIN SODIUM 2 G/50ML
2000 SOLUTION INTRAVENOUS EVERY 8 HOURS
Status: DISCONTINUED | OUTPATIENT
Start: 2019-12-11 | End: 2019-12-16

## 2019-12-11 RX ORDER — LANOLIN ALCOHOL/MO/W.PET/CERES
3 CREAM (GRAM) TOPICAL
Status: DISCONTINUED | OUTPATIENT
Start: 2019-12-11 | End: 2019-12-16 | Stop reason: HOSPADM

## 2019-12-11 RX ORDER — DIGOXIN 125 MCG
62.5 TABLET ORAL DAILY
Status: DISCONTINUED | OUTPATIENT
Start: 2019-12-12 | End: 2019-12-16 | Stop reason: HOSPADM

## 2019-12-11 RX ORDER — LEVOTHYROXINE SODIUM 0.05 MG/1
50 TABLET ORAL
Status: DISCONTINUED | OUTPATIENT
Start: 2019-12-12 | End: 2019-12-16 | Stop reason: HOSPADM

## 2019-12-11 RX ORDER — HEPARIN SODIUM 1000 [USP'U]/ML
2000 INJECTION, SOLUTION INTRAVENOUS; SUBCUTANEOUS AS NEEDED
Status: DISCONTINUED | OUTPATIENT
Start: 2019-12-11 | End: 2019-12-12

## 2019-12-11 RX ORDER — HEPARIN SODIUM 1000 [USP'U]/ML
4000 INJECTION, SOLUTION INTRAVENOUS; SUBCUTANEOUS AS NEEDED
Status: CANCELLED | OUTPATIENT
Start: 2019-12-11

## 2019-12-11 RX ORDER — HEPARIN SODIUM 1000 [USP'U]/ML
4000 INJECTION, SOLUTION INTRAVENOUS; SUBCUTANEOUS AS NEEDED
Status: DISCONTINUED | OUTPATIENT
Start: 2019-12-11 | End: 2019-12-12

## 2019-12-11 RX ORDER — ZOLPIDEM TARTRATE 5 MG/1
5 TABLET ORAL
Status: DISCONTINUED | OUTPATIENT
Start: 2019-12-11 | End: 2019-12-16 | Stop reason: HOSPADM

## 2019-12-11 RX ORDER — CLINDAMYCIN PHOSPHATE 900 MG/50ML
900 INJECTION INTRAVENOUS EVERY 8 HOURS
Status: CANCELLED | OUTPATIENT
Start: 2019-12-11 | End: 2019-12-12

## 2019-12-11 RX ORDER — TAMSULOSIN HYDROCHLORIDE 0.4 MG/1
0.4 CAPSULE ORAL
Status: CANCELLED | OUTPATIENT
Start: 2019-12-11

## 2019-12-11 RX ORDER — MIDODRINE HYDROCHLORIDE 5 MG/1
5 TABLET ORAL
Status: DISCONTINUED | OUTPATIENT
Start: 2019-12-11 | End: 2019-12-11 | Stop reason: HOSPADM

## 2019-12-11 RX ORDER — MIDODRINE HYDROCHLORIDE 5 MG/1
10 TABLET ORAL
Status: DISCONTINUED | OUTPATIENT
Start: 2019-12-11 | End: 2019-12-11

## 2019-12-11 RX ORDER — HEPARIN SODIUM 1000 [USP'U]/ML
2000 INJECTION, SOLUTION INTRAVENOUS; SUBCUTANEOUS AS NEEDED
Status: DISCONTINUED | OUTPATIENT
Start: 2019-12-11 | End: 2019-12-11

## 2019-12-11 RX ORDER — LEVOTHYROXINE SODIUM 0.05 MG/1
50 TABLET ORAL
Status: DISCONTINUED | OUTPATIENT
Start: 2019-12-11 | End: 2019-12-11 | Stop reason: HOSPADM

## 2019-12-11 RX ORDER — HEPARIN SODIUM 10000 [USP'U]/100ML
3-20 INJECTION, SOLUTION INTRAVENOUS
Status: DISCONTINUED | OUTPATIENT
Start: 2019-12-11 | End: 2019-12-12

## 2019-12-11 RX ORDER — HEPARIN SODIUM 10000 [USP'U]/100ML
3-20 INJECTION, SOLUTION INTRAVENOUS
Status: DISCONTINUED | OUTPATIENT
Start: 2019-12-11 | End: 2019-12-11

## 2019-12-11 RX ORDER — MIDODRINE HYDROCHLORIDE 5 MG/1
5 TABLET ORAL
Status: COMPLETED | OUTPATIENT
Start: 2019-12-12 | End: 2019-12-12

## 2019-12-11 RX ORDER — ZOLPIDEM TARTRATE 5 MG/1
5 TABLET ORAL
Status: CANCELLED | OUTPATIENT
Start: 2019-12-11

## 2019-12-11 RX ORDER — SODIUM CHLORIDE, SODIUM LACTATE, POTASSIUM CHLORIDE, CALCIUM CHLORIDE 600; 310; 30; 20 MG/100ML; MG/100ML; MG/100ML; MG/100ML
75 INJECTION, SOLUTION INTRAVENOUS CONTINUOUS
Status: DISCONTINUED | OUTPATIENT
Start: 2019-12-12 | End: 2019-12-13

## 2019-12-11 RX ADMIN — Medication 250 MG: at 22:04

## 2019-12-11 RX ADMIN — NYSTATIN 500000 UNITS: 100000 SUSPENSION ORAL at 08:56

## 2019-12-11 RX ADMIN — CLINDAMYCIN PHOSPHATE 900 MG: 900 INJECTION, SOLUTION INTRAVENOUS at 12:45

## 2019-12-11 RX ADMIN — NYSTATIN 500000 UNITS: 100000 SUSPENSION ORAL at 11:46

## 2019-12-11 RX ADMIN — CEFAZOLIN SODIUM 2000 MG: 2 SOLUTION INTRAVENOUS at 11:47

## 2019-12-11 RX ADMIN — HEPARIN SODIUM AND DEXTROSE 11.11 UNITS/KG/HR: 10000; 5 INJECTION INTRAVENOUS at 21:14

## 2019-12-11 RX ADMIN — HEPARIN SODIUM 2000 UNITS: 1000 INJECTION INTRAVENOUS; SUBCUTANEOUS at 12:59

## 2019-12-11 RX ADMIN — NYSTATIN 500000 UNITS: 100000 SUSPENSION ORAL at 17:53

## 2019-12-11 RX ADMIN — CHLORHEXIDINE GLUCONATE 0.12% ORAL RINSE 15 ML: 1.2 LIQUID ORAL at 08:53

## 2019-12-11 RX ADMIN — MELATONIN 3 MG: 3 TAB ORAL at 21:17

## 2019-12-11 RX ADMIN — ALBUMIN (HUMAN) 25 G: 12.5 SOLUTION INTRAVENOUS at 04:32

## 2019-12-11 RX ADMIN — HEPARIN SODIUM AND DEXTROSE 12.8 UNITS/KG/HR: 10000; 5 INJECTION INTRAVENOUS at 12:04

## 2019-12-11 RX ADMIN — TAMSULOSIN HYDROCHLORIDE 0.4 MG: 0.4 CAPSULE ORAL at 17:53

## 2019-12-11 RX ADMIN — CEFAZOLIN SODIUM 2000 MG: 2 SOLUTION INTRAVENOUS at 03:28

## 2019-12-11 RX ADMIN — MIDODRINE HYDROCHLORIDE 5 MG: 5 TABLET ORAL at 17:53

## 2019-12-11 RX ADMIN — CEFAZOLIN SODIUM 2000 MG: 2 SOLUTION INTRAVENOUS at 20:47

## 2019-12-11 RX ADMIN — NYSTATIN 500000 UNITS: 100000 SUSPENSION ORAL at 21:17

## 2019-12-11 RX ADMIN — MIDODRINE HYDROCHLORIDE 15 MG: 5 TABLET ORAL at 08:55

## 2019-12-11 RX ADMIN — DIGOXIN 62.5 MCG: 125 TABLET ORAL at 08:54

## 2019-12-11 RX ADMIN — HEPARIN SODIUM AND DEXTROSE 14.8 UNITS/KG/HR: 10000; 5 INJECTION INTRAVENOUS at 01:38

## 2019-12-11 RX ADMIN — LEVOTHYROXINE SODIUM 50 MCG: 50 TABLET ORAL at 08:53

## 2019-12-11 RX ADMIN — MIDODRINE HYDROCHLORIDE 10 MG: 5 TABLET ORAL at 12:10

## 2019-12-11 RX ADMIN — CLINDAMYCIN PHOSPHATE 900 MG: 900 INJECTION, SOLUTION INTRAVENOUS at 04:16

## 2019-12-11 NOTE — TRANSPORTATION MEDICAL NECESSITY
Section I - General Information    Name of Patient: Danae Dinh                 : 1935    Medicare #: [id number]  Transport Date: 19 (PCS is valid for round trips on this date and for all repetitive trips in the 60-day range as noted below )  Origin: Jaimee Maxwell: [address]  Is the pt's stay covered under Medicare Part A (PPS/DRG)   []   Patience Code # [id number]  Closest appropriate facility? If no, why is transport to more distant facility required? Yes  If hospice pt, is this transport related to pt's terminal illness? No       Section II - Medical Necessity Questionnaire  Ambulance transportation is medically necessary only if other means of transport are contraindicated or would be potentially harmful to the patient  To meet this requirement, the patient must either be "bed confined" or suffer from a condition such that transport by means other than ambulance is contraindicated by the patient's condition  The following questions must be answered by the medical professional signing below for this form to be valid:    1)  Describe the MEDICAL CONDITION (physical and/or mental) of this patient AT [address] [place] [address] that requires the patient to be transported in an ambulance and why transport by other means is contraindicated by the patient's condition:pneumonia, resp distress, SOB, rapid atrial fib, septic shock, pleural effusion-required chest tube    2) Is the patient "bed confined" as defined below? Yes  To be "be confined" the patient must satisfy all three of the following conditions: (1) unable to get up from bed without Assistance; AND (2) unable to ambulate; AND (3) unable to sit in a chair or wheelchair  3) Can this patient safely be transported by car or wheelchair van (i e , seated during transport without a medical attendant or monitoring)?    No    4) In addition to completing questions 1-3 above, please check any of the following conditions that apply*:   *Note: supporting documentation for any boxes checked must be maintained in the patient's medical records  If hosp-hosp transfer, describe services needed at 2nd facility not available at 1st facility? Medical attendant required   Requires oxygen-unable to self administer  Unable to tolerate seated position for time needed to transport       Section III - Signature of Physician or Healthcare Professional  I certify that the above information is true and correct based on my evaluation of this patient, and represent that the patient requires transport by ambulance and that other forms of transport are contraindicated  I understand that this information will be used by the Centers for Medicare and Medicaid Services (CMS) to support the determination of medical necessity for ambulance services, and I represent that I have personal knowledge of the patient's condition at time of transport  []  If this box is checked, I also certify that the patient is physically or mentally incapable of signing the ambulance service's claim and that the institution with which I am affiliated has furnished care, services, or assistance to the patient  My signature below is made on behalf of the patient pursuant to 42 CFR §424 36(b)(4)   In accordance with 42 CFR §424 37, the specific reason(s) that the patient is physically or mentally incapable of signing the claim form is as follows: n/a      Signature of Physician* or Healthcare Professional______________________________________________________________  Signature Date 12/11/19 (For scheduled repetitive transports, this form is not valid for transports performed more than 60 days after this date)    Printed Name & Credentials of Physician or Healthcare Professional (MD, DO, RN, etc )______Eva Cabello RN__________________________  *Form must be signed by patient's attending physician for scheduled, repetitive transports   For non-repetitive, unscheduled ambulance transports, if unable to obtain the signature of the attending physician, any of the following may sign (choose appropriate option below)  [] Physician Assistant []  Clinical Nurse Specialist [x]  Registered Nurse  []  Nurse Practitioner  [x] Discharge Planner

## 2019-12-11 NOTE — PROGRESS NOTES
Daily Progress Note - Critical Care   Anita Arnold 82 y o  male MRN: [id number]  Unit/Bed#:  Encounter: [id number]        ----------------------------------------------------------------------------------------  HPI/24hr events:  Alteplase/dornase instilled in chest tube for 3rd consecutive day  Decreased drainage compared to days prior  Vasopressor therapy weaned to off  Stress dose steroids discontinued  No acute issues overnight     ---------------------------------------------------------------------------------------  SUBJECTIVE  "I'm ready to go home"    ---------------------------------------------------------------------------------------  Assessment and Plan:    Neuro:    Diagnosis:  No acute issues  o Plan:  Delirium precautions  Regulate sleep-wake cycles  Proved sleep hygiene with melatonin and q h s  Ambien  Daily CAM ICU  CV:    Diagnosis:  Septic shock-improving  o Plan:  Has remained off of vasopressors times 24 hours  Continue Midrodrine tid  Wean as blood pressure tolerates  Continue close hemodynamic monitoring  o Discontinue stress dose steroids   Diagnosis:  Persistent atrial fibrillation  o Plan:  Continue digoxin 62 5 mcg daily  Eliquis on hold  Continue heparin infusion for systemic anticoagulation in lieu of possible future procedures   Diagnosis:  Systolic heart failure/ moderate aortic stenosis  o New Diagnosis; 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  o Will need close outpatient follow-up and repeat echocardiogram x3 months  o Close monitoring of balance  Low-sodium diet  Daily weights   o P r n  Lasix as needed  o Will resume home beta-blocker (transistion from digoxin) as blood pressure tolerates   Diagnosis HTN:  o Plan:  Hold home antihypertensives in setting hypotension      Pulm:   Diagnosis:  Acute hypoxic respiratory failure in setting of GAS/Staph Aureus PNA w/ empyema  o Plan:  Supplemental O2 to maintain O2 saturations 92-94%  o Antibiotic therapy per ID Ancef/clindamycin day #8  o Chest tube inserted 12/4 for continued drainage  Day 3/3 aletepase/dornase complete  Will obtain chest CT today, and follow up discussions with thoracic surgery  May ultimately require VATS if patient shows no improvement in resolution  o Encourage good incentive spirometry/pulmonary toileting  GI:    Diagnosis:  No acute issues  o Plan:  GI prophylaxis: not indicated  Bowel regimen:  Senokot daily  :    Diagnosis:  No acute issues  o Plan:  Monitor daily BUN/creatinine  Strict I&Os  Chacon catheter-no  F/E/N:    Plan:  Monitor electrolytes  Replete as warranted  K> 4 0, Mg> 2 0> Phos> 3 0   Regular house with nutritional supplements    Heme/Onc:    Diagnosis:no acute issues  o Plan: H/H and platelets remain stable  Continue to trend and monitor  ppx VTE heparin gtt    Endo:    Diagnosis:  Hypothyroidism  o Plan:  Levothyroxine 50 mcg daily  ID:    Diagnosis:  Septic shock secondary to beta-hemolytic strep group A/MSSA PNA complicated by empyema  o Plan:  Appreciate ID consultation/recommendations  Continue antibiotic therapy Ancef/clindamycin antibiotic day 8  May consider discontinuation of clindamycin given resistance  Will likely require extended antibiotic course  IR consultation for PICC  o Monitor fever curve and WBC count  MSK/Skin:    Frequent offloading repositioning void skin breakdown  PT/OT  Disposition: Continue Stepdown Level 1 level of care   Code Status: Level 1 - Full Code  ---------------------------------------------------------------------------------------  ICU CORE MEASURES    Prophylaxis   VTE Pharmacologic Prophylaxis: Heparin Drip  VTE Mechanical Prophylaxis: sequential compression device  Stress Ulcer Prophylaxis: Prophylaxis Not Indicated     ABCDE Protocol (if indicated)  Plan to perform spontaneous awakening trial today? Not applicable  Plan to perform spontaneous breathing trial today? Not applicable  Obvious barriers to extubation? Not applicable  CAM-ICU: Negative    Invasive Devices Review  Invasive Devices     Central Venous Catheter Line            CVC Central Lines 19 Triple 16cm 3 days          Drain            Chest Tube Right 20 Fr  6 days              Can any invasive devices be discontinued today? Not applicable  ---------------------------------------------------------------------------------------  OBJECTIVE    Physical Exam   Constitutional: He is oriented to person, place, and time  He appears well-developed and well-nourished  No distress  HENT:   Head: Normocephalic and atraumatic  Eyes: Pupils are equal, round, and reactive to light  EOM are normal    Neck: Normal range of motion  Neck supple  Cardiovascular: Normal rate and regular rhythm  Pulmonary/Chest: No stridor  No respiratory distress  He has no wheezes  Right 20 Stateless chest tube in place  Dark serosanguineous drainage  No air leak  Abdominal: Soft  Bowel sounds are normal  He exhibits no distension  There is no tenderness  There is no guarding  Musculoskeletal: Normal range of motion  He exhibits no edema  Neurological: He is alert and oriented to person, place, and time  Skin: Skin is warm  Vitals reviewed  Vitals   Vitals:    12/10/19 1700 12/10/19 1800 12/10/19 2000 19 0000   BP: 108/68 135/64 111/58 105/72   BP Location:       Pulse: 65 73 79 72   Resp: (!) 31 17 17 18   Temp:   98 °F (36 7 °C) 97 9 °F (36 6 °C)   TempSrc:   Oral Oral   SpO2: 92% 95% 91% 91%   Weight:       Height:         Temp (24hrs), Av 7 °F (36 5 °C), Min:97 2 °F (36 2 °C), Max:98 °F (36 7 °C)  Current: Temperature: 97 9 °F (36 6 °C)    Invasive/non-invasive ventilation settings   Respiratory    Lab Data (Last 4 hours)    None         O2/Vent Data (Last 4 hours)    None                Height and Weights   Height: 5' 9" (175 3 cm)  IBW: 70 7 kg  Body mass index is 31 22 kg/m²    Weight (last 2 days) Date/Time   Weight    12/10/19 0600   95 9 (211 42)                Intake and Output  I/O       12/09 0701 - 12/10 0700 12/10 0701 - 12/11 0700    I V  (mL/kg) 573 (6) 249 9 (2 6)    IV Piggyback 80 100    Total Intake(mL/kg) 653 (6 8) 349 9 (3 6)    Urine (mL/kg/hr) 1175 (0 5) 125 (0 1)    Stool 0     Chest Tube 340 50    Total Output 1515 175    Net -862 +174 9          Unmeasured Stool Occurrence 2 x           Nutrition       Diet Orders   (From admission, onward)             Start     Ordered    12/05/19 1321  Dietary nutrition supplements  Once     Question Answer Comment   Select Supplement: Ensure Compact-Vanilla    Frequency Breakfast, Dinner        12/05/19 1320    12/05/19 0446  Diet Regular; Regular House  Diet effective now     Question Answer Comment   Diet Type Regular    Regular Regular House    RD to adjust diet per protocol?  Yes        12/05/19 0446                Laboratory and Diagnostics:  Results from last 7 days   Lab Units 12/10/19  0507 12/09/19  1922 12/09/19  0433 12/09/19  0000 12/08/19  0532 12/07/19  0535 12/06/19  0540 12/05/19  0508 12/04/19  0435   WBC Thousand/uL 28 31*  --  32 39*  --  27 97* 29 02* 30 29* 32 39* 49 19*   HEMOGLOBIN g/dL 14 5 15 2 15 8 15 9 15 7 16 0 16 0 15 3 16 2   HEMATOCRIT % 42 4 43 8 47 0 46 2 45 6 46 7 46 3 44 7 47 4   PLATELETS Thousands/uL 210  --  206  --  226 231 254 256 300   BANDS PCT % 3  --  4  --  4  --   --  5 12*   MONO PCT % 3*  --  5  --  2* 5  --  4 5     Results from last 7 days   Lab Units 12/10/19  1702 12/10/19  0507 12/09/19  0433 12/08/19  2146 12/08/19  0532 12/07/19  0535 12/06/19  0540 12/05/19  0508 12/04/19  0435   SODIUM mmol/L 131* 130* 127* 130* 132* 133* 132* 130* 130*   POTASSIUM mmol/L 4 5 4 8 4 9 3 7 3 0* 3 0* 3 4* 2 8* 4 0   CHLORIDE mmol/L 97* 97* 98* 97* 97* 98* 100 97* 98*   CO2 mmol/L 27 25 22 24 24 23 19* 16* 14*   ANION GAP mmol/L 7 8 7 9 11 12 13 17* 18*   BUN mg/dL 27* 21 15 19 23 34* 46* 60* 59*   CREATININE mg/dL 1  24 1 16 1 07 1 04 1 09 1 30 1 68* 2 11* 3 02*   CALCIUM mg/dL 7 6* 7 5* 7 2* 7 0* 6 9* 7 2* 7 1* 7 5* 8 2*   GLUCOSE RANDOM mg/dL 107 171* 158* 124 114 109 105 89 87   ALT U/L  --   --  7*  --  12 24 52 27 11*   AST U/L  --   --  34  --  35 62* 161* 78* 19   ALK PHOS U/L  --   --  203*  --  194* 180* 141* 117* 131*   ALBUMIN g/dL  --   --  1 4*  --  1 4* 1 5* 1 5* 1 5* 1 8*   TOTAL BILIRUBIN mg/dL  --   --  2 90*  --  2 90* 3 30* 3 60* 2 80* 2 50*     Results from last 7 days   Lab Units 12/10/19  1702 12/10/19  0507 12/09/19  0433 12/08/19  2146 12/08/19  0532 12/07/19  0535 12/05/19  0508 12/04/19  0435   MAGNESIUM mg/dL 2 1 2 0 2 2 1 8 2 0 2 4 2 8* 1 9   PHOSPHORUS mg/dL 4 1 4 0 3 1  --  2 8 2 8  --  4 4*      Results from last 7 days   Lab Units 12/10/19  2019 12/10/19  1332 12/10/19  0507 12/09/19  0433 12/08/19  1826 12/08/19  1241 12/08/19  0532 12/07/19  0535   INR   --   --   --   --   --   --   --  1 58*   PTT seconds 59* 74* 132* 84* 85* 81* 101* 80*          Results from last 7 days   Lab Units 12/09/19  0000 12/05/19  0508 12/04/19  0435 12/04/19  0209   LACTIC ACID mmol/L 1 5 1 7 3 4* 3 8*     ABG:  Results from last 7 days   Lab Units 12/04/19  0550   PH ART  7 361   PCO2 ART mm Hg 19 9*   PO2 ART mm Hg 93 1   HCO3 ART mmol/L 11 0*   BASE EXC ART mmol/L -11 6   ABG SOURCE  Line, Arterial     VBG:  Results from last 7 days   Lab Units 12/10/19  0507  12/04/19  0550   PH OLIMPIA  7 426*   < >  --    PCO2 OLIMPIA mm Hg 39 3*   < >  --    PO2 OLIMPIA mm Hg 41 7   < >  --    HCO3 OLIMPIA mmol/L 25 3   < >  --    BASE EXC OLIMPIA mmol/L 1 0   < >  --    ABG SOURCE   --   --  Line, Arterial    < > = values in this interval not displayed       Results from last 7 days   Lab Units 12/10/19  0507 12/04/19  0435   PROCALCITONIN ng/ml 0 46* 6 55*       Micro  Results from last 7 days   Lab Units 12/04/19  1931 12/04/19  1154   SPUTUM CULTURE  2+ Growth of Staphylococcus aureus*  3+ Growth of Beta Hemolytic Streptococcus Group A* --    GRAM STAIN RESULT  2+ Epithelial cells per low power field*  1+ Polys*  2+ Gram positive cocci in pairs, chains and clusters*  1+ Gram positive rods*  1+ Gram negative rods*  --    MRSA CULTURE ONLY   --  No Methicillin Resistant Staphlyococcus aureus (MRSA) isolated       EKG:   Imaging:  I have personally reviewed pertinent reports     and I have personally reviewed pertinent films in PACS    Active Medications  Scheduled Meds:  Current Facility-Administered Medications:  albumin human 25 g Intravenous Q6H Roxana Pino PA-C    cefazolin 2,000 mg Intravenous Q8H Lawrence Calderón MD Last Rate: 2,000 mg (12/10/19 2020)   chlorhexidine 15 mL Swish & Spit Q12H [address] Jo Wagner PA-C    clindamycin 900 mg Intravenous Q8H Lawrence Calderón MD Last Rate: 900 mg (12/10/19 2114)   digoxin 62 5 mcg Oral Daily Roxana Pino PA-C    heparin (porcine) 3-20 Units/kg/hr (Order-Specific) Intravenous Titrated Unknown [place], CRNP Last Rate: 14 8 Units/kg/hr (12/10/19 2134)   heparin (porcine) 2,000 Units Intravenous PRN Unknown [place], CRNP    heparin (porcine) 4,000 Units Intravenous PRN Unknown [place], CRNP    hydrocortisone sodium succinate 100 mg Intravenous Q8H [address] ANNA MARIE Ewing    melatonin 3 mg Oral HS ANNA MARIE Etienne    midodrine 15 mg Oral TID [facility] Roxana Pino PA-C    norepinephrine 1-30 mcg/min Intravenous Titrated Unknown [place], CRNP Last Rate: Stopped (12/10/19 0203)   nystatin 500,000 Units Swish & Swallow 4x Daily ANNA MARIE Etienne    senna-docusate sodium 1 tablet Oral HS Jo Wagner PA-C    tamsulosin 0 4 mg Oral Daily With [employer] gonzales    vasopressin (PITRESSIN) in 0 9 % sodium chloride 100 mL 0 04 Units/min Intravenous Continuous Dalton BolANNA MARIE pete Last Rate: Stopped (12/10/19 1053)   zolpidem 5 mg Oral HS PRN Dalton Bolls, CRNP      Continuous Infusions:    heparin (porcine) 3-20 Units/kg/hr (Order-Specific) Last Rate: 14 8 Units/kg/hr (12/10/19 2134)   norepinephrine 1-30 mcg/min Last Rate: Stopped (12/10/19 0203)   vasopressin (PITRESSIN) in 0 9 % sodium chloride 100 mL 0 04 Units/min Last Rate: Stopped (12/10/19 1053)     PRN Meds:     heparin (porcine) 2,000 Units PRN   heparin (porcine) 4,000 Units PRN   zolpidem 5 mg HS PRN       Allergies   No Known Allergies    Advance Directive and Living Will:      Power of :    POLST:        Counseling / Coordination of Care  Total Critical Care time spent 28 minutes excluding procedures, teaching and family updates  Liliana Guerra PA-C        Portions of the record may have been created with voice recognition software  Occasional wrong word or "sound a like" substitutions may have occurred due to the inherent limitations of voice recognition software    Read the chart carefully and recognize, using context, where substitutions have occurred

## 2019-12-11 NOTE — ASSESSMENT & PLAN NOTE
-s/p thoracentesis, right tube thoracostomy    Persistent effusion, continue drain through chest tube-plan to transfer to [facility] for thoracic surgery  -tolerating conventional nasal cannula, supplemental O2 to maintain saturations greater than 92%  -pulmonary toilet  -respiratory protocol

## 2019-12-11 NOTE — DISCHARGE SUMMARY
Discharge- Duncan Cavanaugh 1935, 82 y o  male MRN: [id number]    Unit/Bed#:  Encounter: [id number]    Primary Care Provider: Eitan Burnham MD   Date and time admitted to hospital: 12/3/2019  4:08 PM        Moderate protein-calorie malnutrition ([facility] [address] )  Assessment & Plan  - Appreciate nutrition recommendations  - continue diet  -encourage p o  Intake    Transaminitis  Assessment & Plan  -improving  - suspect sequelae of shock liver  -RUQ US-no acute cholecystitis, hepatomegaly      Chronic atrial fibrillation  Assessment & Plan  - takes eliquis as outpatient, held now given need for procedures  - heparin gtt   -transition back once taking p o   -patient loaded with digoxin, continue daily    Hyponatremia  Assessment & Plan  - baseline Na is in the 130s   - patient with low urine sodium showing likely absolute Salt deficiency    Septic shock ([facility] [address] )  Assessment & Plan  - likely secondary to empyema, pneumonia present on admission, possible UTI  - follow up culture growth  - abx deescalated to Ancef and clinda  - initially was set to complete clindamycin yesterday, however due to decompensation, id suggested resuming  -monitor hemodynamics closely      Pleural effusion on right  Assessment & Plan  - s/p bedside ED thoracentesis (12/3)  - s/p R CT placement (12/4)  - pleural fluid cx w GAS  - CT scan with dense collection  Chest tube back to suction  - plan to transfer for thoracic surgery       * Acute respiratory failure with hypoxia [facility])  Assessment & Plan  -s/p thoracentesis, right tube thoracostomy    Persistent effusion, continue drain through chest tube-plan to transfer to [facility] [place] for thoracic surgery  -tolerating conventional nasal cannula, supplemental O2 to maintain saturations greater than 92%  -pulmonary toilet  -respiratory protocol         Discharge Summary   Duncan Cavanaugh 82 y o  male MRN: [id number]    [address] [facility] [address] ICU Room / Bed: / Encounter: [id number]      Admitting Provider: Kwan Wallace DO  Discharge Provider: Katie Wynn MD  Admission Date: 12/3/2019     Discharge Date: No discharge date for patient encounter  Primary Care Physician at Discharge: Reina Ferro [phone]    Primary Discharge Diagnosis  Principal Problem:    Acute respiratory failure with hypoxia [facility])  Active Problems:    Pleural effusion on right    Septic shock (HCC)    Hyponatremia    Chronic atrial fibrillation    Transaminitis    Moderate protein-calorie malnutrition (HCC)  Resolved Problems:    SHREYA (acute kidney injury) ([address] )    Positive D dimer    Lactic acidosis      Other Problems Addressed  Patient Active Problem List    Diagnosis Date Noted    Pleural effusion 12/11/2019    Moderate protein-calorie malnutrition ([address] ) 12/04/2019    Acute respiratory failure with hypoxia ([facility] [address] ) 12/03/2019    Pleural effusion on right 12/03/2019    Septic shock (HCC) 12/03/2019    Hyponatremia 12/03/2019    Chronic atrial fibrillation 12/03/2019    Transaminitis 12/03/2019       Consulting Providers   ID    Diagnostic Procedures Performed  XR chest PICC line portable   Final Result      The right-sided PICC line lies with tip in the SVC  Right-sided the jugular line with tip in the SVC   Bilateral effusion with congestion             I personally discussed this study with Mirza Herrera on 12/11/2019 at 11:28 AM                Workstation performed: [id number]         CT chest wo contrast   Final Result      1  Right hydropneumothorax/empyema with basilar chest tube in place  Loculated components at the right apex and lateral aspect of the right upper lobe appear small but increased from prior  Loculated intrafissural component appears unchanged  2   Persistent bibasilar atelectasis  Heterogeneity within the result right basilar consolidation may suggest superimposed pneumonia        3   Increasing 3rd spacing/volume overload with increasing layering left pleural effusion and diffuse, bilateral interstitial edema  Anasarca also show slight interval increase  4   Persistent mediastinal lymphadenopathy  This could be reactive but warrants chest CT follow-up 3 months after resolution of acute episode to document involution  5   Chronic sequelae of calcific pericarditis  6   Probable cirrhosis  Workstation performed: [id number]         XR chest portable ICU   Final Result      No significant interval change  Stable small right pleural effusion with subjacent consolidation  Stable position of the tubes and lines  Workstation performed: [id number]         XR chest portable ICU   Final Result         1  Lines and tubes as described above  2   Right lower lobe airspace opacities and small pleural effusion, improved since prior study  3   Improved left retrocardiac opacification, likely effusion and atelectasis  Workstation performed: [id number]         VAS upper limb venous duplex scan, unilateral/limited   Final Result      XR chest portable ICU   Final Result   Right IJ central line position as above without pneumothorax appreciated  Right greater than left basilar effusions with consolidation at the right lung base as before  Workstation performed: [id number]         CT chest wo contrast   Final Result      1  Interval right chest tube placement  Decreased right pleural effusion  Small right-sided hydropneumothorax remains  2   Dense consolidation at the lung bases more extensive on the right without significant change  3   Persistent intrathoracic lymphadenopathy, unchanged  Workstation performed: [id number]         XR chest portable ICU   Final Result      Right-sided chest tube placement with possible tiny residual loculated right basilar pneumothorax  Slightly improved central vascular congestion with persistent bibasilar opacities and pleural effusions  Workstation performed: [id number]         US right upper quadrant   Final Result      1  Mild hepatomegaly  Hepatic morphology and echogenicity appear to be normal       2   Layering biliary sludge  No findings of acute cholecystitis  3   Normal biliary tree  4   No right hydronephrosis  Workstation performed: [id number]         XR chest portable ICU   Final Result      Interstitial and vascular congestion with bilateral mid to lower lung airspace opacities and small effusions  There is a tiny right basilar pneumothorax  Workstation performed: [id number]         XR chest portable ICU   Final Result      Persistent central vascular congestion small right and possible left pleural effusions  No pneumothorax  Workstation performed: [id number]         XR chest portable ICU   Final Result      Placement of right sided chest tube with partially diminished size of right pleural effusion  Minimal lateral pleural space air  Workstation performed: [id number]         CT chest abdomen pelvis wo contrast   Final Result      Small to moderate layering right pleural effusion with subtle thickening of pleura throughout the right hemithorax suggests the possibility that there is empyema in this patient with sepsis  Nonspecific mediastinal and hilar lymphadenopathy slightly more notable on the right than the left, possibly reactive  Consolidative airspace opacity at the lung bases more notable on the right than on the left with a CT appearance is more suspicious for compressive atelectasis than for pneumonia  The possibility of pneumonia, especially at the right lung base,    difficult to exclude with certainty  Findings suggestive of mild pulmonary vascular interstitial edema  Cardiomegaly  Mild thickening of the pericardium with pericardial calcification is a nonspecific finding but can indicate the sequela of prior viral pericarditis        Nodular hepatic contour suggestive of hepatic cirrhosis  The study was marked in EPIC for significant notification  Workstation performed: [id number]         XR chest portable ICU   Final Result      No significant interval change since yesterday            Workstation performed: [id number]         XR chest portable   ED Interpretation   Decreased size of right pleural effusion status post thoracentesis  No obvious pneumothorax  Final Result      1  No significant pneumothorax  2   Decrease in size of right-sided pleural effusion and improved pulmonary vascular congestion  3   Improvement in right lower lung field opacity  4   Slightly increased opacity at the left lung base may be due to atelectasis or trace left pleural effusion  Workstation performed: [id number]         XR chest 1 view portable   Final Result      Large right-sided pleural effusion with subjacent consolidation  Workstation performed: [id number]             Treatments   IV hydration and antibiotics: Ancef and Clindamycin  Procedures   PICC line, arterial line, internal jugular line, thoracentesis and CT placement  Other Pertinent Test Results  12/4 sputum culture- 2+ staph aureus, 3+ beta hemolytic strep group A   Presenting Problem/History of Present Illness  Acute respiratory failure with hypoxia [facility])    Hospital Course  The patient is an 79-year-old male with past medical history of chronic atrial fibrillation on Eliquis, hypertension, hyperlipidemia, and thyroid disease who presented to the emergency department with respiratory distress and septic shock  He was found to have a large right pleural effusion and underwent bedside thoracentesis which drained dark yellow purulence fluid concerning for infection    He was also noted to be in AFib with RVR with a heart rate in the 140s to 150s along with being hypotensive, have an acute kidney injury, transaminitis, and had a severe leukocytosis and bandemia on admission  He subsequently had a chest tube placed which drained an additional 800 mL of purulence fluid  He was placed on BiPAP for long recruitment  Initially he was improving, however he then became hypotensive requiring pressors  Infectious Disease was consulted given his hypotension, tachycardia, lactic acidosis, and severe leukocytosis on admission  Cultures were positive for group a strep pneumonia and empyema  The patient did clinically improve, however it took several days to completely wean him off vasopressor therapy  He also went into AFib with RVR which was controlled with digoxin  Despite chest tube placement repeat CT scan of the chest showed a loculated collection  The case was discussed with thoracic surgery at [place] who recommended tPA and Pulmozyme administration in the chest tube for 3 days  If he did not improve at that time there was discussion for transfer for possible VATS  On the day of transfer the patient was noted to be slightly hypothermic in the morning which did improve with warming blankets  He did have repeat blood cultures sent  He will continue on antibiotics  He completed the 3 day course and today underwent repeat CT scan of the chest   Chest CT showed right hydropneumothorax/empyema with chest tube in place and loculated components at the right apex and lateral aspect of the right upper lobe  His case was discussed again with thoracic surgery and [place] and it was decided that the patient should be transferred to [facility] for VATS decortication  He will be transferred to [place] this evening for impending thoracic surgery  Discharge Disposition: Final discharge disposition not confirmed  Facility: [facility]  Discharged With Lines: PICC line   Test Results Pending at Discharge  12/11 blood cultures-pending  +MSSA and GAS in sputum  Medications   See after visit summary for reconciled discharge medications provided to patient and family  Allergies  No Known Allergies    Code Status: Level 1 - Full Code  Advance Directive and Living Will: <no information>  Power of :    POLST:      Discharge  Statement   I spent 29 minutes discharging the patient  This time was spent on the day of discharge  I had direct contact with the patient on the day of discharge  Additional documentation is required if more than 30 minutes were spent on discharge

## 2019-12-11 NOTE — PROCEDURES
Insert PICC line  Date/Time: 12/11/2019 11:09 AM  Performed by: Mei Prajapati RN  Authorized by: ANNA MARIE Foss     Patient location:  Bedside  Other Assisting Provider: No    Consent:     Consent obtained:  Written and verbal    Consent given by:  Patient  Universal protocol:     Procedure explained and questions answered to patient or proxy's satisfaction: yes      Relevant documents present and verified: yes      Test results available and properly labeled: yes      Radiology Images displayed and confirmed  If images not available, report reviewed: no      Required blood products, implants, devices, and special equipment available: yes      Site/side marked: yes      Immediately prior to procedure, a time out was called: yes      Patient identity confirmed:  Verbally with patient, arm band and hospital-assigned identification number  Pre-procedure details:     Hand hygiene: Hand hygiene performed prior to insertion      Sterile barrier technique: All elements of maximal sterile technique followed      Skin preparation:  ChloraPrep    Skin preparation agent: Skin preparation agent completely dried prior to procedure    Indications:     PICC line indications: long term antibiotics and new site    Anesthesia (see MAR for exact dosages):      Anesthesia method:  Local infiltration    Local anesthetic:  Lidocaine 1% w/o epi  Procedure details:     Location:  Basilic    Vessel type: vein      Laterality:  Right    Approach: percutaneous technique used      Patient position:  Flat    Procedural supplies:  Double lumen    Catheter size:  5 Fr    Landmarks identified: yes      Ultrasound guidance: yes      Sterile ultrasound techniques: Sterile gel and sterile probe covers were used      Number of attempts:  1    Successful placement: yes      Vessel of catheter tip end:  Chest Xray needed to confirm placement    Total catheter length (cm):  42    Catheter out on skin (cm):  0    Max flow rate:  5 ml/sec    Arm circumference:  30  Post-procedure details:     Post-procedure:  Dressing applied and securement device placed    Assessment:  Blood return through all ports and placement verified by x-ray    Post-procedure complications: none      Patient tolerance of procedure:   Tolerated well, no immediate complications

## 2019-12-11 NOTE — ASSESSMENT & PLAN NOTE
- s/p bedside ED thoracentesis (12/3)  - s/p R CT placement (12/4)  - pleural fluid cx w GAS  - CT scan with dense collection    Chest tube back to suction  - plan to transfer for thoracic surgery

## 2019-12-11 NOTE — PROGRESS NOTES
Progress Note - Infectious Disease   Harvinder Young 82 y o  male MRN: [id number]  Unit/Bed#:  Encounter: [id number]      Impression/Plan:    37-year-old male patient admitted for septic shock secondary to severe group A strep pneumonia and right-sided empyema     1- Septic shock:  Hypotension, tachycardia and severe leukocytosis on admission, associated with  lactic acidosis and acute kidney injury  Septic shock is secondary to severe GAS pneumonia and empyema  Patient still on vasopressin this morning  He has right-sided chest tube in place; repeat CT scan chest showing decreased right-sided pleural effusion   Patient remains afebrile, WBC slowly decreasing, WBC count today 21,000, procalcitonin trending down (procalcitonin 0 46 yesterday)  - supportive care as per ICU team  - antibiotics as below  - repeat CBC in a m   - patient has received tPA through chest tube for 3 days  Repeat CT chest done today showing loculated components at the right apex and lateral aspect of right upper lobe  2- severe GAS pneumonia complicated by empyema:  Two weeks of cough and shortness of breath  Large right-sided pleural effusion seen on chest x-ray on admission  WBC count [id number]  Thoracentesis was diagnostic of empyema; 1200 mL of purulent fluid was removed; fluid WBC count 71,000, LDH more than 10,000, pH 6 3, glucose 5 mg/dL   Fluid culture group A strep  Patient has a chest tube inserted 12/04/2019  His leukocytosis and procalcitonin is trending down  Hemodynamically improving, pressors stopped  Will continue cefazolin IV and stop clindamycin today     -  continue cefazolin 2 g IV q 8h   Stop clindamycin  - repeat CBC in a m   - close clinical monitoring    -  chest tube management as per pulmonary team  - because CT scan is still showing loculation/empyema despite 3 days of tPA instillation through the chest tube, will discuss with ICU and Pulmonary team regarding the need for transfer and VATS    Regarding antibiotic therapy, with continue current IV antibiotic treatment, but patient can be managed with p o  Antibiotic therapy after hospital discharge (no need to insert a PICC line for long-term IV antibiotic treatment)      3- atrial fibrillation:  Tachycardia has resolved  - continue supportive care  - rate control and anticoagulation per ICU team     4- acute kidney injury  Creatinine significantly improved, creatinine today 1 16 mg/dL  - IV antibiotics doses mentioned above have been adjusted to creatinine clearance  - repeat BMP, avoid nephrotoxic medications        5- acute respiratory failure:  Secondary to problem #1 and 2;  ARF improved, patient is now on oxygen supplementation through nasal cannula  - continue antibiotics as above  - supportive care as per ICU team     Plan mentioned above discussed with patient  Case discussed with ICU team     Antibiotics:  Cefazolin day 8  Clindamycin day 8      Subjective:  Patient has no fever, chills, sweats; no nausea, vomiting, diarrhea; no cough, shortness of breath; no pain  No new symptoms  Objective:  Vitals:  Temp:  [96 °F (35 6 °C)-98 °F (36 7 °C)] 96 °F (35 6 °C)  HR:  [65-91] 83  Resp:  [14-31] 18  BP: (105-135)/(54-72) 133/71  SpO2:  [91 %-95 %] 94 %  Temp (24hrs), Av 5 °F (36 4 °C), Min:96 °F (35 6 °C), Max:98 °F (36 7 °C)  Current: Temperature: (!) 96 °F (35 6 °C)    Physical Exam:   General Appearance:  Alert, interactive, nontoxic, no acute distress  Throat: Oropharynx moist without lesions  Lungs:   Clear to auscultation bilaterally; no wheezes, rhonchi or rales; respirations unlabored   Heart:  RRR; no murmur, rub or gallop   Abdomen:   Soft, non-tender, non-distended, positive bowel sounds  Extremities: No clubbing, cyanosis or edema  Chest tube with output of 250 mL over past 24 hours   Skin: No new rashes or lesions  No draining wounds noted         Labs, Imaging, & Other studies:   All pertinent labs and imaging studies were personally reviewed  Results from last 7 days   Lab Units 12/11/19  0431 12/10/19  0507 12/09/19  1922 12/09/19  0433   WBC Thousand/uL 21 72* 28 31*  --  32 39*   HEMOGLOBIN g/dL 13 6 14 5 15 2 15 8   PLATELETS Thousands/uL 203 210  --  206     Results from last 7 days   Lab Units 12/11/19  0431 12/10/19  1702 12/10/19  0507 12/09/19  0433  12/08/19  0532 12/07/19  0535   SODIUM mmol/L 131* 131* 130* 127*   < > 132* 133*   POTASSIUM mmol/L 4 1 4 5 4 8 4 9   < > 3 0* 3 0*   CHLORIDE mmol/L 98* 97* 97* 98*   < > 97* 98*   CO2 mmol/L 25 27 25 22   < > 24 23   BUN mg/dL 28* 27* 21 15   < > 23 34*   CREATININE mg/dL 1 12 1 24 1 16 1 07   < > 1 09 1 30   EGFR ml/min/1 73sq m 61 54 58 64   < > 63 51   CALCIUM mg/dL 7 5* 7 6* 7 5* 7 2*   < > 6 9* 7 2*   AST U/L  --   --   --  34  --  35 62*   ALT U/L  --   --   --  7*  --  12 24   ALK PHOS U/L  --   --   --  203*  --  194* 180*    < > = values in this interval not displayed       Results from last 7 days   Lab Units 12/04/19  1931 12/04/19  1154   SPUTUM CULTURE  2+ Growth of Staphylococcus aureus*  3+ Growth of Beta Hemolytic Streptococcus Group A*  --    GRAM STAIN RESULT  2+ Epithelial cells per low power field*  1+ Polys*  2+ Gram positive cocci in pairs, chains and clusters*  1+ Gram positive rods*  1+ Gram negative rods*  --    MRSA CULTURE ONLY   --  No Methicillin Resistant Staphlyococcus aureus (MRSA) isolated     Results from last 7 days   Lab Units 12/10/19  0507   PROCALCITONIN ng/ml 0 46*

## 2019-12-11 NOTE — ASSESSMENT & PLAN NOTE
- likely secondary to empyema, pneumonia present on admission, possible UTI  - follow up culture growth  - abx deescalated to Ancef and clinda  - initially was set to complete clindamycin yesterday, however due to decompensation, id suggested resuming  -monitor hemodynamics closely

## 2019-12-11 NOTE — SOCIAL WORK
Pt had CT chest and it was determined [facility] for [id number]    Medical Nec done

## 2019-12-12 ENCOUNTER — APPOINTMENT (INPATIENT)
Dept: RADIOLOGY | Facility: HOSPITAL | Age: 84
DRG: 853 | End: 2019-12-12
Payer: COMMERCIAL

## 2019-12-12 ENCOUNTER — ANESTHESIA (INPATIENT)
Dept: PERIOP | Facility: HOSPITAL | Age: 84
DRG: 853 | End: 2019-12-12
Payer: COMMERCIAL

## 2019-12-12 PROBLEM — J15.4 GRP A STREP PNEUMONIA (HCC): Status: ACTIVE | Noted: 2019-12-12

## 2019-12-12 PROBLEM — E87.70 VOLUME OVERLOAD: Status: ACTIVE | Noted: 2019-12-12

## 2019-12-12 LAB
ABO GROUP BLD: NORMAL
ANION GAP SERPL CALCULATED.3IONS-SCNC: 8 MMOL/L (ref 4–13)
APTT PPP: 52 SECONDS (ref 23–37)
BLD GP AB SCN SERPL QL: NEGATIVE
BUN SERPL-MCNC: 26 MG/DL (ref 5–25)
CALCIUM SERPL-MCNC: 7.9 MG/DL (ref 8.3–10.1)
CHLORIDE SERPL-SCNC: 103 MMOL/L (ref 100–108)
CO2 SERPL-SCNC: 25 MMOL/L (ref 21–32)
CREAT SERPL-MCNC: 0.97 MG/DL (ref 0.6–1.3)
ERYTHROCYTE [DISTWIDTH] IN BLOOD BY AUTOMATED COUNT: 15.2 % (ref 11.6–15.1)
GFR SERPL CREATININE-BSD FRML MDRD: 72 ML/MIN/1.73SQ M
GLUCOSE SERPL-MCNC: 80 MG/DL (ref 65–140)
HCT VFR BLD AUTO: 42 % (ref 36.5–49.3)
HGB BLD-MCNC: 14.2 G/DL (ref 12–17)
MCH RBC QN AUTO: 32.1 PG (ref 26.8–34.3)
MCHC RBC AUTO-ENTMCNC: 33.8 G/DL (ref 31.4–37.4)
MCV RBC AUTO: 95 FL (ref 82–98)
PLATELET # BLD AUTO: 248 THOUSANDS/UL (ref 149–390)
PMV BLD AUTO: 10 FL (ref 8.9–12.7)
POTASSIUM SERPL-SCNC: 3.7 MMOL/L (ref 3.5–5.3)
RBC # BLD AUTO: 4.43 MILLION/UL (ref 3.88–5.62)
RH BLD: POSITIVE
SODIUM SERPL-SCNC: 136 MMOL/L (ref 136–145)
SPECIMEN EXPIRATION DATE: NORMAL
WBC # BLD AUTO: 20.09 THOUSAND/UL (ref 4.31–10.16)

## 2019-12-12 PROCEDURE — 0BND4ZZ RELEASE RIGHT MIDDLE LUNG LOBE, PERCUTANEOUS ENDOSCOPIC APPROACH: ICD-10-PCS | Performed by: THORACIC SURGERY (CARDIOTHORACIC VASCULAR SURGERY)

## 2019-12-12 PROCEDURE — 99232 SBSQ HOSP IP/OBS MODERATE 35: CPT | Performed by: PHYSICIAN ASSISTANT

## 2019-12-12 PROCEDURE — 88305 TISSUE EXAM BY PATHOLOGIST: CPT | Performed by: PATHOLOGY

## 2019-12-12 PROCEDURE — 87176 TISSUE HOMOGENIZATION CULTR: CPT | Performed by: THORACIC SURGERY (CARDIOTHORACIC VASCULAR SURGERY)

## 2019-12-12 PROCEDURE — 99223 1ST HOSP IP/OBS HIGH 75: CPT | Performed by: INTERNAL MEDICINE

## 2019-12-12 PROCEDURE — 87070 CULTURE OTHR SPECIMN AEROBIC: CPT | Performed by: THORACIC SURGERY (CARDIOTHORACIC VASCULAR SURGERY)

## 2019-12-12 PROCEDURE — 86901 BLOOD TYPING SEROLOGIC RH(D): CPT | Performed by: SURGERY

## 2019-12-12 PROCEDURE — 85027 COMPLETE CBC AUTOMATED: CPT | Performed by: INTERNAL MEDICINE

## 2019-12-12 PROCEDURE — 0BNF4ZZ RELEASE RIGHT LOWER LUNG LOBE, PERCUTANEOUS ENDOSCOPIC APPROACH: ICD-10-PCS | Performed by: THORACIC SURGERY (CARDIOTHORACIC VASCULAR SURGERY)

## 2019-12-12 PROCEDURE — 32652 THORACOSCOPY REM TOTL CORTEX: CPT | Performed by: THORACIC SURGERY (CARDIOTHORACIC VASCULAR SURGERY)

## 2019-12-12 PROCEDURE — 0BNC4ZZ RELEASE RIGHT UPPER LUNG LOBE, PERCUTANEOUS ENDOSCOPIC APPROACH: ICD-10-PCS | Performed by: THORACIC SURGERY (CARDIOTHORACIC VASCULAR SURGERY)

## 2019-12-12 PROCEDURE — 85730 THROMBOPLASTIN TIME PARTIAL: CPT | Performed by: INTERNAL MEDICINE

## 2019-12-12 PROCEDURE — 87205 SMEAR GRAM STAIN: CPT | Performed by: THORACIC SURGERY (CARDIOTHORACIC VASCULAR SURGERY)

## 2019-12-12 PROCEDURE — 87186 SC STD MICRODIL/AGAR DIL: CPT | Performed by: THORACIC SURGERY (CARDIOTHORACIC VASCULAR SURGERY)

## 2019-12-12 PROCEDURE — 86900 BLOOD TYPING SEROLOGIC ABO: CPT | Performed by: SURGERY

## 2019-12-12 PROCEDURE — 80048 BASIC METABOLIC PNL TOTAL CA: CPT | Performed by: INTERNAL MEDICINE

## 2019-12-12 PROCEDURE — 87077 CULTURE AEROBIC IDENTIFY: CPT | Performed by: THORACIC SURGERY (CARDIOTHORACIC VASCULAR SURGERY)

## 2019-12-12 PROCEDURE — C9290 INJ, BUPIVACAINE LIPOSOME: HCPCS | Performed by: THORACIC SURGERY (CARDIOTHORACIC VASCULAR SURGERY)

## 2019-12-12 PROCEDURE — 99223 1ST HOSP IP/OBS HIGH 75: CPT | Performed by: THORACIC SURGERY (CARDIOTHORACIC VASCULAR SURGERY)

## 2019-12-12 PROCEDURE — 87075 CULTR BACTERIA EXCEPT BLOOD: CPT | Performed by: THORACIC SURGERY (CARDIOTHORACIC VASCULAR SURGERY)

## 2019-12-12 PROCEDURE — 71045 X-RAY EXAM CHEST 1 VIEW: CPT

## 2019-12-12 PROCEDURE — 86850 RBC ANTIBODY SCREEN: CPT | Performed by: SURGERY

## 2019-12-12 RX ORDER — DEXAMETHASONE SODIUM PHOSPHATE 4 MG/ML
8 INJECTION, SOLUTION INTRA-ARTICULAR; INTRALESIONAL; INTRAMUSCULAR; INTRAVENOUS; SOFT TISSUE ONCE AS NEEDED
Status: DISCONTINUED | OUTPATIENT
Start: 2019-12-12 | End: 2019-12-12 | Stop reason: HOSPADM

## 2019-12-12 RX ORDER — HYDROMORPHONE HCL/PF 1 MG/ML
0.5 SYRINGE (ML) INJECTION
Status: DISCONTINUED | OUTPATIENT
Start: 2019-12-12 | End: 2019-12-12 | Stop reason: HOSPADM

## 2019-12-12 RX ORDER — MIDODRINE HYDROCHLORIDE 5 MG/1
2.5 TABLET ORAL
Status: DISCONTINUED | OUTPATIENT
Start: 2019-12-13 | End: 2019-12-13

## 2019-12-12 RX ORDER — NEOSTIGMINE METHYLSULFATE 1 MG/ML
INJECTION INTRAVENOUS AS NEEDED
Status: DISCONTINUED | OUTPATIENT
Start: 2019-12-12 | End: 2019-12-12 | Stop reason: SURG

## 2019-12-12 RX ORDER — BUPIVACAINE HYDROCHLORIDE 2.5 MG/ML
INJECTION, SOLUTION INFILTRATION; PERINEURAL AS NEEDED
Status: DISCONTINUED | OUTPATIENT
Start: 2019-12-12 | End: 2019-12-12 | Stop reason: HOSPADM

## 2019-12-12 RX ORDER — OXYCODONE HYDROCHLORIDE 5 MG/1
5 TABLET ORAL EVERY 4 HOURS PRN
Status: DISCONTINUED | OUTPATIENT
Start: 2019-12-12 | End: 2019-12-16 | Stop reason: HOSPADM

## 2019-12-12 RX ORDER — ONDANSETRON 2 MG/ML
INJECTION INTRAMUSCULAR; INTRAVENOUS AS NEEDED
Status: DISCONTINUED | OUTPATIENT
Start: 2019-12-12 | End: 2019-12-12 | Stop reason: SURG

## 2019-12-12 RX ORDER — OXYCODONE HYDROCHLORIDE 5 MG/1
2.5 TABLET ORAL EVERY 4 HOURS PRN
Status: DISCONTINUED | OUTPATIENT
Start: 2019-12-12 | End: 2019-12-16 | Stop reason: HOSPADM

## 2019-12-12 RX ORDER — ACETAMINOPHEN 325 MG/1
650 TABLET ORAL EVERY 6 HOURS PRN
Status: DISCONTINUED | OUTPATIENT
Start: 2019-12-12 | End: 2019-12-16 | Stop reason: HOSPADM

## 2019-12-12 RX ORDER — ONDANSETRON 2 MG/ML
4 INJECTION INTRAMUSCULAR; INTRAVENOUS EVERY 4 HOURS PRN
Status: DISCONTINUED | OUTPATIENT
Start: 2019-12-12 | End: 2019-12-16 | Stop reason: HOSPADM

## 2019-12-12 RX ORDER — ACETAMINOPHEN 325 MG/1
975 TABLET ORAL EVERY 8 HOURS SCHEDULED
Status: DISCONTINUED | OUTPATIENT
Start: 2019-12-12 | End: 2019-12-16 | Stop reason: HOSPADM

## 2019-12-12 RX ORDER — SODIUM CHLORIDE, SODIUM LACTATE, POTASSIUM CHLORIDE, CALCIUM CHLORIDE 600; 310; 30; 20 MG/100ML; MG/100ML; MG/100ML; MG/100ML
20 INJECTION, SOLUTION INTRAVENOUS CONTINUOUS
Status: DISCONTINUED | OUTPATIENT
Start: 2019-12-12 | End: 2019-12-12

## 2019-12-12 RX ORDER — ROCURONIUM BROMIDE 10 MG/ML
INJECTION, SOLUTION INTRAVENOUS AS NEEDED
Status: DISCONTINUED | OUTPATIENT
Start: 2019-12-12 | End: 2019-12-12 | Stop reason: SURG

## 2019-12-12 RX ORDER — SUCCINYLCHOLINE/SOD CL,ISO/PF 100 MG/5ML
SYRINGE (ML) INTRAVENOUS AS NEEDED
Status: DISCONTINUED | OUTPATIENT
Start: 2019-12-12 | End: 2019-12-12 | Stop reason: SURG

## 2019-12-12 RX ORDER — CEFAZOLIN SODIUM 2 G/50ML
SOLUTION INTRAVENOUS AS NEEDED
Status: DISCONTINUED | OUTPATIENT
Start: 2019-12-12 | End: 2019-12-12 | Stop reason: SURG

## 2019-12-12 RX ORDER — PROPOFOL 10 MG/ML
INJECTION, EMULSION INTRAVENOUS AS NEEDED
Status: DISCONTINUED | OUTPATIENT
Start: 2019-12-12 | End: 2019-12-12 | Stop reason: SURG

## 2019-12-12 RX ORDER — GUAIFENESIN 600 MG
600 TABLET, EXTENDED RELEASE 12 HR ORAL EVERY 12 HOURS SCHEDULED
Status: DISCONTINUED | OUTPATIENT
Start: 2019-12-12 | End: 2019-12-16 | Stop reason: HOSPADM

## 2019-12-12 RX ORDER — EPHEDRINE SULFATE 50 MG/ML
INJECTION INTRAVENOUS AS NEEDED
Status: DISCONTINUED | OUTPATIENT
Start: 2019-12-12 | End: 2019-12-12 | Stop reason: SURG

## 2019-12-12 RX ORDER — GLYCOPYRROLATE 0.2 MG/ML
INJECTION INTRAMUSCULAR; INTRAVENOUS AS NEEDED
Status: DISCONTINUED | OUTPATIENT
Start: 2019-12-12 | End: 2019-12-12 | Stop reason: SURG

## 2019-12-12 RX ORDER — FENTANYL CITRATE 50 UG/ML
INJECTION, SOLUTION INTRAMUSCULAR; INTRAVENOUS AS NEEDED
Status: DISCONTINUED | OUTPATIENT
Start: 2019-12-12 | End: 2019-12-12 | Stop reason: SURG

## 2019-12-12 RX ORDER — SODIUM CHLORIDE 9 MG/ML
INJECTION INTRAVENOUS AS NEEDED
Status: DISCONTINUED | OUTPATIENT
Start: 2019-12-12 | End: 2019-12-12 | Stop reason: HOSPADM

## 2019-12-12 RX ORDER — METOPROLOL TARTRATE 5 MG/5ML
INJECTION INTRAVENOUS AS NEEDED
Status: DISCONTINUED | OUTPATIENT
Start: 2019-12-12 | End: 2019-12-12 | Stop reason: SURG

## 2019-12-12 RX ADMIN — EPHEDRINE SULFATE 5 MG: 50 INJECTION, SOLUTION INTRAVENOUS at 17:37

## 2019-12-12 RX ADMIN — MIDODRINE HYDROCHLORIDE 5 MG: 5 TABLET ORAL at 10:37

## 2019-12-12 RX ADMIN — ROCURONIUM BROMIDE 20 MG: 50 INJECTION, SOLUTION INTRAVENOUS at 17:37

## 2019-12-12 RX ADMIN — CEFAZOLIN SODIUM 2000 MG: 2 SOLUTION INTRAVENOUS at 17:12

## 2019-12-12 RX ADMIN — ROCURONIUM BROMIDE 5 MG: 50 INJECTION, SOLUTION INTRAVENOUS at 17:23

## 2019-12-12 RX ADMIN — MIDODRINE HYDROCHLORIDE 5 MG: 5 TABLET ORAL at 21:55

## 2019-12-12 RX ADMIN — SODIUM CHLORIDE, SODIUM LACTATE, POTASSIUM CHLORIDE, AND CALCIUM CHLORIDE 75 ML/HR: .6; .31; .03; .02 INJECTION, SOLUTION INTRAVENOUS at 21:00

## 2019-12-12 RX ADMIN — FENTANYL CITRATE 50 MCG: 50 INJECTION, SOLUTION INTRAMUSCULAR; INTRAVENOUS at 17:23

## 2019-12-12 RX ADMIN — CEFAZOLIN SODIUM 2000 MG: 2 SOLUTION INTRAVENOUS at 13:00

## 2019-12-12 RX ADMIN — MELATONIN 3 MG: 3 TAB ORAL at 21:55

## 2019-12-12 RX ADMIN — METOPROLOL TARTRATE 2.5 MG: 5 INJECTION, SOLUTION INTRAVENOUS at 18:35

## 2019-12-12 RX ADMIN — PHENYLEPHRINE HYDROCHLORIDE 100 MCG: 10 INJECTION INTRAVENOUS at 17:23

## 2019-12-12 RX ADMIN — ACETAMINOPHEN 975 MG: 325 TABLET ORAL at 21:55

## 2019-12-12 RX ADMIN — CEFAZOLIN SODIUM 2000 MG: 2 SOLUTION INTRAVENOUS at 04:19

## 2019-12-12 RX ADMIN — DIGOXIN 62.5 MCG: 125 TABLET ORAL at 08:43

## 2019-12-12 RX ADMIN — PHENYLEPHRINE HYDROCHLORIDE 100 MCG: 10 INJECTION INTRAVENOUS at 18:21

## 2019-12-12 RX ADMIN — SODIUM CHLORIDE, SODIUM LACTATE, POTASSIUM CHLORIDE, AND CALCIUM CHLORIDE 100 ML/HR: .6; .31; .03; .02 INJECTION, SOLUTION INTRAVENOUS at 00:05

## 2019-12-12 RX ADMIN — NYSTATIN 500000 UNITS: 100000 SUSPENSION ORAL at 13:00

## 2019-12-12 RX ADMIN — PHENYLEPHRINE HYDROCHLORIDE 100 MCG: 10 INJECTION INTRAVENOUS at 18:13

## 2019-12-12 RX ADMIN — LEVOTHYROXINE SODIUM 50 MCG: 50 TABLET ORAL at 06:21

## 2019-12-12 RX ADMIN — NYSTATIN 500000 UNITS: 100000 SUSPENSION ORAL at 08:43

## 2019-12-12 RX ADMIN — ONDANSETRON 4 MG: 2 INJECTION INTRAMUSCULAR; INTRAVENOUS at 17:40

## 2019-12-12 RX ADMIN — MIDODRINE HYDROCHLORIDE 5 MG: 5 TABLET ORAL at 06:21

## 2019-12-12 RX ADMIN — SENNOSIDES AND DOCUSATE SODIUM 1 TABLET: 8.6; 5 TABLET ORAL at 21:55

## 2019-12-12 RX ADMIN — Medication 100 MG: at 17:23

## 2019-12-12 RX ADMIN — ROCURONIUM BROMIDE 5 MG: 50 INJECTION, SOLUTION INTRAVENOUS at 18:06

## 2019-12-12 RX ADMIN — Medication 250 MG: at 08:43

## 2019-12-12 RX ADMIN — OXYCODONE HYDROCHLORIDE 5 MG: 5 TABLET ORAL at 20:25

## 2019-12-12 RX ADMIN — SODIUM CHLORIDE, SODIUM LACTATE, POTASSIUM CHLORIDE, AND CALCIUM CHLORIDE: .6; .31; .03; .02 INJECTION, SOLUTION INTRAVENOUS at 17:23

## 2019-12-12 RX ADMIN — GUAIFENESIN 600 MG: 600 TABLET, EXTENDED RELEASE ORAL at 10:37

## 2019-12-12 RX ADMIN — HEPARIN SODIUM 2000 UNITS: 1000 INJECTION INTRAVENOUS; SUBCUTANEOUS at 00:03

## 2019-12-12 RX ADMIN — CEFAZOLIN SODIUM 2000 MG: 2 SOLUTION INTRAVENOUS at 20:28

## 2019-12-12 RX ADMIN — NYSTATIN 500000 UNITS: 100000 SUSPENSION ORAL at 21:55

## 2019-12-12 RX ADMIN — PHENYLEPHRINE HYDROCHLORIDE 200 MCG: 10 INJECTION INTRAVENOUS at 18:18

## 2019-12-12 RX ADMIN — GLYCOPYRROLATE 0.6 MG: 0.2 INJECTION, SOLUTION INTRAMUSCULAR; INTRAVENOUS at 18:31

## 2019-12-12 RX ADMIN — GUAIFENESIN 600 MG: 600 TABLET, EXTENDED RELEASE ORAL at 20:25

## 2019-12-12 RX ADMIN — PROPOFOL 100 MG: 10 INJECTION, EMULSION INTRAVENOUS at 17:23

## 2019-12-12 RX ADMIN — SODIUM CHLORIDE, SODIUM LACTATE, POTASSIUM CHLORIDE, AND CALCIUM CHLORIDE 100 ML/HR: .6; .31; .03; .02 INJECTION, SOLUTION INTRAVENOUS at 10:33

## 2019-12-12 RX ADMIN — PHENYLEPHRINE HYDROCHLORIDE 100 MCG: 10 INJECTION INTRAVENOUS at 17:37

## 2019-12-12 RX ADMIN — NEOSTIGMINE METHYLSULFATE 3 MG: 1 INJECTION INTRAVENOUS at 18:31

## 2019-12-12 NOTE — ANESTHESIA POSTPROCEDURE EVALUATION
Post-Op Assessment Note    CV Status:  Stable  Pain Score: 0    Pain management: adequate     Mental Status:  Alert and awake   Hydration Status:  Euvolemic   PONV Controlled:  Controlled   Airway Patency:  Patent  Airway: intubated   Post Op Vitals Reviewed: Yes      Staff: CRNA           BP      Temp 97 5 °F (36 4 °C) (12/12/19 1845)    Pulse 105 (12/12/19 1845)   Resp 18 (12/12/19 1845)    SpO2 93 % (12/12/19 1845)

## 2019-12-12 NOTE — ASSESSMENT & PLAN NOTE
Status post chest tube placement at [facility]   TPA infusion to chest tube has been unsuccessful  Referred to [facility] for thoracic surgery evaluation  Cultures have been positive for group a strep and methicillin sensitive Staph aureus  Continue cefazolin  Chest tube management per thoracic surgery

## 2019-12-12 NOTE — ASSESSMENT & PLAN NOTE
· Monitor on telemetry in perioperative setting  · Continue digoxin for rate control  · Restart Eliquis when stable from a surgical point of view   Is on heparin infusion at this time

## 2019-12-12 NOTE — ASSESSMENT & PLAN NOTE
· Patient is noted to have severe group a strep pneumonia complicated by empyema  · Plan for decortication today by thoracic surgery  · Continue antibiotics in the form of Ancef  Clindamycin was discontinued yesterday by ID  · No need for PICC line for long-term antibiotics as per Infectious Disease    · Will likely be able to be discharged with oral antibiotic after hospital discharge

## 2019-12-12 NOTE — H&P
H&P- Devora Parker 1935, 82 y o  male MRN: [id number]    Unit/Bed#: [facility] 719-01 Encounter: [id number]    Primary Care Provider: Flores Garcia MD   Date and time admitted to hospital: 12/11/2019  7:58 PM        * Empyema [facility])  Assessment & Plan  Status post chest tube placement at [facility]   TPA infusion to chest tube has been unsuccessful  Referred to [facility] for thoracic surgery evaluation  Cultures have been positive for group a strep and methicillin sensitive Staph aureus  Continue cefazolin  Chest tube management per thoracic surgery  Septic shock ([address] )  Assessment & Plan  Resolved, off pressors times 48 hours  Wean off midodrine as tolerated  Cardiomyopathy [facility])  Assessment & Plan  Possibly due to Afib  Ejection fraction noted to be 40%,  Continue digoxin for rate control  Restart Eliquis when stable from a surgical point of view  Monitor on telemetry while weaning off midodrine  Monitor electrolytes    Chronic atrial fibrillation  Assessment & Plan  Monitor on telemetry well wean off midodrine  Continue digoxin for rate control  Restart Eliquis when stable from a surgical point of view    Hyponatremia  Assessment & Plan  Likely due to iatrogenic volume overload when being treated for septic shock  Restart Lasix when off midodrine in blood pressures are stable  Avoid further IV fluid administration if possible  Low-sodium diet  History of Present Illness     HPI:  Devora Parker is a 82 y o  male who presents with empyema  He was recently admitted to [facility] with septic shock  He has been successfully weaned off pressors  He is found to have empyema which has not improved with chest tube placement and tPA administration to the chest tube  He has been referred to since with [facility] for thoracic surgery evaluation  This time he denies any chest pain, shortness of breath    Reports mild cough with minimal clear sputum  Denies any abdominal pain, constipation  Reports mildly loose stools  Review of Systems   All other systems reviewed and are negative  Historical Information   Past Medical History:   Diagnosis Date    A-fib ([address] )     Disease of thyroid gland     Hyperlipidemia     Hypertension     Optic neuropathy      No past surgical history on file  Social History   Social History     Substance and Sexual Activity   Alcohol Use Yes    Frequency: Never    Comment: occasional     Social History     Substance and Sexual Activity   Drug Use Never     Social History     Tobacco Use   Smoking Status Former Smoker   Smokeless Tobacco Never Used     Family History: non-contributory    Meds/Allergies   all medications and allergies reviewed  No Known Allergies    Objective   Vitals: Blood pressure 119/68, pulse 90, temperature 98 °F (36 7 °C), height 5' 10" (1 778 m), SpO2 96 %  No intake or output data in the 24 hours ending 12/11/19 2135    Invasive Devices     Peripherally Inserted Central Catheter Line            PICC Line 01/65/68 Right Basilic less than 1 day          Drain            Chest Tube Right 20 Fr  7 days                Physical Exam   Constitutional: He is oriented to person, place, and time  He appears well-developed and well-nourished  No distress  HENT:   Head: Normocephalic and atraumatic  Poor dentition   Eyes: Pupils are equal, round, and reactive to light  EOM are normal    Neck: Neck supple  Cardiovascular: Normal rate and regular rhythm  Pulmonary/Chest:   Right-sided crackles  Right-sided chest tube   Abdominal: Soft  Musculoskeletal: Normal range of motion  He exhibits edema  Neurological: He is alert and oriented to person, place, and time  No cranial nerve deficit  Skin: Skin is warm and dry  Lab Results: I have personally reviewed pertinent reports  Imaging: I have personally reviewed pertinent reports      EKG, Pathology, and Other Studies: I have personally reviewed pertinent reports  Code Status: Level 1 - Full Code  Advance Directive and Living Will:      Power of :    POLST:      Counseling / Coordination of Care  Total floor / unit time spent today 45 minutes  Greater than 50% of total time was spent with the patient and / or family counseling and / or coordination of care  A description of the counseling / coordination of care:  Discussed plan of care with the patient and his grandson at the bedside

## 2019-12-12 NOTE — CONSULTS
Consultation - Infectious Disease   Ameena Dubose 82 y o  male MRN: [id number]  [facility] Encounter: [id number]      Assessment/Plan     Assessment:  82year old male initially presenting to the Emergency department with a two week history of cough and shortness of breath and was found to have a right sided pleural effusion with empyema  Patient did require admission due to septic shock with leukocytosis, tachycardia and hypotension however is clinically stable at this time  Patient subsequently had a chest tube placed with tPA/pulmozyme instillation but with no improvement  Patient was transferred to [facility] for thoracoscopic decortication for persistent parapneummonic effusion  Plan:  1  Group A streptococcus pneumonia complicated by Empyema   - Two week history of cough and shortness of breath with large right sided plural effusion seen on CXR on admission with profound leukocytosis of [address]  - Thoracentesis diagnostic for empyema with fluid culture positive for group A strep   - Chest tube insertion with  tPA/pulmozyme instillation for 3 days with persistent loculation  - Leukocytosis and procalcitonin trending down   - Clinically improving     Recommendations  - Continue Cefazolin 2g Q8H IV  - Continue to monitor CBC, procalcitonin and hemodynamics   - Patient to OR today for thoracoscopic decortication    2  Septic shock  - Secondary to #1  - Patient noted to have septic shock on admission with hypotension, tachycardia and profound leukocytosis on admission with lactic acidosis and SHREYA  - Vasopressors discontinued however patient still on midodrine with plans to weaned off   - Leukocytosis trending down     Recommendations   - Continue antibiotic as above   - Supportive care by primary team  - Patient to OR today for thoracoscopic decortication    3  Atrial fibrillation  - Continue management by primary team    4   SHREYA  - Resolved   - Continue to monitor  - Avoid nephrotoxic medications     5  Acute respiratory failure   - Secondary to #1 & 2  - Continue oxygen supplementation as necessary   - Supportive care per primary team     History of Present Illness   Physician Requesting Consult: Philip Vizcaino MD  Reason for Consult / Principal Problem: Empyema   Hx and PE limited by: None     HPI: Autumn Espinoza is a 82y o  year old male with a past medical history of atrial fibrillation and hypothyroidism who was transferred to [facility] from [facility] for right thoracoscopic decortication for loculated parapneumonic effusion  Patient initially presented to the Emergency department 12/3/19 with a two week history of cough and shortness of breath and was admitted due to septic shock  CXR on admission showed a large right sided pleural effusion and patient subsequently underwent thoracocentesis which was diagnostic for empyema with fluid culture being positive for Group A streptococcus and also had a chest tube inserted Patient was initially started empirically on Vancomycin which was switched to IV Cefazolin and Clindamycin  Clindamycin was eventually discontinued as patient began to improve  Serial CT chest showed persistent dense consolidation in the right lobe and patient underwent infusion with tPA and pulmozyme into the chest tube but with minimal improvement  It was decided then that patient would require surgical intervention and patient was transferred today  Today patient was seen and examined at bedside resting comfortably with family at the bedside  Patient complains of fatigue and productive cough; sputum is clear with no hemoptysis  Patient denies any other symptoms such as shortness of breath, chest pain, nausea, vomiting, fever, chills, abdominal pain or diarrhea             Inpatient consult to Infectious Diseases     Performed by  Meagan Lovett MD     Authorized by Philip Vizcaino MD              Review of Systems   Constitutional: Positive for fatigue  Negative for activity change, appetite change, chills and unexpected weight change  HENT: Negative for congestion, hearing loss and sore throat  Eyes: Negative for pain and discharge  Respiratory: Positive for cough  Negative for shortness of breath  Cardiovascular: Negative for chest pain and leg swelling  Gastrointestinal: Negative for abdominal distention, abdominal pain, blood in stool, constipation, diarrhea, nausea and vomiting  Endocrine: Negative for cold intolerance and heat intolerance  Genitourinary: Negative for difficulty urinating and dysuria  Musculoskeletal: Negative for arthralgias, back pain and neck pain  Skin: Negative for color change and rash  Allergic/Immunologic: Negative for environmental allergies and food allergies  Neurological: Negative for dizziness, weakness, light-headedness and headaches  Psychiatric/Behavioral: The patient is not nervous/anxious  Historical Information   Past Medical History:   Diagnosis Date    A-fib ([facility] )     Disease of thyroid gland     Hyperlipidemia     Hypertension     Optic neuropathy      No past surgical history on file  Social History   Social History     Substance and Sexual Activity   Alcohol Use Yes    Frequency: Never    Comment: occasional     Social History     Substance and Sexual Activity   Drug Use Never     Social History     Tobacco Use   Smoking Status Former Smoker   Smokeless Tobacco Never Used     No family history on file      Meds/Allergies      Current Facility-Administered Medications:     acetaminophen (TYLENOL) tablet 650 mg, 650 mg, Oral, Q6H PRN, Hermelinda Aguirre PA-C    ceFAZolin (ANCEF) IVPB (premix) 2,000 mg, 2,000 mg, Intravenous, Q8H, Noni Soulier, MD, Last Rate: 100 mL/hr at 12/12/19 0419, 2,000 mg at 12/12/19 0419    digoxin (LANOXIN) tablet 62 5 mcg, 62 5 mcg, Oral, Daily, Noni Soulier, MD, 62 5 mcg at 12/12/19 0843    guaiFENesin (MUCINEX) 12 hr tablet 600 mg, 600 mg, Oral, Q12H [facility], Hermelinda Aguirre PA-C, 600 mg at 12/12/19 1037    heparin (porcine) 25,000 units in 250 mL infusion (premix), 3-20 Units/kg/hr (Order-Specific), Intravenous, Titrated, Jean Paul Gazra MD, Stopped at 12/12/19 0611    heparin (porcine) injection 2,000 Units, 2,000 Units, Intravenous, PRN, Jean Paul Garza MD, 2,000 Units at 12/12/19 0003    heparin (porcine) injection 4,000 Units, 4,000 Units, Intravenous, PRN, Jean Paul Garza MD    lactated ringers infusion, 75 mL/hr, Intravenous, Continuous, Hermelinda Aguirre PA-C, Last Rate: 100 mL/hr at 12/12/19 1033, 100 mL/hr at 12/12/19 1033    levothyroxine tablet 50 mcg, 50 mcg, Oral, Daily Before Breakfast, Jean Paul Garza MD, 50 mcg at 12/12/19 7560    melatonin tablet 3 mg, 3 mg, Oral, HS, Jean Paul Garza MD, 3 mg at 12/11/19 2117    [START ON 12/13/2019] midodrine (PROAMATINE) tablet 2 5 mg, 2 5 mg, Oral, TID AC, Hermelinda Aguirre PA-C    midodrine (PROAMATINE) tablet 5 mg, 5 mg, Oral, TID AC, Hermelinda Aguirre PA-C, 5 mg at 12/12/19 1037    nystatin (MYCOSTATIN) oral suspension 500,000 Units, 500,000 Units, Swish & Swallow, 4x Daily, Jean Paul Garza MD, 500,000 Units at 12/12/19 0843    ondansetron (ZOFRAN) injection 4 mg, 4 mg, Intravenous, Q4H PRN, Yordan Aguirre PA-C    saccharomyces boulardii (FLORASTOR) capsule 250 mg, 250 mg, Oral, BID, Jean Paul Garza MD, 250 mg at 12/12/19 0843    senna-docusate sodium (SENOKOT S) 8 6-50 mg per tablet 1 tablet, 1 tablet, Oral, HS, Jean Paul Garza MD    tamsulosin (FLOMAX) capsule 0 4 mg, 0 4 mg, Oral, Daily With Pankaj Sahne MD    zolpidem [facility]) tablet 5 mg, 5 mg, Oral, HS PRN, Jean Paul Garza MD    No Known Allergies    Objective       Intake/Output Summary (Last 24 hours) at 12/12/2019 1226  Last data filed at 12/12/2019 0850  Gross per 24 hour   Intake 0 ml   Output 325 ml   Net -325 ml       Invasive Devices:   PICC Line 71/45/13 Right Basilic (Active)   Reasons to continue PICC No peripheral vascular access 12/11/2019  8:00 PM   Goal for Removal D/C when no longer on IV medications 12/11/2019  8:00 PM   Line Necessity Reviewed Yes, reviewed with provider 12/11/2019  8:00 PM   Site Assessment Clean;Dry; Intact 12/12/2019  9:00 AM   Proximal Lumen (Red Port-PICC only) Status Infusing 12/12/2019  9:00 AM   Medial Lumen (Purple/White Port-PICC only) Status Blood return noted;Normal saline locked; Flushed 12/12/2019  9:00 AM   Catheter Out on Skin (cm) 0 cm 12/11/2019 11:00 AM   Extremity Circumference (cm) 30 cm 12/11/2019 11:00 AM   Dressing Type Chlorhexidine dressing 12/12/2019  9:00 AM   Dressing Status Clean;Dry; Intact 12/12/2019  9:00 AM   Dressing Change Due 12/18/19 12/11/2019 12:12 PM       Chest Tube Right 20 Fr  (Active)   Function -20 cm H2O 12/11/2019  9:14 PM   Chest Tube Air Leak No 12/11/2019  9:14 PM   Patency Intervention Tip/tilt 12/11/2019  9:14 PM   Drainage Description Serosanguineous 12/11/2019 12:00 PM   Dressing Status Clean;Dry; Intact 12/11/2019  9:14 PM   Site Assessment Unable to assess 12/11/2019  9:14 PM   Surrounding Skin Dry; Intact 12/11/2019  9:14 PM   Output (mL) 0 mL 12/11/2019  9:14 PM       Physical Exam   Constitutional: He is oriented to person, place, and time  He appears well-developed and well-nourished  No distress  HENT:   Head: Normocephalic and atraumatic  Eyes: Pupils are equal, round, and reactive to light  EOM are normal    Neck: Normal range of motion  Neck supple  Cardiovascular: Normal rate and normal heart sounds  No murmur heard  Pulmonary/Chest: Effort normal  No respiratory distress  He has no wheezes  Chest tube in place   Abdominal: Soft  Bowel sounds are normal  There is no tenderness  Genitourinary:   Genitourinary Comments: Deferred    Musculoskeletal: He exhibits edema  Upper extremity edema   Neurological: He is alert and oriented to person, place, and time  Skin: Skin is warm  Psychiatric: He has a normal mood and affect  Lab Results:  I have personally reviewed pertinent labs  Imaging Studies: I have personally reviewed pertinent reports  EKG, Pathology, and Other Studies: I have personally reviewed pertinent reports  Counseling / Coordination of Care  Total floor / unit time spent today 40 minutes  Greater than 50% of total time was spent with the patient and / or family counseling and / or coordination of care

## 2019-12-12 NOTE — ASSESSMENT & PLAN NOTE
· Patient was noted to have septic shock when at [facility]  Patient was noted to be hypotensive, tachycardic and have severe leukocytosis on admission associated with lactic acidosis and acute kidney injury requiring pressors  · Was discontinued from vasopressors 12/10  · Wean midodrine to 2 5 mg PO TID tomorrow and eventually to off  Was on up to 15 mg TID at [facility] and was reduced to 5 mg PO TID on admission  · Leukocytosis noted to be persistent    · Check procalitonin tomorrow

## 2019-12-12 NOTE — ASSESSMENT & PLAN NOTE
Likely due to iatrogenic volume overload when being treated for septic shock  Restart Lasix when off midodrine in blood pressures are stable  Avoid further IV fluid administration if possible  Low-sodium diet

## 2019-12-12 NOTE — UTILIZATION REVIEW
Notification of Discharge  This is a Notification of Discharge from our facility [address]  Please be advised that this patient has been discharge from our facility  Below you will find the admission and discharge date and time including the patients disposition  PRESENTATION DATE: 12/3/2019  4:08 PM  OBS ADMISSION DATE:   IP ADMISSION DATE: 12/3/19 1748   DISCHARGE DATE: 12/11/2019  7:19 PM  DISPOSITION: [address]   Admission Orders listed below:  Admission Orders (From admission, onward)     Ordered        12/03/19 1748  Inpatient Admission  Once                   Please contact the UR Department if additional information is required to close this patient's authorization/case  [address] [facility] Utilization Review Department  Main: [phone] x carefully listen to the prompts  All voicemails are confidential   [email or web address]  Send all requests for admission clinical reviews, approved or denied determinations and any other requests to dedicated fax number below belonging to the campus where the patient is receiving treatment   List of dedicated fax numbers:  [address] DENIALS (Administrative/Medical Necessity) [phone]   [address] (Maternity/NICU/Pediatrics) [phone]   [facility] [phone]   [facility] [phone]   [address] [place] [address] [phone]   [facility] [phone]   [facility] [phone]   Jorge Alberto Castillo [phone]   Julian Lozano [address] 1000 [facility] [phone]

## 2019-12-12 NOTE — UTILIZATION REVIEW
Notification of Inpatient Admission/Inpatient Authorization Request   This is a Notification of Inpatient Admission for [address]  Be advised that this patient was admitted to our facility under Inpatient Status  Contact Kenny Stokes at [phone] for additional admission information  Tania Carl UR DEPT  DEDICATED -[phone]  Patient Name:   Farzana Knowles   YOB: 1935       [address]   [address]:   [address]  Tax ID: [id number]  NPI: [id number] Attending Provider/NPI: Marcella Fritz [[id number]]   Place of Service Code: 24     Place of Service Name:  [address] [place] [address]   Start Date: 12/11/19 1958     Discharge Date & Time: No discharge date for patient encounter  Type of Admission: Inpatient Status Discharge Disposition (if discharged): [address] [place] [address]   Patient Diagnoses: Acute respiratory failure with hypoxia [J96 01]     Orders: Admission Orders (From admission, onward)     Ordered        12/11/19 2025  Inpatient Admission  Once                    Assigned Utilization Review Contact: Kenny Stokes  Utilization   Network Utilization Review Department  Phone: [phone]; Fax [phone]  Email: [email or web address]   ATTENTION PAYERS: Please call the assigned Utilization  directly with any questions or concerns ALL voicemails in the department are confidential  Send all requests for admission clinical reviews, approved or denied determinations and any other requests to dedicated fax number belonging to the campus where the patient is receiving treatment

## 2019-12-12 NOTE — PROGRESS NOTES
Progress Note - Thoracic Surgery   Tyrell Tristan 82 y o  male MRN: [id number]  Unit/Bed#: [facility] 719-01 Encounter: [id number]    Assessment:  61-year-old male with loculated right parapneumonic effusion status post right tube thoracostomy and failed attempt at tPA/DNAse  Right chest tube with 0 cc recorded since arrival at [address]:  OR today for right VATS, decortication   NPO for surgery  Keep right chest tube to suction for now  Antibiotics per Infectious Disease  Hold heparin drip at 6:00 a m  In anticipation for surgery later today  Rest of management per primary service    Subjective/Objective     Chief Complaint:     Subjective:  No events overnight  Objective:     Vitals: Blood pressure 119/68, pulse 90, temperature 98 °F (36 7 °C), height 5' 10" (1 778 m), SpO2 96 %  ,Body mass index is 30 72 kg/m²  I/O       12/10 0701 - 12/11 0700 12/11 0701 - 12/12 0700    Chest Tube  0    Total Output  0    Net  0                Physical Exam:   No acute distress  Irregularly irregular rhythm  Nonlabored respirations on room air  Right chest tube in place with serosanguineous drainage, to suction without air leak  Lab, Imaging and other studies:   CBC with diff: No results found for: WBC, HGB, HCT, MCV, PLT, ADJUSTEDWBC, MCH, MCHC, RDW, MPV, NRBC, BMP/CMP: No results found for: SODIUM, K, CL, CO2, ANIONGAP, BUN, CREATININE, GLUCOSE, CALCIUM, AST, ALT, ALKPHOS, PROT, BILITOT, EGFR, Magnesium: No components found for: MAG, Coags:   Lab Results   Component Value Date    PTT 51 (H) 12/11/2019   , Blood Culture:   Lab Results   Component Value Date    BLOODCX Received in Microbiology Lab  Culture in Progress  12/11/2019    BLOODCX Received in Microbiology Lab  Culture in Progress   12/11/2019   , Urine Culture: No results found for: URINECX, Wound Culure: No results found for: WOUNDCULT  VTE Pharmacologic Prophylaxis: Sequential compression device (Venodyne)   VTE Mechanical Prophylaxis: sequential compression device

## 2019-12-12 NOTE — CONSULTS
Consultation - Thoracic Surgery   Regina Oconnor 82 y o  male MRN: [id number]  Unit/Bed#: [facility] 719-01 Encounter: [id number]    Assessment and Plan:  82-year-old male with loculated right empyema status post right tube thoracostomy and unsuccessful attempt at management with tPA/dornase  Plan for right VATS, decortication tomorrow  NPO at midnight for surgery  Hold heparin drip at 6:00 a m  tomorrow  Aggressive pulmonary toilet/incentive spirometry  Keep R chest tube to suction for now  Antibiotics per Infectious Disease Service  Rest of management per primary service      History of Present Illness   HPI:  Regina Oconnor is a 82 y o  male who presents with shortness of breath  The patient presented on 12/03/2019 to [facility] with 2 week history of progressive shortness of breath and cough that got worse 4 days prior to presentation  He was noted to be hypoxic on arrival to the emergency department and imaging revealed a large right pleural effusion  He underwent right thoracentesis in the emergency department and cultures from this procedure have grown group A strep  Patient was noted to be in septic shock and was admitted to the intensive care unit and Infectious Disease Services was consulted for management of patient's antibiotics  He underwent right tube thoracostomy on 12/04/2019  Sputum culture from 12/04/2019 has grown group a strep and MSSA  He was on pressors which have since been weaned to off  The patient also underwent 3 days of tPA/dornase through the right chest tube and repeat CT chest today showed persistent loculated right pleural effusion/empyema  The patient was therefore transferred to [facility] for thoracic surgery evaluation for possible thoracoscopic drainage of right empyema  He endorses a remote history of cigarette smoking for 1 year and quit in 1964  Denies any sick contacts  Denies history of lung cancer    He states overall he feels better since presenting to the hospital  He has a history of HTN, HLD, hypothyrodism and Atrial fibrillation for which he normally takes eliquis (on heparin drip now)  Inpatient consult to Thoracic Surgery     Performed by  Darion Child MD     Authorized by Dangelo Krishnan MD              Review of Systems   Constitutional: Positive for fever  Negative for appetite change and chills  HENT: Negative for congestion, sore throat and trouble swallowing  Eyes: Negative for pain, discharge, redness and itching  Respiratory: Positive for cough and shortness of breath  Negative for apnea and wheezing  Cardiovascular: Negative for chest pain, palpitations and leg swelling  Gastrointestinal: Negative for abdominal distention, abdominal pain, constipation, diarrhea, nausea and vomiting  Endocrine: Negative for cold intolerance and heat intolerance  Genitourinary: Negative for difficulty urinating, dysuria and frequency  Musculoskeletal: Negative for arthralgias, back pain and joint swelling  Skin: Negative for color change, pallor and rash  Allergic/Immunologic: Negative for environmental allergies and food allergies  Neurological: Negative for dizziness, seizures, facial asymmetry, numbness and headaches  Hematological: Negative for adenopathy  Does not bruise/bleed easily  Psychiatric/Behavioral: Negative for agitation, behavioral problems and hallucinations  Historical Information   Past Medical History:   Diagnosis Date    [facility]     Disease of thyroid gland     Hyperlipidemia     Hypertension     Optic neuropathy      No past surgical history on file    Social History   Social History     Substance and Sexual Activity   Alcohol Use Yes    Frequency: Never    Comment: occasional     Social History     Substance and Sexual Activity   Drug Use Never     Social History     Tobacco Use   Smoking Status Former Smoker   Smokeless Tobacco Never Used     No family history on file     Meds/Allergies   current meds:   Current Facility-Administered Medications   Medication Dose Route Frequency    ceFAZolin (ANCEF) IVPB (premix) 2,000 mg  2,000 mg Intravenous Q8H    [START ON 12/12/2019] digoxin (LANOXIN) tablet 62 5 mcg  62 5 mcg Oral Daily    heparin (porcine) 25,000 units in 250 mL infusion (premix)  3-20 Units/kg/hr (Order-Specific) Intravenous Titrated    heparin (porcine) injection 2,000 Units  2,000 Units Intravenous PRN    heparin (porcine) injection 4,000 Units  4,000 Units Intravenous PRN    [START ON 12/12/2019] lactated ringers infusion  100 mL/hr Intravenous Continuous    [START ON 12/12/2019] levothyroxine tablet 50 mcg  50 mcg Oral Daily Before Breakfast    melatonin tablet 3 mg  3 mg Oral HS    [START ON 12/12/2019] midodrine (PROAMATINE) tablet 5 mg  5 mg Oral TID AC    nystatin (MYCOSTATIN) oral suspension 500,000 Units  500,000 Units Swish & Swallow 4x Daily    saccharomyces boulardii (FLORASTOR) capsule 250 mg  250 mg Oral BID    senna-docusate sodium (SENOKOT S) 8 6-50 mg per tablet 1 tablet  1 tablet Oral HS    [START ON 12/12/2019] tamsulosin (FLOMAX) capsule 0 4 mg  0 4 mg Oral Daily With Dinner    zolpidem (AMBIEN) tablet 5 mg  5 mg Oral HS PRN     No Known Allergies    Objective   First Vitals:   Blood Pressure: 119/68 (12/11/19 2003)  Pulse: 90 (12/11/19 2003)  Temperature: 98 °F (36 7 °C) (12/11/19 2003)  Height: 5' 10" (177 8 cm) (12/11/19 2003)  SpO2: 96 % (12/11/19 2003)    Current Vitals:   Blood Pressure: 119/68 (12/11/19 2003)  Pulse: 90 (12/11/19 2003)  Temperature: 98 °F (36 7 °C) (12/11/19 2003)  Height: 5' 10" (177 8 cm) (12/11/19 2003)  SpO2: 96 % (12/11/19 2003)    No intake or output data in the 24 hours ending 12/11/19 2147    Invasive Devices     Peripherally Inserted Central Catheter Line            PICC Line 44/32/98 Right Basilic less than 1 day          Drain            Chest Tube Right 20 Fr  7 days                Physical Exam Constitutional: He is oriented to person, place, and time  He appears well-developed and well-nourished  HENT:   Head: Normocephalic and atraumatic  Eyes: Pupils are equal, round, and reactive to light  Conjunctivae and EOM are normal    Neck: Normal range of motion  Neck supple  No tracheal deviation present  Cardiovascular: Normal rate  Irregularly irregular rhythm   Pulmonary/Chest: Effort normal  No respiratory distress  Course R breath sounds  R chest tube in place to suction with serosanguinous drainage  No airleak   Abdominal: Soft  Bowel sounds are normal  He exhibits no distension  There is no tenderness  Musculoskeletal: Normal range of motion  He exhibits no edema  Neurological: He is alert and oriented to person, place, and time  Skin: Skin is warm and dry  Vitals reviewed  Lab Results:   CBC:   Lab Results   Component Value Date    WBC 21 72 (H) 12/11/2019    HGB 13 6 12/11/2019    HCT 40 5 12/11/2019    MCV 96 12/11/2019     12/11/2019    MCH 32 2 12/11/2019    MCHC 33 6 12/11/2019    RDW 14 9 12/11/2019    MPV 10 0 12/11/2019    NRBC 0 12/11/2019   , CMP:   Lab Results   Component Value Date    SODIUM 131 (L) 12/11/2019    K 4 1 12/11/2019    CL 98 (L) 12/11/2019    CO2 25 12/11/2019    BUN 28 (H) 12/11/2019    CREATININE 1 12 12/11/2019    CALCIUM 7 5 (L) 12/11/2019    EGFR 61 12/11/2019   , Coagulation: No results found for: PT, INR, APTT, Urinalysis: No results found for: Roxie Pollen, SPECGRAV, PHUR, LEUKOCYTESUR, NITRITE, PROTEINUA, GLUCOSEU, KETONESU, BILIRUBINUR, BLOODU, Amylase: No results found for: AMYLASE, Lipase: No results found for: LIPASE  Imaging: I have personally reviewed pertinent films in PACS  EKG, Pathology, and Other Studies: I have personally reviewed pertinent films in PACS    Counseling / Coordination of Care  Total floor / unit time spent today 25 minutes    Greater than 50% of total time was spent with the patient and / or family counseling and / or coordination of care      Ulises Zheng MD  12/11/2019 9:47 PM

## 2019-12-12 NOTE — UTILIZATION REVIEW
Initial Clinical Review Direct admit from [facility]     Admission: Date/Time/Statement: Inpatient Admission Orders (From admission, onward)     Ordered        12/11/19 2025  Inpatient Admission  Once                   Orders Placed This Encounter   Procedures    Inpatient Admission     Standing Status:   Standing     Number of Occurrences:   1     Order Specific Question:   Admitting Physician     Answer:   Magi Rosalinda [[id number]]     Order Specific Question:   Level of Care     Answer:   Med Surg [[id number]]     Order Specific Question:   Estimated length of stay     Answer:   Inpatient Only Surgery     No chief complaint on file  Assessment/Plan: 83 yo male w/ empyema and septic shock   empyema which has not improved with chest tube placement and tPA administration to the chest tube  He [facility]  weaned off pressors  Reports mild cough with minimal clear sputum  admitted IP status for thoracic surgery eval  Cultures have been positive for group a strep and methicillin sensitive Staph aureus  Cont cefazolin , CT management , tele , monitor labs, avoid further IVF if possible , restart lasix when off midodrine and BP stable  12/11 Thoracic consult   Right pneumonia with right empyema  Atrial fibrillation   Recommend Right thoracoscopic decortication, NPO , hold heparin , cont abx and aggressive pulm toilet , R chest tube to suction      Temperature Pulse Respirations Blood Pressure SpO2   12/11/19 2003 12/11/19 2003 12/12/19 0753 12/11/19 2003 12/11/19 2003   98 °F (36 7 °C) 90 16 119/68 96 %      Temp src Heart Rate Source Patient Position - Orthostatic VS BP Location FiO2 (%)   -- -- -- -- --             Pain Score       12/11/19 2300       No Pain        Wt Readings from Last 1 Encounters:      12/12/19 94 6 kg (208 lb 8 9 oz)     Additional Vital Signs:   12/12/19 07:53:27  97 2 °F (36 2 °C)Abnormal   94  16  132/82  99  90 % Pertinent Labs/Diagnostic Test Results:   12/11CT chest -   Right hydropneumothorax/empyema with basilar chest tube in place  Loculated components at the right apex and lateral aspect of the right upper lobe appear small but increased from prior  Loculated intrafissural component appears unchanged    2   Persistent bibasilar atelectasis  Heterogeneity within the result right basilar consolidation may suggest superimposed pneumonia    3   Increasing 3rd spacing/volume overload with increasing layering left pleural effusion and diffuse, bilateral interstitial edema  Anasarca also show slight interval increase      4   Persistent mediastinal lymphadenopathy  This could be reactive but warrants chest CT follow-up 3 months after resolution of acute episode to document involution    5   Chronic sequelae of calcific pericarditis    6   Probable cirrhosis      12/11 Chest Xray picc line The right-sided PICC line lies with tip in the SVC  Right-sided the jugular line with tip in the SVC  Bilateral effusion with congestion  Results from last 7 days   Lab Units 12/12/19  0634 12/11/19  0431 12/10/19  0507 12/09/19  1922 12/09/19  0433  12/08/19  0532   WBC Thousand/uL 20 09* 21 72* 28 31*  --  32 39*  --  27 97*   HEMOGLOBIN g/dL 14 2 13 6 14 5 15 2 15 8   < > 15 7   HEMATOCRIT % 42 0 40 5 42 4 43 8 47 0   < > 45 6   PLATELETS Thousands/uL 248 203 210  --  206  --  226   BANDS PCT %  --   --  3  --  4  --  4    < > = values in this interval not displayed       Results from last 7 days   Lab Units 12/12/19  0634 12/11/19  0431 12/10/19  1702 12/10/19  0507 12/09/19  0433 12/09/19  0000 12/08/19  2146 12/08/19  0532 12/07/19  0535   SODIUM mmol/L 136 131* 131* 130* 127*  --  130* 132* 133*   POTASSIUM mmol/L 3 7 4 1 4 5 4 8 4 9  --  3 7 3 0* 3 0*   CHLORIDE mmol/L 103 98* 97* 97* 98*  --  97* 97* 98*   CO2 mmol/L 25 25 27 25 22  --  24 24 23   ANION GAP mmol/L 8 8 7 8 7  --  9 11 12   BUN mg/dL 26* 28* 27* 21 15  -- 19 23 34*   CREATININE mg/dL 0 97 1 12 1 24 1 16 1 07  --  1 04 1 09 1 30   EGFR ml/min/1 73sq m 72 61 54 58 64  --  67 63 51   CALCIUM mg/dL 7 9* 7 5* 7 6* 7 5* 7 2*  --  7 0* 6 9* 7 2*   CALCIUM, IONIZED mmol/L  --   --   --   --  0 99* 0 96*  --  0 99* 0 98*   MAGNESIUM mg/dL  --  2 0 2 1 2 0 2 2  --  1 8 2 0 2 4   PHOSPHORUS mg/dL  --  4 1 4 1 4 0 3 1  --   --  2 8 2 8     Results from last 7 days   Lab Units 12/09/19  0433 12/08/19  0532 12/07/19  0535 12/06/19  0540   AST U/L 34 35 62* 161*   ALT U/L 7* 12 24 52   ALK PHOS U/L 203* 194* 180* 141*   TOTAL PROTEIN g/dL 6 0* 5 9* 6 0* 5 9*   ALBUMIN g/dL 1 4* 1 4* 1 5* 1 5*   TOTAL BILIRUBIN mg/dL 2 90* 2 90* 3 30* 3 60*     Results from last 7 days   Lab Units 12/06/19  0018 12/05/19  1748 12/05/19  1152   POC GLUCOSE mg/dl 110 113 106     Results from last 7 days   Lab Units 12/12/19  0634 12/11/19  0431 12/10/19  1702 12/10/19  0507 12/09/19  0433 12/08/19  2146 12/08/19  0532 12/07/19  0535 12/06/19  0540   GLUCOSE RANDOM mg/dL 80 118 107 171* 158* 124 114 109 105     Results from last 7 days   Lab Units 12/10/19  0507 12/09/19  0000   PH OLIMPIA  7 426* 7 438*   PCO2 OLIMPIA mm Hg 39 3* 32 5*   PO2 OLIMPIA mm Hg 41 7 46 9*   HCO3 OLIMPIA mmol/L 25 3 21 5*   BASE EXC OLIMPIA mmol/L 1 0 -1 6   O2 CONTENT OLIMPIA ml/dL 16 1 19 1   O2 HGB, VENOUS % 75 7 83 2*     Results from last 7 days   Lab Units 12/12/19  0636 12/11/19  2214 12/11/19  2103  12/07/19  0535   PROTIME seconds  --   --   --   --  18 9*   INR   --   --   --   --  1 58*   PTT seconds 52* 51* 50*   < > 80*    < > = values in this interval not displayed       Results from last 7 days   Lab Units 12/10/19  0507   PROCALCITONIN ng/ml 0 46*     Results from last 7 days   Lab Units 12/09/19  0000   LACTIC ACID mmol/L 1 5     Results from last 7 days   Lab Units 12/10/19  1702 12/10/19  0507   DIGOXIN LVL ng/mL 1 4 1 5     Results from last 7 days   Lab Units 12/09/19  1318   SODIUM UR  15       Results from last 7 days   Lab Units 12/11/19  1236   BLOOD CULTURE  Received in Microbiology Lab  Culture in Progress  Received in Microbiology Lab  Culture in Progress  Results from last 7 days   Lab Units 12/11/19  0431 12/10/19  0507 12/09/19  0433 12/08/19  0532 12/07/19  0535   TOTAL COUNTED  100 100 100 100 100     ED Treatment:   Medication Administration - No Administrations Displayed (No Start Event Found)     None        Past Medical History:   Diagnosis Date    A-fib ([facility] )     Disease of thyroid gland     Hyperlipidemia     Hypertension     Optic neuropathy      Present on Admission:   Chronic atrial fibrillation   Hyponatremia   Moderate protein-calorie malnutrition (HCC)   Empyema (HCC)   Septic shock (HCC)      Admitting Diagnosis: Acute respiratory failure with hypoxia [J96 01]  Age/Sex: 82 y o  male  Admission Orders:  Scheduled Medications:    Medications:  cefazolin 2,000 mg Intravenous Q8H   digoxin 62 5 mcg Oral Daily   levothyroxine 50 mcg Oral Daily Before Breakfast   melatonin 3 mg Oral HS   midodrine 5 mg Oral TID AC   nystatin 500,000 Units Swish & Swallow 4x Daily   saccharomyces boulardii 250 mg Oral BID   senna-docusate sodium 1 tablet Oral HS   tamsulosin 0 4 mg Oral Daily With Dinner     Continuous IV Infusions:    heparin (porcine) 3-20 Units/kg/hr (Order-Specific) Intravenous Titrated   lactated ringers 100 mL/hr Intravenous Continuous     PRN Meds:    heparin (porcine) 2,000 Units Intravenous PRN   heparin (porcine) 4,000 Units Intravenous PRN   zolpidem 5 mg Oral HS PRN     Daily weight   NPO   SCD  Reg diet   Tele   Neuro checks q4hr   I&O   Fall precautions    IP CONSULT TO THORACIC SURGERY    Network Utilization Review Department  [email or web address]  ATTENTION: Please call with any questions or concerns to [phone] and carefully listen to the prompts so that you are directed to the right person   All voicemails are confidential   Nito [employer] all requests for admission clinical reviews, approved or denied determinations and any other requests to dedicated fax number below belonging to the campus where the patient is receiving treatment   List of dedicated fax numbers for the Facilities:  [address] [place] [address] DENIALS (Administrative/Medical Necessity) [phone]   [address] (Maternity/NICU/Pediatrics) [phone]   Linda Abhi [phone]   Mykel Sandyparvez [phone]   [address] [place] [address] [phone]   [address] [phone]   [facility]  [phone]   Julian Lozano [address] 1000 [facility] [phone]

## 2019-12-12 NOTE — ANESTHESIA PREPROCEDURE EVALUATION
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%,  Pulmonary  Pneumonia,   Comment: Right empyema     GI/Hepatic      Comment: Confirmed NPO appropriate       Negative  ROS        Endo/Other  History of thyroid disease , hypothyroidism,      GYN       Hematology   Musculoskeletal       Neurology  Negative neurology ROS      Psychology           Physical Exam    Airway    Mallampati score: II  TM Distance: >3 FB  Neck ROM: full     Dental   Comment: Edentulous upper  #27 loose,     Cardiovascular  Rhythm: regular,     Pulmonary      Other Findings    12/5/19 TTE:  IMPRESSIONS:  Moderately reduced LV systolic function LVEF around 40%  Biatrial dilation  Moderate aortic stenosis  Mild MR, Mild TR, trace AI  poor endocardial resolution, oblique views  Definity was used    Anesthesia Plan  ASA Score- 3     Anesthesia Type- general with ASA Monitors  Additional Monitors: arterial line  Airway Plan: ETT  Comment: Discussed risk to dislodge loose #27 tooth  Patient understands and states he was going to have it removed in the future regardless  Plan Factors-    Induction- intravenous  Postoperative Plan- Plan for postoperative opioid use  Planned trial extubation    Informed Consent- Anesthetic plan and risks discussed with patient

## 2019-12-12 NOTE — ASSESSMENT & PLAN NOTE
· Status post chest tube placement at [facility]   · TPA infusion to chest tube has been unsuccessful  · Referred to [address] [place] [address] for thoracic surgery evaluation  Plan for decortication today by thoracic surgery  · Heparin drip has been on hold since this morning  · Continue Ancef    · ID following

## 2019-12-12 NOTE — PROGRESS NOTES
[address] Internal Medicine  Progress Note - Jessica Heart 1935, 82 y o  male MRN: [id number]  Unit/Bed#: [facility] 719-01 Encounter: [id number]  Primary Care Provider: Ashley Leonard MD   Date and time admitted to hospital: 12/11/2019  7:58 PM    * Empyema [facility])  Assessment & Plan  · Status post chest tube placement at [facility], [place], operated by [facility]   · TPA infusion to chest tube has been unsuccessful  · Referred to [facility] for thoracic surgery evaluation  Plan for decortication today by thoracic surgery  · Heparin drip has been on hold since this morning  · Continue Ancef  · ID following    Septic shock [facility])  Assessment & Plan  · Patient was noted to have septic shock when at [facility] [place]  Patient was noted to be hypotensive, tachycardic and have severe leukocytosis on admission associated with lactic acidosis and acute kidney injury requiring pressors  · Was discontinued from vasopressors 12/10  · Wean midodrine to 2 5 mg PO TID tomorrow and eventually to off  Was on up to 15 mg TID at [facility] and was reduced to 5 mg PO TID on admission  · Leukocytosis noted to be persistent  · Check procalitonin tomorrow    Grp A strep pneumonia ([facility] [address] )  Assessment & Plan  · Patient is noted to have severe group a strep pneumonia complicated by empyema  · Plan for decortication today by thoracic surgery  · Continue antibiotics in the form of Ancef  Clindamycin was discontinued yesterday by ID  · No need for PICC line for long-term antibiotics as per Infectious Disease  · Will likely be able to be discharged with oral antibiotic after hospital discharge    Chronic atrial fibrillation  Assessment & Plan  · Monitor on telemetry in perioperative setting  · Continue digoxin for rate control  · Restart Eliquis when stable from a surgical point of view   Is on heparin infusion at this time    Hyponatremia  Assessment & Plan  · Likely due to iatrogenic volume overload when being treated for septic shock   · Restart Lasix tomorrow if BP allows  · Was started on lactated Ringer's perioperatively by surgery service  Will discontinue when able postop  · Patient does have edema on exam suggesting likely mild degree of volume overload from aggressive volume resuscitation  Lab Results   Component Value Date    SODIUM 136 12/12/2019    SODIUM 131 (L) 12/11/2019    SODIUM 131 (L) 12/10/2019         Volume overload  Assessment & Plan  · CT: Increasing 3rd spacing/volume overload with increasing layering left pleural effusion and diffuse, bilateral interstitial edema  Anasarca also show slight interval increase  · Patient had aggressive IV fluid resuscitation for septic shock  · Now does have edema of the bilateral upper extremities and had hyponatremia which has since improved  · Was placed on IV fluids by surgery perioperatively for decortication today  · Discontinue IV fluids as soon as possible postoperatively  · Plan to initiate oral Lasix tomorrow if BP allows  Cardiomyopathy ([facility] [address] )  Assessment & Plan  · Possibly due to Afib  · Ejection fraction noted to be 40%,  · Continue digoxin for rate control  · Restart Eliquis when stable from a surgical point of view  · Unknown baseline  · outpatient cardio follow up     VTE Pharmacologic Prophylaxis:   Pharmacologic: Heparin Drip  Mechanical VTE Prophylaxis in Place: Yes    Patient Centered Rounds: I have performed bedside rounds with nursing staff today  Discussions with Specialists or Other Care Team Provider: nursing    Education and Discussions with Family / Patient: patient    Time Spent for Care: 30 minutes  More than 50% of total time spent on counseling and coordination of care as described above  Current Length of Stay: 1 day(s)    Current Patient Status: Inpatient   Certification Statement: The patient will continue to require additional inpatient hospital stay due to VATS decortication today    Discharge Plan: await decortication   Likely d/c in 48 hrs    Code Status: Level 1 - Full Code      Subjective:   Patient reports that he has pain with inspiration and it is improved on exhale  He reports that he has pain when the clamps chest tube in his anterior aspect of his chest   It goes away when the chest tube was unclamped  He has cough however it is nonproductive  Objective:     Vitals:   Temp (24hrs), Av 8 °F (36 6 °C), Min:97 2 °F (36 2 °C), Max:98 1 °F (36 7 °C)    Temp:  [97 2 °F (36 2 °C)-98 1 °F (36 7 °C)] 97 5 °F (36 4 °C)  HR:  [] 101  Resp:  [16-23] 18  BP: (119-135)/(67-84) 135/84  SpO2:  [89 %-96 %] 93 %  Body mass index is 29 92 kg/m²  Input and Output Summary (last 24 hours): Intake/Output Summary (Last 24 hours) at 2019 1228  Last data filed at 2019 0850  Gross per 24 hour   Intake 0 ml   Output 325 ml   Net -325 ml       Physical Exam:     Physical Exam   Constitutional: No distress  HENT:   Head: Normocephalic and atraumatic  Eyes: No scleral icterus  Cardiovascular: Normal rate, regular rhythm, normal heart sounds and intact distal pulses  No murmur heard  Pulmonary/Chest: Effort normal and breath sounds normal  No accessory muscle usage  No respiratory distress  He has no wheezes  He has no rales  Chest tube noted in right side of chest   The patient wearing oxygen at this time  No evidence of respiratory distress  Able to speak in full sentences  Abdominal: Soft  Bowel sounds are normal  He exhibits no distension  There is no tenderness  There is no rigidity, no rebound and no guarding  Musculoskeletal: He exhibits edema (Bilateral upper extremity edema l > right  1+ BLE edema)  Neurological: He is alert  Skin: Skin is warm and dry  No rash noted  He is not diaphoretic  No erythema  Psychiatric: He has a normal mood and affect  His behavior is normal    Nursing note and vitals reviewed        Additional Data:     Labs:    Results from last 7 days   Lab Units 19  4840 12/11/19  0431 12/10/19  0507   WBC Thousand/uL 20 09* 21 72* 28 31*   HEMOGLOBIN g/dL 14 2 13 6 14 5   HEMATOCRIT % 42 0 40 5 42 4   PLATELETS Thousands/uL 248 203 210   BANDS PCT %  --   --  3   LYMPHO PCT %  --  8* 3*   MONO PCT %  --  6 3*   EOS PCT %  --  0 0     Results from last 7 days   Lab Units 12/12/19  0634  12/09/19  0433   SODIUM mmol/L 136   < > 127*   POTASSIUM mmol/L 3 7   < > 4 9   CHLORIDE mmol/L 103   < > 98*   CO2 mmol/L 25   < > 22   BUN mg/dL 26*   < > 15   CREATININE mg/dL 0 97   < > 1 07   ANION GAP mmol/L 8   < > 7   CALCIUM mg/dL 7 9*   < > 7 2*   ALBUMIN g/dL  --   --  1 4*   TOTAL BILIRUBIN mg/dL  --   --  2 90*   ALK PHOS U/L  --   --  203*   ALT U/L  --   --  7*   AST U/L  --   --  34   GLUCOSE RANDOM mg/dL 80   < > 158*    < > = values in this interval not displayed  Results from last 7 days   Lab Units 12/07/19  0535   INR  1 58*     Results from last 7 days   Lab Units 12/06/19  0018 12/05/19  1748   POC GLUCOSE mg/dl 110 113         Results from last 7 days   Lab Units 12/10/19  0507 12/09/19  0000   LACTIC ACID mmol/L  --  1 5   PROCALCITONIN ng/ml 0 46*  --      * I Have Reviewed All Lab Data Listed Above  * Additional Pertinent Lab Tests Reviewed: All Labs Within Last 24 Hours Reviewed    Imaging:    Imaging Reports Reviewed Today Include: CT  Imaging Personally Reviewed by Myself Includes:  none    Recent Cultures (last 7 days):     Results from last 7 days   Lab Units 12/11/19  1236   BLOOD CULTURE  Received in Microbiology Lab  Culture in Progress  Received in Microbiology Lab  Culture in Progress         Last 24 Hours Medication List:     Current Facility-Administered Medications:  acetaminophen 650 mg Oral Q6H PRN Hermelinda Aguirre PA-C    cefazolin 2,000 mg Intravenous Q8H Woodrow Gong MD Last Rate: 2,000 mg (12/12/19 0419)   digoxin 62 5 mcg Oral Daily Woodrow Gong MD    guaiFENesin 600 mg Oral Q12H [address] Hermelinda Aguirre PA-C    heparin (porcine) 3-20 Units/kg/hr (Order-Specific) Intravenous Titrated Will Tolliver MD Last Rate: Stopped (12/12/19 8348)   heparin (porcine) 2,000 Units Intravenous PRN Will Tolliver MD    heparin (porcine) 4,000 Units Intravenous PRN Will Tolliver MD    lactated ringers 100 mL/hr Intravenous Continuous Yanageorge Gonzalez MD Last Rate: 100 mL/hr (12/12/19 1033)   levothyroxine 50 mcg Oral Daily Before Breakfast Will Tolliver MD    melatonin 3 mg Oral HS Will Tollivre MD    [START ON 12/13/2019] midodrine 2 5 mg Oral TID AC Hermelinda Aguirre PA-C    midodrine 5 mg Oral TID AC Hermelinda Aguirre PA-C    nystatin 500,000 Units Swish & Swallow 4x Daily Will Tolliver MD    ondansetron 4 mg Intravenous Q4H PRN Hermelinda Aguirre PA-C    saccharomyces boulardii 250 mg Oral BID Will Tolliver MD    senna-docusate sodium 1 tablet Oral HS Will Tolliver MD    tamsulosin 0 4 mg Oral Daily With Hardy Zheng MD    zolpidem 5 mg Oral HS PRN Will Tolliver MD         Today, Patient Was Seen By: Lorena Dumont PA-C    ** Please Note: Dictation voice to text software may have been used in the creation of this document   **

## 2019-12-12 NOTE — PROGRESS NOTES
Patient is resting in bed, should be going to OR for procedure soon  Nothing is needed at this time, will continue to monitor  Will call report to P4 nurse

## 2019-12-12 NOTE — ASSESSMENT & PLAN NOTE
· Likely due to iatrogenic volume overload when being treated for septic shock  · Restart Lasix tomorrow if BP allows  · Was started on lactated Ringer's perioperatively by surgery service  Will discontinue when able postop  · Patient does have edema on exam suggesting likely mild degree of volume overload from aggressive volume resuscitation      Lab Results   Component Value Date    SODIUM 136 12/12/2019    SODIUM 131 (L) 12/11/2019    SODIUM 131 (L) 12/10/2019

## 2019-12-12 NOTE — ASSESSMENT & PLAN NOTE
Possibly due to Afib  Ejection fraction noted to be 40%,  Continue digoxin for rate control  Restart Eliquis when stable from a surgical point of view  Monitor on telemetry while weaning off midodrine    Monitor electrolytes

## 2019-12-12 NOTE — SOCIAL WORK
CM met with pt to introduce CM role and begin discharge planning  Pt lives with his granddaughter, Eusebia Davies in a bilevel home that has 4 MAURO and 7 steps with railing to main level  Pt's emergency contact/POA is his daughter, Bruna Sapp ([phone])  Pt reports being incependent with ADLs PTA, no use of DMEs  Pt reports history of VNA about 1 year ago  Pt reports no history of STR, inpatient MH treatment or D/A treatment  Pt drives and is retired  PCP is Dr Joycelyn Simental ([phone]) and pt uses [address] in [facility] for prescriptions  CM reviewed d/c planning process including the following: identifying help at home, patient preference for d/c planning needs, Discharge Lounge, [facility] Meds to Bed program, availability of treatment team to discuss questions or concerns patient and/or family may have regarding understanding medications and recognizing signs and symptoms once discharged  CM also encouraged patient to follow up with all recommended appointments after discharge  Patient advised of importance for patient and family to participate in managing patients medical well being

## 2019-12-12 NOTE — ASSESSMENT & PLAN NOTE
Monitor on telemetry well wean off midodrine  Continue digoxin for rate control    Restart Eliquis when stable from a surgical point of view

## 2019-12-12 NOTE — PLAN OF CARE
Problem: PAIN - ADULT  Goal: Verbalizes/displays adequate comfort level or baseline comfort level  Description  Interventions:  - Encourage patient to monitor pain and request assistance  - Assess pain using appropriate pain scale  - Administer analgesics based on type and severity of pain and evaluate response  - Implement non-pharmacological measures as appropriate and evaluate response  - Consider cultural and social influences on pain and pain management  - Notify physician/advanced practitioner if interventions unsuccessful or patient reports new pain  Outcome: Progressing     Problem: INFECTION - ADULT  Goal: Absence or prevention of progression during hospitalization  Description  INTERVENTIONS:  - Assess and monitor for signs and symptoms of infection  - Monitor lab/diagnostic results  - Monitor all insertion sites, i e  indwelling lines, tubes, and drains  - Monitor endotracheal if appropriate and nasal secretions for changes in amount and color  - [place] appropriate cooling/warming therapies per order  - Administer medications as ordered  - Instruct and encourage patient and family to use good hand hygiene technique  - Identify and instruct in appropriate isolation precautions for identified infection/condition  Outcome: Progressing  Goal: Absence of fever/infection during neutropenic period  Description  INTERVENTIONS:  - Monitor WBC    Outcome: Progressing     Problem: SAFETY ADULT  Goal: Patient will remain free of falls  Description  INTERVENTIONS:  - Assess patient frequently for physical needs  -  Identify cognitive and physical deficits and behaviors that affect risk of falls    -  [place] fall precautions as indicated by assessment   - Educate patient/family on patient safety including physical limitations  - Instruct patient to call for assistance with activity based on assessment  - Modify environment to reduce risk of injury  - Consider OT/PT consult to assist with strengthening/mobility  Outcome: Progressing  Goal: Maintain or return to baseline ADL function  Description  INTERVENTIONS:  -  Assess patient's ability to carry out ADLs; assess patient's baseline for ADL function and identify physical deficits which impact ability to perform ADLs (bathing, care of mouth/teeth, toileting, grooming, dressing, etc )  - Assess/evaluate cause of self-care deficits   - Assess range of motion  - Assess patient's mobility; develop plan if impaired  - Assess patient's need for assistive devices and provide as appropriate  - Encourage maximum independence but intervene and supervise when necessary  - Involve family in performance of ADLs  - Assess for home care needs following discharge   - Consider OT consult to assist with ADL evaluation and planning for discharge  - Provide patient education as appropriate  Outcome: Progressing  Goal: Maintain or return mobility status to optimal level  Description  INTERVENTIONS:  - Assess patient's baseline mobility status (ambulation, transfers, stairs, etc )    - Identify cognitive and physical deficits and behaviors that affect mobility  - Identify mobility aids required to assist with transfers and/or ambulation (gait belt, sit-to-stand, lift, walker, cane, etc )  - [place] fall precautions as indicated by assessment  - Record patient progress and toleration of activity level on Mobility SBAR; progress patient to next Phase/Stage  - Instruct patient to call for assistance with activity based on assessment  - Consider rehabilitation consult to assist with strengthening/weightbearing, etc   Outcome: Progressing     Problem: DISCHARGE PLANNING  Goal: Discharge to home or other facility with appropriate resources  Description  INTERVENTIONS:  - Identify barriers to discharge w/patient and caregiver  - Arrange for needed discharge resources and transportation as appropriate  - Identify discharge learning needs (meds, wound care, etc )  - Arrange for interpretive services to assist at discharge as needed  - Refer to Case Management Department for coordinating discharge planning if the patient needs post-hospital services based on physician/advanced practitioner order or complex needs related to functional status, cognitive ability, or social support system  Outcome: Progressing     Problem: Knowledge Deficit  Goal: Patient/family/caregiver demonstrates understanding of disease process, treatment plan, medications, and discharge instructions  Description  Complete learning assessment and assess knowledge base    Interventions:  - Provide teaching at level of understanding  - Provide teaching via preferred learning methods  Outcome: Progressing

## 2019-12-12 NOTE — ASSESSMENT & PLAN NOTE
· Possibly due to Afib  · Ejection fraction noted to be 40%,  · Continue digoxin for rate control  · Restart Eliquis when stable from a surgical point of view    · Unknown baseline  · outpatient cardio follow up

## 2019-12-12 NOTE — ASSESSMENT & PLAN NOTE
· CT: Increasing 3rd spacing/volume overload with increasing layering left pleural effusion and diffuse, bilateral interstitial edema  Anasarca also show slight interval increase  · Patient had aggressive IV fluid resuscitation for septic shock  · Now does have edema of the bilateral upper extremities and had hyponatremia which has since improved  · Was placed on IV fluids by surgery perioperatively for decortication today  · Discontinue IV fluids as soon as possible postoperatively  · Plan to initiate oral Lasix tomorrow if BP allows

## 2019-12-12 NOTE — OP NOTE
OPERATIVE REPORT  PATIENT NAME: Martha Feldman    :  1935  MRN: [id number]  Pt Location: [facility] OR ROOM 08    SURGERY DATE: 2019    Surgeon(s) and Role:     * Mary Carmen Beverly MD - Primary     * Maru Ledezma MD - Assisting    Preop Diagnosis:  Right empyema    Post-Op Diagnosis Codes:  Right empyema    Procedure(s) (LRB):  RIGHT THORACOSCOPIC COMPLETE DECORTICATION  Specimen(s):  ID Type Source Tests Collected by Time Destination   1 : Pleural Peel Tissue Pleural, Right TISSUE EXAM Mary Carmen Beverly MD 2019 1803    A : Pleural Peel Tissue Pleural, Right ANAEROBIC CULTURE AND GRAM STAIN, CULTURE, TISSUE AND GRAM STAIN Mary Carmen Beverly MD 2019 1800        Estimated Blood Loss:   Minimal    Drains:  Chest Tube 1 Right 24 Fr  (Active)   Number of days: 0       Chest Tube 2 Right 28 Fr  (Active)   Number of days: 0       Anesthesia Type:   General    Operative Indications:  Right empyema  Mr Pretty Marion is an 29-year-old who presented with a right-sided pneumonia and complex parapneumonic effusion  He had part of his effusion tapped and was a strep empyema  He had a chest tube placed in underwent several rounds of tPA but did not completely drain is pleural space  He is brought to the operating room for decortication  Operative Findings: Moderate amount of right pleural fluid was loculated  Inflammatory peel on all lobes  Complications:   None    Procedure and Technique:  The patient was brought to the operating room placed in the supine position  After institution of adequate general anesthesia of the double-lumen endotracheal tube was placed bronchoscopically  He was turned into the left lateral decubitus position, his right lung was isolated, and his right chest tube was removed  His right chest was then prepped and draped into a sterile field    Standard port incisions were utilized with a 10 mm port in the 8th intercostal space in the posterior axillary line as well as a 3 5 cm incision more anteriorly  The diaphragm was freed from numerous adhesions  The lower lobe was dissected up off of the diaphragm where it was densely adherent  There is some inflammatory peel long the diaphragmatic surface of the lung and a plane between the peel and the visceral pleura was developed and the diaphragmatic surface was decorticated  The lung is in circumferentially freed from surrounding adhesions  The fissure was opened  There is an inflammatory peel over the lateral portion of the lower lobe, the middle lobe, and the posterior segment and apical segment of the upper lobe  These are all decorticated  Some of the peel was sent for culture while the rest is sent for routine pathology  Paravertebral blocks were then placed with 20 mL of Exparel mixed with 20 mL of 0 25% Marcaine  These were performed over 8 levels in 5 mL aliquots using a spinal needle  Two chest tubes were then placed 1 through the camera port 1 through a separate stab incision  One of these was a 29 [place] chest tube placed posteriorly and a 24 [place] chest tube anteriorly  The lung was watch is a completely re-expanded  The anterior access incision was closed in layers with running absorbable suture to the pectoral fascia, the subcutaneous subcuticular layers  The chest tubes were fixed to the chest wall and connected to [place] drainage  Dry sterile dressings were applied  The patient was turned supine, extubated, and brought to the recovery unit in stable condition having tolerated the procedure well  Sponge and instrument counts were correct     I was present for the entire procedure    Patient Disposition:  PACU     SIGNATURE: Cody Velez MD  DATE: December 12, 2019  TIME: 6:29 PM

## 2019-12-12 NOTE — NURSING NOTE
Spoke with Tanner Broussard PA-C regarding carbohydrate drink, per Tanner Broussard pt does not need to drink pre op carbohydrate drink prior to surgery

## 2019-12-13 LAB
ALBUMIN SERPL BCP-MCNC: 2.1 G/DL (ref 3.5–5)
ALP SERPL-CCNC: 142 U/L (ref 46–116)
ALT SERPL W P-5'-P-CCNC: 7 U/L (ref 12–78)
ANION GAP SERPL CALCULATED.3IONS-SCNC: 7 MMOL/L (ref 4–13)
AST SERPL W P-5'-P-CCNC: 17 U/L (ref 5–45)
BASOPHILS # BLD AUTO: 0.07 THOUSANDS/ΜL (ref 0–0.1)
BASOPHILS NFR BLD AUTO: 0 % (ref 0–1)
BILIRUB SERPL-MCNC: 1.64 MG/DL (ref 0.2–1)
BUN SERPL-MCNC: 18 MG/DL (ref 5–25)
CALCIUM SERPL-MCNC: 7.8 MG/DL (ref 8.3–10.1)
CHLORIDE SERPL-SCNC: 105 MMOL/L (ref 100–108)
CO2 SERPL-SCNC: 26 MMOL/L (ref 21–32)
CREAT SERPL-MCNC: 0.84 MG/DL (ref 0.6–1.3)
EOSINOPHIL # BLD AUTO: 0.05 THOUSAND/ΜL (ref 0–0.61)
EOSINOPHIL NFR BLD AUTO: 0 % (ref 0–6)
ERYTHROCYTE [DISTWIDTH] IN BLOOD BY AUTOMATED COUNT: 15.2 % (ref 11.6–15.1)
GFR SERPL CREATININE-BSD FRML MDRD: 82 ML/MIN/1.73SQ M
GLUCOSE SERPL-MCNC: 94 MG/DL (ref 65–140)
HCT VFR BLD AUTO: 38.9 % (ref 36.5–49.3)
HGB BLD-MCNC: 12.8 G/DL (ref 12–17)
IMM GRANULOCYTES # BLD AUTO: >0.5 THOUSAND/UL (ref 0–0.2)
IMM GRANULOCYTES NFR BLD AUTO: 2 % (ref 0–2)
LYMPHOCYTES # BLD AUTO: 1.23 THOUSANDS/ΜL (ref 0.6–4.47)
LYMPHOCYTES NFR BLD AUTO: 6 % (ref 14–44)
MAGNESIUM SERPL-MCNC: 1.7 MG/DL (ref 1.6–2.6)
MCH RBC QN AUTO: 32.1 PG (ref 26.8–34.3)
MCHC RBC AUTO-ENTMCNC: 32.9 G/DL (ref 31.4–37.4)
MCV RBC AUTO: 98 FL (ref 82–98)
MONOCYTES # BLD AUTO: 1.9 THOUSAND/ΜL (ref 0.17–1.22)
MONOCYTES NFR BLD AUTO: 9 % (ref 4–12)
NEUTROPHILS # BLD AUTO: 18.17 THOUSANDS/ΜL (ref 1.85–7.62)
NEUTS SEG NFR BLD AUTO: 83 % (ref 43–75)
NRBC BLD AUTO-RTO: 0 /100 WBCS
PLATELET # BLD AUTO: 195 THOUSANDS/UL (ref 149–390)
PMV BLD AUTO: 9.9 FL (ref 8.9–12.7)
POTASSIUM SERPL-SCNC: 3.8 MMOL/L (ref 3.5–5.3)
PROCALCITONIN SERPL-MCNC: 0.4 NG/ML
PROT SERPL-MCNC: 5.6 G/DL (ref 6.4–8.2)
RBC # BLD AUTO: 3.99 MILLION/UL (ref 3.88–5.62)
SODIUM SERPL-SCNC: 138 MMOL/L (ref 136–145)
WBC # BLD AUTO: 21.93 THOUSAND/UL (ref 4.31–10.16)

## 2019-12-13 PROCEDURE — 85025 COMPLETE CBC W/AUTO DIFF WBC: CPT | Performed by: SURGERY

## 2019-12-13 PROCEDURE — 80053 COMPREHEN METABOLIC PANEL: CPT | Performed by: SURGERY

## 2019-12-13 PROCEDURE — 83735 ASSAY OF MAGNESIUM: CPT | Performed by: SURGERY

## 2019-12-13 PROCEDURE — 99232 SBSQ HOSP IP/OBS MODERATE 35: CPT | Performed by: INTERNAL MEDICINE

## 2019-12-13 PROCEDURE — 99024 POSTOP FOLLOW-UP VISIT: CPT | Performed by: THORACIC SURGERY (CARDIOTHORACIC VASCULAR SURGERY)

## 2019-12-13 PROCEDURE — 84145 PROCALCITONIN (PCT): CPT | Performed by: SURGERY

## 2019-12-13 RX ORDER — MAGNESIUM SULFATE HEPTAHYDRATE 40 MG/ML
2 INJECTION, SOLUTION INTRAVENOUS ONCE
Status: COMPLETED | OUTPATIENT
Start: 2019-12-13 | End: 2019-12-13

## 2019-12-13 RX ORDER — FUROSEMIDE 20 MG/1
20 TABLET ORAL
Status: DISCONTINUED | OUTPATIENT
Start: 2019-12-13 | End: 2019-12-15

## 2019-12-13 RX ADMIN — LEVOTHYROXINE SODIUM 50 MCG: 50 TABLET ORAL at 06:23

## 2019-12-13 RX ADMIN — NYSTATIN 500000 UNITS: 100000 SUSPENSION ORAL at 11:02

## 2019-12-13 RX ADMIN — DIGOXIN 62.5 MCG: 125 TABLET ORAL at 08:52

## 2019-12-13 RX ADMIN — TAMSULOSIN HYDROCHLORIDE 0.4 MG: 0.4 CAPSULE ORAL at 17:12

## 2019-12-13 RX ADMIN — NYSTATIN 500000 UNITS: 100000 SUSPENSION ORAL at 17:12

## 2019-12-13 RX ADMIN — NYSTATIN 500000 UNITS: 100000 SUSPENSION ORAL at 08:53

## 2019-12-13 RX ADMIN — MIDODRINE HYDROCHLORIDE 2.5 MG: 5 TABLET ORAL at 06:20

## 2019-12-13 RX ADMIN — Medication 250 MG: at 08:53

## 2019-12-13 RX ADMIN — Medication 250 MG: at 17:12

## 2019-12-13 RX ADMIN — GUAIFENESIN 600 MG: 600 TABLET, EXTENDED RELEASE ORAL at 08:53

## 2019-12-13 RX ADMIN — ACETAMINOPHEN 975 MG: 325 TABLET ORAL at 06:23

## 2019-12-13 RX ADMIN — SODIUM CHLORIDE, SODIUM LACTATE, POTASSIUM CHLORIDE, AND CALCIUM CHLORIDE 75 ML/HR: .6; .31; .03; .02 INJECTION, SOLUTION INTRAVENOUS at 03:36

## 2019-12-13 RX ADMIN — MAGNESIUM SULFATE HEPTAHYDRATE 2 G: 40 INJECTION, SOLUTION INTRAVENOUS at 12:06

## 2019-12-13 RX ADMIN — ACETAMINOPHEN 975 MG: 325 TABLET ORAL at 21:08

## 2019-12-13 RX ADMIN — NYSTATIN 500000 UNITS: 100000 SUSPENSION ORAL at 21:08

## 2019-12-13 RX ADMIN — MELATONIN 3 MG: 3 TAB ORAL at 21:08

## 2019-12-13 RX ADMIN — APIXABAN 5 MG: 5 TABLET, FILM COATED ORAL at 17:12

## 2019-12-13 RX ADMIN — MIDODRINE HYDROCHLORIDE 2.5 MG: 5 TABLET ORAL at 11:02

## 2019-12-13 RX ADMIN — CEFAZOLIN SODIUM 2000 MG: 2 SOLUTION INTRAVENOUS at 03:36

## 2019-12-13 RX ADMIN — FUROSEMIDE 20 MG: 20 TABLET ORAL at 12:06

## 2019-12-13 RX ADMIN — CEFAZOLIN SODIUM 2000 MG: 2 SOLUTION INTRAVENOUS at 12:06

## 2019-12-13 RX ADMIN — ACETAMINOPHEN 975 MG: 325 TABLET ORAL at 14:48

## 2019-12-13 RX ADMIN — GUAIFENESIN 600 MG: 600 TABLET, EXTENDED RELEASE ORAL at 20:30

## 2019-12-13 RX ADMIN — CEFAZOLIN SODIUM 2000 MG: 2 SOLUTION INTRAVENOUS at 20:30

## 2019-12-13 NOTE — ASSESSMENT & PLAN NOTE
· CT: Increasing 3rd spacing/volume overload with increasing layering left pleural effusion and diffuse, bilateral interstitial edema  Anasarca also show slight interval increase      · Patient had aggressive IV fluid resuscitation for septic shock  · Lasix as blood pressure permits  · Monitor I/O

## 2019-12-13 NOTE — ASSESSMENT & PLAN NOTE
· Status post chest tube placement at [facility]   · TPA infusion to chest tube has been unsuccessful  · Status post decortication per thoracic surgery  · Continue Ancef    · ID following

## 2019-12-13 NOTE — RESTORATIVE TECHNICIAN NOTE
Restorative Specialist Mobility Note       Activity: Ambulate in le, Chair     Assistive Device: Front wheel walker

## 2019-12-13 NOTE — PROGRESS NOTES
Progress Note - Infectious Disease   Susan Hernandez 82 y o  male MRN: [id number]  Unit/Bed#: [facility] 419-01 Encounter: [id number]      Impression/Recommendations:  1  Septic shock  POA to [facility]:  Tachycardia, leukocytosis, refractory hypotension  Due to # 2/3  No other appreciable source  Blood cultures negative  Clinically improved with antibiotics, pleural space drainage  Clinically stable and nontoxic  Slight rise in WBC today likely postoperative in nature  Rec:  · Continue antibiotics as below  · Follow temperatures and WBC closely  · Supportive care as per the primary service     2  Group A Strep/MSSA pneumonia  Consider post viral pneumonia in setting of preceding biphasic clinical course  Slowly improving  Rec:  · Continue cefazolin as below  · Follow respiratory status closely     3  Group A Strep empyema  Due to # 2  Status post chest tube, tPA with persistence  Now status post VATS/decortication  Rec:  · Continue cefazolin through the weekend  · Continue postoperative care/chest tube drainage per Thoracics  · Once chest tubes removed, anticipate D/C antibiotics to complete treatment course     4  Acute hypoxic respiratory failure  Multifactorial due to all of above  Improving  Rec:  · Continue antibiotics as above  · Follow respiratory status closely     The patient is stable from an ID standpoint  I will reassess the patient on Monday 12/16  Please call in the interim with new questions  Antibiotics:  Cefazolin #10  Antibiotics #11  POD #1    Subjective:  Patient seen on AM rounds  Denies fevers, chills, sweats, nausea, vomiting, or diarrhea  Want to go home  24 Hour Events:  Went to the OR yesterday for VATS/decortication  No documented fevers, chills, sweats, nausea, vomiting, or diarrhea      Objective:  Vitals:  Temp:  [97 °F (36 1 °C)-97 9 °F (36 6 °C)] 97 5 °F (36 4 °C)  HR:  [] 72  Resp:  [18-20] 18  BP: (100-143)/(56-84) 115/73  SpO2:  [93 %-97 %] 95 %  Temp (24hrs), Av 5 °F (36 4 °C), Min:97 °F (36 1 °C), Max:97 9 °F (36 6 °C)  Current: Temperature: 97 5 °F (36 4 °C)    Physical Exam:   General:  No acute distress  Psychiatric:  Awake and alert  Pulmonary:  Normal respiratory excursion without accessory muscle use  Chest tubing with serosanguineous fluid  Abdomen:  Soft, nontender  Extremities:  Generalized arm and leg edema  Skin:  No rashes    Lab Results:  I have personally reviewed pertinent labs  Results from last 7 days   Lab Units 19  0501 19  0634 19  0431  19  0433  19  0532   POTASSIUM mmol/L 3 8 3 7 4 1   < > 4 9   < > 3 0*   CHLORIDE mmol/L 105 103 98*   < > 98*   < > 97*   CO2 mmol/L 26 25 25   < > 22   < > 24   BUN mg/dL 18 26* 28*   < > 15   < > 23   CREATININE mg/dL 0 84 0 97 1 12   < > 1 07   < > 1 09   EGFR ml/min/1 73sq m 82 72 61   < > 64   < > 63   CALCIUM mg/dL 7 8* 7 9* 7 5*   < > 7 2*   < > 6 9*   AST U/L 17  --   --   --  34  --  35   ALT U/L 7*  --   --   --  7*  --  12   ALK PHOS U/L 142*  --   --   --  203*  --  194*    < > = values in this interval not displayed  Results from last 7 days   Lab Units 19  0501 19  0634 19  0431   WBC Thousand/uL 21 93* 20 09* 21 72*   HEMOGLOBIN g/dL 12 8 14 2 13 6   PLATELETS Thousands/uL 195 248 203     Results from last 7 days   Lab Units 19  1236   BLOOD CULTURE  No Growth at 24 hrs  No Growth at 24 hrs  Imaging Studies:   I have personally reviewed pertinent imaging study reports and images in PACS  EKG, Pathology, and Other Studies:   I have personally reviewed pertinent reports

## 2019-12-13 NOTE — ASSESSMENT & PLAN NOTE
Malnutrition Findings:        BMI Findings: Body mass index is 29 92 kg/m²     Encourage increase calorie and protein intake as able

## 2019-12-13 NOTE — ASSESSMENT & PLAN NOTE
· Patient was noted to have septic shock when at [facility] [place]    Patient was noted to be hypotensive, tachycardic and have severe leukocytosis on admission associated with lactic acidosis and acute kidney injury requiring pressors  · Improving monitor

## 2019-12-13 NOTE — PERIOPERATIVE NURSING NOTE
fernando Obrien, aware of chest tube air leak and suctioning sound upon inspiration  As per fernando sands, that is to be expected and is ok  No new orders  Will continue to monitor

## 2019-12-13 NOTE — PROGRESS NOTES
Thoracic surgery  Post-op check    A/p: 82 y o  M w hx of R empyema, s/p R VATS decortication earlier today    AVSS  On 2LNC  R CT x 2 (sxn, minimal air leak of 10) w 200 cc SS output    Plan:  Regular diet  [email or web address]  PICC  CT x 2 to suction x 72 hrs  F/u intra-op cultures  Rest of care per primary team    S: Patient is s/pR VATS decortication earlier today  Post-operatively, patient is feeling well  Pain is well controlled on prn analgesia  Denies nausea, vomiting, fevers, chills, chest pain, SOB or any other complaints at this time       O:  NAD, alert and oriented x3  Normocephalic, atraumatic  MMM, EOMI, PERRLA  Norm resp effort on 2LNC  R CT x 2 (sxn, minimal air leak of 10) w 200 cc SS output  RRR  Abd soft, NT/ND  No calf tenderness or peripheral edema  Motor/sensation intact in distal extremities  CN grossly intact  -rash/lesions

## 2019-12-13 NOTE — ASSESSMENT & PLAN NOTE
· Monitor on telemetry in perioperative setting  · Continue digoxin for rate control    · Eliquis for anticoagulation

## 2019-12-13 NOTE — PROGRESS NOTES
Progress Note - Lucy Preston 1935, 82 y o  male MRN: [id number]    Unit/Bed#: [facility] 419-01 Encounter: [id number]    Primary Care Provider: Robin Ayala MD   Date and time admitted to hospital: 12/11/2019  7:58 PM        * Empyema [facility])  Assessment & Plan  · Status post chest tube placement at [facility]   · TPA infusion to chest tube has been unsuccessful  · Status post decortication per thoracic surgery  · Continue Ancef  · ID following    Volume overload  Assessment & Plan  · CT: Increasing 3rd spacing/volume overload with increasing layering left pleural effusion and diffuse, bilateral interstitial edema  Anasarca also show slight interval increase  · Patient had aggressive IV fluid resuscitation for septic shock  · Lasix as blood pressure permits  · Monitor I/O    Grp A strep pneumonia ([facility] [address] )  Assessment & Plan  · Patient is noted to have severe group a strep pneumonia complicated by empyema  · Status post decortication  · IV antibiotics by infectious disease    Cardiomyopathy ([facility] [address] )  Assessment & Plan  EF 40%  Continue digoxin  Eliquis  Outpatient cardiology follow-up    Moderate protein-calorie malnutrition ([facility] [address] )  Assessment & Plan  Malnutrition Findings:        BMI Findings: Body mass index is 29 92 kg/m²  Encourage increase calorie and protein intake as able    Chronic atrial fibrillation  Assessment & Plan  · Monitor on telemetry in perioperative setting  · Continue digoxin for rate control  · Eliquis for anticoagulation    Hyponatremia  Assessment & Plan    Lab Results   Component Value Date    SODIUM 138 12/13/2019    SODIUM 136 12/12/2019    SODIUM 131 (L) 12/11/2019     Monitor sodium levels closely    Septic shock ([facility] [address] )  Assessment & Plan  · Patient was noted to have septic shock when at [facility] [place]    Patient was noted to be hypotensive, tachycardic and have severe leukocytosis on admission associated with lactic acidosis and acute kidney injury requiring pressors  · Improving monitor      VTE Pharmacologic Prophylaxis:   Pharmacologic: Apixaban (Eliquis)  Mechanical VTE Prophylaxis in Place: Yes    Patient Centered Rounds: I have performed bedside rounds with nursing staff today  Discussions with Specialists or Other Care Team Provider:     Education and Discussions with Family / Patient:  Discussed with the patient, family at bedside updated    Time Spent for Care: 30 minutes  More than 50% of total time spent on counseling and coordination of care as described above  Current Length of Stay: 2 day(s)    Current Patient Status: Inpatient   Certification Statement: The patient will continue to require additional inpatient hospital stay due to As mentioned    Discharge Plan:  Awaiting clinical symptomatic improvement    Code Status: Level 1 - Full Code      Subjective:     Reports improving dyspnea  Sitting up in chair  Reports puffiness in upper and lower extremities  History chart labs medications reviewed    Objective:     Vitals:   Temp (24hrs), Av 5 °F (36 4 °C), Min:97 °F (36 1 °C), Max:97 9 °F (36 6 °C)    Temp:  [97 °F (36 1 °C)-97 9 °F (36 6 °C)] 97 6 °F (36 4 °C)  HR:  [] 67  Resp:  [17-20] 17  BP: ()/(55-75) 131/62  SpO2:  [93 %-99 %] 99 %  Body mass index is 29 92 kg/m²  Input and Output Summary (last 24 hours):        Intake/Output Summary (Last 24 hours) at 2019 1622  Last data filed at 2019 1400  Gross per 24 hour   Intake 1283 75 ml   Output 1420 ml   Net -136 25 ml       Physical Exam:     Physical Exam    Comfortably sitting up in chair  Chest tube in place  Neck supple  Lungs diminished breath sounds bilaterally occasional crackles  Heart sounds S1-S2 noted  Abdomen soft nontender  Awake obeys simple commands  Pulses noted  Pedal edema as well as upper extremity edema noted  No rash    Additional Data:     Labs:    Results from last 7 days   Lab Units 19  0501  12/10/19  0507   WBC Thousand/uL 21 93*   < > 28 31*   HEMOGLOBIN g/dL 12 8   < > 14 5   HEMATOCRIT % 38 9   < > 42 4   PLATELETS Thousands/uL 195   < > 210   BANDS PCT %  --   --  3   NEUTROS PCT % 83*  --   --    LYMPHS PCT % 6*  --   --    LYMPHO PCT   --    < > 3*   MONOS PCT % 9  --   --    MONO PCT   --    < > 3*   EOS PCT % 0   < > 0    < > = values in this interval not displayed  Results from last 7 days   Lab Units 12/13/19  0501   SODIUM mmol/L 138   POTASSIUM mmol/L 3 8   CHLORIDE mmol/L 105   CO2 mmol/L 26   BUN mg/dL 18   CREATININE mg/dL 0 84   ANION GAP mmol/L 7   CALCIUM mg/dL 7 8*   ALBUMIN g/dL 2 1*   TOTAL BILIRUBIN mg/dL 1 64*   ALK PHOS U/L 142*   ALT U/L 7*   AST U/L 17   GLUCOSE RANDOM mg/dL 94     Results from last 7 days   Lab Units 12/07/19  0535   INR  1 58*             Results from last 7 days   Lab Units 12/13/19  0501 12/10/19  0507 12/09/19  0000   LACTIC ACID mmol/L  --   --  1 5   PROCALCITONIN ng/ml 0 40* 0 46*  --            * I Have Reviewed All Lab Data Listed Above  * Additional Pertinent Lab Tests Reviewed: All Labs Within Last 24 Hours Reviewed    Imaging:    Imaging Reports Reviewed Today Include:  Imaging studies noted  Imaging Personally Reviewed by Myself Includes:      Recent Cultures (last 7 days):     Results from last 7 days   Lab Units 12/12/19  1800 12/11/19  1236   BLOOD CULTURE   --  No Growth at 24 hrs  No Growth at 24 hrs     GRAM STAIN RESULT  No polys seen  No organisms seen  --        Last 24 Hours Medication List:     Current Facility-Administered Medications:  acetaminophen 650 mg Oral Q6H PRN Cathleen Mcarthur MD    acetaminophen 975 mg Oral [facility] Cathleen Mcarthur MD    apixaban 5 mg Oral BID Vinny Weiss MD    cefazolin 2,000 mg Intravenous Q8H Cathleen Mcarthur MD Last Rate: 2,000 mg (12/13/19 1206)   digoxin 62 5 mcg Oral Daily Cathleen Mcarthur MD    furosemide 20 mg Oral BID (diuretic) Vinny Weiss MD    guaiFENesin 600 mg Oral Q12H [address] Cathleen Mcarthur MD influenza vaccine 0 5 mL Intramuscular Once Brandon Ovalle MD    levothyroxine 50 mcg Oral Daily Before Breakfast Brandon Ovalle MD    melatonin 3 mg Oral HS Brandon Ovalle MD    nystatin 500,000 Units Swish & Swallow 4x Daily Brandon Ovalle MD    ondansetron 4 mg Intravenous Q4H PRN Brandon Ovalle MD    oxyCODONE 2 5 mg Oral Q4H PRN Brandon Ovalle MD    oxyCODONE 5 mg Oral Q4H PRN Brandon Ovalle MD    pneumococcal 13-valent conjugate vaccine 0 5 mL Intramuscular Once Brandon Ovalle MD    saccharomyces boulardii 250 mg Oral BID Brandon Ovalle MD    senna-docusate sodium 1 tablet Oral HS Brandon Ovalle MD    tamsulosin 0 4 mg Oral Daily With Jaylene Donald MD    zolpidem 5 mg Oral HS PRN Brandon Ovalle MD         Today, Patient Was Seen By: Zara Bryant MD    ** Please Note: Dictation voice to text software may have been used in the creation of this document   **

## 2019-12-13 NOTE — ASSESSMENT & PLAN NOTE
Lab Results   Component Value Date    SODIUM 138 12/13/2019    SODIUM 136 12/12/2019    SODIUM 131 (L) 12/11/2019     Monitor sodium levels closely

## 2019-12-13 NOTE — PROGRESS NOTES
Progress Note - Thoracic Surgery   Jose Armando Morales 82 y o  male MRN: [id number]  Unit/Bed#: [facility] 419-01 Encounter: [id number]    Assessment:  83yo M with R loculated parapneumonic effusio s/p initial R tube thoracostomy and failed attempt at tPA/DNAse  Now POD#1 s/p R VATS decortication  R CT x2 (Y): 375cc serosang (-20, -AL)     Plan:  · Maintain R CT x2 to sxn for at least 72 hours postoperatively  · F/u intra-op Cx  · Rest of care per primary    Subjective/Objective     Subjective:   Per RN, patient with increased drainage around chest tube insertion sites  Dressings changed at bedside this morning, appears to be small amount of leakage from anterior chest tube site, serosanguineous  No SOB on RA (NC not in correct positioning overnight, so removed this AM)  Denies any other complaints  Objective:     Blood pressure 120/68, pulse 66, temperature 97 9 °F (36 6 °C), temperature source Oral, resp  rate 20, height 5' 10" (1 778 m), weight 94 6 kg (208 lb 8 9 oz), SpO2 97 %  ,Body mass index is 29 92 kg/m²        Intake/Output Summary (Last 24 hours) at 12/13/2019 0542  Last data filed at 12/13/2019 8584  Gross per 24 hour   Intake 2705 42 ml   Output 2220 ml   Net 485 42 ml       Invasive Devices     Peripherally Inserted Central Catheter Line            PICC Line 23/09/33 Right Basilic 1 day          Drain            Chest Tube 1 Right 24 Fr  less than 1 day    Chest Tube 2 Right 28 Fr  less than 1 day                Physical Exam:   Gen: NAD  CV: RRR  Lungs: nl effort on RA   R CT x2 to suction with serosang output and without airleak  Abd: soft, NT/ND  Ext: no CCE  Skin: no rashes  Neuro: A&Ox3     Lab, Imaging and other studies:  CBC:   Lab Results   Component Value Date    WBC 21 93 (H) 12/13/2019    HGB 12 8 12/13/2019    HCT 38 9 12/13/2019    MCV 98 12/13/2019     12/13/2019    MCH 32 1 12/13/2019    MCHC 32 9 12/13/2019    RDW 15 2 (H) 12/13/2019    MPV 9 9 12/13/2019    NRBC 0 12/13/2019   , CMP: Lab Results   Component Value Date    SODIUM 138 12/13/2019    K 3 8 12/13/2019     12/13/2019    CO2 26 12/13/2019    BUN 18 12/13/2019    CREATININE 0 84 12/13/2019    CALCIUM 7 8 (L) 12/13/2019    AST 17 12/13/2019    ALT 7 (L) 12/13/2019    ALKPHOS 142 (H) 12/13/2019    EGFR 82 12/13/2019     VTE Prophylaxis: sequential compression device

## 2019-12-13 NOTE — NURSING NOTE
Around 0630 at med pass pt complaining of feeling "wet" under L arm  Upon exam pt weeping from L elbow  Put dry gauze dressing on and removed BP cuff from that arm and left off until vitals due next  When removing cuff noticed bruising under L upper arm and ventral portion of L forearm near elbow  Pt stable, will continue to monitor

## 2019-12-13 NOTE — ASSESSMENT & PLAN NOTE
· Patient is noted to have severe group a strep pneumonia complicated by empyema  · Status post decortication  · IV antibiotics by infectious disease

## 2019-12-14 PROCEDURE — G8978 MOBILITY CURRENT STATUS: HCPCS

## 2019-12-14 PROCEDURE — 99024 POSTOP FOLLOW-UP VISIT: CPT | Performed by: PHYSICIAN ASSISTANT

## 2019-12-14 PROCEDURE — 97163 PT EVAL HIGH COMPLEX 45 MIN: CPT

## 2019-12-14 PROCEDURE — 99232 SBSQ HOSP IP/OBS MODERATE 35: CPT | Performed by: INTERNAL MEDICINE

## 2019-12-14 PROCEDURE — 99024 POSTOP FOLLOW-UP VISIT: CPT | Performed by: THORACIC SURGERY (CARDIOTHORACIC VASCULAR SURGERY)

## 2019-12-14 PROCEDURE — G8979 MOBILITY GOAL STATUS: HCPCS

## 2019-12-14 RX ADMIN — GUAIFENESIN 600 MG: 600 TABLET, EXTENDED RELEASE ORAL at 08:07

## 2019-12-14 RX ADMIN — ACETAMINOPHEN 975 MG: 325 TABLET ORAL at 21:08

## 2019-12-14 RX ADMIN — LEVOTHYROXINE SODIUM 50 MCG: 50 TABLET ORAL at 06:26

## 2019-12-14 RX ADMIN — NYSTATIN 500000 UNITS: 100000 SUSPENSION ORAL at 17:05

## 2019-12-14 RX ADMIN — APIXABAN 5 MG: 5 TABLET, FILM COATED ORAL at 17:05

## 2019-12-14 RX ADMIN — DIGOXIN 62.5 MCG: 125 TABLET ORAL at 08:06

## 2019-12-14 RX ADMIN — CEFAZOLIN SODIUM 2000 MG: 2 SOLUTION INTRAVENOUS at 12:09

## 2019-12-14 RX ADMIN — ACETAMINOPHEN 975 MG: 325 TABLET ORAL at 14:00

## 2019-12-14 RX ADMIN — FUROSEMIDE 20 MG: 20 TABLET ORAL at 08:07

## 2019-12-14 RX ADMIN — GUAIFENESIN 600 MG: 600 TABLET, EXTENDED RELEASE ORAL at 21:07

## 2019-12-14 RX ADMIN — FUROSEMIDE 20 MG: 20 TABLET ORAL at 17:05

## 2019-12-14 RX ADMIN — CEFAZOLIN SODIUM 2000 MG: 2 SOLUTION INTRAVENOUS at 19:46

## 2019-12-14 RX ADMIN — NYSTATIN 500000 UNITS: 100000 SUSPENSION ORAL at 21:08

## 2019-12-14 RX ADMIN — APIXABAN 5 MG: 5 TABLET, FILM COATED ORAL at 08:06

## 2019-12-14 RX ADMIN — ACETAMINOPHEN 975 MG: 325 TABLET ORAL at 05:03

## 2019-12-14 RX ADMIN — Medication 250 MG: at 17:05

## 2019-12-14 RX ADMIN — MELATONIN 3 MG: 3 TAB ORAL at 21:07

## 2019-12-14 RX ADMIN — CEFAZOLIN SODIUM 2000 MG: 2 SOLUTION INTRAVENOUS at 05:03

## 2019-12-14 RX ADMIN — TAMSULOSIN HYDROCHLORIDE 0.4 MG: 0.4 CAPSULE ORAL at 17:05

## 2019-12-14 RX ADMIN — Medication 250 MG: at 08:06

## 2019-12-14 RX ADMIN — NYSTATIN 500000 UNITS: 100000 SUSPENSION ORAL at 08:07

## 2019-12-14 RX ADMIN — NYSTATIN 500000 UNITS: 100000 SUSPENSION ORAL at 12:09

## 2019-12-14 NOTE — ASSESSMENT & PLAN NOTE
· Status post chest tube placement at [facility]   · TPA infusion to chest tube has been unsuccessful  · Status post decortication per thoracic surgery  · Continue Ancef    · ID following

## 2019-12-14 NOTE — PROGRESS NOTES
Progress Note - Anita Arnold 1935, 82 y o  male MRN: [id number]    Unit/Bed#: [facility] 419-01 Encounter: [id number]    Primary Care Provider: Charanjit Davies MD   Date and time admitted to hospital: 12/11/2019  7:58 PM        * Empyema [facility])  Assessment & Plan  · Status post chest tube placement at [facility]   · TPA infusion to chest tube has been unsuccessful  · Status post decortication per thoracic surgery  · Continue Ancef  · ID following    Volume overload  Assessment & Plan  · CT: Increasing 3rd spacing/volume overload with increasing layering left pleural effusion and diffuse, bilateral interstitial edema  Anasarca also show slight interval increase  · Patient had aggressive IV fluid resuscitation for septic shock  · Lasix as blood pressure permits  · Monitor I/O    Grp A strep pneumonia ([address] )  Assessment & Plan  · Patient is noted to have severe group a strep pneumonia complicated by empyema  · Status post decortication  · IV antibiotics by infectious disease    Cardiomyopathy ([facility] [address] )  Assessment & Plan  EF 40%  Continue digoxin  Eliquis  Outpatient cardiology follow-up    Moderate protein-calorie malnutrition ([address] )  Assessment & Plan  Malnutrition Findings:        BMI Findings: Body mass index is 30 08 kg/m²  Encourage increase calorie and protein intake as able    Chronic atrial fibrillation  Assessment & Plan  · Continue digoxin  · Eliquis for anticoagulation    Hyponatremia  Assessment & Plan    Lab Results   Component Value Date    SODIUM 138 12/13/2019    SODIUM 136 12/12/2019    SODIUM 131 (L) 12/11/2019     Monitor sodium levels closely    Septic shock ([facility] [address] )  Assessment & Plan  · Patient was noted to have septic shock when at [facility] [place]    Patient was noted to be hypotensive, tachycardic and have severe leukocytosis on admission associated with lactic acidosis and acute kidney injury requiring pressors  · Improving monitor        VTE Pharmacologic Prophylaxis:   Pharmacologic: Apixaban (Eliquis)  Mechanical VTE Prophylaxis in Place: Yes    Patient Centered Rounds: I have performed bedside rounds with nursing staff today  Discussions with Specialists or Other Care Team Provider:     Education and Discussions with Family / Patient:  Discussed with the patient, family at bedside updated    Time Spent for Care: 30 minutes  More than 50% of total time spent on counseling and coordination of care as described above  Current Length of Stay: 3 day(s)    Current Patient Status: Inpatient   Certification Statement: The patient will continue to require additional inpatient hospital stay due to As mentioned    Discharge Plan:  Awaiting clinical and symptomatic improvement    Code Status: Level 1 - Full Code      Subjective:     Comfortably sitting up in chair  Reports feeling okay  Encouraged out of bed as able  Incentive spirometry      Objective:     Vitals:   Temp (24hrs), Av 8 °F (36 6 °C), Min:97 5 °F (36 4 °C), Max:98 °F (36 7 °C)    Temp:  [97 5 °F (36 4 °C)-98 °F (36 7 °C)] 97 5 °F (36 4 °C)  HR:  [] 106  Resp:  [20] 20  BP: (130-163)/(66-89) 163/89  SpO2:  [94 %-96 %] 94 %  Body mass index is 30 08 kg/m²  Input and Output Summary (last 24 hours):        Intake/Output Summary (Last 24 hours) at 2019 1530  Last data filed at 2019 1510  Gross per 24 hour   Intake 870 ml   Output 925 ml   Net -55 ml       Physical Exam:     Physical Exam    Comfortably sitting up in bed  Obese  Short thick neck  Lungs diminished breath sounds bilaterally  Chest tube noted  Heart sounds S1-S2 noted  Abdomen soft  Awake alert obeys simple commands  Anasarca noted  No rash  Pulses noted      Additional Data:     Labs:    Results from last 7 days   Lab Units 19  0501  12/10/19  0507   WBC Thousand/uL 21 93*   < > 28 31*   HEMOGLOBIN g/dL 12 8   < > 14 5   HEMATOCRIT % 38 9   < > 42 4   PLATELETS Thousands/uL 195   < > 210   BANDS PCT %  --   --  3 NEUTROS PCT % 83*  --   --    LYMPHS PCT % 6*  --   --    LYMPHO PCT   --    < > 3*   MONOS PCT % 9  --   --    MONO PCT   --    < > 3*   EOS PCT % 0   < > 0    < > = values in this interval not displayed  Results from last 7 days   Lab Units 12/13/19  0501   SODIUM mmol/L 138   POTASSIUM mmol/L 3 8   CHLORIDE mmol/L 105   CO2 mmol/L 26   BUN mg/dL 18   CREATININE mg/dL 0 84   ANION GAP mmol/L 7   CALCIUM mg/dL 7 8*   ALBUMIN g/dL 2 1*   TOTAL BILIRUBIN mg/dL 1 64*   ALK PHOS U/L 142*   ALT U/L 7*   AST U/L 17   GLUCOSE RANDOM mg/dL 94                 Results from last 7 days   Lab Units 12/13/19  0501 12/10/19  0507 12/09/19  0000   LACTIC ACID mmol/L  --   --  1 5   PROCALCITONIN ng/ml 0 40* 0 46*  --            * I Have Reviewed All Lab Data Listed Above  * Additional Pertinent Lab Tests Reviewed: All Labs Within Last 24 Hours Reviewed    Imaging:    Imaging Reports Reviewed Today Include:   Imaging Personally Reviewed by Myself Includes:     Recent Cultures (last 7 days):     Results from last 7 days   Lab Units 12/12/19  1800 12/11/19  1236   BLOOD CULTURE   --  No Growth at 48 hrs  No Growth at 48 hrs     GRAM STAIN RESULT  No polys seen  No organisms seen  --        Last 24 Hours Medication List:     Current Facility-Administered Medications:  acetaminophen 650 mg Oral Q6H PRN Jessica Chacko MD    acetaminophen 975 mg Oral [facility] Jessica Chacko MD    apixaban 5 mg Oral BID Esme Cardenas MD    cefazolin 2,000 mg Intravenous Benetta Mandy Jessica Chacko MD Last Rate: 2,000 mg (12/14/19 1209)   digoxin 62 5 mcg Oral Daily Jessica Chacko MD    furosemide 20 mg Oral BID (diuretic) Esme Cardenas MD    guaiFENesin 600 mg Oral Q12H Maira Abdalla MD    influenza vaccine 0 5 mL Intramuscular Once Jessica Chacko MD    levothyroxine 50 mcg Oral Daily Before Breakfast Jessica Chacko MD    melatonin 3 mg Oral HS Jessica Chacko MD    nystatin 500,000 Units Swish & Swallow 4x Daily Lam Alvarez MD    ondansetron 4 mg Intravenous Q4H PRN Lam Alvarez MD    oxyCODONE 2 5 mg Oral Q4H PRN Lam Alvarez MD    oxyCODONE 5 mg Oral Q4H PRN Lam Alvarez MD    pneumococcal 13-valent conjugate vaccine 0 5 mL Intramuscular Once Lam Alvarez MD    saccharomyces boulardii 250 mg Oral BID Lam Alvarez MD    senna-docusate sodium 1 tablet Oral HS Lam Alvarez MD    tamsulosin 0 4 mg Oral Daily With Julian Dominguez MD    zolpidem 5 mg Oral HS PRN Lam Alvarez MD         Today, Patient Was Seen By: Antonio Blakely MD    ** Please Note: Dictation voice to text software may have been used in the creation of this document   **

## 2019-12-14 NOTE — PLAN OF CARE
Problem: PHYSICAL THERAPY ADULT  Goal: Performs mobility at highest level of function for planned discharge setting  See evaluation for individualized goals  Description  Treatment/Interventions: Functional transfer training, LE strengthening/ROM, Elevations, Therapeutic exercise, Endurance training, Patient/family training, Equipment eval/education, Bed mobility, Gait training, Spoke to nursing  Equipment Recommended: Walker(RW)       See flowsheet documentation for full assessment, interventions and recommendations  Note:   Prognosis: Good  Problem List: Decreased strength, Decreased endurance, Impaired balance, Decreased mobility  Assessment: Pt is 82 y o  male seen for PT evaluation s/p admit to [facility] on 12/11/2019 w/ Empyema ([facility] [address] ) R loculated parapneumonic effusio s/p initial R tube thoracostomy and failed attempt at tPA/DNAse  Now s/p R VATS decortication 12/13/19  PT consulted to assess pt's functional mobility and d/c needs  Order placed for PT eval and tx, w/ up as tolerated order  Comorbidities affecting pt's physical performance at time of assessment include: septic shock, A-fib, pleural effusion, cardiomyopathy, acute respiratory failure  PTA, pt was independent w/ all functional mobility w/ no AD and lives w/ granddaughter in split level house w/ 6 steps up to bed/bath and 7 steps down to living area  Personal factors affecting pt at time of IE include: inaccessible home environment, lives in two story house, stairs to enter home and limited home support  Please find objective findings from PT assessment regarding body systems outlined above with impairments and limitations including weakness, impaired balance, decreased endurance, impaired coordination, gait deviations, decreased activity tolerance, decreased functional mobility tolerance and fall risk  Pt performed STS at supervision and ambulated 120ft X2 w/ RW at supervision   Pt defers stairs at this time due to SOB after ambulation  The following objective measures performed on IE also reveal limitations: Barthel Index: 65/100  Pt's clinical presentation is currently unstable/unpredictable seen in pt's presentation of recent admission for emphysema requiring medical attention, recent decline in function as compared to baseline, multiple lines, fall risk, reliance on AD as compared to baseline  Pt to benefit from continued PT tx to address deficits as defined above and maximize level of functional independent mobility and consistency  From PT/mobility standpoint, recommendation at time of d/c would be anticipate home w/ family support pending progress in order to facilitate return to PLOF  Recommendation: Home PT, Home with family support     PT - OK to Discharge: No    See flowsheet documentation for full assessment

## 2019-12-14 NOTE — PROGRESS NOTES
Progress Note - Thoracic Surgery   Celine Chi 82 y o  male MRN: [id number]  Unit/Bed#: [facility] 419-01 Encounter: [id number]    Assessment:  83yo M with R loculated parapneumonic effusio s/p initial R tube thoracostomy and failed attempt at tPA/DNAse  Now POD#1 s/p R VATS decortication  R CT x2 (Y): 145cc serosang (-20, -AL)     Plan:  · Maintain R CT x2 to sxn for at least 72 hours postoperatively  · F/u intra-op Cx, ntd  · Rest of care per primary    Subjective/Objective     Subjective:   Per RN, patient with increased drainage around chest tube insertion sites  Dressings changed at bedside this morning, appears to be small amount of leakage from anterior chest tube site, serosanguineous  No SOB on RA (NC not in correct positioning overnight, so removed this AM)  Denies any other complaints  Objective:     Blood pressure 130/66, pulse 88, temperature 98 °F (36 7 °C), temperature source Oral, resp  rate 20, height 5' 10" (1 778 m), weight 95 1 kg (209 lb 10 5 oz), SpO2 96 %  ,Body mass index is 30 08 kg/m²        Intake/Output Summary (Last 24 hours) at 12/14/2019 0644  Last data filed at 12/14/2019 0601  Gross per 24 hour   Intake 820 ml   Output 675 ml   Net 145 ml       Invasive Devices     Peripherally Inserted Central Catheter Line            PICC Line 35/22/76 Right Basilic 2 days          Drain            Chest Tube 1 Right 24 Fr  1 day    Chest Tube 2 Right 28 Fr  1 day                Physical Exam:   Gen: NAD, A&O, Comfortable   Chest: Normal work of breathing, no resp distress, right-sided chest tubes to [place] with 145 mL serosanguineous drainage, no air leak  Abd: S, ND, NT  Ext: No edema  Skin: warm, dry, intact       Lab, Imaging and other studies:  CBC:   No results found for: WBC, HGB, HCT, MCV, PLT, ADJUSTEDWBC, MCH, MCHC, RDW, MPV, NRBC, CMP:   No results found for: SODIUM, K, CL, CO2, ANIONGAP, BUN, CREATININE, GLUCOSE, CALCIUM, AST, ALT, ALKPHOS, PROT, BILITOT, EGFR  VTE Prophylaxis: sequential compression device

## 2019-12-14 NOTE — PROGRESS NOTES
12/14/19    Procedure: Chest tube removal    Right anterior chest tube removed in routine fashion without incident  Thopaz tubing exchanged  The patient tolerated the procedure well  A dry, sterile dressing was placed      Breana Jeffries PA-C

## 2019-12-14 NOTE — ASSESSMENT & PLAN NOTE
Malnutrition Findings:        BMI Findings: Body mass index is 30 08 kg/m²     Encourage increase calorie and protein intake as able

## 2019-12-14 NOTE — PHYSICAL THERAPY NOTE
Physical Therapy Evaluation     Patient's Name: Daphne Marlow    Admitting Diagnosis  Acute respiratory failure with hypoxia [J96 01]    Problem List  Patient Active Problem List   Diagnosis    Acute respiratory failure with hypoxia (HCC)    Empyema (HCC)    Septic shock (HCC)    Hyponatremia    Chronic atrial fibrillation    Transaminitis    Moderate protein-calorie malnutrition (HCC)    Pleural effusion    Cardiomyopathy ([address] )    Grp A strep pneumonia ([facility] [address] )    Volume overload       Past Medical History  Past Medical History:   Diagnosis Date    A-fib ([facility] [address] )     Disease of thyroid gland     Hyperlipidemia     Hypertension     Optic neuropathy        Past Surgical History  Past Surgical History:   Procedure Laterality Date    MN THORACOSCOPY SURG PART PULM DECORT Right 12/12/2019    Procedure: RIGHT THORACOSCOPIC LUNG DECORTICATION;  Surgeon: Chin Fowler MD;  [facility];  Service: Thoracic    THORACOSCOPY VIDEO ASSISTED SURGERY (VATS) Right 12/12/2019    Procedure: THORACOSCOPY VIDEO ASSISTED SURGERY (VATS);   Surgeon: Chin Fowler MD;  [facility];  Service: Thoracic        12/14/19 1510   Note Type   Note type Eval only   Pain Assessment   Pain Assessment No/denies pain   Pain Score No Pain   Home Living   Type of [address] Two level;Bed/bath upstairs;Stairs to enter with rails  (split-level; 6 steps up to bed/bath; 7 steps down to living )   Bathroom Shower/Tub Tub/shower unit   Bathroom Toilet Standard   Bathroom Equipment Grab bars in [facility]   (none per pt)   Prior Function   Level of [place] Independent with ADLs and functional mobility   Lives With   (granddaughter)   Receives Help From Family   ADL Assistance Independent   IADLs Independent   Falls in the last 6 months 0   Vocational Full time employment   Restrictions/Precautions   Weight Bearing Precautions Per Order No   Other Precautions Cardiac/sternal;Multiple lines;Telemetry; Fall Risk   General   Family/Caregiver Present Yes   Cognition   Overall Cognitive Status WFL   Arousal/Participation Alert   Orientation Level Oriented X4   Memory Decreased recall of precautions   Following Commands Follows all commands and directions without difficulty   Comments pt pleasant and agreeable to work w/ therapy   RLE Assessment   RLE Assessment WFL   LLE Assessment   LLE Assessment WFL   Coordination   Movements are Fluid and Coordinated 1   Bed Mobility   Supine to Sit Unable to assess   Sit to Supine Unable to assess   Additional Comments pt OOB upon arrival and returned sitting upright in chair at end of session w/ all needs within reach   Transfers   Sit to Stand 5  Supervision   Additional items Assist x 1   Stand to Sit 5  Supervision   Additional items Assist x 1   Additional Comments RW   Ambulation/Elevation   Gait pattern Excessively slow; Short stride; Improper Weight shift;Decreased foot clearance   Gait Assistance 5  Supervision   Additional items Assist x 1   Assistive Device Rolling walker   Distance 120ft X2   Stair Management Assistance Not tested   Balance   Static Sitting Good   Static Standing Fair   Ambulatory Fair -   Endurance Deficit   Endurance Deficit Yes   Endurance Deficit Description SOB   Activity Tolerance   Activity Tolerance Patient limited by fatigue;Treatment limited secondary to medical complications (Comment)  (SOB)   Nurse Made Aware RN cleared pt for therapy   Assessment   Prognosis Good   Problem List Decreased strength;Decreased endurance; Impaired balance;Decreased mobility   Assessment Pt is 82 y o  male seen for PT evaluation s/p admit to [facility] on 12/11/2019 w/ Empyema ([address] ) R loculated parapneumonic effusio s/p initial R tube thoracostomy and failed attempt at tPA/DNAse  Now s/p R VATS decortication 12/13/19  PT consulted to assess pt's functional mobility and d/c needs  Order placed for PT eval and tx, w/ up as tolerated order  Comorbidities affecting pt's physical performance at time of assessment include: septic shock, A-fib, pleural effusion, cardiomyopathy, acute respiratory failure  PTA, pt was independent w/ all functional mobility w/ no AD and lives w/ granddaughter in split level house w/ 6 steps up to bed/bath and 7 steps down to living area  Personal factors affecting pt at time of IE include: inaccessible home environment, lives in two story house, stairs to enter home and limited home support  Please find objective findings from PT assessment regarding body systems outlined above with impairments and limitations including weakness, impaired balance, decreased endurance, impaired coordination, gait deviations, decreased activity tolerance, decreased functional mobility tolerance and fall risk  Pt performed STS at supervision and ambulated 120ft X2 w/ RW at supervision  Pt defers stairs at this time due to SOB after ambulation  The following objective measures performed on IE also reveal limitations: Barthel Index: 65/100  Pt's clinical presentation is currently unstable/unpredictable seen in pt's presentation of recent admission for emphysema requiring medical attention, recent decline in function as compared to baseline, multiple lines, fall risk, reliance on AD as compared to baseline  Pt to benefit from continued PT tx to address deficits as defined above and maximize level of functional independent mobility and consistency  From PT/mobility standpoint, recommendation at time of d/c would be anticipate home w/ family support pending progress in order to facilitate return to PLOF  Goals   Patient Goals to go home   STG Expiration Date 12/28/19   Short Term Goal #1 1  Pt will demonstrate the ability to perform all aspects of bed mobility at Mod I in onder to maximize functional independence and decrease burden on caregivers   2 Pt will demonstrate the ability to perform all functional transfers at Mod I in order to maximize functional independence and decrease burden on caregivers  3 Pt will demonstrate the ability to ambulate at least 300ft with least restrictive device at Mod I in order to maximize functional independence  4 Pt will demonstrate the ability to negotiate 7 steps with HR and supervision in order to return to household/community mobility  5 Pt will demonstrate improved balance by one grade in order to decrease risk of falls  6 Pt will increase b/l LE strength by 1 grade in order to increase ease of functional mobility and transfers   PT Treatment Day 0   Plan   Treatment/Interventions Functional transfer training;LE strengthening/ROM; Elevations; Therapeutic exercise; Endurance training;Patient/family training;Equipment eval/education; Bed mobility;Gait training;Spoke to nursing   PT Frequency   (4-6x/week)   Recommendation   Recommendation Home with family support   Equipment Recommended Walker  (RW)   PT - OK to Discharge No   Additional Comments more ambulation and stairs   Modified [place] Scale   Modified [place] Scale 3   Barthel Index   Feeding 10   Bathing 0   Grooming Score 5   Dressing Score 5   Bladder Score 10   Bowels Score 10   Toilet Use Score 5   Transfers (Bed/Chair) Score 10   Mobility (Level Surface) Score 10   Stairs Score 0   Barthel Index Score 65     Yanet Cervantes, PT, DPT

## 2019-12-14 NOTE — ASSESSMENT & PLAN NOTE
· Patient was noted to have septic shock when at [facility] [place]    Patient was noted to be hypotensive, tachycardic and have severe leukocytosis on admission associated with lactic acidosis and acute kidney injury requiring pressors  · Improving monitor

## 2019-12-15 ENCOUNTER — APPOINTMENT (INPATIENT)
Dept: RADIOLOGY | Facility: HOSPITAL | Age: 84
DRG: 853 | End: 2019-12-15
Payer: COMMERCIAL

## 2019-12-15 PROBLEM — E87.6 HYPOKALEMIA: Status: ACTIVE | Noted: 2019-12-15

## 2019-12-15 LAB
ANION GAP SERPL CALCULATED.3IONS-SCNC: 12 MMOL/L (ref 4–13)
ANION GAP SERPL CALCULATED.3IONS-SCNC: 3 MMOL/L (ref 4–13)
BACTERIA TISS AEROBE CULT: NO GROWTH
BACTERIA UR QL AUTO: ABNORMAL /HPF
BASOPHILS # BLD AUTO: 0.11 THOUSANDS/ΜL (ref 0–0.1)
BASOPHILS NFR BLD AUTO: 1 % (ref 0–1)
BILIRUB UR QL STRIP: NEGATIVE
BUN SERPL-MCNC: 11 MG/DL (ref 5–25)
BUN SERPL-MCNC: 12 MG/DL (ref 5–25)
CALCIUM SERPL-MCNC: 6.9 MG/DL (ref 8.3–10.1)
CALCIUM SERPL-MCNC: 7.8 MG/DL (ref 8.3–10.1)
CHLORIDE SERPL-SCNC: 101 MMOL/L (ref 100–108)
CHLORIDE SERPL-SCNC: 90 MMOL/L (ref 100–108)
CLARITY UR: ABNORMAL
CO2 SERPL-SCNC: 26 MMOL/L (ref 21–32)
CO2 SERPL-SCNC: 29 MMOL/L (ref 21–32)
COLOR UR: YELLOW
CREAT SERPL-MCNC: 0.91 MG/DL (ref 0.6–1.3)
CREAT SERPL-MCNC: 3.25 MG/DL (ref 0.6–1.3)
EOSINOPHIL # BLD AUTO: 0.13 THOUSAND/ΜL (ref 0–0.61)
EOSINOPHIL NFR BLD AUTO: 1 % (ref 0–6)
ERYTHROCYTE [DISTWIDTH] IN BLOOD BY AUTOMATED COUNT: 15.2 % (ref 11.6–15.1)
GFR SERPL CREATININE-BSD FRML MDRD: 17 ML/MIN/1.73SQ M
GFR SERPL CREATININE-BSD FRML MDRD: 78 ML/MIN/1.73SQ M
GLUCOSE SERPL-MCNC: 120 MG/DL (ref 65–140)
GLUCOSE SERPL-MCNC: 315 MG/DL (ref 65–140)
GLUCOSE UR STRIP-MCNC: ABNORMAL MG/DL
GRAM STN SPEC: NORMAL
GRAM STN SPEC: NORMAL
HCT VFR BLD AUTO: 40.6 % (ref 36.5–49.3)
HGB BLD-MCNC: 13.3 G/DL (ref 12–17)
HGB UR QL STRIP.AUTO: NEGATIVE
HYALINE CASTS #/AREA URNS LPF: ABNORMAL /LPF
IMM GRANULOCYTES # BLD AUTO: 0.35 THOUSAND/UL (ref 0–0.2)
IMM GRANULOCYTES NFR BLD AUTO: 2 % (ref 0–2)
KETONES UR STRIP-MCNC: NEGATIVE MG/DL
LEUKOCYTE ESTERASE UR QL STRIP: NEGATIVE
LYMPHOCYTES # BLD AUTO: 1.49 THOUSANDS/ΜL (ref 0.6–4.47)
LYMPHOCYTES NFR BLD AUTO: 8 % (ref 14–44)
MAGNESIUM SERPL-MCNC: 1.6 MG/DL (ref 1.6–2.6)
MCH RBC QN AUTO: 32 PG (ref 26.8–34.3)
MCHC RBC AUTO-ENTMCNC: 32.8 G/DL (ref 31.4–37.4)
MCV RBC AUTO: 98 FL (ref 82–98)
MONOCYTES # BLD AUTO: 1.58 THOUSAND/ΜL (ref 0.17–1.22)
MONOCYTES NFR BLD AUTO: 9 % (ref 4–12)
NEUTROPHILS # BLD AUTO: 14.19 THOUSANDS/ΜL (ref 1.85–7.62)
NEUTS SEG NFR BLD AUTO: 79 % (ref 43–75)
NITRITE UR QL STRIP: NEGATIVE
NON-SQ EPI CELLS URNS QL MICRO: ABNORMAL /HPF
NRBC BLD AUTO-RTO: 0 /100 WBCS
PH UR STRIP.AUTO: 7.5 [PH]
PLATELET # BLD AUTO: 207 THOUSANDS/UL (ref 149–390)
PMV BLD AUTO: 9.8 FL (ref 8.9–12.7)
POTASSIUM SERPL-SCNC: 3.2 MMOL/L (ref 3.5–5.3)
POTASSIUM SERPL-SCNC: 3.7 MMOL/L (ref 3.5–5.3)
PROT UR STRIP-MCNC: ABNORMAL MG/DL
RBC # BLD AUTO: 4.16 MILLION/UL (ref 3.88–5.62)
RBC #/AREA URNS AUTO: ABNORMAL /HPF
SODIUM SERPL-SCNC: 128 MMOL/L (ref 136–145)
SODIUM SERPL-SCNC: 133 MMOL/L (ref 136–145)
SP GR UR STRIP.AUTO: 1.01 (ref 1–1.03)
UROBILINOGEN UR QL STRIP.AUTO: 0.2 E.U./DL
WBC # BLD AUTO: 17.85 THOUSAND/UL (ref 4.31–10.16)
WBC #/AREA URNS AUTO: ABNORMAL /HPF

## 2019-12-15 PROCEDURE — 80048 BASIC METABOLIC PNL TOTAL CA: CPT | Performed by: INTERNAL MEDICINE

## 2019-12-15 PROCEDURE — 81001 URINALYSIS AUTO W/SCOPE: CPT | Performed by: INTERNAL MEDICINE

## 2019-12-15 PROCEDURE — 99024 POSTOP FOLLOW-UP VISIT: CPT | Performed by: THORACIC SURGERY (CARDIOTHORACIC VASCULAR SURGERY)

## 2019-12-15 PROCEDURE — 71046 X-RAY EXAM CHEST 2 VIEWS: CPT

## 2019-12-15 PROCEDURE — 85025 COMPLETE CBC W/AUTO DIFF WBC: CPT | Performed by: INTERNAL MEDICINE

## 2019-12-15 PROCEDURE — 83735 ASSAY OF MAGNESIUM: CPT | Performed by: INTERNAL MEDICINE

## 2019-12-15 PROCEDURE — 99232 SBSQ HOSP IP/OBS MODERATE 35: CPT | Performed by: INTERNAL MEDICINE

## 2019-12-15 RX ORDER — POTASSIUM CHLORIDE 20 MEQ/1
20 TABLET, EXTENDED RELEASE ORAL ONCE
Status: COMPLETED | OUTPATIENT
Start: 2019-12-15 | End: 2019-12-15

## 2019-12-15 RX ORDER — MAGNESIUM SULFATE HEPTAHYDRATE 40 MG/ML
2 INJECTION, SOLUTION INTRAVENOUS ONCE
Status: COMPLETED | OUTPATIENT
Start: 2019-12-15 | End: 2019-12-15

## 2019-12-15 RX ADMIN — Medication 250 MG: at 08:14

## 2019-12-15 RX ADMIN — NYSTATIN 500000 UNITS: 100000 SUSPENSION ORAL at 21:13

## 2019-12-15 RX ADMIN — ACETAMINOPHEN 975 MG: 325 TABLET ORAL at 06:19

## 2019-12-15 RX ADMIN — CEFAZOLIN SODIUM 2000 MG: 2 SOLUTION INTRAVENOUS at 12:36

## 2019-12-15 RX ADMIN — CEFAZOLIN SODIUM 2000 MG: 2 SOLUTION INTRAVENOUS at 04:11

## 2019-12-15 RX ADMIN — MELATONIN 3 MG: 3 TAB ORAL at 21:12

## 2019-12-15 RX ADMIN — Medication 250 MG: at 17:09

## 2019-12-15 RX ADMIN — POTASSIUM CHLORIDE 20 MEQ: 1500 TABLET, EXTENDED RELEASE ORAL at 13:07

## 2019-12-15 RX ADMIN — DIGOXIN 62.5 MCG: 125 TABLET ORAL at 08:14

## 2019-12-15 RX ADMIN — FUROSEMIDE 20 MG: 20 TABLET ORAL at 08:18

## 2019-12-15 RX ADMIN — APIXABAN 5 MG: 5 TABLET, FILM COATED ORAL at 17:09

## 2019-12-15 RX ADMIN — APIXABAN 5 MG: 5 TABLET, FILM COATED ORAL at 08:13

## 2019-12-15 RX ADMIN — CEFAZOLIN SODIUM 2000 MG: 2 SOLUTION INTRAVENOUS at 19:39

## 2019-12-15 RX ADMIN — GUAIFENESIN 600 MG: 600 TABLET, EXTENDED RELEASE ORAL at 08:13

## 2019-12-15 RX ADMIN — NYSTATIN 500000 UNITS: 100000 SUSPENSION ORAL at 12:36

## 2019-12-15 RX ADMIN — ACETAMINOPHEN 975 MG: 325 TABLET ORAL at 14:29

## 2019-12-15 RX ADMIN — NYSTATIN 500000 UNITS: 100000 SUSPENSION ORAL at 08:14

## 2019-12-15 RX ADMIN — SODIUM CHLORIDE 500 ML: 0.9 INJECTION, SOLUTION INTRAVENOUS at 12:53

## 2019-12-15 RX ADMIN — MAGNESIUM SULFATE HEPTAHYDRATE 2 G: 40 INJECTION, SOLUTION INTRAVENOUS at 12:50

## 2019-12-15 RX ADMIN — GUAIFENESIN 600 MG: 600 TABLET, EXTENDED RELEASE ORAL at 21:12

## 2019-12-15 RX ADMIN — LEVOTHYROXINE SODIUM 50 MCG: 50 TABLET ORAL at 06:18

## 2019-12-15 RX ADMIN — ACETAMINOPHEN 975 MG: 325 TABLET ORAL at 21:12

## 2019-12-15 RX ADMIN — TAMSULOSIN HYDROCHLORIDE 0.4 MG: 0.4 CAPSULE ORAL at 17:09

## 2019-12-15 RX ADMIN — NYSTATIN 500000 UNITS: 100000 SUSPENSION ORAL at 17:09

## 2019-12-15 NOTE — PROGRESS NOTES
Progress Note - Susan Hernandez 1935, 82 y o  male MRN: [id number]    Unit/Bed#: [facility] 419-01 Encounter: [id number]    Primary Care Provider: Neil Francis MD   Date and time admitted to hospital: 12/11/2019  7:58 PM        * Empyema [facility])  Assessment & Plan  · Status post chest tube placement at [facility]   · TPA infusion to chest tube has been unsuccessful  · Status post decortication per thoracic surgery  · Continue cefazolin per Infectious Disease  · ID following    Hypokalemia  Assessment & Plan  Replete  Monitor    Volume overload  Assessment & Plan  · Anasarca with chronic CHF EF 35%, hypoalbuminemia  · Lasix on hold due to worsening kidney function  · Continue fluid restriction less than 2 g salt diet  · Monitor I/O, daily weights    Grp A strep pneumonia ([facility] [address] )  Assessment & Plan  · Patient is noted to have severe group a strep pneumonia complicated by empyema  · Status post decortication  · IV antibiotics by infectious disease    Cardiomyopathy ([facility] [address] )  Assessment & Plan  EF 40%  Continue digoxin, monitor given acute renal failure  Eliquis  Outpatient cardiology follow-up    Moderate protein-calorie malnutrition ([facility] [address] )  Assessment & Plan  Malnutrition Findings:        BMI Findings: Body mass index is 29 58 kg/m²     Encourage increase calorie and protein intake as able    Chronic atrial fibrillation  Assessment & Plan  · Continue digoxin, monitor given acute renal failure  · Eliquis for anticoagulation    Acute kidney injury [facility])  Assessment & Plan  Acute liver worsening kidney function creatinine 3 25 differentials include obstruction versus ATN  Diuresis probably contributing  Will hold diuresis  Monitor postvoid residuals  Check urinalysis  Gentle IV fluids  Check urinalysis  Check retroperitoneal ultrasound  Avoid nephrotoxins, monitor kidney function closely  Monitor kidney function closely if persistent or worsens will consider nephrology consult      Hyponatremia  Assessment & Plan    Lab Results   Component Value Date    SODIUM 128 (L) 12/15/2019    SODIUM 138 2019    SODIUM 136 2019     Sodium 128 likely due to diuresis with Lasix contributing  Hold Lasix  Gentle IV fluids  Monitor sodium levels closely  Continue fluid restriction    Septic shock [facility])  Assessment & Plan  · Patient was noted to have septic shock when at [facility]  Patient was noted to be hypotensive, tachycardic and have severe leukocytosis on admission associated with lactic acidosis and acute kidney injury requiring pressors  · Improving monitor        VTE Pharmacologic Prophylaxis:   Pharmacologic: Apixaban (Eliquis)  Mechanical VTE Prophylaxis in Place: Yes    Patient Centered Rounds: I have performed bedside rounds with nursing staff today  Discussions with Specialists or Other Care Team Provider:     Education and Discussions with Family / Patient:  Discussed the patient, son-in-law at bedside updated    Time Spent for Care: 30 minutes  More than 50% of total time spent on counseling and coordination of care as described above  Current Length of Stay: 4 day(s)    Current Patient Status: Inpatient   Certification Statement: The patient will continue to require additional inpatient hospital stay due to As mentioned    Discharge Plan:  Awaiting clinical symptomatic improvement    Code Status: Level 1 - Full Code      Subjective:     Comfortably sitting up in chair  Reports feeling okay  Appetite fair to good  Encouraged ambulation as able  Discussed with RN at bedside in detail    Objective:     Vitals:   Temp (24hrs), Av 6 °F (36 4 °C), Min:97 5 °F (36 4 °C), Max:97 6 °F (36 4 °C)    Temp:  [97 5 °F (36 4 °C)-97 6 °F (36 4 °C)] 97 5 °F (36 4 °C)  HR:  [72-86] 72  Resp:  [18-20] 20  BP: (111-158)/(71-78) 143/78  SpO2:  [95 %-96 %] 95 %  Body mass index is 29 58 kg/m²  Input and Output Summary (last 24 hours):        Intake/Output Summary (Last 24 hours) at 12/15/2019 [place] filed at 12/15/2019 1311  Gross per 24 hour   Intake 610 ml   Output 1965 ml   Net -1355 ml       Physical Exam:     Physical Exam    Comfortably sitting up in chair  Neck supple  Lungs diminished breath sounds bilaterally occasional crackles  Heart sounds S1-S2 noted  Abdomen soft  Awake obeys simple commands  Anasarca noted - penile edema, lower extremity edema noted  Pulses noted  No rash    Additional Data:     Labs:    Results from last 7 days   Lab Units 12/15/19  0440  12/10/19  0507   WBC Thousand/uL 17 85*   < > 28 31*   HEMOGLOBIN g/dL 13 3   < > 14 5   HEMATOCRIT % 40 6   < > 42 4   PLATELETS Thousands/uL 207   < > 210   BANDS PCT %  --   --  3   NEUTROS PCT % 79*   < >  --    LYMPHS PCT % 8*   < >  --    LYMPHO PCT   --    < > 3*   MONOS PCT % 9   < >  --    MONO PCT   --    < > 3*   EOS PCT % 1   < > 0    < > = values in this interval not displayed  Results from last 7 days   Lab Units 12/15/19  0440 12/13/19  0501   SODIUM mmol/L 128* 138   POTASSIUM mmol/L 3 2* 3 8   CHLORIDE mmol/L 90* 105   CO2 mmol/L 26 26   BUN mg/dL 11 18   CREATININE mg/dL 3 25* 0 84   ANION GAP mmol/L 12 7   CALCIUM mg/dL 6 9* 7 8*   ALBUMIN g/dL  --  2 1*   TOTAL BILIRUBIN mg/dL  --  1 64*   ALK PHOS U/L  --  142*   ALT U/L  --  7*   AST U/L  --  17   GLUCOSE RANDOM mg/dL 315* 94                 Results from last 7 days   Lab Units 12/13/19  0501 12/10/19  0507 12/09/19  0000   LACTIC ACID mmol/L  --   --  1 5   PROCALCITONIN ng/ml 0 40* 0 46*  --            * I Have Reviewed All Lab Data Listed Above  * Additional Pertinent Lab Tests Reviewed: All Labs Within Last 24 Hours Reviewed    Imaging:    Imaging Reports Reviewed Today Include:   Imaging Personally Reviewed by Myself Includes:     Recent Cultures (last 7 days):     Results from last 7 days   Lab Units 12/12/19  1800 12/11/19  1236   BLOOD CULTURE   --  No Growth at 72 hrs  No Growth at 72 hrs     GRAM STAIN RESULT  No polys seen  No organisms seen  --        Last 24 Hours Medication List:     Current Facility-Administered Medications:  acetaminophen 650 mg Oral Q6H PRN Ginette Bunn MD    acetaminophen 975 mg Oral [facility] Ginette Bunn MD    apixaban 5 mg Oral BID Logan Salazar MD    cefazolin 2,000 mg Intravenous Q8H Ginette Bunn MD Last Rate: 2,000 mg (12/15/19 1236)   digoxin 62 5 mcg Oral Daily Ginette Bunn MD    guaiFENesin 600 mg Oral Q12H Placido Chapa MD    influenza vaccine 0 5 mL Intramuscular Once Ginette Bunn MD    levothyroxine 50 mcg Oral Daily Before Breakfast Ginette Bunn MD    magnesium sulfate 2 g Intravenous Once Logan Salazar MD    melatonin 3 mg Oral HS Ginette Bunn MD    nystatin 500,000 Units Swish & Swallow 4x Daily Ginette Bunn MD    ondansetron 4 mg Intravenous Q4H PRN Ginette Bunn MD    oxyCODONE 2 5 mg Oral Q4H PRN Ginette Bunn MD    oxyCODONE 5 mg Oral Q4H PRN Ginette Bunn MD    pneumococcal 13-valent conjugate vaccine 0 5 mL Intramuscular Once Ginette Bunn MD    saccharomyces boulardii 250 mg Oral BID Ginette Bunn MD    senna-docusate sodium 1 tablet Oral HS Ginette Bunn MD    sodium chloride 500 mL Intravenous Once Logan Salazar MD Last Rate: 500 mL (12/15/19 1253)   tamsulosin 0 4 mg Oral Daily With Nisha Dunn MD    zolpidem 5 mg Oral HS PRN Ginette Bunn MD         Today, Patient Was Seen By: Logan Salazar MD    ** Please Note: Dictation voice to text software may have been used in the creation of this document   **

## 2019-12-15 NOTE — ASSESSMENT & PLAN NOTE
EF 40%  Continue digoxin, monitor given acute renal failure  Eliquis  Outpatient cardiology follow-up

## 2019-12-15 NOTE — ASSESSMENT & PLAN NOTE
· Patient was noted to have septic shock when at [facility] [place]    Patient was noted to be hypotensive, tachycardic and have severe leukocytosis on admission associated with lactic acidosis and acute kidney injury requiring pressors  · Improving monitor

## 2019-12-15 NOTE — ASSESSMENT & PLAN NOTE
Lab Results   Component Value Date    SODIUM 128 (L) 12/15/2019    SODIUM 138 12/13/2019    SODIUM 136 12/12/2019     Sodium 128 likely due to diuresis with Lasix contributing  Hold Lasix  Gentle IV fluids  Monitor sodium levels closely  Continue fluid restriction

## 2019-12-15 NOTE — ASSESSMENT & PLAN NOTE
· Status post chest tube placement at [facility]   · TPA infusion to chest tube has been unsuccessful    · Status post decortication per thoracic surgery  · Continue cefazolin per Infectious Disease  · ID following

## 2019-12-15 NOTE — ASSESSMENT & PLAN NOTE
Malnutrition Findings:        BMI Findings: Body mass index is 29 58 kg/m²     Encourage increase calorie and protein intake as able

## 2019-12-15 NOTE — ASSESSMENT & PLAN NOTE
Acute liver worsening kidney function creatinine 3 25 differentials include obstruction versus ATN  Diuresis probably contributing  Will hold diuresis  Monitor postvoid residuals  Check urinalysis  Gentle IV fluids  Check urinalysis  Check retroperitoneal ultrasound  Avoid nephrotoxins, monitor kidney function closely  Monitor kidney function closely if persistent or worsens will consider nephrology consult

## 2019-12-15 NOTE — ASSESSMENT & PLAN NOTE
· Anasarca with chronic CHF EF 35%, hypoalbuminemia  · Lasix on hold due to worsening kidney function  · Continue fluid restriction less than 2 g salt diet  · Monitor I/O, daily weights

## 2019-12-15 NOTE — PROGRESS NOTES
Progress Note - Thoracic Surgery   Temitope Montes De Oca 82 y o  male MRN: [id number]  Unit/Bed#: [facility] 419-01 Encounter: [id number]    Assessment:  82-year-old male with right loculated parapneumonic effusion now postop day 3 status post right VATS, decortication    Right chest tube with 150 cc output previous 24 hours    Plan:  Place remaining chest tube to water-seal today  Continue anticoagulation for atrial fibrillation  Aggressive pulmonary toilet/incentive spirometry  P r n  Pain control  Antibiotics per Infectious Disease service  Rest of management per primary service    Subjective/Objective     Chief Complaint:     Subjective:  No events overnight  Anterior chest tube removed without incident yesterday      Objective:     Vitals: Blood pressure 111/71, pulse 86, temperature 97 6 °F (36 4 °C), temperature source Oral, resp  rate 20, height 5' 10" (1 778 m), weight 93 5 kg (206 lb 2 1 oz), SpO2 96 %  ,Body mass index is 29 58 kg/m²  I/O       12/13 0701 - 12/14 0700 12/14 0701 - 12/15 0700    P  O  720 510    IV Piggyback 100 100    Total Intake(mL/kg) 820 (8 6) 610 (6 5)    Urine (mL/kg/hr) 550 (0 2) 1475 (0 7)    Stool  0    Chest Tube 125 115    Total Output 675 1590    Net +145 -980          Unmeasured Urine Occurrence 1 x 1 x    Unmeasured Stool Occurrence  2 x          Physical Exam:  No acute distress  Nonlabored respirations on room air  Right chest tube without air leak to suction, serosanguineous drainage    Lab, Imaging and other studies:   CBC with diff:   Lab Results   Component Value Date    WBC 17 85 (H) 12/15/2019    HGB 13 3 12/15/2019    HCT 40 6 12/15/2019    MCV 98 12/15/2019     12/15/2019    MCH 32 0 12/15/2019    MCHC 32 8 12/15/2019    RDW 15 2 (H) 12/15/2019    MPV 9 8 12/15/2019    NRBC 0 12/15/2019   , BMP/CMP:   Lab Results   Component Value Date    SODIUM 128 (L) 12/15/2019    K 3 2 (L) 12/15/2019    CL 90 (L) 12/15/2019    CO2 26 12/15/2019    BUN 11 12/15/2019    CREATININE 3 25 (H) 12/15/2019    CALCIUM 6 9 (L) 12/15/2019    EGFR 17 12/15/2019   , Magnesium: No components found for: MAG, Coags: No results found for: PT, PTT, INR, Blood Culture: No results found for: BLOODCX, Urine Culture: No results found for: URINECX, Wound Culure: No results found for: WOUNDCULT  VTE Pharmacologic Prophylaxis: Sequential compression device (Venodyne)   VTE Mechanical Prophylaxis: sequential compression device

## 2019-12-16 ENCOUNTER — APPOINTMENT (INPATIENT)
Dept: RADIOLOGY | Facility: HOSPITAL | Age: 84
DRG: 853 | End: 2019-12-16
Payer: COMMERCIAL

## 2019-12-16 VITALS
DIASTOLIC BLOOD PRESSURE: 87 MMHG | TEMPERATURE: 97.5 F | HEIGHT: 70 IN | WEIGHT: 203.4 LBS | OXYGEN SATURATION: 94 % | SYSTOLIC BLOOD PRESSURE: 132 MMHG | HEART RATE: 89 BPM | RESPIRATION RATE: 18 BRPM | BODY MASS INDEX: 29.12 KG/M2

## 2019-12-16 PROBLEM — N17.9 ACUTE KIDNEY INJURY (HCC): Status: RESOLVED | Noted: 2019-12-03 | Resolved: 2019-12-16

## 2019-12-16 PROBLEM — J86.9 EMPYEMA (HCC): Status: RESOLVED | Noted: 2019-12-03 | Resolved: 2019-12-16

## 2019-12-16 PROBLEM — E87.6 HYPOKALEMIA: Status: RESOLVED | Noted: 2019-12-15 | Resolved: 2019-12-16

## 2019-12-16 PROBLEM — J15.4 GRP A STREP PNEUMONIA (HCC): Status: RESOLVED | Noted: 2019-12-12 | Resolved: 2019-12-16

## 2019-12-16 PROBLEM — A41.9 SEPTIC SHOCK (HCC): Status: RESOLVED | Noted: 2019-12-03 | Resolved: 2019-12-16

## 2019-12-16 PROBLEM — E87.1 HYPONATREMIA: Status: RESOLVED | Noted: 2019-12-03 | Resolved: 2019-12-16

## 2019-12-16 PROBLEM — R65.21 SEPTIC SHOCK (HCC): Status: RESOLVED | Noted: 2019-12-03 | Resolved: 2019-12-16

## 2019-12-16 LAB
ANION GAP SERPL CALCULATED.3IONS-SCNC: 3 MMOL/L (ref 4–13)
BACTERIA BLD CULT: NORMAL
BACTERIA BLD CULT: NORMAL
BUN SERPL-MCNC: 10 MG/DL (ref 5–25)
CALCIUM SERPL-MCNC: 7.1 MG/DL (ref 8.3–10.1)
CHLORIDE SERPL-SCNC: 108 MMOL/L (ref 100–108)
CO2 SERPL-SCNC: 26 MMOL/L (ref 21–32)
CREAT SERPL-MCNC: 0.65 MG/DL (ref 0.6–1.3)
GFR SERPL CREATININE-BSD FRML MDRD: 91 ML/MIN/1.73SQ M
GLUCOSE SERPL-MCNC: 64 MG/DL (ref 65–140)
MAGNESIUM SERPL-MCNC: 1.8 MG/DL (ref 1.6–2.6)
POTASSIUM SERPL-SCNC: 3.5 MMOL/L (ref 3.5–5.3)
SODIUM SERPL-SCNC: 137 MMOL/L (ref 136–145)

## 2019-12-16 PROCEDURE — 99024 POSTOP FOLLOW-UP VISIT: CPT | Performed by: THORACIC SURGERY (CARDIOTHORACIC VASCULAR SURGERY)

## 2019-12-16 PROCEDURE — 99232 SBSQ HOSP IP/OBS MODERATE 35: CPT | Performed by: INTERNAL MEDICINE

## 2019-12-16 PROCEDURE — 99239 HOSP IP/OBS DSCHRG MGMT >30: CPT | Performed by: INTERNAL MEDICINE

## 2019-12-16 PROCEDURE — 83735 ASSAY OF MAGNESIUM: CPT | Performed by: INTERNAL MEDICINE

## 2019-12-16 PROCEDURE — 80048 BASIC METABOLIC PNL TOTAL CA: CPT | Performed by: INTERNAL MEDICINE

## 2019-12-16 PROCEDURE — 71046 X-RAY EXAM CHEST 2 VIEWS: CPT

## 2019-12-16 PROCEDURE — 99024 POSTOP FOLLOW-UP VISIT: CPT | Performed by: PHYSICIAN ASSISTANT

## 2019-12-16 RX ADMIN — Medication 250 MG: at 08:48

## 2019-12-16 RX ADMIN — GUAIFENESIN 600 MG: 600 TABLET, EXTENDED RELEASE ORAL at 08:48

## 2019-12-16 RX ADMIN — LEVOTHYROXINE SODIUM 50 MCG: 50 TABLET ORAL at 05:45

## 2019-12-16 RX ADMIN — NYSTATIN 500000 UNITS: 100000 SUSPENSION ORAL at 08:48

## 2019-12-16 RX ADMIN — DIGOXIN 62.5 MCG: 125 TABLET ORAL at 08:49

## 2019-12-16 RX ADMIN — ACETAMINOPHEN 975 MG: 325 TABLET ORAL at 05:45

## 2019-12-16 RX ADMIN — CEFAZOLIN SODIUM 2000 MG: 2 SOLUTION INTRAVENOUS at 04:26

## 2019-12-16 RX ADMIN — APIXABAN 5 MG: 5 TABLET, FILM COATED ORAL at 08:48

## 2019-12-16 NOTE — UTILIZATION REVIEW
Continued Stay Review    Date: 12/15/2019                       POD #3 s/p VATS decortication  Current Patient Class: Inpatient  Current Level of Care: Med/Surg / level 2    HPI:84 y o  male initially admitted on 12/11/2019  Chest tube without air leak, output 115 ml, serosang    Assessment/Plan: Place chest tube to water seal today  Chest chest X  Continue IS  Cont IV Abx  Continue anticoagulation for atrial fibrillation  Pain control  Continue fluid restriction less than 2 g salt diet  Lasix on hold due to worsening kidney function  I/O  Monitor        Pertinent Labs/Diagnostic Results:   Results from last 7 days   Lab Units 12/15/19  0440 12/13/19  0501 12/12/19  0634 12/11/19  0431 12/10/19  0507   WBC Thousand/uL 17 85* 21 93* 20 09* 21 72* 28 31*   HEMOGLOBIN g/dL 13 3 12 8 14 2 13 6 14 5   HEMATOCRIT % 40 6 38 9 42 0 40 5 42 4   PLATELETS Thousands/uL 207 195 248 203 210   NEUTROS ABS Thousands/µL 14 19* 18 17*  --   --   --    BANDS PCT %  --   --   --   --  3     Results from last 7 days   Lab Units 12/15/19  1440 12/15/19  0440 12/13/19  0501 12/12/19  0634 12/11/19  0431 12/10/19  1702 12/10/19  0507   SODIUM mmol/L 133* 128* 138 136 131* 131* 130*   POTASSIUM mmol/L 3 7 3 2* 3 8 3 7 4 1 4 5 4 8   CHLORIDE mmol/L 101 90* 105 103 98* 97* 97*   CO2 mmol/L 29 26 26 25 25 27 25   ANION GAP mmol/L 3* 12 7 8 8 7 8   BUN mg/dL 12 11 18 26* 28* 27* 21   CREATININE mg/dL 0 91 3 25* 0 84 0 97 1 12 1 24 1 16   EGFR ml/min/1 73sq m 78 17 82 72 61 54 58   CALCIUM mg/dL 7 8* 6 9* 7 8* 7 9* 7 5* 7 6* 7 5*   MAGNESIUM mg/dL  --  1 6 1 7  --  2 0 2 1 2 0   PHOSPHORUS mg/dL  --   --   --   --  4 1 4 1 4 0     Results from last 7 days   Lab Units 12/13/19  0501   AST U/L 17   ALT U/L 7*   ALK PHOS U/L 142*   TOTAL PROTEIN g/dL 5 6*   ALBUMIN g/dL 2 1*   TOTAL BILIRUBIN mg/dL 1 64*     Results from last 7 days   Lab Units 12/15/19  1440 12/15/19  0440 12/13/19  0501 12/12/19  0634 12/11/19  0431 12/10/19  1702 12/10/19  0507   GLUCOSE RANDOM mg/dL 120 315* 94 80 118 107 171*     Results from last 7 days   Lab Units 12/10/19  0507   PH OLIMPIA  7 426*   PCO2 OLIMPIA mm Hg 39 3*   PO2 OLIMPIA mm Hg 41 7   HCO3 OLIMPIA mmol/L 25 3   BASE EXC OLIMPIA mmol/L 1 0   O2 CONTENT OLIMPIA ml/dL 16 1   O2 HGB, VENOUS % 75 7     Results from last 7 days   Lab Units 19  0636 19  2214 19  2103   PTT seconds 52* 51* 50*     Results from last 7 days   Lab Units 19  0501 12/10/19  0507   PROCALCITONIN ng/ml 0 40* 0 46*     Results from last 7 days   Lab Units 12/10/19  1702 12/10/19  0507   DIGOXIN LVL ng/mL 1 4 1 5     Results from last 7 days   Lab Units 12/15/19  2114   CLARITY UA  Cloudy   COLOR UA  Yellow   SPEC GRAV UA  1 012   PH UA  7 5   GLUCOSE UA mg/dl 100 (1/10%)*   KETONES UA mg/dl Negative   BLOOD UA  Negative   PROTEIN UA mg/dl 30 (1+)*   NITRITE UA  Negative   BILIRUBIN UA  Negative   UROBILINOGEN UA E U /dl 0 2   LEUKOCYTES UA  Negative   WBC UA /hpf None Seen   RBC UA /hpf 4-10*   BACTERIA UA /hpf None Seen   EPITHELIAL CELLS WET PREP /hpf None Seen     Results from last 7 days   Lab Units 19  1800 19  1236   BLOOD CULTURE   --  No Growth After 4 Days  No Growth After 4 Days  GRAM STAIN RESULT  No polys seen  No organisms seen  --      Results from last 7 days   Lab Units 19  0431 12/10/19  0507   TOTAL COUNTED  100 100     Vital Signs: Temp (24hrs), Av 6 °F (36 4 °C), Min:97 5 °F (36 4 °C), Max:97 6 °F (36 4 °C)   Temp:  [97 5 °F (36 4 °C)-97 6 °F (36 4 °C)] 97 5 °F (36 4 °C)  HR:  [72-86] 72  Resp:  [18-20] 20  BP: (111-158)/(71-78) 143/78  SpO2:  [95 %-96 %] 95 %  Body mass index is 29 58 kg/m²       Medications:   acetaminophen 650 mg Oral Q6H PRN   acetaminophen 975 mg Oral Q8H [facility] HOME   apixaban 5 mg Oral BID   cefazolin 2,000 mg Intravenous Q8H   digoxin 62 5 mcg Oral Daily   guaiFENesin 600 mg Oral Q12H [facility]   influenza vaccine 0 5 mL Intramuscular Once   levothyroxine 50 mcg Oral Daily Before Breakfast   magnesium sulfate 2 g Intravenous Once   melatonin 3 mg Oral HS   nystatin 500,000 Units Swish & Swallow 4x Daily   ondansetron 4 mg Intravenous Q4H PRN   oxyCODONE 2 5 mg Oral Q4H PRN   oxyCODONE 5 mg Oral Q4H PRN   pneumococcal 13-valent conjugate vaccine 0 5 mL Intramuscular Once   saccharomyces boulardii 250 mg Oral BID   senna-docusate sodium 1 tablet Oral HS   sodium chloride 500 mL Intravenous Once   tamsulosin 0 4 mg Oral Daily With Dinner   zolpidem 5 mg Oral HS PRN       Discharge Plan: TBD    Network Utilization Review Department  [email or web address]  ATTENTION: Please call with any questions or concerns to [phone] and carefully listen to the prompts so that you are directed to the right person  All voicemails are confidential   Urmila Slaughter all requests for admission clinical reviews, approved or denied determinations and any other requests to dedicated fax number below belonging to the campus where the patient is receiving treatment   List of dedicated fax numbers for the Facilities:  [address] [place] [address] DENIALS (Administrative/Medical Necessity) [phone]   [address] (Maternity/NICU/Pediatrics) [phone]   Edward Metzgeriel [phone]   Napolean Hoots [phone]   [place] [phone]   [address]  [phone]   [address] [facility] [address] [place] [address] [phone]   [facility] [phone] [address] [facility]  [address] [facility] [address] [facility] [phone]

## 2019-12-16 NOTE — DISCHARGE SUMMARY
Discharge Summary - [facility] Internal Medicine    Patient Information: Autumn Espinoza 82 y o  male MRN: [id number]  Unit/Bed#: [facility] 419-01 Encounter: [id number]    Discharging Physician / Practitioner: Ryan Wilkinson MD  PCP: Radha Man MD  Admission Date: 12/11/2019  Discharge Date: 12/16/19    Disposition:     Home    Reason for Admission:  Transfer from [facility] for CT surgery evaluation    Discharge Diagnoses:     Principal Problem (Resolved):    Empyema ([address] )  Active Problems:    Chronic atrial fibrillation    Moderate protein-calorie malnutrition (HCC)    Pleural effusion    Cardiomyopathy ([address] )    Volume overload  Resolved Problems:    Septic shock ([address] )    Hyponatremia    Acute kidney injury ([address] )    Grp A strep pneumonia ([facility] )    Hypokalemia      Consultations During Hospital Stay:  · Thoracic surgery  · Infectious Disease    Procedures Performed:     · CT chestRight hydropneumothorax/empyema with basilar chest tube in place  Loculated components at the right apex and lateral aspect of the right upper lobe appear small but increased from prior  Loculated intrafissural component appears unchanged  · Chest x-ray no evidence of pneumothorax, chest tubes removed  · Right thoracoscopic complete decortication      Hospital Course:     Autumn Espinoza is a 82 y o  male patient who originally presented to the hospital on 12/11/2019 due to transferred from [address] [place] [address]  He was initially admitted [facility] with septic shock noted to have empyema with chest tube placement and tPA administration  Pacing sharp tick shock resolved, he was continued IV antibiotics and transferred to [facility] for thoracic surgery evaluation  Patient underwent right thoracoscopy decortication with chest tube placement by thoracic surgery  He was continued on IV antibiotics and was followed by infectious disease    He was also noted to have anasarca likely due to fluid resuscitation from septic shock hypoalbuminemia and cardiomyopathy with EF of 40%  He received gentle diuresis  He was noted to have elevated creatinine at 3 25 this was likely an error as his repeat creatinine levels were 0 91, his BUN was not elevated  Today his creatinine is 0 65, he takes Lasix 40 mg p o  Daily as needed he will continue to take this at home, he completed his antibiotics during his stay in the hospital, chest tubes have been discontinued, he is reporting symptomatic improvement, remains hemodynamically stable and is deemed ready for discharge today  He will have close follow-up with thoracic surgery, cardiology, primary care physician  Condition at Discharge: fair     Discharge Day Visit / Exam:     Subjective:     Sitting up in chair  Reports feeling better  Requesting discharge      Vitals: Blood Pressure: 132/87 (12/16/19 0700)  Pulse: 89 (12/16/19 0700)  Temperature: 97 5 °F (36 4 °C) (12/16/19 0700)  Temp Source: Oral (12/16/19 0700)  Respirations: 18 (12/16/19 0700)  Height: 5' 10" (177 8 cm) (12/11/19 2003)  Weight - Scale: 92 3 kg (203 lb 6 4 oz) (12/16/19 0600)  SpO2: 94 % (12/16/19 0700)  Exam:   Physical Exam    Comfortably sitting up in chair  Obese  Short thick neck  Lungs diminished breath sounds bases, occasional crackles  Heart sounds S1 and S2 noted  Abdomen soft nontender  Abdominal obesity noted  Awake alert obeys simple commands  Lower extremity edema noted penile edema noted  Pulses noted  No rash    Discharge instructions/Information to patient and family:   See after visit summary for information provided to patient and family  Discharge plan discussed with the patient, family at bedside updated  Outpatient follow-up with thoracic surgery, primary care physician, cardiology    Provisions for Follow-Up Care:  See after visit summary for information related to follow-up care and any pertinent home health orders        Planned Readmission: no     Discharge Statement:  I spent 40 minutes discharging the patient  This time was spent on the day of discharge  I had direct contact with the patient on the day of discharge  Greater than 50% of the total time was spent examining patient, answering all patient questions, arranging and discussing plan of care with patient as well as directly providing post-discharge instructions  Additional time then spent on discharge activities  Discharge Medications:  See after visit summary for reconciled discharge medications provided to patient and family        ** Please Note: This note has been constructed using a voice recognition system **

## 2019-12-16 NOTE — PROGRESS NOTES
Progress Note - Infectious Disease   Raina Hardin 82 y o  male MRN: [id number]  Unit/Bed#: [facility] 419-01 Encounter: [id number]      Impression/Recommendations:  1   Septic shock   POA to [facility]:  Tachycardia, leukocytosis, refractory hypotension   Due to # 2/3   No other appreciable source   Blood cultures negative   Now improved with antibiotics, pleural space drainage     Rec:  · Disontinue antibiotics to complete treatment course  · Follow temperatures and WBC closely  · Supportive care as per the primary service     2   Group A Strep/MSSA pneumonia   Consider post viral pneumonia in setting of preceding biphasic clinical course  Improved status post 14 days IV antibiotics  Rec:  · Discontinue antibiotics to complete treatment course  · Follow respiratory status closely  · Check repeat CXR in 4-6 weeks to ensure radiographic resolution     3   Group A Strep empyema   Due to # 2   Status post chest tube, tPA with persistence  Now status post VATS/decortication, 14 days IV antibiotics  All postoperative chest tubes now removed  Rec:  · Discontinue antibiotics to complete treatment course  · Outpatient follow-up with Thoracics in 2-3 weeks     4   Acute hypoxic respiratory failure   Multifactorial due to all of above   Improved  Rec:  · Follow respiratory status closely     The patient is stable from an ID standpoint for D/C     Antibiotics:  Cefazolin #13  Antibiotics #14  POD #4    Subjective:  Patient seen on AM rounds  Feels well  Denies fevers, chills, sweats, nausea, vomiting, or diarrhea  Magaly Lamb to go home  24 Hour Events:  No documented fevers, chills, sweats, nausea, vomiting, or diarrhea    Had last chest tube removed this AM     Objective:  Vitals:  Temp:  [97 5 °F (36 4 °C)-97 9 °F (36 6 °C)] 97 5 °F (36 4 °C)  HR:  [87-90] 89  Resp:  [17-18] 18  BP: (116-144)/(63-87) 132/87  SpO2:  [94 %-97 %] 94 %  Temp (24hrs), Av 7 °F (36 5 °C), Min:97 5 °F (36 4 °C), Max:97 9 °F (36 6 °C)  Current: Temperature: 97 5 °F (36 4 °C)    Physical Exam:   General:  No acute distress  Psychiatric:  Awake and alert  Pulmonary:  Normal respiratory excursion without accessory muscle use  Abdomen:  Soft, nontender  Extremities:  No edema  Skin:  No rashes    Lab Results:  I have personally reviewed pertinent labs  Results from last 7 days   Lab Units 12/16/19  0433 12/15/19  1440 12/15/19  0440 12/13/19  0501   POTASSIUM mmol/L 3 5 3 7 3 2* 3 8   CHLORIDE mmol/L 108 101 90* 105   CO2 mmol/L 26 29 26 26   BUN mg/dL 10 12 11 18   CREATININE mg/dL 0 65 0 91 3 25* 0 84   EGFR ml/min/1 73sq m 91 78 17 82   CALCIUM mg/dL 7 1* 7 8* 6 9* 7 8*   AST U/L  --   --   --  17   ALT U/L  --   --   --  7*   ALK PHOS U/L  --   --   --  142*     Results from last 7 days   Lab Units 12/15/19  0440 12/13/19  0501 12/12/19  0634   WBC Thousand/uL 17 85* 21 93* 20 09*   HEMOGLOBIN g/dL 13 3 12 8 14 2   PLATELETS Thousands/uL 207 195 248     Results from last 7 days   Lab Units 12/12/19  1800 12/11/19  1236   BLOOD CULTURE   --  No Growth After 4 Days  No Growth After 4 Days  GRAM STAIN RESULT  No polys seen  No organisms seen  --        Imaging Studies:   I have personally reviewed pertinent imaging study reports and images in PACS  EKG, Pathology, and Other Studies:   I have personally reviewed pertinent reports

## 2019-12-16 NOTE — PROGRESS NOTES
Progress Note - Thoracic Surgery   Michel Agosto 82 y o  male MRN: [id number]  Unit/Bed#: [facility] 419-01 Encounter: [id number]    Assessment:  82 y o  M w R loculated parapneumonic effusion now postop day 4 s/p right VATS, decortication    R CT (-8, -AL) 100 cc SS output over last 24 hrs  IS: 1,000  AVSS  On room air    12/15 CXR following placement to water seal: no ptx, mild atelectasis and airspace opacities in R mid and lower lung, small R and tiny L pleural effusions      Plan:  Likely D/c chest tube today  Aggressive pulmonary toilet  OOB/Ambulate  Abx per ID  Rest of care per primary team    Subjective/Objective     Subjective: No acute events overnight  Pain is controlled on prn analgesia  Denies chest pain or SOB  Tolerating diet without nausea/vomiting  No fevers, chills  Objective:     Blood pressure 144/78, pulse 89, temperature 97 8 °F (36 6 °C), temperature source Oral, resp  rate 18, height 5' 10" (1 778 m), weight 93 5 kg (206 lb 2 1 oz), SpO2 94 %  ,Body mass index is 29 58 kg/m²        Intake/Output Summary (Last 24 hours) at 12/16/2019 0625  Last data filed at 12/16/2019 0434  Gross per 24 hour   Intake 460 ml   Output 1441 ml   Net -981 ml       Invasive Devices     Peripherally Inserted Central Catheter Line            PICC Line 42/65/63 Right Basilic 4 days          Drain            Chest Tube 2 Right 28 Fr  3 days                  NAD, alert and oriented x3  Normocephalic, atraumatic  MMM, EOMI, PERRLA  Norm resp effort on RA  RRR  R CT (-8, -AL) 100 cc SS output over last 24 hrs  Abd soft, NT/ND  No calf tenderness or peripheral edema  Motor/sensation intact in distal extremities  CN grossly intact  -rash/lesions    Lab, Imaging and other studies:  CBC: No results found for: WBC, HGB, HCT, MCV, PLT, ADJUSTEDWBC, MCH, MCHC, RDW, MPV, NRBC, CMP:   Lab Results   Component Value Date    SODIUM 137 12/16/2019    K 3 5 12/16/2019     12/16/2019    CO2 26 12/16/2019    BUN 10 12/16/2019 CREATININE 0 65 12/16/2019    CALCIUM 7 1 (L) 12/16/2019    EGFR 91 12/16/2019     VTE Pharmacologic Prophylaxis: Sequential compression device (Venodyne)   VTE Mechanical Prophylaxis: sequential compression device

## 2019-12-16 NOTE — PROGRESS NOTES
12/16/19    Procedure: Chest tube removal    Right chest tube removed in routine fashion without incident  The patient tolerated the procedure well  A dry, sterile dressing was placed  Will check a pa/lat chest x-ray       Law Humphreys PA-C

## 2019-12-17 ENCOUNTER — TELEPHONE (OUTPATIENT)
Dept: CARDIAC SURGERY | Facility: CLINIC | Age: 84
End: 2019-12-17

## 2019-12-17 DIAGNOSIS — Z98.890 STATUS POST LUNG SURGERY: Primary | ICD-10-CM

## 2019-12-17 LAB
BACTERIA SPEC ANAEROBE CULT: ABNORMAL
BACTERIA SPEC ANAEROBE CULT: ABNORMAL

## 2019-12-17 NOTE — TELEPHONE ENCOUNTER
1  Constipation: (Yes: Colace 100 mg BID, Senokot 2 tabs QHS or Senokot S 2 tabs qhs   No: Dulcolax/Miralax/suppository/enema)    Denies       2  Pain Management: (Oxycodone 5-10 mg prn, Tylenol 650 mg q6 hrs and +/- Advil 600 mg TID for 7 days  Neurontin 300 mg TID for 21 days)    Denies any pain      3  Fever: (Chills? Call for temp >100 4 )    Denies    4  Cough: (Dry, productive?)    Denies    5  Shortness of breath: (At rest, with activity?)  Minimal with exertion      6  Appetite:     Good    7  Incisions: (Warmth, redness, drainage? May shower, no baths  No creams, lotions, or ointments to incisions)  Denies any redness, warmth or drainage  8   Ambulation/Incentive Spirometry: (Any activity? Use IS every 15 min) yes using IS and ambulatory in house  Patient advised to call for fever greater than 100 4, chills, increased SOB or cough

## 2019-12-18 LAB
ATRIAL RATE: 416 BPM
QRS AXIS: 65 DEGREES
QRSD INTERVAL: 88 MS
QT INTERVAL: 364 MS
QTC INTERVAL: 393 MS
T WAVE AXIS: 240 DEGREES
VENTRICULAR RATE: 70 BPM

## 2019-12-18 PROCEDURE — 93010 ELECTROCARDIOGRAM REPORT: CPT | Performed by: INTERNAL MEDICINE

## 2019-12-18 NOTE — UTILIZATION REVIEW
Notification of Discharge  This is a Notification of Discharge from our facility [address]  Please be advised that this patient has been discharge from our facility  Below you will find the admission and discharge date and time including the patients disposition  PRESENTATION DATE: 12/11/2019  7:58 PM    IP ADMISSION DATE: 12/11/19 1958   DISCHARGE DATE: 12/16/2019  2:38 PM  DISPOSITION: Home/Self Care Home/Self Care   Admission Orders listed below:  Admission Orders (From admission, onward)     Ordered        12/12/19 1917  Admit Patient to  Once         12/11/19 2025  Inpatient Admission  Once                   Please contact the UR Department if additional information is required to close this patient's authorization/case  Kelly Coughlin  [facility] Utilization Review Department  Main: [phone] x carefully listen to the prompts  All voicemails are confidential   [email or web address]  Send all requests for admission clinical reviews, approved or denied determinations and any other requests to dedicated fax number below belonging to the campus where the patient is receiving treatment   List of dedicated fax numbers:  [address] DENIALS (Administrative/Medical Necessity) [phone]   [address] (Maternity/NICU/Pediatrics) [phone]   Alex Dickson [phone]   David Lilly [phone]   Ximena Holland [phone]   [address] [phone]   [facility]  [phone]   [address] [facility] [address]  2401 [facility] 1000 [address] [facility] [phone]

## 2019-12-18 NOTE — UTILIZATION REVIEW
Continued Stay Review     Date: 12/15/2019                       POD #3 s/p VATS decortication  Current Patient Class: Inpatient                   Current Level of Care: Med/Surg / level 2     HPI:84 y o  male initially admitted on 12/11/2019  Chest tube without air leak, output 115 ml, serosang     Assessment/Plan: Place chest tube to water seal today  Chest chest X  Continue IS  Cont IV Abx  Continue anticoagulation for atrial fibrillation  Pain control  Continue fluid restriction less than 2 g salt diet  Lasix on hold due to worsening kidney function  I/O    Monitor        Pertinent Labs/Diagnostic Results:            Results from last 7 days   Lab Units 12/15/19  0440 12/13/19  0501 12/12/19  0634 12/11/19  0431 12/10/19  0507   WBC Thousand/uL 17 85* 21 93* 20 09* 21 72* 28 31*   HEMOGLOBIN g/dL 13 3 12 8 14 2 13 6 14 5   HEMATOCRIT % 40 6 38 9 42 0 40 5 42 4   PLATELETS Thousands/uL 207 195 248 203 210   NEUTROS ABS Thousands/µL 14 19* 18 17*  --   --   --    BANDS PCT %  --   --   --   --  3                  Results from last 7 days   Lab Units 12/15/19  1440 12/15/19  0440 12/13/19  0501 12/12/19  0634 12/11/19  0431 12/10/19  1702 12/10/19  0507    SODIUM mmol/L 133* 128* 138 136 131* 131* 130*    POTASSIUM mmol/L 3 7 3 2* 3 8 3 7 4 1 4 5 4 8    CHLORIDE mmol/L 101 90* 105 103 98* 97* 97*    CO2 mmol/L 29 26 26 25 25 27 25    ANION GAP mmol/L 3* 12 7 8 8 7 8    BUN mg/dL 12 11 18 26* 28* 27* 21    CREATININE mg/dL 0 91 3 25* 0 84 0 97 1 12 1 24 1 16    EGFR ml/min/1 73sq m 78 17 82 72 61 54 58    CALCIUM mg/dL 7 8* 6 9* 7 8* 7 9* 7 5* 7 6* 7 5*    MAGNESIUM mg/dL  --  1 6 1 7  --  2 0 2 1 2 0    PHOSPHORUS mg/dL  --   --   --   --  4 1 4 1 4 0            Results from last 7 days   Lab Units 12/13/19  0501   AST U/L 17   ALT U/L 7*   ALK PHOS U/L 142*   TOTAL PROTEIN g/dL 5 6*   ALBUMIN g/dL 2 1*   TOTAL BILIRUBIN mg/dL 1 64*                  Results from last 7 days   Lab Units 12/15/19  2928 12/15/19  0440 19  0501 19  0634 19  0431 12/10/19  1702 12/10/19  0507    GLUCOSE RANDOM mg/dL 120 315* 94 80 118 107 171*            Results from last 7 days   Lab Units 12/10/19  0507   PH OLIMPIA   7 426*   PCO2 OLIMPIA mm Hg 39 3*   PO2 OLIMPIA mm Hg 41 7   HCO3 OLIMPIA mmol/L 25 3   BASE EXC OLIMPIA mmol/L 1 0   O2 CONTENT OLIMPIA ml/dL 16 1   O2 HGB, VENOUS % 75 7             Results from last 7 days   Lab Units 19  0636 19  2214 19  2103   PTT seconds 52* 51* 50*            Results from last 7 days   Lab Units 19  0501 12/10/19  0507   PROCALCITONIN ng/ml 0 40* 0 46*            Results from last 7 days   Lab Units 12/10/19  1702 12/10/19  0507   DIGOXIN LVL ng/mL 1 4 1 5           Results from last 7 days   Lab Units 12/15/19  2114   CLARITY UA   Cloudy   COLOR UA   Yellow   SPEC GRAV UA   1 012   PH UA   7 5   GLUCOSE UA mg/dl 100 (1/10%)*   KETONES UA mg/dl Negative   BLOOD UA   Negative   PROTEIN UA mg/dl 30 (1+)*   NITRITE UA   Negative   BILIRUBIN UA   Negative   UROBILINOGEN UA E U /dl 0 2   LEUKOCYTES UA   Negative   WBC UA /hpf None Seen   RBC UA /hpf 4-10*   BACTERIA UA /hpf None Seen   EPITHELIAL CELLS WET PREP /hpf None Seen            Results from last 7 days   Lab Units 19  1800 19  1236   BLOOD CULTURE    --  No Growth After 4 Days  No Growth After 4 Days     GRAM STAIN RESULT   No polys seen  No organisms seen  --             Results from last 7 days   Lab Units 19  0431 12/10/19  0507   TOTAL COUNTED   100 100      Vital Signs: Temp (24hrs), Av 6 °F (36 4 °C), Min:97 5 °F (36 4 °C), Max:97 6 °F (36 4 °C)   Temp:  [97 5 °F (36 4 °C)-97 6 °F (36 4 °C)] 97 5 °F (36 4 °C)  HR:  [72-86] 72  Resp:  [18-20] 20  BP: (111-158)/(71-78) 143/78  SpO2:  [95 %-96 %] 95 %  Body mass index is 29 58 kg/m²       Medications:   acetaminophen 650 mg Oral Q6H PRN   acetaminophen 975 mg Oral Q8H HERBERT   apixaban 5 mg Oral BID   cefazolin 2,000 mg Intravenous Q8H   digoxin 62 5 mcg Oral Daily   guaiFENesin 600 mg Oral Q12H [facility]   influenza vaccine 0 5 mL Intramuscular Once   levothyroxine 50 mcg Oral Daily Before Breakfast   magnesium sulfate 2 g Intravenous Once   melatonin 3 mg Oral HS   nystatin 500,000 Units Swish & Swallow 4x Daily   ondansetron 4 mg Intravenous Q4H PRN   oxyCODONE 2 5 mg Oral Q4H PRN   oxyCODONE 5 mg Oral Q4H PRN   pneumococcal 13-valent conjugate vaccine 0 5 mL Intramuscular Once   saccharomyces boulardii 250 mg Oral BID   senna-docusate sodium 1 tablet Oral HS   sodium chloride 500 mL Intravenous Once   tamsulosin 0 4 mg Oral Daily With Dinner   zolpidem 5 mg Oral HS PRN         Discharge Plan: TBD     Network Utilization Review Department  [email or web address]  ATTENTION: Please call with any questions or concerns to [phone] and carefully listen to the prompts so that you are directed to the right person  All voicemails are confidential   Brennan Pronnichelle all requests for admission clinical reviews, approved or denied determinations and any other requests to dedicated fax number below belonging to the campus where the patient is receiving treatment   List of dedicated fax numbers for the Facilities:  [address] DENIALS (Administrative/Medical Necessity) [phone]   [address] (Maternity/NICU/Pediatrics) [phone]   Tito Berg [phone]   Nato Posada [phone]   Enrico Middleton [phone]   [address] [facility] [address] [facility] [address] [phone]

## 2019-12-19 NOTE — UTILIZATION REVIEW
Notification of Discharge  This is a Notification of Discharge from our facility [address]  Please be advised that this patient has been discharge from our facility  Below you will find the admission and discharge date and time including the patients disposition  PRESENTATION DATE: 12/11/2019  7:58 PM    IP ADMISSION DATE: 12/11/19 1958   DISCHARGE DATE: 12/16/2019  2:38 PM  DISPOSITION: Home/Self Care Home/Self Care   Admission Orders listed below:  Admission Orders (From admission, onward)     Ordered        12/12/19 1917  Admit Patient to  Once         12/11/19 2025  Inpatient Admission  Once                   Please contact the UR Department if additional information is required to close this patient's authorization/case  Cindia Brunner  [facility] Utilization Review Department  Main: [phone] x carefully listen to the prompts  All voicemails are confidential   [email or web address]  Send all requests for admission clinical reviews, approved or denied determinations and any other requests to dedicated fax number below belonging to the campus where the patient is receiving treatment   List of dedicated fax numbers:  [address] DENIALS (Administrative/Medical Necessity) [phone]   [address] [place] [address] (Maternity/NICU/Pediatrics) [phone]   Jaylen Congress [phone]   [facility] [phone]   [place] [phone]   Nidia Cheek [facility] 15[address] [facility] [phone]   Manny Bates [phone]   [address] [facility]  24[address] [facility] [address] [facility] [phone]

## 2019-12-23 ENCOUNTER — TELEPHONE (OUTPATIENT)
Dept: CARDIAC SURGERY | Facility: CLINIC | Age: 84
End: 2019-12-23

## 2019-12-23 NOTE — TELEPHONE ENCOUNTER
Patient had decortication performed on 12/12 by Dr Svetlana Krishna  Patient lives in [facility] and it is very difficult for him to get transportation to our office  Patient's PCP office called, asking if ok if they see patient for post op appt and remove sutures since we do not have anyone that sees patients close to him  I informed her I would have to check with our Leah BEGUM and get back to them   Her best # to be reached @ [phone]

## 2021-04-28 ENCOUNTER — IMMUNIZATIONS (OUTPATIENT)
Dept: FAMILY MEDICINE CLINIC | Facility: HOSPITAL | Age: 86
End: 2021-04-28

## 2021-04-28 DIAGNOSIS — Z23 ENCOUNTER FOR IMMUNIZATION: Primary | ICD-10-CM

## 2021-04-28 PROCEDURE — 91300 SARS-COV-2 / COVID-19 MRNA VACCINE (PFIZER-BIONTECH) 30 MCG: CPT

## 2021-04-28 PROCEDURE — 0001A SARS-COV-2 / COVID-19 MRNA VACCINE (PFIZER-BIONTECH) 30 MCG: CPT

## 2021-05-20 ENCOUNTER — IMMUNIZATIONS (OUTPATIENT)
Dept: FAMILY MEDICINE CLINIC | Facility: HOSPITAL | Age: 86
End: 2021-05-20

## 2021-05-20 DIAGNOSIS — Z23 ENCOUNTER FOR IMMUNIZATION: Primary | ICD-10-CM

## 2021-05-20 PROCEDURE — 91300 SARS-COV-2 / COVID-19 MRNA VACCINE (PFIZER-BIONTECH) 30 MCG: CPT

## 2021-05-20 PROCEDURE — 0002A SARS-COV-2 / COVID-19 MRNA VACCINE (PFIZER-BIONTECH) 30 MCG: CPT

## 2021-10-27 ENCOUNTER — HOSPITAL ENCOUNTER (EMERGENCY)
Facility: HOSPITAL | Age: 86
End: 2021-10-28
Attending: EMERGENCY MEDICINE | Admitting: EMERGENCY MEDICINE
Payer: COMMERCIAL

## 2021-10-27 ENCOUNTER — APPOINTMENT (EMERGENCY)
Dept: CT IMAGING | Facility: HOSPITAL | Age: 86
End: 2021-10-27
Payer: COMMERCIAL

## 2021-10-27 ENCOUNTER — APPOINTMENT (EMERGENCY)
Dept: RADIOLOGY | Facility: HOSPITAL | Age: 86
End: 2021-10-27
Payer: COMMERCIAL

## 2021-10-27 DIAGNOSIS — L03.114 CELLULITIS OF HAND, LEFT: ICD-10-CM

## 2021-10-27 DIAGNOSIS — M65.9 FLEXOR TENOSYNOVITIS OF FINGER: Primary | ICD-10-CM

## 2021-10-27 DIAGNOSIS — R22.32 LOCALIZED SWELLING ON LEFT HAND: ICD-10-CM

## 2021-10-27 LAB
ALBUMIN SERPL BCP-MCNC: 3.1 G/DL (ref 3.5–5)
ALP SERPL-CCNC: 160 U/L (ref 46–116)
ALT SERPL W P-5'-P-CCNC: 38 U/L (ref 12–78)
ANION GAP SERPL CALCULATED.3IONS-SCNC: 11 MMOL/L (ref 4–13)
APTT PPP: 57 SECONDS (ref 23–37)
AST SERPL W P-5'-P-CCNC: 37 U/L (ref 5–45)
BASOPHILS # BLD AUTO: 0.13 THOUSANDS/ΜL (ref 0–0.1)
BASOPHILS NFR BLD AUTO: 1 % (ref 0–1)
BILIRUB SERPL-MCNC: 1.36 MG/DL (ref 0.2–1)
BUN SERPL-MCNC: 22 MG/DL (ref 5–25)
CALCIUM ALBUM COR SERPL-MCNC: 9.3 MG/DL (ref 8.3–10.1)
CALCIUM SERPL-MCNC: 8.6 MG/DL (ref 8.3–10.1)
CHLORIDE SERPL-SCNC: 101 MMOL/L (ref 100–108)
CO2 SERPL-SCNC: 24 MMOL/L (ref 21–32)
CREAT SERPL-MCNC: 1.56 MG/DL (ref 0.6–1.3)
EOSINOPHIL # BLD AUTO: 0.27 THOUSAND/ΜL (ref 0–0.61)
EOSINOPHIL NFR BLD AUTO: 2 % (ref 0–6)
ERYTHROCYTE [DISTWIDTH] IN BLOOD BY AUTOMATED COUNT: 13.6 % (ref 11.6–15.1)
GFR SERPL CREATININE-BSD FRML MDRD: 40 ML/MIN/1.73SQ M
GLUCOSE SERPL-MCNC: 96 MG/DL (ref 65–140)
HCT VFR BLD AUTO: 51.1 % (ref 36.5–49.3)
HGB BLD-MCNC: 16.6 G/DL (ref 12–17)
IMM GRANULOCYTES # BLD AUTO: 0.11 THOUSAND/UL (ref 0–0.2)
IMM GRANULOCYTES NFR BLD AUTO: 1 % (ref 0–2)
INR PPP: 1.87 (ref 0.84–1.19)
LYMPHOCYTES # BLD AUTO: 2.27 THOUSANDS/ΜL (ref 0.6–4.47)
LYMPHOCYTES NFR BLD AUTO: 18 % (ref 14–44)
MCH RBC QN AUTO: 31.5 PG (ref 26.8–34.3)
MCHC RBC AUTO-ENTMCNC: 32.5 G/DL (ref 31.4–37.4)
MCV RBC AUTO: 97 FL (ref 82–98)
MONOCYTES # BLD AUTO: 1.38 THOUSAND/ΜL (ref 0.17–1.22)
MONOCYTES NFR BLD AUTO: 11 % (ref 4–12)
NEUTROPHILS # BLD AUTO: 8.55 THOUSANDS/ΜL (ref 1.85–7.62)
NEUTS SEG NFR BLD AUTO: 67 % (ref 43–75)
NRBC BLD AUTO-RTO: 0 /100 WBCS
NT-PROBNP SERPL-MCNC: 1844 PG/ML
PLATELET # BLD AUTO: 234 THOUSANDS/UL (ref 149–390)
PMV BLD AUTO: 9.5 FL (ref 8.9–12.7)
POTASSIUM SERPL-SCNC: 4.6 MMOL/L (ref 3.5–5.3)
PROT SERPL-MCNC: 8.4 G/DL (ref 6.4–8.2)
PROTHROMBIN TIME: 20.6 SECONDS (ref 11.6–14.5)
RBC # BLD AUTO: 5.27 MILLION/UL (ref 3.88–5.62)
SODIUM SERPL-SCNC: 136 MMOL/L (ref 136–145)
TROPONIN I SERPL-MCNC: <0.02 NG/ML
WBC # BLD AUTO: 12.71 THOUSAND/UL (ref 4.31–10.16)

## 2021-10-27 PROCEDURE — 99285 EMERGENCY DEPT VISIT HI MDM: CPT

## 2021-10-27 PROCEDURE — 85730 THROMBOPLASTIN TIME PARTIAL: CPT | Performed by: EMERGENCY MEDICINE

## 2021-10-27 PROCEDURE — 96376 TX/PRO/DX INJ SAME DRUG ADON: CPT

## 2021-10-27 PROCEDURE — 96365 THER/PROPH/DIAG IV INF INIT: CPT

## 2021-10-27 PROCEDURE — 93005 ELECTROCARDIOGRAM TRACING: CPT

## 2021-10-27 PROCEDURE — 83880 ASSAY OF NATRIURETIC PEPTIDE: CPT | Performed by: PHYSICIAN ASSISTANT

## 2021-10-27 PROCEDURE — 84484 ASSAY OF TROPONIN QUANT: CPT | Performed by: PHYSICIAN ASSISTANT

## 2021-10-27 PROCEDURE — 80053 COMPREHEN METABOLIC PANEL: CPT | Performed by: EMERGENCY MEDICINE

## 2021-10-27 PROCEDURE — 36415 COLL VENOUS BLD VENIPUNCTURE: CPT

## 2021-10-27 PROCEDURE — 71046 X-RAY EXAM CHEST 2 VIEWS: CPT

## 2021-10-27 PROCEDURE — 85610 PROTHROMBIN TIME: CPT | Performed by: EMERGENCY MEDICINE

## 2021-10-27 PROCEDURE — 85025 COMPLETE CBC W/AUTO DIFF WBC: CPT | Performed by: EMERGENCY MEDICINE

## 2021-10-27 PROCEDURE — 99285 EMERGENCY DEPT VISIT HI MDM: CPT | Performed by: PHYSICIAN ASSISTANT

## 2021-10-27 PROCEDURE — 73201 CT UPPER EXTREMITY W/DYE: CPT

## 2021-10-27 PROCEDURE — 96375 TX/PRO/DX INJ NEW DRUG ADDON: CPT

## 2021-10-27 RX ORDER — AMLODIPINE BESYLATE 5 MG/1
10 TABLET ORAL DAILY
Status: DISCONTINUED | OUTPATIENT
Start: 2021-10-28 | End: 2021-10-28 | Stop reason: HOSPADM

## 2021-10-27 RX ORDER — HYDROMORPHONE HCL/PF 1 MG/ML
0.5 SYRINGE (ML) INJECTION ONCE
Status: COMPLETED | OUTPATIENT
Start: 2021-10-27 | End: 2021-10-27

## 2021-10-27 RX ORDER — FENTANYL CITRATE 50 UG/ML
50 INJECTION, SOLUTION INTRAMUSCULAR; INTRAVENOUS ONCE
Status: COMPLETED | OUTPATIENT
Start: 2021-10-27 | End: 2021-10-27

## 2021-10-27 RX ORDER — METOPROLOL SUCCINATE 50 MG/1
50 TABLET, EXTENDED RELEASE ORAL DAILY
Status: DISCONTINUED | OUTPATIENT
Start: 2021-10-28 | End: 2021-10-28 | Stop reason: HOSPADM

## 2021-10-27 RX ORDER — ONDANSETRON 2 MG/ML
4 INJECTION INTRAMUSCULAR; INTRAVENOUS ONCE
Status: COMPLETED | OUTPATIENT
Start: 2021-10-27 | End: 2021-10-27

## 2021-10-27 RX ORDER — HYDROMORPHONE HCL/PF 1 MG/ML
0.5 SYRINGE (ML) INJECTION EVERY 4 HOURS PRN
Status: DISCONTINUED | OUTPATIENT
Start: 2021-10-27 | End: 2021-10-28 | Stop reason: HOSPADM

## 2021-10-27 RX ORDER — HYDROCHLOROTHIAZIDE 25 MG/1
25 TABLET ORAL DAILY
Status: DISCONTINUED | OUTPATIENT
Start: 2021-10-28 | End: 2021-10-28

## 2021-10-27 RX ORDER — LEVOTHYROXINE SODIUM 0.05 MG/1
50 TABLET ORAL
Status: DISCONTINUED | OUTPATIENT
Start: 2021-10-28 | End: 2021-10-28 | Stop reason: HOSPADM

## 2021-10-27 RX ADMIN — CEFTRIAXONE SODIUM 1000 MG: 10 INJECTION, POWDER, FOR SOLUTION INTRAVENOUS at 16:29

## 2021-10-27 RX ADMIN — APIXABAN 5 MG: 5 TABLET, FILM COATED ORAL at 19:18

## 2021-10-27 RX ADMIN — ONDANSETRON 4 MG: 2 INJECTION INTRAMUSCULAR; INTRAVENOUS at 17:42

## 2021-10-27 RX ADMIN — HYDROMORPHONE HYDROCHLORIDE 0.5 MG: 1 INJECTION, SOLUTION INTRAMUSCULAR; INTRAVENOUS; SUBCUTANEOUS at 22:42

## 2021-10-27 RX ADMIN — HYDROMORPHONE HYDROCHLORIDE 0.5 MG: 1 INJECTION, SOLUTION INTRAMUSCULAR; INTRAVENOUS; SUBCUTANEOUS at 17:43

## 2021-10-27 RX ADMIN — FENTANYL CITRATE 50 MCG: 50 INJECTION INTRAMUSCULAR; INTRAVENOUS at 16:52

## 2021-10-27 RX ADMIN — IOHEXOL 85 ML: 350 INJECTION, SOLUTION INTRAVENOUS at 14:40

## 2021-10-28 ENCOUNTER — HOSPITAL ENCOUNTER (INPATIENT)
Facility: HOSPITAL | Age: 86
LOS: 5 days | Discharge: HOME HEALTH | DRG: 854 | End: 2021-11-02
Attending: INTERNAL MEDICINE | Admitting: INTERNAL MEDICINE
Payer: COMMERCIAL

## 2021-10-28 VITALS
SYSTOLIC BLOOD PRESSURE: 104 MMHG | DIASTOLIC BLOOD PRESSURE: 70 MMHG | OXYGEN SATURATION: 91 % | TEMPERATURE: 98 F | RESPIRATION RATE: 21 BRPM | WEIGHT: 213.85 LBS | BODY MASS INDEX: 30.68 KG/M2 | HEART RATE: 102 BPM

## 2021-10-28 DIAGNOSIS — S91.302A WOUND OF LEFT FOOT: ICD-10-CM

## 2021-10-28 DIAGNOSIS — M65.9 FLEXOR TENOSYNOVITIS OF FINGER: Primary | ICD-10-CM

## 2021-10-28 DIAGNOSIS — L03.114 CELLULITIS OF HAND, LEFT: ICD-10-CM

## 2021-10-28 PROBLEM — R09.02 HYPOXIA: Status: ACTIVE | Noted: 2021-10-28

## 2021-10-28 PROBLEM — R65.10 SIRS (SYSTEMIC INFLAMMATORY RESPONSE SYNDROME) (HCC): Status: ACTIVE | Noted: 2021-10-28

## 2021-10-28 PROBLEM — N18.32 STAGE 3B CHRONIC KIDNEY DISEASE (HCC): Status: ACTIVE | Noted: 2021-10-28

## 2021-10-28 LAB
ATRIAL RATE: 94 BPM
QRS AXIS: 69 DEGREES
QRSD INTERVAL: 90 MS
QT INTERVAL: 338 MS
QTC INTERVAL: 461 MS
T WAVE AXIS: 68 DEGREES
VENTRICULAR RATE: 112 BPM

## 2021-10-28 PROCEDURE — 99244 OFF/OP CNSLTJ NEW/EST MOD 40: CPT | Performed by: NURSE PRACTITIONER

## 2021-10-28 PROCEDURE — 96367 TX/PROPH/DG ADDL SEQ IV INF: CPT

## 2021-10-28 PROCEDURE — 96366 THER/PROPH/DIAG IV INF ADDON: CPT

## 2021-10-28 PROCEDURE — 96376 TX/PRO/DX INJ SAME DRUG ADON: CPT

## 2021-10-28 PROCEDURE — 93010 ELECTROCARDIOGRAM REPORT: CPT | Performed by: INTERNAL MEDICINE

## 2021-10-28 RX ORDER — METOPROLOL TARTRATE 5 MG/5ML
5 INJECTION INTRAVENOUS EVERY 6 HOURS PRN
Status: DISCONTINUED | OUTPATIENT
Start: 2021-10-28 | End: 2021-10-28 | Stop reason: HOSPADM

## 2021-10-28 RX ORDER — CEFAZOLIN SODIUM 2 G/50ML
2000 SOLUTION INTRAVENOUS EVERY 8 HOURS
Status: DISCONTINUED | OUTPATIENT
Start: 2021-10-28 | End: 2021-10-28 | Stop reason: HOSPADM

## 2021-10-28 RX ADMIN — CEFTRIAXONE SODIUM 1000 MG: 10 INJECTION, POWDER, FOR SOLUTION INTRAVENOUS at 08:00

## 2021-10-28 RX ADMIN — HYDROCHLOROTHIAZIDE 25 MG: 25 TABLET ORAL at 09:03

## 2021-10-28 RX ADMIN — APIXABAN 5 MG: 5 TABLET, FILM COATED ORAL at 09:02

## 2021-10-28 RX ADMIN — HYDROMORPHONE HYDROCHLORIDE 0.5 MG: 1 INJECTION, SOLUTION INTRAMUSCULAR; INTRAVENOUS; SUBCUTANEOUS at 22:12

## 2021-10-28 RX ADMIN — CEFAZOLIN SODIUM 2000 MG: 2 SOLUTION INTRAVENOUS at 15:29

## 2021-10-28 RX ADMIN — HYDROMORPHONE HYDROCHLORIDE 0.5 MG: 1 INJECTION, SOLUTION INTRAMUSCULAR; INTRAVENOUS; SUBCUTANEOUS at 02:56

## 2021-10-28 RX ADMIN — HYDROMORPHONE HYDROCHLORIDE 0.5 MG: 1 INJECTION, SOLUTION INTRAMUSCULAR; INTRAVENOUS; SUBCUTANEOUS at 15:28

## 2021-10-28 RX ADMIN — APIXABAN 5 MG: 5 TABLET, FILM COATED ORAL at 18:18

## 2021-10-28 RX ADMIN — AMLODIPINE BESYLATE 10 MG: 5 TABLET ORAL at 09:02

## 2021-10-28 RX ADMIN — METOPROLOL SUCCINATE 50 MG: 50 TABLET, EXTENDED RELEASE ORAL at 09:02

## 2021-10-28 RX ADMIN — CEFAZOLIN SODIUM 2000 MG: 2 SOLUTION INTRAVENOUS at 22:15

## 2021-10-28 RX ADMIN — LEVOTHYROXINE SODIUM 50 MCG: 50 TABLET ORAL at 05:21

## 2021-10-28 RX ADMIN — HYDROMORPHONE HYDROCHLORIDE 0.5 MG: 1 INJECTION, SOLUTION INTRAMUSCULAR; INTRAVENOUS; SUBCUTANEOUS at 11:52

## 2021-10-29 ENCOUNTER — APPOINTMENT (INPATIENT)
Dept: RADIOLOGY | Facility: HOSPITAL | Age: 86
DRG: 854 | End: 2021-10-29
Payer: COMMERCIAL

## 2021-10-29 LAB
ANION GAP SERPL CALCULATED.3IONS-SCNC: 11 MMOL/L (ref 4–13)
BASOPHILS # BLD AUTO: 0.11 THOUSANDS/ΜL (ref 0–0.1)
BASOPHILS NFR BLD AUTO: 1 % (ref 0–1)
BUN SERPL-MCNC: 22 MG/DL (ref 5–25)
CALCIUM SERPL-MCNC: 8.8 MG/DL (ref 8.3–10.1)
CHLORIDE SERPL-SCNC: 99 MMOL/L (ref 100–108)
CO2 SERPL-SCNC: 23 MMOL/L (ref 21–32)
CREAT SERPL-MCNC: 1.55 MG/DL (ref 0.6–1.3)
EOSINOPHIL # BLD AUTO: 0.44 THOUSAND/ΜL (ref 0–0.61)
EOSINOPHIL NFR BLD AUTO: 4 % (ref 0–6)
ERYTHROCYTE [DISTWIDTH] IN BLOOD BY AUTOMATED COUNT: 13.7 % (ref 11.6–15.1)
GFR SERPL CREATININE-BSD FRML MDRD: 41 ML/MIN/1.73SQ M
GLUCOSE SERPL-MCNC: 100 MG/DL (ref 65–140)
HCT VFR BLD AUTO: 48.9 % (ref 36.5–49.3)
HGB BLD-MCNC: 15.5 G/DL (ref 12–17)
IMM GRANULOCYTES # BLD AUTO: 0.1 THOUSAND/UL (ref 0–0.2)
IMM GRANULOCYTES NFR BLD AUTO: 1 % (ref 0–2)
LYMPHOCYTES # BLD AUTO: 2.45 THOUSANDS/ΜL (ref 0.6–4.47)
LYMPHOCYTES NFR BLD AUTO: 20 % (ref 14–44)
MCH RBC QN AUTO: 31.2 PG (ref 26.8–34.3)
MCHC RBC AUTO-ENTMCNC: 31.7 G/DL (ref 31.4–37.4)
MCV RBC AUTO: 98 FL (ref 82–98)
MONOCYTES # BLD AUTO: 1.42 THOUSAND/ΜL (ref 0.17–1.22)
MONOCYTES NFR BLD AUTO: 12 % (ref 4–12)
NEUTROPHILS # BLD AUTO: 7.65 THOUSANDS/ΜL (ref 1.85–7.62)
NEUTS SEG NFR BLD AUTO: 62 % (ref 43–75)
NRBC BLD AUTO-RTO: 0 /100 WBCS
PLATELET # BLD AUTO: 255 THOUSANDS/UL (ref 149–390)
PLATELET # BLD AUTO: 257 THOUSANDS/UL (ref 149–390)
PMV BLD AUTO: 9.8 FL (ref 8.9–12.7)
PMV BLD AUTO: 9.8 FL (ref 8.9–12.7)
POTASSIUM SERPL-SCNC: 3.9 MMOL/L (ref 3.5–5.3)
RBC # BLD AUTO: 4.97 MILLION/UL (ref 3.88–5.62)
SODIUM SERPL-SCNC: 133 MMOL/L (ref 136–145)
WBC # BLD AUTO: 12.17 THOUSAND/UL (ref 4.31–10.16)

## 2021-10-29 PROCEDURE — 11042 DBRDMT SUBQ TIS 1ST 20SQCM/<: CPT | Performed by: PODIATRIST

## 2021-10-29 PROCEDURE — 99223 1ST HOSP IP/OBS HIGH 75: CPT | Performed by: PHYSICIAN ASSISTANT

## 2021-10-29 PROCEDURE — 99222 1ST HOSP IP/OBS MODERATE 55: CPT | Performed by: PODIATRIST

## 2021-10-29 PROCEDURE — 99221 1ST HOSP IP/OBS SF/LOW 40: CPT | Performed by: PHYSICIAN ASSISTANT

## 2021-10-29 PROCEDURE — 80048 BASIC METABOLIC PNL TOTAL CA: CPT | Performed by: PHYSICIAN ASSISTANT

## 2021-10-29 PROCEDURE — 0JBR0ZZ EXCISION OF LEFT FOOT SUBCUTANEOUS TISSUE AND FASCIA, OPEN APPROACH: ICD-10-PCS | Performed by: PODIATRIST

## 2021-10-29 PROCEDURE — 97760 ORTHOTIC MGMT&TRAING 1ST ENC: CPT

## 2021-10-29 PROCEDURE — 73630 X-RAY EXAM OF FOOT: CPT

## 2021-10-29 PROCEDURE — 85049 AUTOMATED PLATELET COUNT: CPT | Performed by: PHYSICIAN ASSISTANT

## 2021-10-29 PROCEDURE — 97163 PT EVAL HIGH COMPLEX 45 MIN: CPT

## 2021-10-29 PROCEDURE — 85025 COMPLETE CBC W/AUTO DIFF WBC: CPT | Performed by: PHYSICIAN ASSISTANT

## 2021-10-29 RX ORDER — HYDROMORPHONE HCL/PF 1 MG/ML
0.2 SYRINGE (ML) INJECTION EVERY 4 HOURS PRN
Status: DISCONTINUED | OUTPATIENT
Start: 2021-10-29 | End: 2021-10-30

## 2021-10-29 RX ORDER — METOPROLOL SUCCINATE 50 MG/1
50 TABLET, EXTENDED RELEASE ORAL DAILY
Status: DISCONTINUED | OUTPATIENT
Start: 2021-10-29 | End: 2021-10-31

## 2021-10-29 RX ORDER — METOPROLOL TARTRATE 5 MG/5ML
5 INJECTION INTRAVENOUS EVERY 6 HOURS PRN
Status: DISCONTINUED | OUTPATIENT
Start: 2021-10-29 | End: 2021-11-02 | Stop reason: HOSPADM

## 2021-10-29 RX ORDER — AMLODIPINE BESYLATE 10 MG/1
10 TABLET ORAL DAILY
Status: DISCONTINUED | OUTPATIENT
Start: 2021-10-29 | End: 2021-10-31

## 2021-10-29 RX ORDER — GABAPENTIN 300 MG/1
300 CAPSULE ORAL ONCE
Status: DISCONTINUED | OUTPATIENT
Start: 2021-10-29 | End: 2021-10-29

## 2021-10-29 RX ORDER — GABAPENTIN 300 MG/1
300 CAPSULE ORAL ONCE
Status: COMPLETED | OUTPATIENT
Start: 2021-10-30 | End: 2021-10-29

## 2021-10-29 RX ORDER — OXYCODONE HYDROCHLORIDE 5 MG/1
2.5 TABLET ORAL EVERY 4 HOURS PRN
Status: DISCONTINUED | OUTPATIENT
Start: 2021-10-29 | End: 2021-10-30

## 2021-10-29 RX ORDER — ONDANSETRON 2 MG/ML
4 INJECTION INTRAMUSCULAR; INTRAVENOUS EVERY 6 HOURS PRN
Status: DISCONTINUED | OUTPATIENT
Start: 2021-10-29 | End: 2021-11-02 | Stop reason: HOSPADM

## 2021-10-29 RX ORDER — LEVOTHYROXINE SODIUM 0.05 MG/1
50 TABLET ORAL
Status: DISCONTINUED | OUTPATIENT
Start: 2021-10-29 | End: 2021-10-31

## 2021-10-29 RX ORDER — SODIUM CHLORIDE, SODIUM LACTATE, POTASSIUM CHLORIDE, CALCIUM CHLORIDE 600; 310; 30; 20 MG/100ML; MG/100ML; MG/100ML; MG/100ML
50 INJECTION, SOLUTION INTRAVENOUS CONTINUOUS
Status: DISCONTINUED | OUTPATIENT
Start: 2021-10-29 | End: 2021-10-30

## 2021-10-29 RX ORDER — HYDROCHLOROTHIAZIDE 25 MG/1
25 TABLET ORAL DAILY
Status: DISCONTINUED | OUTPATIENT
Start: 2021-10-29 | End: 2021-10-31

## 2021-10-29 RX ORDER — CEFAZOLIN SODIUM 2 G/50ML
2000 SOLUTION INTRAVENOUS EVERY 8 HOURS
Status: DISCONTINUED | OUTPATIENT
Start: 2021-10-29 | End: 2021-11-02 | Stop reason: HOSPADM

## 2021-10-29 RX ORDER — ACETAMINOPHEN 325 MG/1
975 TABLET ORAL EVERY 8 HOURS SCHEDULED
Status: DISCONTINUED | OUTPATIENT
Start: 2021-10-29 | End: 2021-10-31

## 2021-10-29 RX ORDER — OXYCODONE HYDROCHLORIDE 5 MG/1
5 TABLET ORAL EVERY 4 HOURS PRN
Status: DISCONTINUED | OUTPATIENT
Start: 2021-10-29 | End: 2021-10-30

## 2021-10-29 RX ORDER — HEPARIN SODIUM 5000 [USP'U]/ML
5000 INJECTION, SOLUTION INTRAVENOUS; SUBCUTANEOUS EVERY 8 HOURS SCHEDULED
Status: DISCONTINUED | OUTPATIENT
Start: 2021-10-29 | End: 2021-11-02 | Stop reason: HOSPADM

## 2021-10-29 RX ADMIN — ACETAMINOPHEN 975 MG: 325 TABLET, FILM COATED ORAL at 14:17

## 2021-10-29 RX ADMIN — HYDROCHLOROTHIAZIDE 25 MG: 25 TABLET ORAL at 08:42

## 2021-10-29 RX ADMIN — CEFAZOLIN SODIUM 2000 MG: 2 SOLUTION INTRAVENOUS at 06:09

## 2021-10-29 RX ADMIN — AMLODIPINE BESYLATE 10 MG: 10 TABLET ORAL at 08:42

## 2021-10-29 RX ADMIN — OXYCODONE HYDROCHLORIDE 5 MG: 5 TABLET ORAL at 21:22

## 2021-10-29 RX ADMIN — LEVOTHYROXINE SODIUM 50 MCG: 50 TABLET ORAL at 06:10

## 2021-10-29 RX ADMIN — ACETAMINOPHEN 975 MG: 325 TABLET, FILM COATED ORAL at 06:10

## 2021-10-29 RX ADMIN — ACETAMINOPHEN 975 MG: 325 TABLET, FILM COATED ORAL at 21:22

## 2021-10-29 RX ADMIN — OXYCODONE HYDROCHLORIDE 5 MG: 5 TABLET ORAL at 11:05

## 2021-10-29 RX ADMIN — OXYCODONE HYDROCHLORIDE 5 MG: 5 TABLET ORAL at 06:10

## 2021-10-29 RX ADMIN — HEPARIN SODIUM 5000 UNITS: 5000 INJECTION INTRAVENOUS; SUBCUTANEOUS at 14:17

## 2021-10-29 RX ADMIN — SODIUM CHLORIDE, SODIUM LACTATE, POTASSIUM CHLORIDE, AND CALCIUM CHLORIDE 50 ML/HR: .6; .31; .03; .02 INJECTION, SOLUTION INTRAVENOUS at 21:30

## 2021-10-29 RX ADMIN — GABAPENTIN 300 MG: 300 CAPSULE ORAL at 23:59

## 2021-10-29 RX ADMIN — METOPROLOL SUCCINATE 50 MG: 50 TABLET, EXTENDED RELEASE ORAL at 08:41

## 2021-10-29 RX ADMIN — CEFAZOLIN SODIUM 2000 MG: 2 SOLUTION INTRAVENOUS at 14:18

## 2021-10-29 RX ADMIN — CEFAZOLIN SODIUM 2000 MG: 2 SOLUTION INTRAVENOUS at 21:22

## 2021-10-30 ENCOUNTER — ANESTHESIA EVENT (INPATIENT)
Dept: PERIOP | Facility: HOSPITAL | Age: 86
DRG: 854 | End: 2021-10-30
Payer: COMMERCIAL

## 2021-10-30 ENCOUNTER — ANESTHESIA (INPATIENT)
Dept: PERIOP | Facility: HOSPITAL | Age: 86
DRG: 854 | End: 2021-10-30
Payer: COMMERCIAL

## 2021-10-30 PROBLEM — A41.9 SEPSIS (HCC): Status: ACTIVE | Noted: 2021-10-30

## 2021-10-30 PROBLEM — A41.9 SEPSIS (HCC): Status: RESOLVED | Noted: 2021-10-30 | Resolved: 2021-10-30

## 2021-10-30 LAB
ABO GROUP BLD: NORMAL
ANION GAP SERPL CALCULATED.3IONS-SCNC: 10 MMOL/L (ref 4–13)
BASOPHILS # BLD AUTO: 0.11 THOUSANDS/ΜL (ref 0–0.1)
BASOPHILS NFR BLD AUTO: 1 % (ref 0–1)
BLD GP AB SCN SERPL QL: NEGATIVE
BUN SERPL-MCNC: 23 MG/DL (ref 5–25)
CALCIUM SERPL-MCNC: 8.4 MG/DL (ref 8.3–10.1)
CHLORIDE SERPL-SCNC: 99 MMOL/L (ref 100–108)
CO2 SERPL-SCNC: 23 MMOL/L (ref 21–32)
CREAT SERPL-MCNC: 1.61 MG/DL (ref 0.6–1.3)
EOSINOPHIL # BLD AUTO: 0.51 THOUSAND/ΜL (ref 0–0.61)
EOSINOPHIL NFR BLD AUTO: 5 % (ref 0–6)
ERYTHROCYTE [DISTWIDTH] IN BLOOD BY AUTOMATED COUNT: 13.7 % (ref 11.6–15.1)
GFR SERPL CREATININE-BSD FRML MDRD: 39 ML/MIN/1.73SQ M
GLUCOSE SERPL-MCNC: 87 MG/DL (ref 65–140)
HCT VFR BLD AUTO: 49.2 % (ref 36.5–49.3)
HGB BLD-MCNC: 15.8 G/DL (ref 12–17)
IMM GRANULOCYTES # BLD AUTO: 0.05 THOUSAND/UL (ref 0–0.2)
IMM GRANULOCYTES NFR BLD AUTO: 1 % (ref 0–2)
LYMPHOCYTES # BLD AUTO: 2.44 THOUSANDS/ΜL (ref 0.6–4.47)
LYMPHOCYTES NFR BLD AUTO: 25 % (ref 14–44)
MCH RBC QN AUTO: 31.5 PG (ref 26.8–34.3)
MCHC RBC AUTO-ENTMCNC: 32.1 G/DL (ref 31.4–37.4)
MCV RBC AUTO: 98 FL (ref 82–98)
MONOCYTES # BLD AUTO: 1.15 THOUSAND/ΜL (ref 0.17–1.22)
MONOCYTES NFR BLD AUTO: 12 % (ref 4–12)
NEUTROPHILS # BLD AUTO: 5.46 THOUSANDS/ΜL (ref 1.85–7.62)
NEUTS SEG NFR BLD AUTO: 56 % (ref 43–75)
NRBC BLD AUTO-RTO: 0 /100 WBCS
PLATELET # BLD AUTO: 260 THOUSANDS/UL (ref 149–390)
PMV BLD AUTO: 9.9 FL (ref 8.9–12.7)
POTASSIUM SERPL-SCNC: 3.6 MMOL/L (ref 3.5–5.3)
RBC # BLD AUTO: 5.01 MILLION/UL (ref 3.88–5.62)
RH BLD: POSITIVE
SODIUM SERPL-SCNC: 132 MMOL/L (ref 136–145)
SPECIMEN EXPIRATION DATE: NORMAL
WBC # BLD AUTO: 9.72 THOUSAND/UL (ref 4.31–10.16)

## 2021-10-30 PROCEDURE — 86900 BLOOD TYPING SEROLOGIC ABO: CPT | Performed by: PHYSICIAN ASSISTANT

## 2021-10-30 PROCEDURE — 99232 SBSQ HOSP IP/OBS MODERATE 35: CPT | Performed by: PHYSICIAN ASSISTANT

## 2021-10-30 PROCEDURE — 86901 BLOOD TYPING SEROLOGIC RH(D): CPT | Performed by: PHYSICIAN ASSISTANT

## 2021-10-30 PROCEDURE — 26020 DRAIN HAND TENDON SHEATH: CPT | Performed by: PLASTIC SURGERY

## 2021-10-30 PROCEDURE — 80048 BASIC METABOLIC PNL TOTAL CA: CPT | Performed by: INTERNAL MEDICINE

## 2021-10-30 PROCEDURE — 87075 CULTR BACTERIA EXCEPT BLOOD: CPT | Performed by: PLASTIC SURGERY

## 2021-10-30 PROCEDURE — 85025 COMPLETE CBC W/AUTO DIFF WBC: CPT | Performed by: PHYSICIAN ASSISTANT

## 2021-10-30 PROCEDURE — 86850 RBC ANTIBODY SCREEN: CPT | Performed by: PHYSICIAN ASSISTANT

## 2021-10-30 PROCEDURE — 0J9K0ZZ DRAINAGE OF LEFT HAND SUBCUTANEOUS TISSUE AND FASCIA, OPEN APPROACH: ICD-10-PCS | Performed by: PLASTIC SURGERY

## 2021-10-30 PROCEDURE — 87070 CULTURE OTHR SPECIMN AEROBIC: CPT | Performed by: PLASTIC SURGERY

## 2021-10-30 PROCEDURE — 87205 SMEAR GRAM STAIN: CPT | Performed by: PLASTIC SURGERY

## 2021-10-30 PROCEDURE — 0JDK0ZZ EXTRACTION OF LEFT HAND SUBCUTANEOUS TISSUE AND FASCIA, OPEN APPROACH: ICD-10-PCS | Performed by: PLASTIC SURGERY

## 2021-10-30 RX ORDER — LIDOCAINE HYDROCHLORIDE 10 MG/ML
INJECTION, SOLUTION EPIDURAL; INFILTRATION; INTRACAUDAL; PERINEURAL AS NEEDED
Status: DISCONTINUED | OUTPATIENT
Start: 2021-10-30 | End: 2021-10-30 | Stop reason: HOSPADM

## 2021-10-30 RX ORDER — ALBUTEROL SULFATE 2.5 MG/3ML
2.5 SOLUTION RESPIRATORY (INHALATION) ONCE
Status: COMPLETED | OUTPATIENT
Start: 2021-10-30 | End: 2021-10-30

## 2021-10-30 RX ORDER — OXYCODONE HYDROCHLORIDE 5 MG/1
5 TABLET ORAL EVERY 4 HOURS PRN
Status: DISCONTINUED | OUTPATIENT
Start: 2021-10-30 | End: 2021-11-02 | Stop reason: HOSPADM

## 2021-10-30 RX ORDER — CEFAZOLIN SODIUM 2 G/50ML
SOLUTION INTRAVENOUS AS NEEDED
Status: DISCONTINUED | OUTPATIENT
Start: 2021-10-30 | End: 2021-10-30

## 2021-10-30 RX ORDER — HYDROMORPHONE HCL/PF 1 MG/ML
0.5 SYRINGE (ML) INJECTION EVERY 4 HOURS PRN
Status: DISCONTINUED | OUTPATIENT
Start: 2021-10-30 | End: 2021-11-02 | Stop reason: HOSPADM

## 2021-10-30 RX ORDER — ONDANSETRON 2 MG/ML
INJECTION INTRAMUSCULAR; INTRAVENOUS AS NEEDED
Status: DISCONTINUED | OUTPATIENT
Start: 2021-10-30 | End: 2021-10-30

## 2021-10-30 RX ORDER — SODIUM CHLORIDE 9 MG/ML
INJECTION, SOLUTION INTRAVENOUS CONTINUOUS PRN
Status: DISCONTINUED | OUTPATIENT
Start: 2021-10-30 | End: 2021-10-30

## 2021-10-30 RX ORDER — AMOXICILLIN 250 MG
2 CAPSULE ORAL
Status: DISCONTINUED | OUTPATIENT
Start: 2021-10-30 | End: 2021-11-02 | Stop reason: HOSPADM

## 2021-10-30 RX ORDER — MAGNESIUM HYDROXIDE 1200 MG/15ML
LIQUID ORAL AS NEEDED
Status: DISCONTINUED | OUTPATIENT
Start: 2021-10-30 | End: 2021-10-30 | Stop reason: HOSPADM

## 2021-10-30 RX ORDER — POLYETHYLENE GLYCOL 3350 17 G/17G
17 POWDER, FOR SOLUTION ORAL DAILY PRN
Status: DISCONTINUED | OUTPATIENT
Start: 2021-10-30 | End: 2021-11-02 | Stop reason: HOSPADM

## 2021-10-30 RX ORDER — HYDROMORPHONE HCL IN WATER/PF 6 MG/30 ML
0.2 PATIENT CONTROLLED ANALGESIA SYRINGE INTRAVENOUS
Status: DISCONTINUED | OUTPATIENT
Start: 2021-10-30 | End: 2021-10-30 | Stop reason: HOSPADM

## 2021-10-30 RX ORDER — FENTANYL CITRATE 50 UG/ML
INJECTION, SOLUTION INTRAMUSCULAR; INTRAVENOUS AS NEEDED
Status: DISCONTINUED | OUTPATIENT
Start: 2021-10-30 | End: 2021-10-30

## 2021-10-30 RX ORDER — PROPOFOL 10 MG/ML
INJECTION, EMULSION INTRAVENOUS AS NEEDED
Status: DISCONTINUED | OUTPATIENT
Start: 2021-10-30 | End: 2021-10-30

## 2021-10-30 RX ORDER — FENTANYL CITRATE/PF 50 MCG/ML
25 SYRINGE (ML) INJECTION
Status: DISCONTINUED | OUTPATIENT
Start: 2021-10-30 | End: 2021-10-30 | Stop reason: HOSPADM

## 2021-10-30 RX ORDER — ALBUMIN (HUMAN) 12.5 G/50ML
25 SOLUTION INTRAVENOUS ONCE
Status: DISCONTINUED | OUTPATIENT
Start: 2021-10-30 | End: 2021-10-30

## 2021-10-30 RX ORDER — ONDANSETRON 2 MG/ML
4 INJECTION INTRAMUSCULAR; INTRAVENOUS ONCE AS NEEDED
Status: DISCONTINUED | OUTPATIENT
Start: 2021-10-30 | End: 2021-10-30 | Stop reason: HOSPADM

## 2021-10-30 RX ORDER — LIDOCAINE HYDROCHLORIDE 10 MG/ML
INJECTION, SOLUTION EPIDURAL; INFILTRATION; INTRACAUDAL; PERINEURAL AS NEEDED
Status: DISCONTINUED | OUTPATIENT
Start: 2021-10-30 | End: 2021-10-30

## 2021-10-30 RX ORDER — OXYCODONE HYDROCHLORIDE 10 MG/1
10 TABLET ORAL EVERY 4 HOURS PRN
Status: DISCONTINUED | OUTPATIENT
Start: 2021-10-30 | End: 2021-11-02 | Stop reason: HOSPADM

## 2021-10-30 RX ADMIN — HEPARIN SODIUM 5000 UNITS: 5000 INJECTION INTRAVENOUS; SUBCUTANEOUS at 21:52

## 2021-10-30 RX ADMIN — DOCUSATE SODIUM AND SENNOSIDES 2 TABLET: 8.6; 5 TABLET ORAL at 21:52

## 2021-10-30 RX ADMIN — LIDOCAINE HYDROCHLORIDE 50 MG: 10 INJECTION, SOLUTION EPIDURAL; INFILTRATION; INTRACAUDAL at 08:32

## 2021-10-30 RX ADMIN — OXYCODONE HYDROCHLORIDE 5 MG: 5 TABLET ORAL at 14:00

## 2021-10-30 RX ADMIN — OXYCODONE HYDROCHLORIDE 5 MG: 5 TABLET ORAL at 07:40

## 2021-10-30 RX ADMIN — ACETAMINOPHEN 975 MG: 325 TABLET, FILM COATED ORAL at 14:00

## 2021-10-30 RX ADMIN — HYDROMORPHONE HYDROCHLORIDE 0.2 MG: 1 INJECTION, SOLUTION INTRAMUSCULAR; INTRAVENOUS; SUBCUTANEOUS at 11:33

## 2021-10-30 RX ADMIN — FENTANYL CITRATE 25 MCG: 50 INJECTION, SOLUTION INTRAMUSCULAR; INTRAVENOUS at 08:37

## 2021-10-30 RX ADMIN — OXYCODONE HYDROCHLORIDE 10 MG: 10 TABLET ORAL at 21:53

## 2021-10-30 RX ADMIN — ACETAMINOPHEN 975 MG: 325 TABLET, FILM COATED ORAL at 21:52

## 2021-10-30 RX ADMIN — ONDANSETRON 4 MG: 2 INJECTION INTRAMUSCULAR; INTRAVENOUS at 08:49

## 2021-10-30 RX ADMIN — CEFAZOLIN SODIUM 2000 MG: 2 SOLUTION INTRAVENOUS at 05:46

## 2021-10-30 RX ADMIN — SODIUM CHLORIDE, SODIUM LACTATE, POTASSIUM CHLORIDE, AND CALCIUM CHLORIDE 50 ML/HR: .6; .31; .03; .02 INJECTION, SOLUTION INTRAVENOUS at 12:35

## 2021-10-30 RX ADMIN — LEVOTHYROXINE SODIUM 50 MCG: 50 TABLET ORAL at 05:45

## 2021-10-30 RX ADMIN — ALBUTEROL SULFATE 2.5 MG: 2.5 SOLUTION RESPIRATORY (INHALATION) at 10:16

## 2021-10-30 RX ADMIN — CEFAZOLIN SODIUM 2000 MG: 2 SOLUTION INTRAVENOUS at 14:01

## 2021-10-30 RX ADMIN — PROPOFOL 200 MG: 10 INJECTION, EMULSION INTRAVENOUS at 08:32

## 2021-10-30 RX ADMIN — HYDROMORPHONE HYDROCHLORIDE 0.5 MG: 1 INJECTION, SOLUTION INTRAMUSCULAR; INTRAVENOUS; SUBCUTANEOUS at 15:52

## 2021-10-30 RX ADMIN — FENTANYL CITRATE 25 MCG: 50 INJECTION, SOLUTION INTRAMUSCULAR; INTRAVENOUS at 08:45

## 2021-10-30 RX ADMIN — ACETAMINOPHEN 975 MG: 325 TABLET, FILM COATED ORAL at 05:45

## 2021-10-30 RX ADMIN — FENTANYL CITRATE 25 MCG: 50 INJECTION, SOLUTION INTRAMUSCULAR; INTRAVENOUS at 08:41

## 2021-10-30 RX ADMIN — HEPARIN SODIUM 5000 UNITS: 5000 INJECTION INTRAVENOUS; SUBCUTANEOUS at 14:01

## 2021-10-30 RX ADMIN — CEFAZOLIN SODIUM 2000 MG: 2 SOLUTION INTRAVENOUS at 21:51

## 2021-10-30 RX ADMIN — LIDOCAINE HYDROCHLORIDE 50 MG: 10 INJECTION, SOLUTION EPIDURAL; INFILTRATION; INTRACAUDAL at 08:51

## 2021-10-30 RX ADMIN — SODIUM CHLORIDE: 0.9 INJECTION, SOLUTION INTRAVENOUS at 08:28

## 2021-10-30 RX ADMIN — FENTANYL CITRATE 25 MCG: 50 INJECTION INTRAMUSCULAR; INTRAVENOUS at 10:04

## 2021-10-30 RX ADMIN — PHENYLEPHRINE HYDROCHLORIDE 30 MCG/MIN: 10 INJECTION INTRAVENOUS at 08:33

## 2021-10-30 RX ADMIN — CEFAZOLIN SODIUM 2000 MG: 2 SOLUTION INTRAVENOUS at 08:41

## 2021-10-30 RX ADMIN — HYDROMORPHONE HYDROCHLORIDE 0.2 MG: 0.2 INJECTION, SOLUTION INTRAMUSCULAR; INTRAVENOUS; SUBCUTANEOUS at 10:37

## 2021-10-30 RX ADMIN — FENTANYL CITRATE 25 MCG: 50 INJECTION INTRAMUSCULAR; INTRAVENOUS at 09:59

## 2021-10-30 RX ADMIN — FENTANYL CITRATE 25 MCG: 50 INJECTION, SOLUTION INTRAMUSCULAR; INTRAVENOUS at 08:58

## 2021-10-30 RX ADMIN — FENTANYL CITRATE 25 MCG: 50 INJECTION INTRAMUSCULAR; INTRAVENOUS at 10:28

## 2021-10-31 LAB
ANION GAP SERPL CALCULATED.3IONS-SCNC: 7 MMOL/L (ref 4–13)
BASOPHILS # BLD AUTO: 0.1 THOUSANDS/ΜL (ref 0–0.1)
BASOPHILS NFR BLD AUTO: 1 % (ref 0–1)
BUN SERPL-MCNC: 18 MG/DL (ref 5–25)
CALCIUM SERPL-MCNC: 8 MG/DL (ref 8.3–10.1)
CHLORIDE SERPL-SCNC: 100 MMOL/L (ref 100–108)
CO2 SERPL-SCNC: 26 MMOL/L (ref 21–32)
CREAT SERPL-MCNC: 1.57 MG/DL (ref 0.6–1.3)
EOSINOPHIL # BLD AUTO: 0.41 THOUSAND/ΜL (ref 0–0.61)
EOSINOPHIL NFR BLD AUTO: 5 % (ref 0–6)
ERYTHROCYTE [DISTWIDTH] IN BLOOD BY AUTOMATED COUNT: 13.6 % (ref 11.6–15.1)
GFR SERPL CREATININE-BSD FRML MDRD: 40 ML/MIN/1.73SQ M
GLUCOSE SERPL-MCNC: 85 MG/DL (ref 65–140)
HCT VFR BLD AUTO: 47 % (ref 36.5–49.3)
HGB BLD-MCNC: 15 G/DL (ref 12–17)
IMM GRANULOCYTES # BLD AUTO: 0.05 THOUSAND/UL (ref 0–0.2)
IMM GRANULOCYTES NFR BLD AUTO: 1 % (ref 0–2)
LYMPHOCYTES # BLD AUTO: 2.04 THOUSANDS/ΜL (ref 0.6–4.47)
LYMPHOCYTES NFR BLD AUTO: 23 % (ref 14–44)
MCH RBC QN AUTO: 31.5 PG (ref 26.8–34.3)
MCHC RBC AUTO-ENTMCNC: 31.9 G/DL (ref 31.4–37.4)
MCV RBC AUTO: 99 FL (ref 82–98)
MONOCYTES # BLD AUTO: 1.31 THOUSAND/ΜL (ref 0.17–1.22)
MONOCYTES NFR BLD AUTO: 15 % (ref 4–12)
NEUTROPHILS # BLD AUTO: 5.14 THOUSANDS/ΜL (ref 1.85–7.62)
NEUTS SEG NFR BLD AUTO: 55 % (ref 43–75)
NRBC BLD AUTO-RTO: 0 /100 WBCS
PLATELET # BLD AUTO: 233 THOUSANDS/UL (ref 149–390)
PMV BLD AUTO: 9.6 FL (ref 8.9–12.7)
POTASSIUM SERPL-SCNC: 3.7 MMOL/L (ref 3.5–5.3)
RBC # BLD AUTO: 4.76 MILLION/UL (ref 3.88–5.62)
SODIUM SERPL-SCNC: 133 MMOL/L (ref 136–145)
WBC # BLD AUTO: 9.05 THOUSAND/UL (ref 4.31–10.16)

## 2021-10-31 PROCEDURE — 80048 BASIC METABOLIC PNL TOTAL CA: CPT | Performed by: FAMILY MEDICINE

## 2021-10-31 PROCEDURE — 85025 COMPLETE CBC W/AUTO DIFF WBC: CPT | Performed by: FAMILY MEDICINE

## 2021-10-31 PROCEDURE — 99232 SBSQ HOSP IP/OBS MODERATE 35: CPT | Performed by: INTERNAL MEDICINE

## 2021-10-31 RX ORDER — ACETAMINOPHEN 325 MG/1
975 TABLET ORAL EVERY 8 HOURS SCHEDULED
Status: DISCONTINUED | OUTPATIENT
Start: 2021-10-31 | End: 2021-11-02 | Stop reason: HOSPADM

## 2021-10-31 RX ORDER — PRAVASTATIN SODIUM 80 MG/1
80 TABLET ORAL
Status: DISCONTINUED | OUTPATIENT
Start: 2021-10-31 | End: 2021-11-02 | Stop reason: HOSPADM

## 2021-10-31 RX ORDER — LEVOTHYROXINE SODIUM 0.07 MG/1
75 TABLET ORAL
Status: DISCONTINUED | OUTPATIENT
Start: 2021-11-01 | End: 2021-11-02 | Stop reason: HOSPADM

## 2021-10-31 RX ORDER — METOPROLOL SUCCINATE 25 MG/1
25 TABLET, EXTENDED RELEASE ORAL EVERY 12 HOURS SCHEDULED
Status: DISCONTINUED | OUTPATIENT
Start: 2021-10-31 | End: 2021-11-02 | Stop reason: HOSPADM

## 2021-10-31 RX ORDER — FUROSEMIDE 40 MG/1
40 TABLET ORAL DAILY
COMMUNITY
End: 2022-02-26 | Stop reason: HOSPADM

## 2021-10-31 RX ORDER — ALBUTEROL SULFATE 90 UG/1
2 AEROSOL, METERED RESPIRATORY (INHALATION) EVERY 4 HOURS PRN
Status: DISCONTINUED | OUTPATIENT
Start: 2021-10-31 | End: 2021-11-02 | Stop reason: HOSPADM

## 2021-10-31 RX ORDER — GUAIFENESIN 600 MG
600 TABLET, EXTENDED RELEASE 12 HR ORAL 2 TIMES DAILY PRN
Status: DISCONTINUED | OUTPATIENT
Start: 2021-10-31 | End: 2021-11-02 | Stop reason: HOSPADM

## 2021-10-31 RX ORDER — GUAIFENESIN 600 MG
1200 TABLET, EXTENDED RELEASE 12 HR ORAL EVERY 12 HOURS PRN
COMMUNITY

## 2021-10-31 RX ORDER — FUROSEMIDE 40 MG/1
40 TABLET ORAL DAILY
Status: DISCONTINUED | OUTPATIENT
Start: 2021-11-01 | End: 2021-11-02 | Stop reason: HOSPADM

## 2021-10-31 RX ORDER — ROSUVASTATIN CALCIUM 10 MG/1
10 TABLET, COATED ORAL DAILY
COMMUNITY

## 2021-10-31 RX ORDER — ALBUTEROL SULFATE 90 UG/1
2 AEROSOL, METERED RESPIRATORY (INHALATION) EVERY 4 HOURS PRN
COMMUNITY

## 2021-10-31 RX ADMIN — METOPROLOL SUCCINATE 25 MG: 25 TABLET, EXTENDED RELEASE ORAL at 22:04

## 2021-10-31 RX ADMIN — ACETAMINOPHEN 975 MG: 325 TABLET, FILM COATED ORAL at 14:19

## 2021-10-31 RX ADMIN — HEPARIN SODIUM 5000 UNITS: 5000 INJECTION INTRAVENOUS; SUBCUTANEOUS at 22:05

## 2021-10-31 RX ADMIN — LEVOTHYROXINE SODIUM 50 MCG: 50 TABLET ORAL at 05:53

## 2021-10-31 RX ADMIN — ACETAMINOPHEN 975 MG: 325 TABLET, FILM COATED ORAL at 22:04

## 2021-10-31 RX ADMIN — CEFAZOLIN SODIUM 2000 MG: 2 SOLUTION INTRAVENOUS at 14:17

## 2021-10-31 RX ADMIN — CEFAZOLIN SODIUM 2000 MG: 2 SOLUTION INTRAVENOUS at 05:53

## 2021-10-31 RX ADMIN — HEPARIN SODIUM 5000 UNITS: 5000 INJECTION INTRAVENOUS; SUBCUTANEOUS at 14:21

## 2021-10-31 RX ADMIN — ACETAMINOPHEN 975 MG: 325 TABLET, FILM COATED ORAL at 05:53

## 2021-10-31 RX ADMIN — HYDROMORPHONE HYDROCHLORIDE 0.5 MG: 1 INJECTION, SOLUTION INTRAMUSCULAR; INTRAVENOUS; SUBCUTANEOUS at 10:24

## 2021-10-31 RX ADMIN — OXYCODONE HYDROCHLORIDE 10 MG: 10 TABLET ORAL at 18:31

## 2021-10-31 RX ADMIN — HEPARIN SODIUM 5000 UNITS: 5000 INJECTION INTRAVENOUS; SUBCUTANEOUS at 05:53

## 2021-10-31 RX ADMIN — PRAVASTATIN SODIUM 80 MG: 80 TABLET ORAL at 18:29

## 2021-10-31 RX ADMIN — DOCUSATE SODIUM AND SENNOSIDES 2 TABLET: 8.6; 5 TABLET ORAL at 22:04

## 2021-10-31 RX ADMIN — CEFAZOLIN SODIUM 2000 MG: 2 SOLUTION INTRAVENOUS at 22:05

## 2021-11-01 LAB
ANION GAP SERPL CALCULATED.3IONS-SCNC: 7 MMOL/L (ref 4–13)
BUN SERPL-MCNC: 19 MG/DL (ref 5–25)
CALCIUM SERPL-MCNC: 8.4 MG/DL (ref 8.3–10.1)
CHLORIDE SERPL-SCNC: 101 MMOL/L (ref 100–108)
CO2 SERPL-SCNC: 26 MMOL/L (ref 21–32)
CREAT SERPL-MCNC: 1.53 MG/DL (ref 0.6–1.3)
GFR SERPL CREATININE-BSD FRML MDRD: 41 ML/MIN/1.73SQ M
GLUCOSE SERPL-MCNC: 122 MG/DL (ref 65–140)
POTASSIUM SERPL-SCNC: 3.5 MMOL/L (ref 3.5–5.3)
SODIUM SERPL-SCNC: 134 MMOL/L (ref 136–145)

## 2021-11-01 PROCEDURE — 80048 BASIC METABOLIC PNL TOTAL CA: CPT | Performed by: INTERNAL MEDICINE

## 2021-11-01 PROCEDURE — 99024 POSTOP FOLLOW-UP VISIT: CPT | Performed by: PHYSICIAN ASSISTANT

## 2021-11-01 PROCEDURE — 97116 GAIT TRAINING THERAPY: CPT

## 2021-11-01 PROCEDURE — 97164 PT RE-EVAL EST PLAN CARE: CPT

## 2021-11-01 PROCEDURE — 99232 SBSQ HOSP IP/OBS MODERATE 35: CPT | Performed by: INTERNAL MEDICINE

## 2021-11-01 RX ADMIN — PRAVASTATIN SODIUM 80 MG: 80 TABLET ORAL at 17:56

## 2021-11-01 RX ADMIN — OXYCODONE HYDROCHLORIDE 10 MG: 10 TABLET ORAL at 21:26

## 2021-11-01 RX ADMIN — CEFAZOLIN SODIUM 2000 MG: 2 SOLUTION INTRAVENOUS at 05:28

## 2021-11-01 RX ADMIN — OXYCODONE HYDROCHLORIDE 10 MG: 10 TABLET ORAL at 03:51

## 2021-11-01 RX ADMIN — DOCUSATE SODIUM AND SENNOSIDES 2 TABLET: 8.6; 5 TABLET ORAL at 22:35

## 2021-11-01 RX ADMIN — ACETAMINOPHEN 975 MG: 325 TABLET, FILM COATED ORAL at 20:54

## 2021-11-01 RX ADMIN — ACETAMINOPHEN 975 MG: 325 TABLET, FILM COATED ORAL at 05:28

## 2021-11-01 RX ADMIN — HEPARIN SODIUM 5000 UNITS: 5000 INJECTION INTRAVENOUS; SUBCUTANEOUS at 22:35

## 2021-11-01 RX ADMIN — ACETAMINOPHEN 975 MG: 325 TABLET, FILM COATED ORAL at 13:57

## 2021-11-01 RX ADMIN — METOPROLOL SUCCINATE 25 MG: 25 TABLET, EXTENDED RELEASE ORAL at 09:35

## 2021-11-01 RX ADMIN — OXYCODONE HYDROCHLORIDE 10 MG: 10 TABLET ORAL at 14:10

## 2021-11-01 RX ADMIN — LEVOTHYROXINE SODIUM 75 MCG: 75 TABLET ORAL at 05:28

## 2021-11-01 RX ADMIN — FUROSEMIDE 40 MG: 40 TABLET ORAL at 09:35

## 2021-11-01 RX ADMIN — HEPARIN SODIUM 5000 UNITS: 5000 INJECTION INTRAVENOUS; SUBCUTANEOUS at 13:57

## 2021-11-01 RX ADMIN — METOPROLOL SUCCINATE 25 MG: 25 TABLET, EXTENDED RELEASE ORAL at 20:54

## 2021-11-01 RX ADMIN — CEFAZOLIN SODIUM 2000 MG: 2 SOLUTION INTRAVENOUS at 22:36

## 2021-11-01 RX ADMIN — HEPARIN SODIUM 5000 UNITS: 5000 INJECTION INTRAVENOUS; SUBCUTANEOUS at 05:28

## 2021-11-01 RX ADMIN — CEFAZOLIN SODIUM 2000 MG: 2 SOLUTION INTRAVENOUS at 13:57

## 2021-11-02 VITALS
HEIGHT: 70 IN | WEIGHT: 212.9 LBS | HEART RATE: 93 BPM | OXYGEN SATURATION: 92 % | SYSTOLIC BLOOD PRESSURE: 102 MMHG | RESPIRATION RATE: 18 BRPM | BODY MASS INDEX: 30.48 KG/M2 | TEMPERATURE: 98.1 F | DIASTOLIC BLOOD PRESSURE: 58 MMHG

## 2021-11-02 PROBLEM — R09.02 HYPOXIA: Status: RESOLVED | Noted: 2021-10-28 | Resolved: 2021-11-02

## 2021-11-02 LAB
ANION GAP SERPL CALCULATED.3IONS-SCNC: 8 MMOL/L (ref 4–13)
BACTERIA SPEC ANAEROBE CULT: NO GROWTH
BASOPHILS # BLD AUTO: 0.09 THOUSANDS/ΜL (ref 0–0.1)
BASOPHILS NFR BLD AUTO: 1 % (ref 0–1)
BUN SERPL-MCNC: 16 MG/DL (ref 5–25)
CALCIUM SERPL-MCNC: 8.5 MG/DL (ref 8.3–10.1)
CHLORIDE SERPL-SCNC: 99 MMOL/L (ref 100–108)
CO2 SERPL-SCNC: 26 MMOL/L (ref 21–32)
CREAT SERPL-MCNC: 1.34 MG/DL (ref 0.6–1.3)
EOSINOPHIL # BLD AUTO: 0.52 THOUSAND/ΜL (ref 0–0.61)
EOSINOPHIL NFR BLD AUTO: 6 % (ref 0–6)
ERYTHROCYTE [DISTWIDTH] IN BLOOD BY AUTOMATED COUNT: 13.6 % (ref 11.6–15.1)
GFR SERPL CREATININE-BSD FRML MDRD: 48 ML/MIN/1.73SQ M
GLUCOSE SERPL-MCNC: 88 MG/DL (ref 65–140)
HCT VFR BLD AUTO: 50.1 % (ref 36.5–49.3)
HGB BLD-MCNC: 15.8 G/DL (ref 12–17)
IMM GRANULOCYTES # BLD AUTO: 0.07 THOUSAND/UL (ref 0–0.2)
IMM GRANULOCYTES NFR BLD AUTO: 1 % (ref 0–2)
LYMPHOCYTES # BLD AUTO: 2.4 THOUSANDS/ΜL (ref 0.6–4.47)
LYMPHOCYTES NFR BLD AUTO: 26 % (ref 14–44)
MCH RBC QN AUTO: 31.2 PG (ref 26.8–34.3)
MCHC RBC AUTO-ENTMCNC: 31.5 G/DL (ref 31.4–37.4)
MCV RBC AUTO: 99 FL (ref 82–98)
MONOCYTES # BLD AUTO: 0.88 THOUSAND/ΜL (ref 0.17–1.22)
MONOCYTES NFR BLD AUTO: 10 % (ref 4–12)
NEUTROPHILS # BLD AUTO: 5.14 THOUSANDS/ΜL (ref 1.85–7.62)
NEUTS SEG NFR BLD AUTO: 56 % (ref 43–75)
NRBC BLD AUTO-RTO: 0 /100 WBCS
PLATELET # BLD AUTO: 252 THOUSANDS/UL (ref 149–390)
PMV BLD AUTO: 9.8 FL (ref 8.9–12.7)
POTASSIUM SERPL-SCNC: 3.7 MMOL/L (ref 3.5–5.3)
RBC # BLD AUTO: 5.06 MILLION/UL (ref 3.88–5.62)
SODIUM SERPL-SCNC: 133 MMOL/L (ref 136–145)
WBC # BLD AUTO: 9.1 THOUSAND/UL (ref 4.31–10.16)

## 2021-11-02 PROCEDURE — 99239 HOSP IP/OBS DSCHRG MGMT >30: CPT | Performed by: HOSPITALIST

## 2021-11-02 PROCEDURE — 99024 POSTOP FOLLOW-UP VISIT: CPT | Performed by: PHYSICIAN ASSISTANT

## 2021-11-02 PROCEDURE — 85025 COMPLETE CBC W/AUTO DIFF WBC: CPT | Performed by: FAMILY MEDICINE

## 2021-11-02 PROCEDURE — 99232 SBSQ HOSP IP/OBS MODERATE 35: CPT | Performed by: PODIATRIST

## 2021-11-02 PROCEDURE — 80048 BASIC METABOLIC PNL TOTAL CA: CPT | Performed by: FAMILY MEDICINE

## 2021-11-02 PROCEDURE — 97535 SELF CARE MNGMENT TRAINING: CPT

## 2021-11-02 PROCEDURE — 97167 OT EVAL HIGH COMPLEX 60 MIN: CPT

## 2021-11-02 RX ORDER — OXYCODONE HYDROCHLORIDE 10 MG/1
10 TABLET ORAL EVERY 6 HOURS PRN
Qty: 15 TABLET | Refills: 0 | Status: SHIPPED | OUTPATIENT
Start: 2021-11-02 | End: 2021-11-12

## 2021-11-02 RX ORDER — CEPHALEXIN 500 MG/1
500 CAPSULE ORAL EVERY 6 HOURS SCHEDULED
Qty: 12 CAPSULE | Refills: 0 | Status: SHIPPED | OUTPATIENT
Start: 2021-11-02 | End: 2021-11-05

## 2021-11-02 RX ADMIN — METOPROLOL SUCCINATE 25 MG: 25 TABLET, EXTENDED RELEASE ORAL at 08:02

## 2021-11-02 RX ADMIN — CEFAZOLIN SODIUM 2000 MG: 2 SOLUTION INTRAVENOUS at 06:02

## 2021-11-02 RX ADMIN — PRAVASTATIN SODIUM 80 MG: 80 TABLET ORAL at 15:46

## 2021-11-02 RX ADMIN — OXYCODONE HYDROCHLORIDE 10 MG: 10 TABLET ORAL at 11:26

## 2021-11-02 RX ADMIN — HEPARIN SODIUM 5000 UNITS: 5000 INJECTION INTRAVENOUS; SUBCUTANEOUS at 13:47

## 2021-11-02 RX ADMIN — ACETAMINOPHEN 975 MG: 325 TABLET, FILM COATED ORAL at 13:47

## 2021-11-02 RX ADMIN — CEFAZOLIN SODIUM 2000 MG: 2 SOLUTION INTRAVENOUS at 13:47

## 2021-11-02 RX ADMIN — HEPARIN SODIUM 5000 UNITS: 5000 INJECTION INTRAVENOUS; SUBCUTANEOUS at 06:02

## 2021-11-02 RX ADMIN — ACETAMINOPHEN 975 MG: 325 TABLET, FILM COATED ORAL at 06:01

## 2021-11-02 RX ADMIN — LEVOTHYROXINE SODIUM 75 MCG: 75 TABLET ORAL at 06:02

## 2021-11-02 RX ADMIN — FUROSEMIDE 40 MG: 40 TABLET ORAL at 08:02

## 2021-11-02 RX ADMIN — OXYCODONE HYDROCHLORIDE 10 MG: 10 TABLET ORAL at 06:01

## 2021-11-03 ENCOUNTER — TELEPHONE (OUTPATIENT)
Dept: PODIATRY | Facility: CLINIC | Age: 86
End: 2021-11-03

## 2021-11-03 ENCOUNTER — TELEPHONE (OUTPATIENT)
Dept: CASE MANAGEMENT | Facility: HOSPITAL | Age: 86
End: 2021-11-03

## 2021-11-03 ENCOUNTER — TELEPHONE (OUTPATIENT)
Dept: PLASTIC SURGERY | Facility: CLINIC | Age: 86
End: 2021-11-03

## 2021-11-03 LAB
BACTERIA WND AEROBE CULT: ABNORMAL
DME PARACHUTE DELIVERY DATE ACTUAL: NORMAL
DME PARACHUTE DELIVERY DATE REQUESTED: NORMAL
DME PARACHUTE ITEM DESCRIPTION: NORMAL
DME PARACHUTE ITEM DESCRIPTION: NORMAL
DME PARACHUTE ORDER STATUS: NORMAL
DME PARACHUTE SUPPLIER NAME: NORMAL
DME PARACHUTE SUPPLIER PHONE: NORMAL
GRAM STN SPEC: ABNORMAL

## 2021-11-12 LAB
DME PARACHUTE DELIVERY DATE ACTUAL: NORMAL
DME PARACHUTE DELIVERY DATE EXPECTED: NORMAL
DME PARACHUTE DELIVERY DATE REQUESTED: NORMAL
DME PARACHUTE ITEM DESCRIPTION: NORMAL
DME PARACHUTE ORDER STATUS: NORMAL
DME PARACHUTE SUPPLIER NAME: NORMAL
DME PARACHUTE SUPPLIER PHONE: NORMAL

## 2022-02-21 ENCOUNTER — APPOINTMENT (EMERGENCY)
Dept: RADIOLOGY | Facility: HOSPITAL | Age: 87
DRG: 919 | End: 2022-02-21
Payer: COMMERCIAL

## 2022-02-21 ENCOUNTER — HOSPITAL ENCOUNTER (INPATIENT)
Facility: HOSPITAL | Age: 87
LOS: 4 days | Discharge: HOME HEALTH | DRG: 919 | End: 2022-02-26
Attending: EMERGENCY MEDICINE | Admitting: STUDENT IN AN ORGANIZED HEALTH CARE EDUCATION/TRAINING PROGRAM
Payer: COMMERCIAL

## 2022-02-21 DIAGNOSIS — I42.9 CARDIOMYOPATHY, UNSPECIFIED TYPE (HCC): ICD-10-CM

## 2022-02-21 DIAGNOSIS — R60.0 BILATERAL LEG EDEMA: ICD-10-CM

## 2022-02-21 DIAGNOSIS — T81.30XA WOUND DEHISCENCE: ICD-10-CM

## 2022-02-21 DIAGNOSIS — C44.300 SKIN CANCER OF FACE: ICD-10-CM

## 2022-02-21 DIAGNOSIS — I48.20 CHRONIC ATRIAL FIBRILLATION (HCC): ICD-10-CM

## 2022-02-21 DIAGNOSIS — E87.1 HYPONATREMIA: ICD-10-CM

## 2022-02-21 DIAGNOSIS — S00.83XA HEMATOMA OF FACE, INITIAL ENCOUNTER: ICD-10-CM

## 2022-02-21 DIAGNOSIS — J96.01 ACUTE RESPIRATORY FAILURE WITH HYPOXIA (HCC): ICD-10-CM

## 2022-02-21 DIAGNOSIS — R09.02 HYPOXEMIA: Primary | ICD-10-CM

## 2022-02-21 PROBLEM — I50.21 ACUTE SYSTOLIC CHF (CONGESTIVE HEART FAILURE) (HCC): Status: ACTIVE | Noted: 2019-12-11

## 2022-02-21 LAB
ABO GROUP BLD: NORMAL
ALBUMIN SERPL BCP-MCNC: 3.2 G/DL (ref 3.5–5)
ALP SERPL-CCNC: 153 U/L (ref 46–116)
ALT SERPL W P-5'-P-CCNC: 15 U/L (ref 12–78)
ANION GAP SERPL CALCULATED.3IONS-SCNC: 9 MMOL/L (ref 4–13)
APTT PPP: 57 SECONDS (ref 23–37)
AST SERPL W P-5'-P-CCNC: 26 U/L (ref 5–45)
BASOPHILS # BLD AUTO: 0.12 THOUSANDS/ΜL (ref 0–0.1)
BASOPHILS NFR BLD AUTO: 2 % (ref 0–1)
BILIRUB SERPL-MCNC: 2 MG/DL (ref 0.2–1)
BLD GP AB SCN SERPL QL: NEGATIVE
BUN SERPL-MCNC: 18 MG/DL (ref 5–25)
CALCIUM ALBUM COR SERPL-MCNC: 9.4 MG/DL (ref 8.3–10.1)
CALCIUM SERPL-MCNC: 8.8 MG/DL (ref 8.3–10.1)
CHLORIDE SERPL-SCNC: 98 MMOL/L (ref 100–108)
CO2 SERPL-SCNC: 23 MMOL/L (ref 21–32)
CREAT SERPL-MCNC: 1.41 MG/DL (ref 0.6–1.3)
EOSINOPHIL # BLD AUTO: 0.31 THOUSAND/ΜL (ref 0–0.61)
EOSINOPHIL NFR BLD AUTO: 4 % (ref 0–6)
ERYTHROCYTE [DISTWIDTH] IN BLOOD BY AUTOMATED COUNT: 16 % (ref 11.6–15.1)
FLUAV RNA RESP QL NAA+PROBE: NEGATIVE
FLUBV RNA RESP QL NAA+PROBE: NEGATIVE
GFR SERPL CREATININE-BSD FRML MDRD: 45 ML/MIN/1.73SQ M
GLUCOSE SERPL-MCNC: 91 MG/DL (ref 65–140)
HCT VFR BLD AUTO: 47.7 % (ref 36.5–49.3)
HGB BLD-MCNC: 15.2 G/DL (ref 12–17)
IMM GRANULOCYTES # BLD AUTO: 0.06 THOUSAND/UL (ref 0–0.2)
IMM GRANULOCYTES NFR BLD AUTO: 1 % (ref 0–2)
INR PPP: 1.83 (ref 0.84–1.19)
LYMPHOCYTES # BLD AUTO: 1.74 THOUSANDS/ΜL (ref 0.6–4.47)
LYMPHOCYTES NFR BLD AUTO: 23 % (ref 14–44)
MCH RBC QN AUTO: 31.4 PG (ref 26.8–34.3)
MCHC RBC AUTO-ENTMCNC: 31.9 G/DL (ref 31.4–37.4)
MCV RBC AUTO: 99 FL (ref 82–98)
MONOCYTES # BLD AUTO: 1.1 THOUSAND/ΜL (ref 0.17–1.22)
MONOCYTES NFR BLD AUTO: 14 % (ref 4–12)
NEUTROPHILS # BLD AUTO: 4.37 THOUSANDS/ΜL (ref 1.85–7.62)
NEUTS SEG NFR BLD AUTO: 56 % (ref 43–75)
NRBC BLD AUTO-RTO: 0 /100 WBCS
NT-PROBNP SERPL-MCNC: 3166 PG/ML
PLATELET # BLD AUTO: 180 THOUSANDS/UL (ref 149–390)
PMV BLD AUTO: 10 FL (ref 8.9–12.7)
POTASSIUM SERPL-SCNC: 4.6 MMOL/L (ref 3.5–5.3)
PROT SERPL-MCNC: 8.4 G/DL (ref 6.4–8.2)
PROTHROMBIN TIME: 20.3 SECONDS (ref 11.6–14.5)
RBC # BLD AUTO: 4.84 MILLION/UL (ref 3.88–5.62)
RH BLD: POSITIVE
RSV RNA RESP QL NAA+PROBE: NEGATIVE
SARS-COV-2 RNA RESP QL NAA+PROBE: NEGATIVE
SODIUM SERPL-SCNC: 130 MMOL/L (ref 136–145)
SPECIMEN EXPIRATION DATE: NORMAL
WBC # BLD AUTO: 7.7 THOUSAND/UL (ref 4.31–10.16)

## 2022-02-21 PROCEDURE — 0HQ1XZZ REPAIR FACE SKIN, EXTERNAL APPROACH: ICD-10-PCS | Performed by: EMERGENCY MEDICINE

## 2022-02-21 PROCEDURE — 71045 X-RAY EXAM CHEST 1 VIEW: CPT

## 2022-02-21 PROCEDURE — 86850 RBC ANTIBODY SCREEN: CPT | Performed by: EMERGENCY MEDICINE

## 2022-02-21 PROCEDURE — 85610 PROTHROMBIN TIME: CPT | Performed by: EMERGENCY MEDICINE

## 2022-02-21 PROCEDURE — 0241U HB NFCT DS VIR RESP RNA 4 TRGT: CPT | Performed by: EMERGENCY MEDICINE

## 2022-02-21 PROCEDURE — 99285 EMERGENCY DEPT VISIT HI MDM: CPT

## 2022-02-21 PROCEDURE — 36415 COLL VENOUS BLD VENIPUNCTURE: CPT | Performed by: EMERGENCY MEDICINE

## 2022-02-21 PROCEDURE — 93005 ELECTROCARDIOGRAM TRACING: CPT

## 2022-02-21 PROCEDURE — 85025 COMPLETE CBC W/AUTO DIFF WBC: CPT | Performed by: EMERGENCY MEDICINE

## 2022-02-21 PROCEDURE — 85730 THROMBOPLASTIN TIME PARTIAL: CPT | Performed by: EMERGENCY MEDICINE

## 2022-02-21 PROCEDURE — 86900 BLOOD TYPING SEROLOGIC ABO: CPT | Performed by: EMERGENCY MEDICINE

## 2022-02-21 PROCEDURE — 99285 EMERGENCY DEPT VISIT HI MDM: CPT | Performed by: EMERGENCY MEDICINE

## 2022-02-21 PROCEDURE — 99220 PR INITIAL OBSERVATION CARE/DAY 70 MINUTES: CPT | Performed by: HOSPITALIST

## 2022-02-21 PROCEDURE — 83880 ASSAY OF NATRIURETIC PEPTIDE: CPT | Performed by: EMERGENCY MEDICINE

## 2022-02-21 PROCEDURE — 80053 COMPREHEN METABOLIC PANEL: CPT | Performed by: EMERGENCY MEDICINE

## 2022-02-21 PROCEDURE — 86901 BLOOD TYPING SEROLOGIC RH(D): CPT | Performed by: EMERGENCY MEDICINE

## 2022-02-21 PROCEDURE — 13160 SEC CLSR SURG WND/DEHSN XTN: CPT | Performed by: EMERGENCY MEDICINE

## 2022-02-21 PROCEDURE — 96374 THER/PROPH/DIAG INJ IV PUSH: CPT

## 2022-02-21 RX ORDER — ONDANSETRON 2 MG/ML
4 INJECTION INTRAMUSCULAR; INTRAVENOUS EVERY 6 HOURS PRN
Status: DISCONTINUED | OUTPATIENT
Start: 2022-02-21 | End: 2022-02-26 | Stop reason: HOSPADM

## 2022-02-21 RX ORDER — ATORVASTATIN CALCIUM 40 MG/1
40 TABLET, FILM COATED ORAL
Status: DISCONTINUED | OUTPATIENT
Start: 2022-02-21 | End: 2022-02-26 | Stop reason: HOSPADM

## 2022-02-21 RX ORDER — LEVOTHYROXINE SODIUM 0.07 MG/1
75 TABLET ORAL
Status: DISCONTINUED | OUTPATIENT
Start: 2022-02-22 | End: 2022-02-26 | Stop reason: HOSPADM

## 2022-02-21 RX ORDER — LIDOCAINE HYDROCHLORIDE AND EPINEPHRINE 20; 5 MG/ML; UG/ML
1 INJECTION, SOLUTION EPIDURAL; INFILTRATION; INTRACAUDAL; PERINEURAL ONCE
Status: COMPLETED | OUTPATIENT
Start: 2022-02-21 | End: 2022-02-21

## 2022-02-21 RX ORDER — METOPROLOL SUCCINATE 25 MG/1
25 TABLET, EXTENDED RELEASE ORAL 2 TIMES DAILY
Status: DISCONTINUED | OUTPATIENT
Start: 2022-02-21 | End: 2022-02-26 | Stop reason: HOSPADM

## 2022-02-21 RX ORDER — ACETAMINOPHEN 325 MG/1
650 TABLET ORAL EVERY 6 HOURS PRN
Status: DISCONTINUED | OUTPATIENT
Start: 2022-02-21 | End: 2022-02-26 | Stop reason: HOSPADM

## 2022-02-21 RX ORDER — ALBUTEROL SULFATE 90 UG/1
2 AEROSOL, METERED RESPIRATORY (INHALATION) EVERY 4 HOURS PRN
Status: DISCONTINUED | OUTPATIENT
Start: 2022-02-21 | End: 2022-02-26 | Stop reason: HOSPADM

## 2022-02-21 RX ORDER — GUAIFENESIN 600 MG
1200 TABLET, EXTENDED RELEASE 12 HR ORAL EVERY 12 HOURS PRN
Status: DISCONTINUED | OUTPATIENT
Start: 2022-02-21 | End: 2022-02-26 | Stop reason: HOSPADM

## 2022-02-21 RX ORDER — FUROSEMIDE 10 MG/ML
40 INJECTION INTRAMUSCULAR; INTRAVENOUS DAILY
Status: DISCONTINUED | OUTPATIENT
Start: 2022-02-22 | End: 2022-02-22

## 2022-02-21 RX ORDER — FUROSEMIDE 10 MG/ML
40 INJECTION INTRAMUSCULAR; INTRAVENOUS ONCE
Status: COMPLETED | OUTPATIENT
Start: 2022-02-21 | End: 2022-02-21

## 2022-02-21 RX ADMIN — ATORVASTATIN CALCIUM 40 MG: 40 TABLET, FILM COATED ORAL at 17:26

## 2022-02-21 RX ADMIN — FUROSEMIDE 40 MG: 10 INJECTION, SOLUTION INTRAVENOUS at 15:19

## 2022-02-21 RX ADMIN — LIDOCAINE HYDROCHLORIDE,EPINEPHRINE BITARTRATE 1 ML: 20; .005 INJECTION, SOLUTION EPIDURAL; INFILTRATION; INTRACAUDAL; PERINEURAL at 13:31

## 2022-02-21 RX ADMIN — SODIUM CHLORIDE 500 ML: 0.9 INJECTION, SOLUTION INTRAVENOUS at 15:19

## 2022-02-21 NOTE — ASSESSMENT & PLAN NOTE
S/p resection at [facility] [address]    Pt unclear of exact details  Complicated by arterial bleed here   Stitches applied to achieve hemostasis  Hold DOAC overnight

## 2022-02-21 NOTE — ASSESSMENT & PLAN NOTE
Wt Readings from Last 3 Encounters:   02/21/22 90 7 kg (200 lb)   11/02/21 96 6 kg (212 lb 14 4 oz)   10/27/21 97 kg (213 lb 13 5 oz)     Diuresis with Intravenous Lasix  Monitor I/O, daily weight  Consultation to cardiology  Check echocardiogram  Monitor on telemetry  Supplemental o2 as required

## 2022-02-21 NOTE — H&P
[address] [facility]  H&P- Dash Sellers 1935, 82 y o  male MRN: [id number]  Unit/Bed#: ED 15 Encounter: [id number]  Primary Care Provider: León Ricks MD   Date and time admitted to hospital: 2/21/2022  1:10 PM    * Acute systolic CHF (congestive heart failure) ([facility] )  Assessment & Plan  Wt Readings from Last 3 Encounters:   02/21/22 90 7 kg (200 lb)   11/02/21 96 6 kg (212 lb 14 4 oz)   10/27/21 97 kg (213 lb 13 5 oz)     Diuresis with Intravenous Lasix  Monitor I/O, daily weight  Consultation to cardiology  Check echocardiogram  Monitor on telemetry  Supplemental o2 as required          Skin cancer of face  Assessment & Plan  S/p resection at [facility]    Pt unclear of exact details  Complicated by arterial bleed here   Stitches applied to achieve hemostasis  Hold DOAC overnight    Hypoxia  Assessment & Plan  Due to Acute systolic CHF exacerbation    Moderate protein-calorie malnutrition ([facility] )  Assessment & Plan  Malnutrition Findings:           BMI Findings: Body mass index is 28 7 kg/m²  Chronic atrial fibrillation (HCC)  Assessment & Plan  Hold DOAC due to arterial bleed in face (some oozing still seen)  Rates controlled    Hyponatremia  Assessment & Plan  Slightly below baseline  Monitor with diuresis ( was also given 500 cc IVF in ER)        Chief Complaint   Patient presents with    Bleeding/Bruising     EMS reports pt had mole removed in [facility] on Wednesday and stiches "popped out" this morning and bleeding x 1 hour  Pt on Eliquis; quick clot x2 applied by EMS and bleeding did not stop  HPI:  Dash Sellers is a 82 y o  male who presents with  Bleeding from a post mole resection  Patient states it was a skin cancer but cannot provide too much further details  It would not bleed, EMS used quick stop twice and ER had to place stitch to achieve hemostasis  Patient was then noted to be dyspneic and somewhat "bluish" in color   He did endorse SOB, particularly on exertion  Denies orthopnea  Denies chest pain  Feels better with o2 applied  His son is at the bedside and has noted his father does appear more dyspneic when ambulating  Patient does not have any other complaints  Denies abdominal pain  Denies fever  Historical Information   Past Medical History:   Diagnosis Date    A-fib ([address] )     Disease of thyroid gland     Hyperlipidemia     Hypertension     Optic neuropathy     Pleural effusion on right     Pneumonia     S/P lung surgery, follow-up exam     Septic shock [facility])      Past Surgical History:   Procedure Laterality Date    HAND DEBRIDEMENT Left 10/30/2021    Procedure: DEBRIDEMENT HAND/FINGER [facility] OUT), second, index and ring finger;  Surgeon: Irish Crawford MD;  Location: [facility] Main OR;  Service: Plastics    NH THORACOSCOPY SURG PART PULM DECORT Right 12/12/2019    Procedure: RIGHT THORACOSCOPIC LUNG DECORTICATION;  Surgeon: Laurie Elliott MD;  Location: [facility] MAIN OR;  Service: Thoracic    THORACOSCOPY VIDEO ASSISTED SURGERY (VATS) Right 12/12/2019    Procedure: THORACOSCOPY VIDEO ASSISTED SURGERY (VATS); Surgeon: Laurie Elliott MD;  Location: [facility] MA[facility] OR;  Service: Thoracic     Social History   Social History     Substance and Sexual Activity   Alcohol Use Not Currently    Comment: occasional     Social History     Substance and Sexual Activity   Drug Use Never     Social History     Tobacco Use   Smoking Status Former Smoker    Types: Cigarettes   Smokeless Tobacco Never Used     Family history is reviewed and is non contributory to this admission  Meds/Allergies   Allergies   Allergen Reactions    Ace Inhibitors Cough     Rash       Meds:  No current facility-administered medications for this encounter      Current Outpatient Medications:     albuterol (PROVENTIL HFA,VENTOLIN HFA) 90 mcg/act inhaler, Inhale 2 puffs every 4 (four) hours as needed for wheezing or shortness of breath, Disp: , Rfl:     apixaban (ELIQUIS) 5 mg, Take 5 mg by mouth 2 (two) times a day, Disp: , Rfl:     furosemide (LASIX) 40 mg tablet, Take 40 mg by mouth daily, Disp: , Rfl:     guaiFENesin (MUCINEX) 600 mg 12 hr tablet, Take 1,200 mg by mouth every 12 (twelve) hours as needed for congestion, Disp: , Rfl:     levothyroxine 50 mcg tablet, Take 75 mcg by mouth daily before breakfast , Disp: , Rfl:     metoprolol succinate (TOPROL-XL) 50 mg 24 hr tablet, Take 25 mg by mouth 2 (two) times a day , Disp: , Rfl:     rosuvastatin (CRESTOR) 10 MG tablet, Take 10 mg by mouth daily, Disp: , Rfl:     (Not in a hospital admission)        Review of Systems:    A complete and comprehensive 14 point organ system review was performed and all other systems are negative other than stated above in the HPI    Current Vitals:   Blood Pressure: 123/78 (02/21/22 1611)  Pulse: (!) 107 (02/21/22 1611)  Temperature: 97 5 °F (36 4 °C) (02/21/22 1323)  Temp Source: Oral (02/21/22 1323)  Respirations: (!) 26 (02/21/22 1611)  Height: 5' 10" (177 8 cm) (02/21/22 1323)  Weight - Scale: 90 7 kg (200 lb) (02/21/22 1323)  SpO2: 98 % (02/21/22 1611)  SPO2 RA Rest      ED from 2/21/2022 in [address] [place] [address] Emergency Department   SpO2 98 %   SpO2 Activity At Rest   O2 Device Nasal cannula   O2 Flow Rate --        No intake or output data in the 24 hours ending 02/21/22 1658  Body mass index is 28 7 kg/m²       Physical Exam:     General: well appearing, no acute distress; R facial post op site, oozed blood, swollen  HEENT: atraumatic, PERRLA,   Neck: Trachea midline, no carotid bruit, no masses  Respiratory: crackles  Cardiovascular: RRR, no m/r/g, Normal S1 and S2  Abdomen: Soft, non-tender, non-distended, normal bowel sounds in all quadrants, no hepatosplenomegaly, no tympany  Rectal: deferred  Musculoskeletal: normal ROM in upper and lower extremities  Integumentary: warm, dry, and pink, with no rash, purpura, or petechia  Heme/Lymph: no lymphadenopathy, no bruises  Neurological: Cranial Nerves II-XII grossly intact; no focal deficits in sensation or strength   Psychiatric: cooperative with normal mood, affect, and cognition    Lab Results:   CBC:   Lab Results   Component Value Date    WBC 7 70 02/21/2022    HGB 15 2 02/21/2022    HCT 47 7 02/21/2022    MCV 99 (H) 02/21/2022     02/21/2022    MCH 31 4 02/21/2022    MCHC 31 9 02/21/2022    RDW 16 0 (H) 02/21/2022    MPV 10 0 02/21/2022    NRBC 0 02/21/2022     CMP:  Lab Results   Component Value Date    CL 98 (L) 02/21/2022    CO2 23 02/21/2022    CO2 12 (L) 12/03/2019    BUN 18 02/21/2022    CREATININE 1 41 (H) 02/21/2022    GLUCOSE 119 12/03/2019    CALCIUM 8 8 02/21/2022    AST 26 02/21/2022    ALT 15 02/21/2022    ALKPHOS 153 (H) 02/21/2022    EGFR 45 02/21/2022     Lab Results   Component Value Date    TROPONINI <0 02 10/27/2021     Coagulation:   Lab Results   Component Value Date    INR 1 83 (H) 02/21/2022    Urinalysis:  Lab Results   Component Value Date    COLORU Yellow 12/15/2019    CLARITYU Cloudy 12/15/2019    SPECGRAV 1 012 12/15/2019    PHUR 7 5 12/15/2019    LEUKOCYTESUR Negative 12/15/2019    NITRITE Negative 12/15/2019    GLUCOSEU 100 (1/10%) (A) 12/15/2019    KETONESU Negative 12/15/2019    BILIRUBINUR Negative 12/15/2019    BLOODU Negative 12/15/2019      Amylase: No results found for: AMYLASE  Lipase: No results found for: LIPASE     Imaging: XR chest 1 view portable    Result Date: 2/21/2022  Narrative: CHEST INDICATION:   Shortness of breath  COMPARISON:  10/27/2021 EXAM PERFORMED/VIEWS:  XR CHEST PORTABLE FINDINGS: Heart shadow is enlarged but unchanged from prior exam  Pulmonary vasculature is diffusely prominent as are the interstitial markings  There are small bilateral effusions  There is no pneumothorax Osseous structures appear within normal limits for patient age       Impression: Findings most consistent with CHF with interstitial edema and small bilateral effusions Workstation performed: [id number] EKG, Pathology, and Other Studies: I have personally reviewed the results  VTE   Prophylaxis: In place    Code Status: Prior    Anticipated Length of Stay:  Patient will be admitted on an Observation basis with an anticipated length of stay of  greater 2 midnights  Counseling / Coordination of Care  Total floor / unit time spent today 51 minutes  Greater than 50% of total time was spent with the patient and / or family counseling and / or coordination of care       "This note has been constructed using a voice recognition system"      Jamaal Clarke MD  2/21/2022, 4:58 PM

## 2022-02-21 NOTE — ED PROVIDER NOTES
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 6 days ago, states that he woke this morning and found that the stitches had popped open with blood leaking onto the bed  He also notes intensive bruising to the face and the neck  Patient is currently anticoagulated on Eliquis, QuikClot was applied twice by EMS without resolution of bleeding  He denies chest pain,  fever, nausea vomiting, diarrhea, trauma, other symptoms  Of note, patient has also had progressive shortness of breath over the last 2 weeks accompanied by some swelling of the legs  He has been taking water pill at home without improvement, notes difficulty walking even a few steps without becoming very short of breath  Kristie Skipper       HPI      Past Medical and Surgical History    Past Medical History:   Diagnosis Date    A-fib ([address] )     Disease of thyroid gland     Hyperlipidemia     Hypertension     Optic neuropathy     Pleural effusion on right     Pneumonia     S/P lung surgery, follow-up exam     Septic shock [facility])        Past Surgical History:   Procedure Laterality Date    HAND DEBRIDEMENT Left 10/30/2021    Procedure: DEBRIDEMENT HAND/FINGER [facility] OUT), second, index and ring finger;  Surgeon: Luis F Charles MD;  Location: [facility];  Service: Plastics    GA THORACOSCOPY SURG PART PULM DECORT Right 12/12/2019    Procedure: RIGHT THORACOSCOPIC LUNG DECORTICATION;  Surgeon: Anel Collins MD; Location: [facility];  Service: Thoracic    THORACOSCOPY VIDEO ASSISTED SURGERY (VATS) Right 12/12/2019    Procedure: THORACOSCOPY VIDEO ASSISTED SURGERY (VATS); Surgeon: Milton Nascimento MD;  Location: [facility];  Service: Thoracic       History reviewed  No pertinent family history  Social History     Tobacco Use    Smoking status: Former Smoker     Types: Cigarettes    Smokeless tobacco: Never Used   Vaping Use    Vaping Use: Never used   Substance Use Topics    Alcohol use: Not Currently     Comment: occasional    Drug use: Never           Allergies    Allergies   Allergen Reactions    Ace Inhibitors Cough     Rash       Home Medications    Prior to Admission medications    Medication Sig Start Date End Date Taking? Authorizing Provider   albuterol (PROVENTIL HFA,VENTOLIN HFA) 90 mcg/act inhaler Inhale 2 puffs every 4 (four) hours as needed for wheezing or shortness of breath    Historical Provider, MD   apixaban (ELIQUIS) 5 mg Take 5 mg by mouth 2 (two) times a day    Historical Provider, MD   furosemide (LASIX) 40 mg tablet Take 40 mg by mouth daily    Historical Provider, MD   guaiFENesin (MUCINEX) 600 mg 12 hr tablet Take 1,200 mg by mouth every 12 (twelve) hours as needed for congestion    Historical Provider, MD   levothyroxine 50 mcg tablet Take 75 mcg by mouth daily before breakfast     Historical Provider, MD   metoprolol succinate (TOPROL-XL) 50 mg 24 hr tablet Take 25 mg by mouth 2 (two) times a day     Historical Provider, MD   rosuvastatin (CRESTOR) 10 MG tablet Take 10 mg by mouth daily    Historical Provider, MD           Review of Systems    Review of Systems   Constitutional: Negative for appetite change, chills and diaphoresis  HENT: Negative for drooling, facial swelling, trouble swallowing and voice change  Respiratory: Negative for apnea, shortness of breath and wheezing  Cardiovascular: Negative for chest pain and leg swelling     Gastrointestinal: Negative for abdominal distention, abdominal pain, diarrhea, nausea and vomiting  Genitourinary: Negative for dysuria and urgency  Musculoskeletal: Negative for arthralgias, back pain, gait problem and neck pain  Skin: Positive for wound  Negative for color change and rash  Neurological: Negative for seizures, speech difficulty, weakness and headaches  Psychiatric/Behavioral: Negative for agitation, behavioral problems and dysphoric mood  The patient is not nervous/anxious  All other systems reviewed and negative  Physical Exam      ED Triage Vitals   Temperature Pulse Respirations Blood Pressure SpO2   02/21/22 1323 02/21/22 1323 02/21/22 1323 02/21/22 1323 02/21/22 1323   97 5 °F (36 4 °C) 88 20 113/86 96 %      Temp Source Heart Rate Source Patient Position - Orthostatic VS BP Location FiO2 (%)   02/21/22 1323 02/21/22 1323 02/21/22 1500 02/21/22 1323 --   Oral Monitor Sitting Left arm       Pain Score       02/21/22 1611       No Pain               Physical Exam  Vitals and nursing note reviewed  Constitutional:       General: He is not in acute distress  Appearance: He is well-developed  He is not ill-appearing, toxic-appearing or diaphoretic  HENT:      Head: Normocephalic  Comments: Curved incision extending approximately 16 cm along the face from behind the ear across the maxillary area and curving towards the lip on right side of the face  Dehiscence noted over the maxillary area, 6 cm in total length, bleeding steadily  Right Ear: External ear normal       Left Ear: External ear normal    Eyes:      Conjunctiva/sclera: Conjunctivae normal       Pupils: Pupils are equal, round, and reactive to light  Neck:      Trachea: No tracheal deviation  Cardiovascular:      Rate and Rhythm: Normal rate and regular rhythm  Heart sounds: Normal heart sounds  No murmur heard  Pulmonary:      Effort: Respiratory distress present  Breath sounds: No stridor  Rales present  No wheezing  Comments: Tachypneic, in mild respiratory distress  Abdominal:      General: There is no distension  Palpations: Abdomen is soft  Tenderness: There is no abdominal tenderness  There is no guarding or rebound  Musculoskeletal:         General: No deformity  Normal range of motion  Cervical back: Normal range of motion and neck supple  Right lower leg: Edema present  Left lower leg: Edema present  Comments: 1+ pitting edema of bilateral lower extremities   Skin:     General: Skin is warm and dry  Coloration: Skin is pale  Findings: Bruising present  No rash  Neurological:      Mental Status: He is alert and oriented to person, place, and time  Psychiatric:         Behavior: Behavior normal          Thought Content: Thought content normal          Judgment: Judgment normal               Diagnostic Results  EKG Interpretation    Rate:  98  BPM  Rhythm:  Atrial fibrillation  Axis:  Normal   Intervals:  Incomplete right bundle-branch block QTc  462 ms  Q waves:  No pathologic Q waves   T waves:  Normal   ST segments:  No significant elevations or depressions     Impression:  Atrial fibrillation with incomplete right bundle-branch block but without evidence of acute ischemia or significant arrhythmia      EKG for comparison:  EKG dated 27 October 2021 similar in character no major changes    EKG interpreted by me         Labs:    Results Reviewed     Procedure Component Value Units Date/Time    NT-BNP PRO [[id number]]  (Abnormal) Collected: 02/21/22 1359    Lab Status: Final result Specimen: Blood from Arm, Right Updated: 02/21/22 1558     NT-proBNP 3,166 pg/mL     COVID/FLU/RSV - 2 hour TAT [[id number]]  (Normal) Collected: 02/21/22 1413    Lab Status: Final result Specimen: Nares from Nasopharyngeal Swab Updated: 02/21/22 1530     SARS-CoV-2 Negative     INFLUENZA A PCR Negative     INFLUENZA B PCR Negative     RSV PCR Negative    Narrative:      FOR PEDIATRIC PATIENTS - copy/paste COVID Guidelines URL to browser: [email or web address]    SARS-CoV-2 assay is a Nucleic Acid Amplification assay intended for the  qualitative detection of nucleic acid from SARS-CoV-2 in nasopharyngeal  swabs  Results are for the presumptive identification of SARS-CoV-2 RNA  Positive results are indicative of infection with SARS-CoV-2, the virus  causing COVID-19, but do not rule out bacterial infection or co-infection  with other viruses  Laboratories within the [place] and its  territories are required to report all positive results to the appropriate  public health authorities  Negative results do not preclude SARS-CoV-2  infection and should not be used as the sole basis for treatment or other  patient management decisions  Negative results must be combined with  clinical observations, patient history, and epidemiological information  This test has not been FDA cleared or approved  This test has been authorized by FDA under an Emergency Use Authorization  (EUA)  This test is only authorized for the duration of time the  declaration that circumstances exist justifying the authorization of the  emergency use of an in vitro diagnostic tests for detection of SARS-CoV-2  virus and/or diagnosis of COVID-19 infection under section 564(b)(1) of  the Act, 21 U  S C  886PQQ-4(W)(0), unless the authorization is terminated  or revoked sooner  The test has been validated but independent review by FDA  and CLIA is pending  Test performed using [employer] GeneXpert: This RT-PCR assay targets N2,  a region unique to SARS-CoV-2  A conserved region in the E-gene was chosen  for pan-Sarbecovirus detection which includes SARS-CoV-2      Protime-INR [[id number]]  (Abnormal) Collected: 02/21/22 1359    Lab Status: Final result Specimen: Blood from Arm, Right Updated: 02/21/22 1441     Protime 20 3 seconds      INR 1 83    APTT [[id number]]  (Abnormal) Collected: 02/21/22 1359    Lab Status: Final result Specimen: Blood from Arm, Right Updated: 02/21/22 1441     PTT 57 seconds     Comprehensive metabolic panel [[id number]]  (Abnormal) Collected: 02/21/22 1359    Lab Status: Final result Specimen: Blood from Arm, Right Updated: 02/21/22 1425     Sodium 130 mmol/L      Potassium 4 6 mmol/L      Chloride 98 mmol/L      CO2 23 mmol/L      ANION GAP 9 mmol/L      BUN 18 mg/dL      Creatinine 1 41 mg/dL      Glucose 91 mg/dL      Calcium 8 8 mg/dL      Corrected Calcium 9 4 mg/dL      AST 26 U/L      ALT 15 U/L      Alkaline Phosphatase 153 U/L      Total Protein 8 4 g/dL      Albumin 3 2 g/dL      Total Bilirubin 2 00 mg/dL      eGFR 45 ml/min/1 73sq m     Narrative:      National Kidney Disease Foundation guidelines for Chronic Kidney Disease (CKD):     Stage 1 with normal or high GFR (GFR > 90 mL/min/1 73 square meters)    Stage 2 Mild CKD (GFR = 60-89 mL/min/1 73 square meters)    Stage 3A Moderate CKD (GFR = 45-59 mL/min/1 73 square meters)    Stage 3B Moderate CKD (GFR = 30-44 mL/min/1 73 square meters)    Stage 4 Severe CKD (GFR = 15-29 mL/min/1 73 square meters)    Stage 5 End Stage CKD (GFR <15 mL/min/1 73 square meters)  Note: GFR calculation is accurate only with a steady state creatinine    CBC and differential [[id number]]  (Abnormal) Collected: 02/21/22 1359    Lab Status: Final result Specimen: Blood from Arm, Right Updated: 02/21/22 1405     WBC 7 70 Thousand/uL      RBC 4 84 Million/uL      Hemoglobin 15 2 g/dL      Hematocrit 47 7 %      MCV 99 fL      MCH 31 4 pg      MCHC 31 9 g/dL      RDW 16 0 %      MPV 10 0 fL      Platelets 513 Thousands/uL      nRBC 0 /100 WBCs      Neutrophils Relative 56 %      Immat GRANS % 1 %      Lymphocytes Relative 23 %      Monocytes Relative 14 %      Eosinophils Relative 4 %      Basophils Relative 2 %      Neutrophils Absolute 4 37 Thousands/µL      Immature Grans Absolute 0 06 Thousand/uL      Lymphocytes Absolute 1 74 Thousands/µL      Monocytes Absolute 1 10 Thousand/µL      Eosinophils Absolute 0 31 Thousand/µL      Basophils Absolute 0 12 Thousands/µL           All labs reviewed and utilized in the medical decision making process    Radiology:    XR chest 1 view portable   Final Result      Findings most consistent with CHF with interstitial edema and small bilateral effusions                  Workstation performed: [id number]             All radiology studies independently viewed by me and interpreted by the radiologist     Procedure    Laceration repair    Date/Time: 2/21/2022 1:30 PM  Performed by: Michael Roland MD  Authorized by: Michael Roland MD   Consent: Verbal consent obtained  Risks and benefits: risks, benefits and alternatives were discussed  Consent given by: patient  Required items: required blood products, implants, devices, and special equipment available  Patient identity confirmed: provided demographic data  Time out: Immediately prior to procedure a "time out" was called to verify the correct patient, procedure, equipment, support staff and site/side marked as required  Body area: head/neck  Location details: right cheek  Laceration length: 6 cm  Foreign bodies: no foreign bodies  Tendon involvement: none  Nerve involvement: none  Vascular damage: yes    Anesthesia:  Local Anesthetic: lidocaine 2% with epinephrine  Anesthetic total: 7 mL      Procedure Details:  Amount of cleaning: standard  Debridement: minimal  Degree of undermining: none  Skin closure: 4-0 nylon  Number of sutures: 5  Technique: simple (combination of simple and figure of eight)  Approximation: close  Approximation difficulty: complex  Patient tolerance: patient tolerated the procedure well with no immediate complications  Comments: Wound initially closed with simple interrupted sutures but patient suffered continued bleeding  Arterial spurting noted near the superior edge of the dehisced portion of wound  After initial closure with simple interrupted sutures, 1 simple interrupted suture was removed and replaced with a figure-of-eight stitch approximately centered around the source of arterial bleeding with resolution of bleeding  ED Course of Care and Re-Assessments      Called to bedside during triage due to active bleeding  Wound repaired with hemostasis achieved as above  Patient noted to be hypoxemic during that process, started on O2 by [place] as temporizing measure with improvement  Overall picture concerning for CHF exacerbation as cause of hypoxemia, started on lasix after wound repair, along with small NS bolus due to concurrent hyponatremia  Admitted to [facility] due to new O2 requirement  Medications   albuterol (PROVENTIL HFA,VENTOLIN HFA) inhaler 2 puff (has no administration in time range)   furosemide (LASIX) injection 40 mg (has no administration in time range)   guaiFENesin (MUCINEX) 12 hr tablet 1,200 mg (has no administration in time range)   levothyroxine tablet 75 mcg (has no administration in time range)   metoprolol succinate (TOPROL-XL) 24 hr tablet 25 mg (has no administration in time range)   atorvastatin (LIPITOR) tablet 40 mg (40 mg Oral Given 2/21/22 1726)   acetaminophen (TYLENOL) tablet 650 mg (has no administration in time range)   ondansetron (ZOFRAN) injection 4 mg (has no administration in time range)   lidocaine-epinephrine (XYLOCAINE-MPF/EPINEPHRINE) 2 %-1:200,000 injection 1 mL (1 mL Infiltration Given 2/21/22 1331)   furosemide (LASIX) injection 40 mg (40 mg Intravenous Given 2/21/22 1519)   sodium chloride 0 9 % bolus 500 mL (0 mL Intravenous Stopped 2/21/22 1619)           FINAL IMPRESSION    Final diagnoses:   Wound dehiscence   Hypoxemia   Bilateral leg edema   Hyponatremia         DISPOSITION/PLAN    Presentation as above felt most consistent with wound dehiscence with bleeding from arterial source as above    Vital signs remarkable hypoxemia suspected to be secondary to CHF exacerbation, examination consistent with same due to bibasilar crackles as well as pitting edema bilateral lower extremities  Hemostasis achieved with wound repaired as above, started on treat with Lasix for CHF, admitted Internal Medicine for further care  Low suspicion for ACS, PE, bacterial pneumonia or other infectious etiology, other acute life threatening alternate pathology at this time  Hemodynamically stable and comfortable at time  Time reflects when diagnosis was documented in both MDM as applicable and the Disposition within this note     Time User Action Codes Description Comment    2/21/2022  3:18 PM Burnadette Sing T Add [T81 30XA] Wound dehiscence     2/21/2022  3:18 PM Dawson Burrs Add [R09 02] Hypoxemia     2/21/2022  3:18 PM Dawson Burrs Add [R60 0] Bilateral leg edema     2/21/2022  3:18 PM Burnadette Sing T Modify [T81 30XA] Wound dehiscence     2/21/2022  3:18 PM Dawson Burrs Modify [R09 02] Hypoxemia     2/21/2022  3:18 PM Burnadette Sing T Add [E87 1] Hyponatremia     2/21/2022  4:40 PM Brooks Goes Add [J96 01] Acute respiratory failure with hypoxia ([facility] )     2/21/2022  4:40 PM Brooks Goes Add [I42 9] Cardiomyopathy, unspecified type [facility])       ED Disposition     ED Disposition Condition Date/Time Comment    Admit Stable Mon Feb 21, 2022  3:18 PM Case was discussed with FEDERICO and the patient's admission status was agreed to be Admission Status: observation status to the service of Dr Matt Sneed   Follow-up Information    None           PATIENT REFERRED TO:    No follow-up provider specified      DISCHARGE MEDICATIONS:    Current Discharge Medication List      CONTINUE these medications which have NOT CHANGED    Details   apixaban (ELIQUIS) 5 mg Take 5 mg by mouth 2 (two) times a day      furosemide (LASIX) 40 mg tablet Take 40 mg by mouth daily      levothyroxine 50 mcg tablet Take 75 mcg by mouth daily before breakfast       metoprolol succinate (TOPROL-XL) 50 mg 24 hr tablet Take 25 mg by mouth 2 (two) times a day       albuterol (PROVENTIL HFA,VENTOLIN HFA) 90 mcg/act inhaler Inhale 2 puffs every 4 (four) hours as needed for wheezing or shortness of breath      guaiFENesin (MUCINEX) 600 mg 12 hr tablet Take 1,200 mg by mouth every 12 (twelve) hours as needed for congestion      rosuvastatin (CRESTOR) 10 MG tablet Take 10 mg by mouth daily             No discharge procedures on file  Franky Loera MD    Portions of the record may have been created with voice recognition software  Occasional wrong word or "sound alike" substitutions may have occurred due to the inherent limitations of voice recognition software    Please read the chart carefully and recognize, using context, where substitutions have occurred     Franky Loera MD  02/21/22 9184

## 2022-02-22 ENCOUNTER — APPOINTMENT (OUTPATIENT)
Dept: NON INVASIVE DIAGNOSTICS | Facility: HOSPITAL | Age: 87
DRG: 919 | End: 2022-02-22
Payer: COMMERCIAL

## 2022-02-22 LAB
ALBUMIN SERPL BCP-MCNC: 2.9 G/DL (ref 3.5–5)
ALP SERPL-CCNC: 131 U/L (ref 46–116)
ALT SERPL W P-5'-P-CCNC: 13 U/L (ref 12–78)
ANION GAP SERPL CALCULATED.3IONS-SCNC: 10 MMOL/L (ref 4–13)
AORTIC ROOT: 3.4 CM
AORTIC VALVE MEAN VELOCITY: 13.7 M/S
APICAL FOUR CHAMBER EJECTION FRACTION: 56 %
AST SERPL W P-5'-P-CCNC: 21 U/L (ref 5–45)
ATRIAL RATE: 227 BPM
AV AREA BY CONTINUOUS VTI: 1.1 CM2
AV AREA PEAK VELOCITY: 1.2 CM2
AV LVOT MEAN GRADIENT: 1 MMHG
AV LVOT PEAK GRADIENT: 1 MMHG
AV MEAN GRADIENT: 9 MMHG
AV PEAK GRADIENT: 17 MMHG
AV VALVE AREA: 1.14 CM2
BASOPHILS # BLD AUTO: 0.12 THOUSANDS/ΜL (ref 0–0.1)
BASOPHILS NFR BLD AUTO: 2 % (ref 0–1)
BILIRUB SERPL-MCNC: 2.08 MG/DL (ref 0.2–1)
BUN SERPL-MCNC: 17 MG/DL (ref 5–25)
CALCIUM ALBUM COR SERPL-MCNC: 9.1 MG/DL (ref 8.3–10.1)
CALCIUM SERPL-MCNC: 8.2 MG/DL (ref 8.3–10.1)
CHLORIDE SERPL-SCNC: 99 MMOL/L (ref 100–108)
CO2 SERPL-SCNC: 22 MMOL/L (ref 21–32)
CREAT SERPL-MCNC: 1.26 MG/DL (ref 0.6–1.3)
DOP CALC AO VTI: 40.9 CM
DOP CALC LVOT AREA: 4.15 CM2
DOP CALC LVOT DIAMETER: 2.3 CM
DOP CALC LVOT PEAK VEL VTI: 11.2 CM
DOP CALC LVOT PEAK VEL: 0.58 M/S
DOP CALC LVOT STROKE INDEX: 21.9 ML/M2
DOP CALC LVOT STROKE VOLUME: 46.51 CM3
EOSINOPHIL # BLD AUTO: 0.29 THOUSAND/ΜL (ref 0–0.61)
EOSINOPHIL NFR BLD AUTO: 4 % (ref 0–6)
ERYTHROCYTE [DISTWIDTH] IN BLOOD BY AUTOMATED COUNT: 15.7 % (ref 11.6–15.1)
FRACTIONAL SHORTENING: 36 % (ref 28–44)
GFR SERPL CREATININE-BSD FRML MDRD: 52 ML/MIN/1.73SQ M
GLUCOSE SERPL-MCNC: 83 MG/DL (ref 65–140)
HCT VFR BLD AUTO: 42.5 % (ref 36.5–49.3)
HGB BLD-MCNC: 14.1 G/DL (ref 12–17)
IMM GRANULOCYTES # BLD AUTO: 0.04 THOUSAND/UL (ref 0–0.2)
IMM GRANULOCYTES NFR BLD AUTO: 1 % (ref 0–2)
INTERVENTRICULAR SEPTUM IN DIASTOLE (PARASTERNAL SHORT AXIS VIEW): 1.1 CM (ref 0.55–1.03)
INTERVENTRICULAR SEPTUM: 1.1 CM (ref 0.6–1.1)
LA/AORTA RATIO 2D: 1.32
LAAS-AP4: 29.1 CM2
LEFT ATRIUM SIZE: 4.5 CM
LEFT INTERNAL DIMENSION IN SYSTOLE: 2.9 CM (ref 3.46–5.23)
LEFT VENTRICULAR INTERNAL DIMENSION IN DIASTOLE: 4.5 CM (ref 5.72–8.52)
LEFT VENTRICULAR POSTERIOR WALL IN END DIASTOLE: 1.2 CM (ref 0.53–1.01)
LEFT VENTRICULAR STROKE VOLUME: 60 ML
LVSV (TEICH): 60 ML
LYMPHOCYTES # BLD AUTO: 1.62 THOUSANDS/ΜL (ref 0.6–4.47)
LYMPHOCYTES NFR BLD AUTO: 20 % (ref 14–44)
MCH RBC QN AUTO: 32.3 PG (ref 26.8–34.3)
MCHC RBC AUTO-ENTMCNC: 33.2 G/DL (ref 31.4–37.4)
MCV RBC AUTO: 97 FL (ref 82–98)
MONOCYTES # BLD AUTO: 1.06 THOUSAND/ΜL (ref 0.17–1.22)
MONOCYTES NFR BLD AUTO: 13 % (ref 4–12)
MV E'TISSUE VEL-SEP: 11 CM/S
NEUTROPHILS # BLD AUTO: 4.88 THOUSANDS/ΜL (ref 1.85–7.62)
NEUTS SEG NFR BLD AUTO: 60 % (ref 43–75)
NRBC BLD AUTO-RTO: 0 /100 WBCS
PLATELET # BLD AUTO: 164 THOUSANDS/UL (ref 149–390)
PMV BLD AUTO: 10 FL (ref 8.9–12.7)
POTASSIUM SERPL-SCNC: 3.6 MMOL/L (ref 3.5–5.3)
PROT SERPL-MCNC: 7.4 G/DL (ref 6.4–8.2)
QRS AXIS: 70 DEGREES
QRSD INTERVAL: 94 MS
QT INTERVAL: 362 MS
QTC INTERVAL: 462 MS
RBC # BLD AUTO: 4.37 MILLION/UL (ref 3.88–5.62)
SL CV PED ECHO LEFT VENTRICLE DIASTOLIC VOLUME (MOD BIPLANE) 2D: 92 ML
SL CV PED ECHO LEFT VENTRICLE SYSTOLIC VOLUME (MOD BIPLANE) 2D: 32 ML
SODIUM SERPL-SCNC: 131 MMOL/L (ref 136–145)
T WAVE AXIS: 27 DEGREES
TRICUSPID ANNULAR PLANE SYSTOLIC EXCURSION: 1 CM
VENTRICULAR RATE: 98 BPM
WBC # BLD AUTO: 8.01 THOUSAND/UL (ref 4.31–10.16)
Z-SCORE OF INTERVENTRICULAR SEPTUM IN END DIASTOLE: 2.55
Z-SCORE OF LEFT VENTRICULAR DIMENSION IN END DIASTOLE: -4.39
Z-SCORE OF LEFT VENTRICULAR DIMENSION IN END SYSTOLE: -3.03
Z-SCORE OF LEFT VENTRICULAR POSTERIOR WALL IN END DIASTOLE: 3.48

## 2022-02-22 PROCEDURE — 99222 1ST HOSP IP/OBS MODERATE 55: CPT | Performed by: INTERNAL MEDICINE

## 2022-02-22 PROCEDURE — NC001 PR NO CHARGE: Performed by: PLASTIC SURGERY

## 2022-02-22 PROCEDURE — 85025 COMPLETE CBC W/AUTO DIFF WBC: CPT | Performed by: HOSPITALIST

## 2022-02-22 PROCEDURE — 97165 OT EVAL LOW COMPLEX 30 MIN: CPT

## 2022-02-22 PROCEDURE — 97163 PT EVAL HIGH COMPLEX 45 MIN: CPT

## 2022-02-22 PROCEDURE — 80053 COMPREHEN METABOLIC PANEL: CPT | Performed by: HOSPITALIST

## 2022-02-22 PROCEDURE — 93306 TTE W/DOPPLER COMPLETE: CPT | Performed by: INTERNAL MEDICINE

## 2022-02-22 PROCEDURE — C8929 TTE W OR WO FOL WCON,DOPPLER: HCPCS

## 2022-02-22 PROCEDURE — 93010 ELECTROCARDIOGRAM REPORT: CPT | Performed by: INTERNAL MEDICINE

## 2022-02-22 PROCEDURE — 99232 SBSQ HOSP IP/OBS MODERATE 35: CPT | Performed by: PHYSICIAN ASSISTANT

## 2022-02-22 RX ORDER — POTASSIUM CHLORIDE 20 MEQ/1
20 TABLET, EXTENDED RELEASE ORAL 2 TIMES DAILY
Status: DISCONTINUED | OUTPATIENT
Start: 2022-02-22 | End: 2022-02-24

## 2022-02-22 RX ORDER — FUROSEMIDE 10 MG/ML
40 INJECTION INTRAMUSCULAR; INTRAVENOUS
Status: DISCONTINUED | OUTPATIENT
Start: 2022-02-22 | End: 2022-02-24

## 2022-02-22 RX ADMIN — LEVOTHYROXINE SODIUM 75 MCG: 75 TABLET ORAL at 06:17

## 2022-02-22 RX ADMIN — METOPROLOL SUCCINATE 25 MG: 25 TABLET, EXTENDED RELEASE ORAL at 08:26

## 2022-02-22 RX ADMIN — POTASSIUM CHLORIDE 20 MEQ: 1500 TABLET, EXTENDED RELEASE ORAL at 10:07

## 2022-02-22 RX ADMIN — ATORVASTATIN CALCIUM 40 MG: 40 TABLET, FILM COATED ORAL at 16:18

## 2022-02-22 RX ADMIN — POTASSIUM CHLORIDE 20 MEQ: 1500 TABLET, EXTENDED RELEASE ORAL at 18:12

## 2022-02-22 RX ADMIN — FUROSEMIDE 40 MG: 10 INJECTION, SOLUTION INTRAMUSCULAR; INTRAVENOUS at 08:25

## 2022-02-22 RX ADMIN — PERFLUTREN 0.4 ML/MIN: 6.52 INJECTION, SUSPENSION INTRAVENOUS at 09:49

## 2022-02-22 RX ADMIN — FUROSEMIDE 40 MG: 10 INJECTION, SOLUTION INTRAMUSCULAR; INTRAVENOUS at 15:13

## 2022-02-22 NOTE — PLAN OF CARE
Problem: OCCUPATIONAL THERAPY ADULT  Goal: Performs self-care activities at highest level of function for planned discharge setting  See evaluation for individualized goals  Description: Treatment Interventions: ADL retraining,Functional transfer training,Endurance training,Patient/family training,Equipment evaluation/education,Compensatory technique education,Energy conservation,Activityengagement          See flowsheet documentation for full assessment, interventions and recommendations  Note: Limitation: Decreased ADL status,Decreased endurance,Decreased self-care trans,Decreased high-level ADLs  Prognosis: Good  Assessment: (P) Patient is a 82 y o  male seen for OT evaluation s/p admit to [address] [facility] on 2/41/5896 w/Acute systolic CHF (congestive heart failure) ([facility] )  Commorbidities affecting patient's functional performance at time of assessment include: skin cancer of face, hypoxia, moderate protein-calorie malnutrition, chronic atrial fibrillation, and hyponatremia  Patient  has a past medical [facility] of [facility] ([facility] ), Disease of thyroid gland, Hyperlipidemia, Hypertension, Optic neuropathy, Pleural effusion on right, Pneumonia, S/P lung surgery, follow-up exam, and Septic shock ([facility] )  Orders placed for OT evaluation and treatment  Performed at least two patient identifiers during session including name and wristband  Prior to admission, patient was living alone in a one-story home with 4 MAURO  At baseline, patient reports that he is independent in both ADLs and IADLs  Personal factors affecting patient at time of initial evaluation include: steps to enter, difficulty performing ADLs and difficulty performing IADLs  Upon evaluation, patient requires supervision and set up assist for UB ADLs, minimal assist for LB ADLs, transfers and functional ambulation in room and bathroom with contact guard assist, with no AD  Patient is alert and oriented x 4   Occupational performance is affected by the following deficits: dynamic sit/ stand balance deficit with poor standing tolerance time for self care and functional mobility and decreased activity tolerance  Patient to benefit from continued Occupational Therapy treatment while in the hospital to address deficits as defined above and maximize level of functional independence with ADLs and functional mobility  Occupational Performance areas to address include: grooming , bathing/ shower, dressing, toilet hygiene, transfer to all surfaces, emergency response, health maintenance, IADLs: safety procedures and Home management  From OT standpoint, recommendation at time of d/c would be Home with family support and Home OT       OT Discharge Recommendation: Home with home health rehabilitation

## 2022-02-22 NOTE — OCCUPATIONAL THERAPY NOTE
Occupational Therapy Evaluation Note        Patient Name: Tushar Comer  Today's Date: 2/22/2022 02/22/22 0747   OT Last Visit   OT Visit Date 02/22/22   Note Type   Note type Evaluation   Restrictions/Precautions   Weight Bearing Precautions Per Order No   Braces or Orthoses Other (Comment)  (none per pt)   Other Precautions Chair Alarm; Bed Alarm;O2;Fall Risk  (3L O2 NC)   Pain Assessment   Pain Assessment Tool 0-10   Pain Score No Pain   Home Living   Type of Home House   Home Layout Other (Comment); One level;Performs ADLs on one level;Stairs to enter with rails  (4 MAURO w/ bilateral HRs)   Bathroom Shower/Tub Tub/shower unit   Bathroom Toilet Standard   Bathroom Equipment Grab bars in [facility]   Additional Comments Ambulatory with no AD at baseline   Prior Function   Level of [place] Independent with ADLs and functional mobility   Lives With Alone   Receives Help From Family  (Local dtrs and grandchildren)   ADL Assistance Independent   IADLs Independent   Falls in the last 6 months 0   Vocational Retired   Lifestyle   Autonomy Per patient report, he lives alone in a [place] home with 4 MAURO  At baseline, patient reports that he is independent in ADLs and receives assistance for IADLs  Patient is ambulatory with no AD at baseline  Reciprocal Relationships Local family   Service to Others Retired-    Psychosocial   Psychosocial ([address]) [address] [place]  Independent   Grooming Assistance 6  [address]  [address] [facility] 5  [address] 4  [address] Deficit Don/doff R sock; Don/doff L sock   Toileting Assistance  5  Supervision/Setup   Functional Assistance 4  Minimal Assistance   Additional Comments ADL assist levels based on pt's functional performance during OT evaluation   Bed Mobility   Supine to Sit 4  Minimal assistance   Additional items Assist x 1;HOB elevated; Bedrails; Increased time required;Verbal cues;LE management   Additional Comments Patient received lying supine in bed upon OT arrival; at end of session: pt seated OOB to recliner chair w/ all needs within reach and chair alarm activated   Transfers   Sit to Stand 4  Minimal assistance  (CGA)   Additional items Assist x 1; Increased time required;Verbal cues   Stand to Sit 4  Minimal assistance  (CGA)   Additional items Assist x 1; Increased time required;Verbal cues   Toilet transfer 4  Minimal assistance  (CGA)   Additional items Assist x 1; Increased time required;Verbal cues;Standard toilet  (Grab bar use)   Additional Comments Performed functional transfers using no AD   Functional Mobility   Functional Mobility 4  Minimal assistance   Additional Comments CGA x1; Ambulation within pt room and bathroom with no overt LOB  HR elevated to 131 bpm at highest during mobility trial, SpO2 decreased to mid 80s (84-85% per reading on Masimo) on 3L O2 NC   Additional items   (no AD)   Balance   Static Sitting Fair +   Dynamic Sitting Fair   Static Standing Fair   Dynamic Standing Fair -   Ambulatory Fair -   Activity Tolerance   Activity Tolerance Patient limited by fatigue   Medical Staff Made Aware PT Cassi   Nurse Made Aware RN Isabelle   RURENAN Assessment   RUE Assessment WFL  (AROM and strength grossly WFL)   LUE Assessment   LUE Assessment WFL  (AROM and strength grossly WFL)   Hand Function   Gross Motor Coordination Functional   Fine Motor Coordination Functional   Sensation   Additional Comments Patient denies diminished sensation throughout BUEs   Vision-Basic Assessment   Current Vision Wears glasses for distance only   Cognition   Overall Cognitive Status WFL   Arousal/Participation Alert; Responsive; Cooperative   Attention Within functional limits   Orientation Level Oriented X4   Memory Within functional limits   Following Commands Follows all commands and directions without difficulty   Comments Patient agreeable to OT evaluation   Assessment   Limitation Decreased ADL status; Decreased endurance;Decreased self-care trans;Decreased high-level ADLs   Prognosis Good   Assessment Patient is a 82 y o  male seen for OT evaluation s/p admit to [address] [facility] on 6/96/8226 w/Acute systolic CHF (congestive heart failure) ([facility] )  Commorbidities affecting patient's functional performance at time of assessment include: skin cancer of face, hypoxia, moderate protein-calorie malnutrition, chronic atrial fibrillation, and hyponatremia  Patient  has a past medical history of A-fib ([facility] ), Disease of thyroid gland, Hyperlipidemia, Hypertension, Optic neuropathy, Pleural effusion on right, Pneumonia, S/P lung surgery, follow-up exam, and Septic shock ([facility] )  Orders placed for OT evaluation and treatment  Performed at least two patient identifiers during session including name and wristband  Prior to admission, patient was living alone in a one-story home with 4 MAURO  At baseline, patient reports that he is independent in both ADLs and IADLs  Personal factors affecting patient at time of initial evaluation include: steps to enter, difficulty performing ADLs and difficulty performing IADLs  Upon evaluation, patient requires supervision and set up assist for UB ADLs, minimal assist for LB ADLs, transfers and functional ambulation in room and bathroom with contact guard assist, with no AD  Patient is alert and oriented x 4  Occupational performance is affected by the following deficits: dynamic sit/ stand balance deficit with poor standing tolerance time for self care and functional mobility and decreased activity tolerance  Patient to benefit from continued Occupational Therapy treatment while in the hospital to address deficits as defined above and maximize level of functional independence with ADLs and functional mobility  Occupational Performance areas to address include: grooming , bathing/ shower, dressing, toilet hygiene, transfer to all surfaces, emergency response, health maintenance, IADLs: safety procedures and Home management  From OT standpoint, recommendation at time of d/c would be Home with family support and Home OT  Plan   Treatment Interventions ADL retraining;Functional transfer training; Endurance training;Patient/family training;Equipment evaluation/education; Compensatory technique education; Energy conservation; Activityengagement   Goal Expiration Date 03/01/22   OT Treatment Day 0   OT Frequency 1-2x/wk   Recommendation   OT Discharge Recommendation Home with home health rehabilitation   Additional Comments  The patient's raw score on the AM-PAC Daily Activity inpatient short form is 20, standardized score is 42 03, greater than 39 4  Patients at this level are likely to benefit from discharge to home  Please refer to the recommendation of the Occupational Therapist for safe discharge planning     AM-PAC Daily Activity Inpatient   Lower Body Dressing 3   Bathing 3   Toileting 3   Upper Body Dressing 3   Grooming 4   Eating 4   Daily Activity Raw Score 20   Daily Activity Standardized Score (Calc for Raw Score >=11) 42 03   AM-PAC Applied Cognition Inpatient   Following a Speech/Presentation 4   Understanding Ordinary Conversation 4   Taking Medications 4   Remembering Where Things Are Placed or Put Away 4   Remembering List of 4-5 Errands 4   Taking Care of Complicated Tasks 4   Applied Cognition Raw Score 24   Applied Cognition Standardized Score 62 21   Barthel Index   Feeding 10   Bathing 0   Grooming Score 5   Dressing Score 5   Bladder Score 10   Bowels Score 10   Toilet Use Score 5   Transfers (Bed/Chair) Score 10   Mobility (Level Surface) Score 0   Stairs Score 0   Barthel Index Score 55   Modified Tippo Scale   Modified Tippo Scale 4     Occupational Therapy Goals to be completed in 5-7 Days:    1- Patient will verbalize and demonstrate use of energy conservation/ deep breathing technique and work simplification skills during functional activity with no verbal cues  2- Patient will verbalize and demonstrate good body mechanics and joint protection techniques during ADLs/ IADLs with no verbal cues  3- Patient will increase OOB/ sitting tolerance to 4-6 hours per day for increased participation in self care and leisure tasks with no s/s of exertion  4- Patient will identify s/s of exertion during ADL and functional mobility with no verbal cues  5- Patient will verbalize/ demonstrate compensatory strategies to recover from exertion with no verbal cues  6- Patient will increase standing tolerance time to 10 minutes with No UE support to complete sink level ADLs @ Mod I level  7- Patient will increase sitting tolerance at edge of bed to 30 minutes to complete UB ADLs @ Indep  level  8- Patient/ Family will demonstrate competency with UE Home Exercise Program      9- Patient will perform functional transfers and mobility with supervision assist with use of AD as indicated      Lizy Torres, OTR/L

## 2022-02-22 NOTE — ASSESSMENT & PLAN NOTE
Wt Readings from Last 3 Encounters:   02/22/22 91 6 kg (202 lb)   11/02/21 96 6 kg (212 lb 14 4 oz)   10/27/21 97 kg (213 lb 13 5 oz)     · Diuresis with Intravenous Lasix  · Monitor I/O, daily weight  · Consultation to cardiology pending   · Check echocardiogram  · Monitor on telemetry  · Supplemental O2 as required  · Reports resolution of SOB since admission

## 2022-02-22 NOTE — WOUND OSTOMY CARE
Progress Note - Wound   Kathia Lowe 82 y o  male MRN: [id number]  Unit/Bed#: -01 Encounter: [id number]      Assessment:   Patient admitted due to acute systolic CHF  History of a-fib , HLD, HTN  Wound care consulted for right cheek, left calf, and left foot wounds  Patient agreeable to assessment, alert and oriented x4, continent of bowel and bladder, standby assist to stand for assessment, OOB to chair with EHOB waffle cushion in place, heels elevated, is an assist with care  Primary RN and SLIM provider Bailey Leung made aware of assessment findings  Recommend possible plastic surgery consult for right facial surgical incision presenting with a large hematoma  1  Bilateral sacrum, buttock, and heels are dry, intact, blanchable pink skin  2  Left planter foot- Noted to have oval area of thick calloused yellow skin that is intact, no redness, no drainage  3  Left medial leg noted to have small area with brown/black discoloration, patient states this is a "wart" he has had for multiple years  No drainage noted, no redness  4  Right face/cheek- Surgical incision post removal of cancerous lesion  Patient states surgery occurred on 2/16/22  Surgical incision noted to have sutures, noted to have beefy red tissue, old adhered drainage, and is dehisced approx  2 5 cm along posterior edge near the ear and exposes what appears to be a hematoma  Malika-wound is swollen, ecchymotic, no warmth noted  SLIM PA Bailey Leung made aware of assessment findings  Educated patient on importance of frequent offloading of pressure via turning, repositioning, and weight-shifting  Verbalized understanding of plan of care  No induration, fluctuance, odor, warmth, redness, or purulence noted to the above noted wound  New dressing applied  Patient tolerated well  Primary nurse aware of plan of care  See flow sheets for more detailed assessment findings  Will follow along      Skin care Plan:  1-Protect sacrum w/Allevyn foam, rosangela with P, change q3d and PRN, peel back and check skin q-shift  2-Turn/reposition q2h for pressure re-distribution on skin   3-Elevate heels to offload pressure  4-Moisturize skin daily with skin nourishing cream  5-Ehob cushion in chair when out of bed  6-Hydraguard to bilateral heels and left plantar foot calloused area BID and PRN  7-Left medial leg- Cleanse with NSS, pat dry  Apply Allevyn foam dressing  Change every 3 days and PRN  8- Right face- Cleanse wound with NSS, pat dry  Apply Adaptic over suture line, cover with 4x4, then ABD pad, Wrap with Carter Kris  Change daily and PRN for dislodgment  9-Wound care will follow along with patient weekly, please call or tiger text with questions and concerns       Wound 10/29/21 Neuropathic Foot Left;Plantar (Active)   Wound Image   02/22/22 1027   Wound Description Dry; Intact; Yellow 02/22/22 1027   Malika-wound Assessment Dry; Intact;Callus 02/22/22 1027   Wound Length (cm) 0 cm 02/22/22 1027   Wound Width (cm) 0 cm 02/22/22 1027   Wound Depth (cm) 0 cm 02/22/22 1027   Wound Surface Area (cm^2) 0 cm^2 02/22/22 1027   Wound Volume (cm^3) 0 cm^3 02/22/22 1027   Calculated Wound Volume (cm^3) 0 cm^3 02/22/22 1027   Drainage Amount None 02/22/22 1027   Dressing Moisture barrier 02/22/22 1027   Wound packed? No 02/22/22 1027   Dressing Changed New 02/22/22 1027   Patient Tolerance Tolerated well 02/22/22 1027       Wound 02/21/22 Incision Other (Comment) Face Right (Active)   Wound Image   02/22/22 1049   Wound Description Beefy red; Other (Comment); Light purple 02/22/22 1033   Malika-wound Assessment Dry; Intact; Purple; Swelling 02/22/22 1033   Wound Length (cm) 8 5 cm 02/22/22 1033   Wound Width (cm) 0 7 cm 02/22/22 1033   Wound Surface Area (cm^2) 5 95 cm^2 02/22/22 1033   Drainage Amount Small 02/22/22 1033   Drainage Description Sanguineous 02/22/22 1033   Non-staged Wound Description Not applicable 73/75/76 4594   Treatments Cleansed;Irrigation with NSS;Site care 02/22/22 1033   Dressing Vaseline gauze;Gauze;ABD;Dry dressing 02/22/22 1033   Wound packed? No 02/22/22 1033   Dressing Changed Changed 02/22/22 1033   Patient Tolerance Tolerated well 02/22/22 1033   Dressing Status Clean;Dry; Intact 02/22/22 1033     Call or tigertext with any questions  Wound Care will continue to follow    Celine Holman RN BSN  Wound care

## 2022-02-22 NOTE — ASSESSMENT & PLAN NOTE
· S/p MOH's at [facility]  2/16/22  · Pt unclear of exact details  · Complicated by arterial bleed here   · Hold DOAC overnight  · Plastic surgery consulted (Dr Cara Solano)   · Wound care nurse evaluation appreciated

## 2022-02-22 NOTE — CASE MANAGEMENT
Case Management Assessment & Discharge Planning Note    Patient name Joaquin Barraza  Location /-84 MRN [id number]  : 1935 Date 2022       Current Admission Date: 2022  Current Admission Diagnosis:Acute systolic CHF (congestive heart failure) [facility])   Patient Active Problem List    Diagnosis Date Noted    Skin cancer of face 2022    SIRS (systemic inflammatory response syndrome) ([facility] ) 10/28/2021    Wound of left foot 10/28/2021    Stage 3b chronic kidney disease ([facility] ) 10/28/2021    Hypoxia 10/28/2021    Flexor tenosynovitis of finger 10/27/2021    Localized swelling on left hand 10/27/2021    Cellulitis of hand, left 10/27/2021    Volume overload 2019    Pleural effusion 9466    Acute systolic CHF (congestive heart failure) ([facility] ) 2019    Moderate protein-calorie malnutrition ([facility] ) 2019    Acute respiratory failure with hypoxia ([facility] ) 2019    Hyponatremia 2019    Chronic atrial fibrillation ([facility] ) 2019    Transaminitis 2019      LOS (days): 0  Geometric Mean LOS (GMLOS) (days): 4 80  Days to GMLOS:4 7     OBJECTIVE:    Risk of Unplanned Readmission Score: 15         Current admission status: Inpatient       Preferred Pharmacy:   [address] [place] [address]  Phone: [phone] Fax: [phone]    Primary Care Provider: Jeremie Riley MD    Primary Insurance: [facility]  Secondary Insurance:     ASSESSMENT:  [address] Representative - Daughter   Primary Phone: [phone] (Mobile)               Advance Directives  Does patient have a [address] [place] [address] Avenue?: No  Was patient offered paperwork?: Yes (Declined)  Does patient currently have a Health Care decision maker?: Yes, please see Health Care Proxy section  Does patient have Advance Directives?: No  Was patient offered paperwork?: Yes (Declined)  Primary Contact: Arabella Collazo/Daughter ([phone])    Readmission Root Cause  30 Day Readmission: No    Patient Information  Admitted from[de-identified] Home  Mental Status: Alert  During Assessment patient was accompanied by: Not accompanied during assessment  Assessment information provided by[de-identified] Patient  Primary Caregiver: Self  Support Systems: [address] [place] [address] of Residence: [place] [address] do you live in?: [address] entry access options   Select all that apply : Stairs  Number of steps to enter home : 4  Do the steps have railings?: Yes  Type of Current Residence: [place]  In the last 12 months, was there a time when you were not able to pay the mortgage or rent on time?: No  In the last 12 months, how many places have you lived?: 1  In the last 12 months, was there a time when you did not have a steady place to sleep or slept in a shelter (including now)?: No  Homeless/housing insecurity resource given?: N/A  Living Arrangements: Lives Alone    Activities of Daily Living Prior to Admission  Functional Status: Independent  Completes ADLs independently?: Yes  Ambulates independently?: Yes  Does patient use assisted devices?: Yes  Assisted Devices (DME) used: [employer]  Does patient currently own DME?: Yes  What DME does the patient currently own?: Walker,Bedside Com[employer]  Does patient have a history of Outpatient Therapy (PT/OT)?: No  Does the patient have a history of Short-Term Rehab?: No  [facility]  Does patient currently have [facility]?: No    Patient Information Continued  Income Source: Pension/[employer]  Does patient have prescription coverage?: Yes  Within the past 12 months, you worried that your food would run out before you got the money to buy more : Never true  Within the past 12 months, the food you bought just didnt last and you didnt have money to get more : Never true  Food insecurity resource given?: N/A  Does patient receive dialysis treatments?: No  Does patient have a history of substance abuse?: No  Does patient have a history of Mental Health Diagnosis?: No    Means of Transportation  Means of Transport to Appts[de-identified] Drives Self  In the past 12 months, has lack of transportation kept you from medical appointments or from getting medications?: No  In the past 12 months, has lack of transportation kept you from meetings, work, or from getting things needed for daily living?: No  Was application for public transport provided?: N/A    DISCHARGE DETAILS:    Discharge planning discussed with[de-identified] Patient  Freedom of Choice: Yes  Comments - Freedom of Choice: Discussed freedom of choice as it relates to discharge planning based on treatment team recommendations  Pt in agreement with [facility] recommendation  CM contacted family/caregiver?: Yes  Were Treatment Team discharge recommendations reviewed with patient/caregiver?: Yes  Did patient/caregiver verbalize understanding of patient care needs?: Yes  Were patient/caregiver advised of the risks associated with not following Treatment Team discharge recommendations?: Yes    Contacts  Patient Contacts: Patient  Contact Method:  In Person  Reason/Outcome: Continuity of [address]         Is the patient interested in [facility] at discharge?: Yes  [address] requested[de-identified] [address] Name[de-identified] Other  [address] Provider[de-identified] PCP  Home Health Services Needed[de-identified] Strengthening/Theraputic Exercises to Improve Function,Evaluate Functional Status and Safety,Gait/ADL Training  Homebound Criteria Met[de-identified] Uses an Assist Device (i e  cane, walker, etc),Requires the Assistance of Another Person for Safe Ambulation or to Leave the Home  Supporting Clincal Findings[de-identified] Fatigues Easliy in Short Distances,Limited Endurance    DME Referral Provided  Referral made for DME?: No    Other Referral/Resources/Interventions Provided:  Interventions: C  Referral Comments: Referrals placed via [facility], awaiting accepting agency  Treatment Team Recommendation: Home with [facility]  Discharge Destination Plan[de-identified] Home with [place] at Discharge : Family Cj Senkeira)     Additional Comments: Pt reports being vaccinated for Covid but no booster

## 2022-02-22 NOTE — CONSULTS
Consultation - Cardiology   Korin Cortez 82 y o  male MRN: [id number]  Unit/Bed#: -01 Encounter: [id number]  02/22/22  12:53 PM    Assessment/ Plan:  1  Acute on chronic congestive heart failure, systolic versus diastolic to be determined, echocardiogram ordered and pending  Increase Lasix to b i d   Add potassium supplementation  Daily weights  Salt restriction  Strict I&Os  Echocardiogram pending  2  Paroxysmal atrial fibrillation, heart rate stable in the 80s to 90s  Continue with Toprol, Eliquis on hold  3  Hypertension, stable 115/81  Continue Lasix, Toprol    4  Hyperlipidemia on Lipitor    5  History of aortic stenosis, echocardiogram pending  6  Chronic kidney disease, creatinine today 1 2  Continue to monitor  7  Skin cancer with recent will resection  History of Present Illness   Physician Requesting Consult: Luis Mccullough MD    Reason for Consult / Principal Problem: chf    HPI: Korin Cortez is a 82y o  year old male who presents with bleeding from a recent mole resection site  Patient states it was skin cancer with cannot give any further details  Per per the [facility] Internal Medicine Hospitalist notes the bleeding would not stop, EMS applied quick clot x2 and the ER finally placed a stitch to achieve hemostasis  Upon evaluation emergency room patient was found to be quite dyspneic and somewhat bluish in color  He did endorse shortness of breath especially on exertion  He denies any orthopnea, chest pain  Patient felt better with oxygen  The son was at the bedside at the time of the H&P stated the father has been more short of breath with ambulation lately       Patient is followed by [facility] cardiology    Last echo January 2020 showed EF at 50% mild AS    Past medical history:  Paroxysmal atrial fibrillation, dilated cardiomyopathy, hypertension, CKD, hyperlipidemia, chronic heart failure, aortic stenosis, hypothyroidism    Consults    EKG:  Unavailable to me at this time      Review of Systems   Constitutional: Negative  Respiratory: Positive for shortness of breath  Cardiovascular: Negative  Skin: Positive for wound  +bleeding from mole resection site   Neurological: Negative  Hematological: Negative  Psychiatric/Behavioral: Negative  All other systems reviewed and are negative  Historical Information   Past Medical History:   Diagnosis Date    A-fib ([address] )     Disease of thyroid gland     Hyperlipidemia     Hypertension     Optic neuropathy     Pleural effusion on right     Pneumonia     S/P lung surgery, follow-up exam     Septic shock [facility])      Past Surgical History:   Procedure Laterality Date    HAND DEBRIDEMENT Left 10/30/2021    Procedure: DEBRIDEMENT HAND/FINGER [facility] OUT), second, index and ring finger;  Surgeon: Paulo Hill MD;  [facility]: [facility];  Service: Plastics    [place] THORACOSCOPY SURG PART PULM DECORT Right 12/12/2019    Procedure: RIGHT THORACOSCOPIC LUNG DECORTICATION;  Surgeon: Chelsey Shi MD;  Location: [facility] MAIN OR;  Service: Thoracic    THORACOSCOPY VIDEO ASSISTED SURGERY (VATS) Right 12/12/2019    Procedure: THORACOSCOPY VIDEO ASSISTED SURGERY (VATS); Surgeon: Chelsey Shi MD;  Location: [facility] MA[facility] OR;  Service: Thoracic     Social History     Substance and Sexual Activity   Alcohol Use Not Currently    Comment: occasional     Social History     Substance and Sexual Activity   Drug Use Never     Social History     Tobacco Use   Smoking Status Former Smoker    Types: Cigarettes   Smokeless Tobacco Never Used       Family History: History reviewed  No pertinent family history      Meds/Allergies   all current active meds have been reviewed and current meds:   Current Facility-Administered Medications   Medication Dose Route Frequency    acetaminophen (TYLENOL) tablet 650 mg  650 mg Oral Q6H PRN    albuterol (PROVENTIL HFA,VENTOLIN HFA) inhaler 2 puff  2 puff Inhalation Q4H PRN  atorvastatin (LIPITOR) tablet 40 mg  40 mg Oral Daily With Dinner    furosemide (LASIX) injection 40 mg  40 mg Intravenous BID (diuretic)    guaiFENesin (MUCINEX) 12 hr tablet 1,200 mg  1,200 mg Oral Q12H PRN    levothyroxine tablet 75 mcg  75 mcg Oral Daily Before Breakfast    metoprolol succinate (TOPROL-XL) 24 hr tablet 25 mg  25 mg Oral BID    ondansetron (ZOFRAN) injection 4 mg  4 mg Intravenous Q6H PRN    potassium chloride (K-DUR,KLOR-CON) CR tablet 20 mEq  20 mEq Oral BID     Allergies   Allergen Reactions    Ace Inhibitors Cough     Rash       Objective   Vitals: Blood pressure 115/81, pulse 96, temperature (!) 97 4 °F (36 3 °C), resp  rate 20, height 5' 10" (1 778 m), weight 91 6 kg (202 lb), SpO2 99 %  , Body mass index is 28 98 kg/m² ,   Orthostatic Blood Pressures      Most Recent Value   Blood Pressure 115/81 filed at 02/22/2022 0930   Patient Position - Orthostatic VS Sitting filed at 02/21/2022 5618          Systolic (73HRY), AQF:149 , Min:109 , LJX:774     Diastolic (33NHV), HRO:94, Min:77, Max:86        Intake/Output Summary (Last 24 hours) at 2/22/2022 1253  Last data filed at 2/22/2022 1005  Gross per 24 hour   Intake 1080 ml   Output 1400 ml   Net -320 ml       Invasive Devices  Report    Peripheral Intravenous Line            Peripheral IV 02/21/22 Right Antecubital <1 day          Drain            Open Drain Anterior; Left Hand 115 days    Open Drain Anterior; Left Hand 115 days                    Physical Exam:  GEN: Alert and oriented x 3, in no acute distress  Well appearing and well nourished  HEENT: +Bruised and swollen face, Sclera anicteric, conjunctivae pink, mucous membranes moist  Oropharynx clear  NECK: Supple, no carotid bruits, no significant JVD  Trachea midline, no thyromegaly  HEART: Regular rhythm, normal S1 and S2, +2/6 murmur noted  PMI nondisplaced, no thrills  LUNGS: Decreased breath sounds with bibasilar crackles noted    No increased work of breathing or signs of respiratory distress  ABDOMEN: Soft, nontender, nondistended, normoactive bowel sounds  EXTREMITIES: +1 edema, Skin warm and well perfused, no clubbing, cyanosis  NEURO: No focal findings  Normal speech  Mood and affect normal    SKIN: Normal without suspicious lesions on exposed skin        Lab Results:     Troponins:       CBC with diff:   Results from last 7 days   Lab Units 02/22/22  0546 02/21/22  1359   WBC Thousand/uL 8 01 7 70   HEMOGLOBIN g/dL 14 1 15 2   HEMATOCRIT % 42 5 47 7   MCV fL 97 99*   PLATELETS Thousands/uL 164 180   MCH pg 32 3 31 4   MCHC g/dL 33 2 31 9   RDW % 15 7* 16 0*   MPV fL 10 0 10 0   NRBC AUTO /100 WBCs 0 0         CMP:   Results from last 7 days   Lab Units 02/22/22  0546 02/21/22  1359   POTASSIUM mmol/L 3 6 4 6   CHLORIDE mmol/L 99* 98*   CO2 mmol/L 22 23   BUN mg/dL 17 18   CREATININE mg/dL 1 26 1 41*   CALCIUM mg/dL 8 2* 8 8   AST U/L 21 26   ALT U/L 13 15   ALK PHOS U/L 131* 153*   EGFR ml/min/1 73sq m 52 45

## 2022-02-22 NOTE — PLAN OF CARE
Problem: PHYSICAL THERAPY ADULT  Goal: Performs mobility at highest level of function for planned discharge setting  See evaluation for individualized goals  Description: Treatment/Interventions: Functional transfer training,LE strengthening/ROM,Elevations,Therapeutic exercise,Endurance training,Patient/family training,Bed mobility,Gait training,Spoke to nursing,OT          See flowsheet documentation for full assessment, interventions and recommendations  Note: Prognosis: Good  Problem List: Decreased strength,Decreased endurance,Impaired balance,Decreased mobility,Impaired sensation  Assessment: Pt is 82year old male seen for PT evaluation s/p admit to [facility] on 2/21/2022 with Acute systolic CHF (congestive heart failure) ([facility] [address] )  PT consulted to assess pt's functional mobility and d/c needs  Order placed for PT eval and tx, with up with assist order  Comorbidities affecting pt's physical performance at time of assessment include hyponatremia, chronic atrial fibrillation, moderate protein calorie malnutrition, hypoxia, and skin cancer of face  PTA, pt was independent with all functional mobility without an AD and ambulates community distances and elevations  Personal factors affecting pt at time of IE include stairs to enter home, inability to ambulate household distances, inability to navigate community distances, inability to navigate level surfaces without external assistance, unable to perform dynamic tasks in community, limited home support, inability to perform IADLs, and difficulty performing ADLs  Please find objective findings from PT assessment regarding body systems outlined above with impairments and limitations including weakness, impaired balance, decreased endurance, gait deviations, decreased activity tolerance, decreased functional mobility tolerance, altered sensation, fall risk, and SOB upon exertion   The following objective measures performed on IE also reveal limitations: Barthel Index: 55/100, Modified [place]: 4 (moderate/severe disability) and AM-PAC 6-Clicks: 42/54  Pt's clinical presentation is currently unstable/unpredictable seen in pt's presentation of need for ongoing medical management/monitoring, pt is a fall risk, pt is requiring supplemental oxygen, and pt requires cues/assist for safety with functional mobility  Pt to benefit from continued PT tx to address deficits as defined above and maximize level of functional independent mobility and consistency  From PT/mobility standpoint, recommendation at time of d/c would be Home PT with family support pending progress in order to facilitate return to PLOF  Barriers to Discharge: Inaccessible home environment,Decreased caregiver support     PT Discharge Recommendation: Home with home health rehabilitation     See flowsheet documentation for full assessment

## 2022-02-22 NOTE — PHYSICAL THERAPY NOTE
Physical Therapy Evaluation     Patient's Name: Nehal García    Admitting Diagnosis  Hypoxemia [R09 02]  Hyponatremia [E87 1]  Bilateral leg edema [R60 0]  Wound dehiscence [T81 30XA]  Post-operative complication [F46  9XXA]  Acute respiratory failure with hypoxia ([facility] [address] ) [J96 01]  Cardiomyopathy, unspecified type ([address] ) [I42 9]    Problem List  Patient Active Problem List   Diagnosis    Acute respiratory failure with hypoxia (HCC)    Hyponatremia    Chronic atrial fibrillation (HCC)    Transaminitis    Moderate protein-calorie malnutrition (HCC)    Pleural effusion    Acute systolic CHF (congestive heart failure) (HCC)    Volume overload    Flexor tenosynovitis of finger    Localized swelling on left hand    Cellulitis of hand, left    SIRS (systemic inflammatory response syndrome) (HCC)    Wound of left foot    Stage 3b chronic kidney disease ([facility] [address] )    Hypoxia    Skin cancer of face     Past Medical History  Past Medical History:   Diagnosis Date    A-fib ([facility] [address] )     Disease of thyroid gland     Hyperlipidemia     Hypertension     Optic neuropathy     Pleural effusion on right     Pneumonia     S/P lung surgery, follow-up exam     Septic shock [facility])      Past Surgical History  Past Surgical History:   Procedure Laterality Date    HAND DEBRIDEMENT Left 10/30/2021    Procedure: DEBRIDEMENT HAND/FINGER [facility] OUT), second, index and ring finger;  Surgeon: Ned Parmar MD;  Location: [facility] Main OR;  Service: Plastics    [place] THORACOSCOPY SURG PART PULM DECORT Right 12/12/2019    Procedure: RIGHT THORACOSCOPIC LUNG DECORTICATION;  Surgeon: Jorge Jordan MD;  Location: [facility] MA[facility];  Service: Thoracic    THORACOSCOPY VIDEO ASSISTED SURGERY (VATS) Right 12/12/2019    Procedure: THORACOSCOPY VIDEO ASSISTED SURGERY (VATS);   Surgeon: Jorge Jordan MD;  Location: [facility] OR;  Service: Thoracic      02/22/22 0759   PT Last Visit   PT Visit Date 02/22/22   Note Type   Note type Evaluation Pain Assessment   Pain Assessment Tool 0-10   Pain Score No Pain   Restrictions/Precautions   Weight Bearing Precautions Per Order No   Braces or Orthoses Other (Comment)  (none per patient)   Other Precautions Chair Alarm; Bed Alarm;O2;Fall Risk  (+3L O2 via NC)   Home Living   Type of [address] [place] [address] One level; Able to live on main level with bedroom/bathroom;Stairs to enter with rails  (4 MAURO with b/l handrails)   Bathroom Shower/Tub Tub/shower unit   Bathroom Toilet Standard   Bathroom Equipment Grab bars in shower   [address] [facility]   Additional Comments Pt ambulates without an AD  Prior Function   Level of [place] Independent with ADLs and functional mobility   Lives With Alone   Receives Help From Family  (local daughters and grandchildren)   ADL Assistance Independent   IADLs Independent   Falls in the last 6 months 0   Vocational Retired   General   Family/Caregiver Present No   Cognition   Overall Cognitive Status WFL   Arousal/Participation Alert   Orientation Level Oriented X4   Memory Within functional limits   Following Commands Follows all commands and directions without difficulty   Comments Pt agreeable to PT    RUE Assessment   RUE Assessment   (defer to OT assessment)   LUE Assessment   LUE Assessment   (defer to OT assessment)   RLE Assessment   RLE Assessment X   Strength RLE   RLE Overall Strength 4-/5   LLE Assessment   LLE Assessment X   Strength LLE   LLE Overall Strength 4-/5   Light Touch   RLE Light Touch Impaired   RLE Light Touch Comments pt reports numbness   LLE Light Touch Impaired   LLE Light Touch Comments pt reports numbness   Bed Mobility   Supine to Sit 4  Minimal assistance   Additional items Assist x 1;HOB elevated; Bedrails; Increased time required;Verbal cues;LE management   Transfers   Sit to Stand 4  Minimal assistance  (CG assist)   Additional items Assist x 1; Increased time required;Verbal cues   Stand to Sit 4 Minimal assistance  (CG assist)   Additional items Assist x 1; Increased time required;Verbal cues   Ambulation/Elevation   Gait pattern Decreased toe off;Decreased heel strike;Decreased hip extension; Excessively slow; Short stride; Wide SETH;Shuffling   Gait Assistance 4  Minimal assist  (CG assist)   Additional items Assist x 1;Verbal cues   Assistive Device None   Distance 20 feet   Stair Management Assistance Not tested   Balance   Static Sitting Fair +   Dynamic Sitting Fair   Static Standing Fair   Dynamic Standing Fair -   Ambulatory Fair -   Endurance Deficit   Endurance Deficit Yes   Endurance Deficit Description decreased activity tolerance; pt requiring supplemental oxygen; SpO2 decreased to ~84-85% with functional mobility; with seated rest breaks SpO2 increased to 95-99%   Activity Tolerance   Activity Tolerance Patient tolerated treatment well   Medical Staff Made Aware JINA Moy   Nurse Made Aware Discussed case with RN Isabelle   Assessment   Prognosis Good   Problem List Decreased strength;Decreased endurance; Impaired balance;Decreased mobility; Impaired sensation   Assessment Pt is 82year old male seen for PT evaluation s/p admit to [facility] on 2/21/2022 with Acute systolic CHF (congestive heart failure) ([facility] [address] )  PT consulted to assess pt's functional mobility and d/c needs  Order placed for PT eval and tx, with up with assist order  Comorbidities affecting pt's physical performance at time of assessment include hyponatremia, chronic atrial fibrillation, moderate protein calorie malnutrition, hypoxia, and skin cancer of face  PTA, pt was independent with all functional mobility without an AD and ambulates community distances and elevations   Personal factors affecting pt at time of IE include stairs to enter home, inability to ambulate household distances, inability to navigate community distances, inability to navigate level surfaces without external assistance, unable to perform dynamic tasks in community, limited home support, inability to perform IADLs, and difficulty performing ADLs  Please find objective findings from PT assessment regarding body systems outlined above with impairments and limitations including weakness, impaired balance, decreased endurance, gait deviations, decreased activity tolerance, decreased functional mobility tolerance, altered sensation, fall risk, and SOB upon exertion  The following objective measures performed on IE also reveal limitations: Barthel Index: 55/100, Modified [place]: 4 (moderate/severe disability) and AM-PAC 6-Clicks: 70/90  Pt's clinical presentation is currently unstable/unpredictable seen in pt's presentation of need for ongoing medical management/monitoring, pt is a fall risk, pt is requiring supplemental oxygen, and pt requires cues/assist for safety with functional mobility  Pt to benefit from continued PT tx to address deficits as defined above and maximize level of functional independent mobility and consistency  From PT/mobility standpoint, recommendation at time of d/c would be Home PT with family support pending progress in order to facilitate return to PLOF  Barriers to Discharge Inaccessible home environment;Decreased caregiver support   Goals   STG Expiration Date 03/04/22   Short Term Goal #1 In 7-10 days: Increase bilateral LE strength 1/2 grade to facilitate independent mobility, Perform all bed mobility tasks modified independent to decrease caregiver burden, Perform all transfers modified independent to improve independence, Ambulate > 150 ft  with least restrictive assistive device modified independent w/o LOB and w/ normalized gait pattern 100% of the time, Navigate 4 stairs modified independent with bilateral handrail to facilitate return to previous living environment and Increase all balance 1/2 grade to decrease risk for falls   Plan   Treatment/Interventions Functional transfer training;LE strengthening/ROM; Elevations; Therapeutic exercise; Endurance training;Patient/family training;Bed mobility;Gait training;Spoke to nursing;OT   PT Frequency 2-3x/wk   Recommendation   PT Discharge Recommendation Home with home health rehabilitation   AM-PAC Basic Mobility Inpatient   Turning in Bed Without Bedrails 3   Lying on Back to Sitting on Edge of Flat Bed 3   Moving Bed to Chair 3   Standing Up From Chair 3   Walk in Room 3   Climb 3-5 Stairs 3   Basic Mobility Inpatient Raw Score 18   Basic Mobility Standardized Score 41 05   Highest Level Of Mobility   -[facility] Goal 6: Walk 10 steps or more   -HL Highest Level of Mobility 6: Walk 10 steps or more   -HL Goal Achieved Yes   Modified [place] Scale   Modified [place] Scale 4   Barthel Index   Feeding 10   Bathing 0   Grooming Score 5   Dressing Score 5   Bladder Score 10   Bowels Score 10   Toilet Use Score 5   Transfers (Bed/Chair) Score 10   Mobility (Level Surface) Score 0   Stairs Score 0   Barthel Index Score 55     PT Evaluation Time: 5407-0421    Margarette Bumpers, PT, DPT

## 2022-02-22 NOTE — DISCHARGE INSTR - OTHER ORDERS
Skin care Plan:  1-Protect sacrum w/Allevyn foam, rosangela with P, change q3d and PRN, peel back and check skin q-shift  2-Turn/reposition q2h for pressure re-distribution on skin   3-Elevate heels to offload pressure  4-Moisturize skin daily with skin nourishing cream  5-Ehob cushion in chair when out of bed  6-Hydraguard to bilateral heels and left plantar foot calloused area BID and PRN  7-Left medial leg- Cleanse with NSS, pat dry  Apply Allevyn foam dressing  Change every 3 days and PRN

## 2022-02-22 NOTE — CONSULTS
Consultation - Plastic Surgery   Alejo Newton 82 y o  male MRN: [id number]  Unit/Bed#: -01 Encounter: [id number]      Assessment/Plan      Assessment:  The patient is a 53-year-old male with significant hematoma of the right cheek with threatened wound breakdown  Plan:  Hematoma is not expanding at this point however there was significant threat of wound breakdown and skin compromise I feel if left untreated  1 ) hold anticoagulation  2 ) will take to OR for evacuation and wound Closure  3 )  Will keep patient NPO overnight in anticipation of OR in the a m  History of Present Illness   Physician Requesting Consult: Wilma Jefferson MD  Reason for Consult / Principal Problem:  Wound dehiscence right cheek status post Mohs surgery  Hx and PE limited by:   HPI: Alejo Newton is a 82y o  year old male who presents with increased swelling and drainage from a right-sided cheek wound  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 by Mohs surgery at [facility] approximately 6 days ago  The patient has been on Eliquis, he states that ever since then he has had progression of swelling and oozing from the wound which led him to the emergency department last night  He has not had any fever, chills, nausea, vomiting, no redness in the area, he states that the drainage is bloody in its nature      Consults    Review of Systems    Historical Information   Past Medical History:   Diagnosis Date    A-fib ([address] )     Disease of thyroid gland     Hyperlipidemia     Hypertension     Optic neuropathy     Pleural effusion on right     Pneumonia     S/P lung surgery, follow-up exam     Septic shock [facility])      Past Surgical History:   Procedure Laterality Date    HAND DEBRIDEMENT Left 10/30/2021    Procedure: DEBRIDEMENT HAND/FINGER ([address]), second, index and ring finger;  Surgeon: Korin Man MD;  Location: [facility] Main OR;  Service: Plastics    [place] THORACOSCOPY SURG PART PULM DECORT Right 12/12/2019    Procedure: RIGHT THORACOSCOPIC LUNG DECORTICATION;  Surgeon: Kenney Ventura MD;  [facility];  Service: Thoracic    THORACOSCOPY VIDEO ASSISTED SURGERY (VATS) Right 12/12/2019    Procedure: THORACOSCOPY VIDEO ASSISTED SURGERY (VATS); Surgeon: Kenney Ventura MD;  Location: [facility];  Service: Thoracic     Social History   Social History     Substance and Sexual Activity   Alcohol Use Not Currently    Comment: occasional     Social History     Substance and Sexual Activity   Drug Use Never     E-Cigarette/Vaping    E-Cigarette Use Never User      E-Cigarette/Vaping Substances    Nicotine No     Flavoring No      Social History     Tobacco Use   Smoking Status Former Smoker    Types: Cigarettes   Smokeless Tobacco Never Used     Family History: non-contributory    Meds/Allergies   all current active meds have been reviewed    Allergies   Allergen Reactions    Ace Inhibitors Cough     Rash       Objective     Intake/Output Summary (Last 24 hours) at 2/25/2022 1012  Last data filed at 2/25/2022 0851  Gross per 24 hour   Intake 1990 ml   Output 355 ml   Net 1635 ml       Invasive Devices  Report    Peripheral Intravenous Line            Peripheral IV 02/23/22 Left Forearm 1 day          Drain            Open Drain Anterior; Left Hand 118 days    Open Drain Anterior; Left Hand 118 days    Closed/Suction Drain Right  Bulb 7 Fr  1 day                Physical Exam    Lab Results:   Lab Results   Component Value Date    WBC 10 28 (H) 02/25/2022    HGB 14 8 02/25/2022    HCT 46 4 02/25/2022    MCV 98 02/25/2022     02/25/2022      Lab Results   Component Value Date/Time    TISSUECULT No growth 12/12/2019 06:00 PM    WOUNDCULT (A) 10/30/2021 09:04 AM     Growth in Broth culture only Staphylococcus coagulase negative     Imaging Studies: N/A  EKG, Pathology, and Other Studies:   Lab Results   Component Value Date/Time    FINALDX  12/12/2019 06:03 PM     A   Right pleura (decortication): - Acute fibrinopurulent exudate (correlate with microbiology studies)  VTE Prophylaxis: RX contraindicated due to: hematoma    Code Status: Level 1 - Full Code  Advance Directive and Living Will:      Power of :    POLST:      Counseling / Coordination of Care  Total floor / unit time spent today 60 minutes  Greater than 50% of total time was spent with the patient and / or family counseling and / or coordination of care   A description of the counseling / coordination of care:  Coordination of care includes review of his chart, consultation time with the patient and discussion with the primary service in coordinating the OR for the AM

## 2022-02-22 NOTE — PROGRESS NOTES
[address] [facility] Progress Note - Dung Calvert 1935, 82 y o  male MRN: [id number]  Unit/Bed#: -01 Encounter: [id number]  Primary Care Provider: Arias Le MD   Date and time admitted to hospital: 2/21/2022  1:10 PM    * Acute systolic CHF (congestive heart failure) ([facility] [address] )  Assessment & Plan  Wt Readings from Last 3 Encounters:   02/22/22 91 6 kg (202 lb)   11/02/21 96 6 kg (212 lb 14 4 oz)   10/27/21 97 kg (213 lb 13 5 oz)     · Diuresis with Intravenous Lasix  · Monitor I/O, daily weight  · Consultation to cardiology pending   · Check echocardiogram  · Monitor on telemetry  · Supplemental O2 as required  · Reports resolution of SOB since admission       Chronic atrial fibrillation (HCC)  Assessment & Plan  · Hold DOAC due to arterial bleed in face (some oozing still seen today 2/22)  · Rates controlled    Hypoxia  Assessment & Plan  · Suspect secondary to acute CHF exacerbation  · Supplemental O2 as needed to maintain >92%    Skin cancer of face  Assessment & Plan  · S/p MOH's at [place]  2/16/22  · Pt unclear of exact details  · Complicated by arterial bleed here   · Hold DOAC overnight  · Plastic surgery consulted (Dr Lilo Scott)   · Wound care nurse evaluation appreciated     Hyponatremia  Assessment & Plan  · Stable overnight, monitor     Moderate protein-calorie malnutrition ([facility] )  Assessment & Plan  Malnutrition Findings:           BMI Findings: Body mass index is 28 98 kg/m²  VTE Pharmacologic Prophylaxis: VTE Score: 6 High Risk (Score >/= 5) - Pharmacological DVT Prophylaxis Contraindicated  Sequential Compression Devices Ordered  Patient Centered Rounds: I evaluated the patient without nursing staff present due to d/w nurse  Discussions with Specialists or Other Care Team Provider: MARY, cards consults pending     Education and Discussions with Family / Patient: Updated  (daughter) via phone   [place] Spike - discussed sandor Vega need for PRS evaluation for the facial hematoma    Time Spent for Care: 20 minutes  More than 50% of total time spent on counseling and coordination of care as described above  Current Length of Stay: 0 day(s)  Current Patient Status: Observation   Certification Statement: The patient, admitted on an observation basis, will now require > 2 midnight hospital stay due to pending cardiac clearance and PRS evaluation  Discharge Plan: Anticipate discharge in 24-48 hrs to home  Code Status: Level 1 - Full Code    Subjective:   Patient seen this afternoon, OOB to chair, eating lunch  He denies chest pain, palpitations, or dizziness  He reports being told he had a heart problem the last time he was admitted to the hospital   He feels better "after whatever medicines you're giving me here"  Objective:     Vitals:   Temp (24hrs), Av 5 °F (36 4 °C), Min:97 4 °F (36 3 °C), Max:97 5 °F (36 4 °C)    Temp:  [97 4 °F (36 3 °C)-97 5 °F (36 4 °C)] 97 4 °F (36 3 °C)  HR:  [] 96  Resp:  [20-28] 20  BP: (109-123)/(77-86) 115/81  SpO2:  [92 %-99 %] 99 %  Body mass index is 28 98 kg/m²  Input and Output Summary (last 24 hours): Intake/Output Summary (Last 24 hours) at 2022 1243  Last data filed at 2022 1005  Gross per 24 hour   Intake 1080 ml   Output 1400 ml   Net -320 ml       Physical Exam:   Physical Exam  Vitals and nursing note reviewed  Constitutional:       General: He is not in acute distress  Appearance: He is obese  He is not toxic-appearing or diaphoretic  HENT:      Head:      Comments: R sided hematoma, no active bleeding seen, but fresh appearing blood noted on bandages; significant bruising to the face/neck   Neck:      Trachea: No tracheal deviation  Cardiovascular:      Rate and Rhythm: Normal rate  Rhythm irregularly irregular  Pulmonary:      Effort: Pulmonary effort is normal  No respiratory distress  Breath sounds: Normal breath sounds  No wheezing or rales  Abdominal:      General: Bowel sounds are normal  There is no distension  Palpations: Abdomen is soft  Musculoskeletal:      Cervical back: Neck supple  Neurological:      Mental Status: He is alert  Comments: Pleasant, a little forgetful (not acute new per daughter)   Psychiatric:         Mood and Affect: Mood normal           Additional Data:     Labs:  Results from last 7 days   Lab Units 02/22/22  0546   WBC Thousand/uL 8 01   HEMOGLOBIN g/dL 14 1   HEMATOCRIT % 42 5   PLATELETS Thousands/uL 164   NEUTROS PCT % 60   LYMPHS PCT % 20   MONOS PCT % 13*   EOS PCT % 4     Results from last 7 days   Lab Units 02/22/22  0546   SODIUM mmol/L 131*   POTASSIUM mmol/L 3 6   CHLORIDE mmol/L 99*   CO2 mmol/L 22   BUN mg/dL 17   CREATININE mg/dL 1 26   ANION GAP mmol/L 10   CALCIUM mg/dL 8 2*   ALBUMIN g/dL 2 9*   TOTAL BILIRUBIN mg/dL 2 08*   ALK PHOS U/L 131*   ALT U/L 13   AST U/L 21   GLUCOSE RANDOM mg/dL 83     Results from last 7 days   Lab Units 02/21/22  1359   INR  1 83*                   Lines/Drains:  Invasive Devices  Report    Peripheral Intravenous Line            Peripheral IV 02/21/22 Right Antecubital <1 day          Drain            Open Drain Anterior; Left Hand 115 days    Open Drain Anterior; Left Hand 115 days                      Imaging: No pertinent imaging reviewed      Recent Cultures (last 7 days):         Last 24 Hours Medication List:   Current Facility-Administered Medications   Medication Dose Route Frequency Provider Last Rate    acetaminophen  650 mg Oral Q6H PRN Augie Brian MD      albuterol  2 puff Inhalation Q4H PRN Augie Brian MD      atorvastatin  40 mg Oral Daily With Christa Gloria MD      furosemide  40 mg Intravenous BID (diuretic) Amadeo Rasheed PA-C      guaiFENesin  1,200 mg Oral Q12H PRN Augie Brian MD      levothyroxine  75 mcg Oral Daily Before Breakfast Sherice Espinoza MD      metoprolol succinate  25 mg Oral BID Ramin Hughes MD      ondansetron  4 mg Intravenous Q6H PRN Ramin Hughes MD      potassium chloride  20 mEq Oral BID Jessy Good PA-C          Today, Patient Was Seen By: Torri Menchaca PA-C    **Please Note: This note may have been constructed using a voice recognition system  **

## 2022-02-22 NOTE — UTILIZATION REVIEW
Initial Clinical Review    Pt initially admitted as Observation on 02/21/22 @ 1519  Changed to Inpatient on 02/22/22 @ 1251  Pt requiring continued stay d/t CHF exacerbation necessitating IV ABX and facial wound needing plastic surgical intervention in OR  Admission: Date/Time/Statement:   Admission Orders (From admission, onward)     Ordered        02/22/22 1251  Inpatient Admission  Once            02/21/22 1519  Place in Observation  Once                      Orders Placed This Encounter   Procedures    Inpatient Admission     Standing Status:   Standing     Number of Occurrences:   1     Order Specific Question:   Level of Care     Answer:   Med Surg [16]     Order Specific Question:   Estimated length of stay     Answer:   More than 2 Midnights     Order Specific Question:   Certification     Answer:   I certify that inpatient services are medically necessary for this patient for a duration of greater than two midnights  See H&P and MD Progress Notes for additional information about the patient's course of treatment  ED Arrival Information     Expected Arrival Acuity    - 2/21/2022 13:10 Emergent         Means of arrival Escorted by Service Admission type    Ambulance [address] Urgent         Arrival complaint    BLEEDING        Chief Complaint   Patient presents with    Bleeding/Bruising     EMS reports pt had mole removed in [place] on Wednesday and stiches "popped out" this morning and bleeding x 1 hour  Pt on Eliquis; quick clot x2 applied by EMS and bleeding did not stop  Initial Presentation: 82 y o  male who presented by EMS to [facility] ED  Admitted in observation status for evaluation and treatment of acute CHF w/ hypoxia  PMHx: A-fib (On Eliquis), Disease of thyroid gland, Hyperlipidemia, Hypertension, Optic neuropathy, Pleural effusion on right, Pneumonia, S/P lung surgery   Presented w/ bleeding and bruising to face and neck after having cancerous mass removed from R side of face 6 days ago  Reports this morning on waking found the stitches had popped open and bleeding  EMS applied QuikClot twice without resolution of bleeding  Pt also notes progressive SOB & FIELDS w/ b/l LE swelling  On exam, 16 cm incision from behind ear across maxillary area and towards lip on R side of face, dehiscence over maxillary area 6 cm steadily bleeding, rales, tachypneic, b/l LE +1 edema, pallor, bruising  proBNP 3166  Na 130  Crt 1 41  CXR indicates CHF w/ interstitial edema and small b/l effusions  ED did a bedside laceration repair w/ local anesthesia; initially closed w/ simple interrupted sutures but continued bleeding & arterial spurting noted  Figure-eight stitch approximately centered around source of arterial bleeding w/ resolution of bleeding  Post laceration repair, pt became hypoxic requiring supplemental O2  Plan IV diuresis, DW, I&O, telemetry, echo, Supplemental O2, weaned as tolerated  Hold Eliquis for now  Trend labs, replete electrolytes as needed  Cardiology consulted  02/22/22 Changed to Inpatient Status  Internal Medicine: Pt reports he feels better today, endorses being told he had a heart problem last time he was admitted to the hospital but is not able to clarify  On exam, obese, R-sided hematoma, significant bruising to face/neck, irregularly irregular rhythm, forgetful but not new per daughter  Plan: telemetry, DW, I&O, echo, IV lasix, supplemental O2 prn, continue holding Eliquis  Consult Plastic Surgery  Cardiology Consult: Pt w/ acute on chronic CHF, afib, HTN, HLD, aortic stenosis  On exam, bruised and swollen face, murmur noted, decreased breath sounds, bibasilar crackles, b/l LE edema +1  Plan: echo, sodium restriction, DW, I&O, continue IV diuresis but increase to BID, start K supplementation, continue other home meds  Trend labs, replete electrolytes as needed  Wound Care: L medial leg: Cleanse with NSS, pat dry   Apply Allevyn foam dressing, change every 3 days and PRN  R face: Cleanse wound with NSS, pat dry; apply Adaptic over suture line, cover with 4x4, then ABD pad, Wrap with Alfred; change daily and PRN for dislodgment  Plastic Surgery Consult: Pt w/ large hematoma on R face s/p Mohs surgery  Skin is ecchymotic  Plan: OR for I&D w/ washout tomorrow  Date: 02/23/22 Day 2  Internal Medicine: Pt w/ no new complaints  On exam, rales, irregularly irregular rhythm, obese, R check hematoma  Plan: IV diuresis per Cardiology, DW, I&O, Supplemental O2, weaned as tolerated; hold Eliquis, to OR today w/ plastic surgery for drainage of R cheek hematoma, ABX        02/23/22 Surgery  Procedure: INCISION AND DRAINAGE (I&D) HEAD/FACE (Right); Wound closure with local advancement flap of 8vyt6py  Indication: Non-expansile hematoma with increasing pain and wound breakdown s/p Mohs surgery for SCC right cheek  Anesthesia: General  ASA Score: 4  Findings: large hematoma w/ 150mL old blood    ED Triage Vitals   Temperature Pulse Respirations Blood Pressure SpO2   02/21/22 1323 02/21/22 1323 02/21/22 1323 02/21/22 1323 02/21/22 1323   97 5 °F (36 4 °C) 88 20 113/86 96 %      Temp Source Heart Rate Source Patient Position - Orthostatic VS BP Location FiO2 (%)   02/21/22 1323 02/21/22 1323 02/21/22 1500 02/21/22 1323 --   Oral Monitor Sitting Left arm       Pain Score       02/21/22 1611       No Pain          Wt Readings from Last 1 Encounters:   02/22/22 91 6 kg (202 lb)     Additional Vital Signs:   Date/Time Temp Pulse Resp BP MAP (mmHg) SpO2 Calculated FIO2 (%) - Nasal Cannula Nasal Cannula O2 Flow Rate (L/min) O2 Device   02/23/22 1014 97 °F (36 1 °C) !  100 20 103/72 -- 95 % -- -- --   02/23/22 06:56:51 97 5 °F (36 4 °C) 96 18 108/72 84 91 % -- -- --   02/22/22 22:47:55 97 8 °F (36 6 °C) 97 18 109/68 82 95 % -- -- --   02/22/22 20:12:08 -- 89 -- 109/67 81 97 % -- -- --   02/22/22 2000 -- -- -- -- -- -- 32 3 L/min Nasal cannula 02/22/22 15:20:52 97 4 °F (36 3 °C) Abnormal  103 18 107/65 79 100 % -- -- --   02/22/22 0930 -- 96 -- 115/81 -- -- 32 3 L/min Nasal cannula   02/22/22 07:20:38 -- 96 20 115/81 92 99 % -- -- --   02/21/22 20:36:12 97 4 °F (36 3 °C) Abnormal  100 -- 109/77 88 99 % -- -- --   02/21/22 2030 -- -- -- -- -- -- 32 3 L/min Nasal cannula   02/21/22 1919 -- 83 21 123/82 97 95 % 32 3 L/min Nasal cannula   02/21/22 1800 -- 93 28 Abnormal  123/82 97 95 % 32 3 L/min Nasal cannula   02/21/22 1714 -- 95 24 Abnormal  121/81 98 99 % 32 3 L/min Nasal cannula   02/21/22 1611 -- 107 Abnormal  26 Abnormal  123/78 -- 98 % 32 3 L/min Nasal cannula   02/21/22 1500 -- 100 21 123/78 94 98 % 32 3 L/min Nasal cannula   02/21/22 1409 -- 101 22 122/83 -- 92 % 44 6 L/min Nasal cannula         Pertinent Labs/Diagnostic Test Results:    2/21 - CXR  Findings most consistent with CHF with interstitial edema and small bilateral effusions    2/21 - EKG  Atrial fibrillation  Incomplete right bundle branch block    2/22 - Echo    Left Ventricle: Left ventricular cavity size is normal  Wall thickness is mildly increased  Systolic function is moderately reduced (40-45%)  There is moderate global hypokinesis  Left atrial filling pressure cannot be estimated    Right Ventricle: Right ventricular cavity size is mildly dilated  Systolic function is mildly reduced    Left Atrium: The atrium is mildly dilated    Right Atrium: The atrium is mildly dilated    Aortic Valve: The aortic valve is trileaflet  The leaflets are moderately thickened  The leaflets are mildly calcified  The leaflets exhibit normal mobility  There is mild stenosis    Mitral Valve: There is mild annular calcification  There is trace regurgitation    Tricuspid Valve: There is trace regurgitation    Pulmonic Valve: There is trace regurgitation      Results from last 7 days   Lab Units 02/21/22  1413   SARS-COV-2  Negative     Results from last 7 days   Lab Units 02/23/22  0537 02/22/22  0546 02/21/22  1359   WBC Thousand/uL 8 61 8 01 7 70   HEMOGLOBIN g/dL 14 2 14 1 15 2   HEMATOCRIT % 44 7 42 5 47 7   PLATELETS Thousands/uL 173 164 180   NEUTROS ABS Thousands/µL  --  4 88 4 37     Results from last 7 days   Lab Units 02/23/22  0518 02/22/22  0546 02/21/22  1359   SODIUM mmol/L 133* 131* 130*   POTASSIUM mmol/L 4 0 3 6 4 6   CHLORIDE mmol/L 99* 99* 98*   CO2 mmol/L 27 22 23   ANION GAP mmol/L 7 10 9   BUN mg/dL 18 17 18   CREATININE mg/dL 1 45* 1 26 1 41*   EGFR ml/min/1 73sq m 43 52 45   CALCIUM mg/dL 8 3 8 2* 8 8     Results from last 7 days   Lab Units 02/22/22  0546 02/21/22  1359   AST U/L 21 26   ALT U/L 13 15   ALK PHOS U/L 131* 153*   TOTAL PROTEIN g/dL 7 4 8 4*   ALBUMIN g/dL 2 9* 3 2*   TOTAL BILIRUBIN mg/dL 2 08* 2 00*     Results from last 7 days   Lab Units 02/23/22  0518 02/22/22  0546 02/21/22  1359   GLUCOSE RANDOM mg/dL 93 83 91     Results from last 7 days   Lab Units 02/21/22  1359   PROTIME seconds 20 3*   INR  1 83*   PTT seconds 57*     Results from last 7 days   Lab Units 02/21/22  1359   NT-PRO BNP pg/mL 3,166*     Results from last 7 days   Lab Units 02/21/22  1413   INFLUENZA A PCR  Negative   INFLUENZA B PCR  Negative   RSV PCR  Negative       ED Treatment:   Medication Administration from 02/21/2022 1310 to 02/21/2022 2024       Date/Time Order Dose Route Action     02/21/2022 1331 lidocaine-epinephrine (XYLOCAINE-MPF/EPINEPHRINE) 2 %-1:200,000 injection 1 mL 1 mL Infiltration Given     02/21/2022 1519 furosemide (LASIX) injection 40 mg 40 mg Intravenous Given     02/21/2022 1519 sodium chloride 0 9 % bolus 500 mL 500 mL Intravenous New Bag     02/21/2022 1726 atorvastatin (LIPITOR) tablet 40 mg 40 mg Oral Given        Past Medical History:   Diagnosis Date    A-fib ([address] )     Disease of thyroid gland     Hyperlipidemia     Hypertension     Optic neuropathy     Pleural effusion on right     Pneumonia     S/P lung surgery, follow-up exam     Septic shock [facility])      Present on Admission:   Hyponatremia   Chronic atrial fibrillation (HCC)   Moderate protein-calorie malnutrition (HCC)   Acute systolic CHF (congestive heart failure) (HCC)   Hypoxia   Skin cancer of face      Admitting Diagnosis: Hypoxemia [R09 02]  Hyponatremia [E87 1]  Bilateral leg edema [R60 0]  Wound dehiscence [T81 30XA]  Post-operative complication [J36  9XXA]  Acute respiratory failure with hypoxia ([facility] ) [J96 01]  Cardiomyopathy, unspecified type ([facility] [address] ) [I42 9]  Age/Sex: 82 y o  male  Admission Orders:  2/21 - Cardiac Diet  2/23 - NPO  DW  I&O  SCDs  Wound Care to R face daily  Scheduled Medications:  atorvastatin, 40 mg, Oral, Daily With Dinner  furosemide, 40 mg, Intravenous, BID (diuretic)  levothyroxine, 75 mcg, Oral, Daily Before Breakfast  metoprolol succinate, 25 mg, Oral, BID  potassium chloride, 20 mEq, Oral, BID    Continuous IV Infusions: none     PRN Meds:  acetaminophen, 650 mg, Oral, Q6H PRN  albuterol, 2 puff, Inhalation, Q4H PRN  guaiFENesin, 1,200 mg, Oral, Q12H PRN  ondansetron, 4 mg, Intravenous, Q6H PRN        IP CONSULT TO CARDIOLOGY  IP CONSULT TO PLASTIC SURGERY    Network Utilization Review Department  ATTENTION: Please call with any questions or concerns to [phone] and carefully listen to the prompts so that you are directed to the right person  All voicemails are confidential   [facility] all requests for admission clinical reviews, approved or denied determinations and any other requests to dedicated fax number below belonging to the campus where the patient is receiving treatment   List of dedicated fax numbers for the Facilities:  [address] DENIALS (Administrative/Medical Necessity) [phone]   [address] (Maternity/NICU/Pediatrics) 261 [facility],[address] [place] [address] Madina Pedraza [address] [place] [address] [phone] [place] [address] [address] [place] [address] [phone]

## 2022-02-23 ENCOUNTER — ANESTHESIA (INPATIENT)
Dept: PERIOP | Facility: HOSPITAL | Age: 87
DRG: 919 | End: 2022-02-23
Payer: COMMERCIAL

## 2022-02-23 ENCOUNTER — ANESTHESIA EVENT (INPATIENT)
Dept: PERIOP | Facility: HOSPITAL | Age: 87
DRG: 919 | End: 2022-02-23
Payer: COMMERCIAL

## 2022-02-23 PROBLEM — N18.30 CKD (CHRONIC KIDNEY DISEASE) STAGE 3, GFR 30-59 ML/MIN (HCC): Status: ACTIVE | Noted: 2021-10-28

## 2022-02-23 PROBLEM — S00.83XA HEMATOMA OF FACE, INITIAL ENCOUNTER: Status: ACTIVE | Noted: 2022-02-23

## 2022-02-23 LAB
ANION GAP SERPL CALCULATED.3IONS-SCNC: 7 MMOL/L (ref 4–13)
BUN SERPL-MCNC: 18 MG/DL (ref 5–25)
CALCIUM SERPL-MCNC: 8.3 MG/DL (ref 8.3–10.1)
CHLORIDE SERPL-SCNC: 99 MMOL/L (ref 100–108)
CO2 SERPL-SCNC: 27 MMOL/L (ref 21–32)
CREAT SERPL-MCNC: 1.45 MG/DL (ref 0.6–1.3)
ERYTHROCYTE [DISTWIDTH] IN BLOOD BY AUTOMATED COUNT: 15.5 % (ref 11.6–15.1)
GFR SERPL CREATININE-BSD FRML MDRD: 43 ML/MIN/1.73SQ M
GLUCOSE SERPL-MCNC: 93 MG/DL (ref 65–140)
HCT VFR BLD AUTO: 44.7 % (ref 36.5–49.3)
HGB BLD-MCNC: 14.2 G/DL (ref 12–17)
MCH RBC QN AUTO: 30.9 PG (ref 26.8–34.3)
MCHC RBC AUTO-ENTMCNC: 31.8 G/DL (ref 31.4–37.4)
MCV RBC AUTO: 97 FL (ref 82–98)
PLATELET # BLD AUTO: 173 THOUSANDS/UL (ref 149–390)
PMV BLD AUTO: 9.7 FL (ref 8.9–12.7)
POTASSIUM SERPL-SCNC: 4 MMOL/L (ref 3.5–5.3)
RBC # BLD AUTO: 4.59 MILLION/UL (ref 3.88–5.62)
SODIUM SERPL-SCNC: 133 MMOL/L (ref 136–145)
WBC # BLD AUTO: 8.61 THOUSAND/UL (ref 4.31–10.16)

## 2022-02-23 PROCEDURE — 85027 COMPLETE CBC AUTOMATED: CPT | Performed by: PHYSICIAN ASSISTANT

## 2022-02-23 PROCEDURE — 80048 BASIC METABOLIC PNL TOTAL CA: CPT | Performed by: PHYSICIAN ASSISTANT

## 2022-02-23 PROCEDURE — NC001 PR NO CHARGE: Performed by: PLASTIC SURGERY

## 2022-02-23 PROCEDURE — 99232 SBSQ HOSP IP/OBS MODERATE 35: CPT | Performed by: PHYSICIAN ASSISTANT

## 2022-02-23 PROCEDURE — 0J9130Z DRAINAGE OF FACE SUBCUTANEOUS TISSUE AND FASCIA WITH DRAINAGE DEVICE, PERCUTANEOUS APPROACH: ICD-10-PCS | Performed by: STUDENT IN AN ORGANIZED HEALTH CARE EDUCATION/TRAINING PROGRAM

## 2022-02-23 PROCEDURE — 10140 I&D HMTMA SEROMA/FLUID COLLJ: CPT | Performed by: PLASTIC SURGERY

## 2022-02-23 PROCEDURE — 13160 SEC CLSR SURG WND/DEHSN XTN: CPT | Performed by: PLASTIC SURGERY

## 2022-02-23 RX ORDER — PROPOFOL 10 MG/ML
INJECTION, EMULSION INTRAVENOUS AS NEEDED
Status: DISCONTINUED | OUTPATIENT
Start: 2022-02-23 | End: 2022-02-23

## 2022-02-23 RX ORDER — GINSENG 100 MG
CAPSULE ORAL AS NEEDED
Status: DISCONTINUED | OUTPATIENT
Start: 2022-02-23 | End: 2022-02-23 | Stop reason: HOSPADM

## 2022-02-23 RX ORDER — CEFAZOLIN SODIUM 2 G/50ML
2000 SOLUTION INTRAVENOUS ONCE
Status: COMPLETED | OUTPATIENT
Start: 2022-02-23 | End: 2022-02-23

## 2022-02-23 RX ORDER — FENTANYL CITRATE/PF 50 MCG/ML
25 SYRINGE (ML) INJECTION
Status: DISCONTINUED | OUTPATIENT
Start: 2022-02-23 | End: 2022-02-23 | Stop reason: HOSPADM

## 2022-02-23 RX ORDER — FENTANYL CITRATE 50 UG/ML
INJECTION, SOLUTION INTRAMUSCULAR; INTRAVENOUS AS NEEDED
Status: DISCONTINUED | OUTPATIENT
Start: 2022-02-23 | End: 2022-02-23

## 2022-02-23 RX ORDER — ONDANSETRON 2 MG/ML
4 INJECTION INTRAMUSCULAR; INTRAVENOUS ONCE AS NEEDED
Status: DISCONTINUED | OUTPATIENT
Start: 2022-02-23 | End: 2022-02-23 | Stop reason: HOSPADM

## 2022-02-23 RX ORDER — SUCCINYLCHOLINE/SOD CL,ISO/PF 100 MG/5ML
SYRINGE (ML) INTRAVENOUS AS NEEDED
Status: DISCONTINUED | OUTPATIENT
Start: 2022-02-23 | End: 2022-02-23

## 2022-02-23 RX ORDER — LIDOCAINE HYDROCHLORIDE 20 MG/ML
INJECTION, SOLUTION EPIDURAL; INFILTRATION; INTRACAUDAL; PERINEURAL AS NEEDED
Status: DISCONTINUED | OUTPATIENT
Start: 2022-02-23 | End: 2022-02-23

## 2022-02-23 RX ORDER — ONDANSETRON 2 MG/ML
INJECTION INTRAMUSCULAR; INTRAVENOUS AS NEEDED
Status: DISCONTINUED | OUTPATIENT
Start: 2022-02-23 | End: 2022-02-23

## 2022-02-23 RX ORDER — DEXAMETHASONE SODIUM PHOSPHATE 4 MG/ML
INJECTION, SOLUTION INTRA-ARTICULAR; INTRALESIONAL; INTRAMUSCULAR; INTRAVENOUS; SOFT TISSUE AS NEEDED
Status: DISCONTINUED | OUTPATIENT
Start: 2022-02-23 | End: 2022-02-23

## 2022-02-23 RX ORDER — SODIUM CHLORIDE, SODIUM LACTATE, POTASSIUM CHLORIDE, CALCIUM CHLORIDE 600; 310; 30; 20 MG/100ML; MG/100ML; MG/100ML; MG/100ML
INJECTION, SOLUTION INTRAVENOUS CONTINUOUS PRN
Status: DISCONTINUED | OUTPATIENT
Start: 2022-02-23 | End: 2022-02-23

## 2022-02-23 RX ORDER — CEPHALEXIN 500 MG/1
500 CAPSULE ORAL EVERY 6 HOURS SCHEDULED
Status: DISCONTINUED | OUTPATIENT
Start: 2022-02-23 | End: 2022-02-26 | Stop reason: HOSPADM

## 2022-02-23 RX ADMIN — PROPOFOL 30 MG: 10 INJECTION, EMULSION INTRAVENOUS at 13:00

## 2022-02-23 RX ADMIN — POTASSIUM CHLORIDE 20 MEQ: 1500 TABLET, EXTENDED RELEASE ORAL at 09:02

## 2022-02-23 RX ADMIN — PROPOFOL 30 MG: 10 INJECTION, EMULSION INTRAVENOUS at 12:22

## 2022-02-23 RX ADMIN — Medication 100 MG: at 11:47

## 2022-02-23 RX ADMIN — CEFAZOLIN SODIUM 2000 MG: 2 SOLUTION INTRAVENOUS at 11:36

## 2022-02-23 RX ADMIN — LIDOCAINE HYDROCHLORIDE 60 MG: 20 INJECTION, SOLUTION EPIDURAL; INFILTRATION; INTRACAUDAL; PERINEURAL at 12:38

## 2022-02-23 RX ADMIN — METOPROLOL SUCCINATE 25 MG: 25 TABLET, EXTENDED RELEASE ORAL at 09:02

## 2022-02-23 RX ADMIN — DEXAMETHASONE SODIUM PHOSPHATE 4 MG: 4 INJECTION, SOLUTION INTRAMUSCULAR; INTRAVENOUS at 12:22

## 2022-02-23 RX ADMIN — FENTANYL CITRATE 50 MCG: 50 INJECTION, SOLUTION INTRAMUSCULAR; INTRAVENOUS at 11:47

## 2022-02-23 RX ADMIN — PROPOFOL 100 MG: 10 INJECTION, EMULSION INTRAVENOUS at 11:47

## 2022-02-23 RX ADMIN — LEVOTHYROXINE SODIUM 75 MCG: 75 TABLET ORAL at 06:00

## 2022-02-23 RX ADMIN — CEPHALEXIN 500 MG: 500 CAPSULE ORAL at 20:23

## 2022-02-23 RX ADMIN — ONDANSETRON 4 MG: 2 INJECTION INTRAMUSCULAR; INTRAVENOUS at 12:46

## 2022-02-23 RX ADMIN — FENTANYL CITRATE 50 MCG: 50 INJECTION, SOLUTION INTRAMUSCULAR; INTRAVENOUS at 12:04

## 2022-02-23 RX ADMIN — FUROSEMIDE 40 MG: 10 INJECTION, SOLUTION INTRAMUSCULAR; INTRAVENOUS at 09:02

## 2022-02-23 RX ADMIN — PROPOFOL 30 MG: 10 INJECTION, EMULSION INTRAVENOUS at 12:38

## 2022-02-23 RX ADMIN — SODIUM CHLORIDE, SODIUM LACTATE, POTASSIUM CHLORIDE, AND CALCIUM CHLORIDE: .6; .31; .03; .02 INJECTION, SOLUTION INTRAVENOUS at 11:40

## 2022-02-23 RX ADMIN — PHENYLEPHRINE HYDROCHLORIDE 40 MCG/MIN: 10 INJECTION INTRAVENOUS at 11:54

## 2022-02-23 RX ADMIN — LIDOCAINE HYDROCHLORIDE 80 MG: 20 INJECTION, SOLUTION EPIDURAL; INFILTRATION; INTRACAUDAL; PERINEURAL at 11:47

## 2022-02-23 NOTE — OP NOTE
OPERATIVE REPORT  PATIENT NAME: Wili Iyer    :  1935  MRN: [id number]  Pt Location: [place] OR ROOM 02    SURGERY DATE: 2022    Surgeon(s) and Role:     * Ryder Pang MD - Primary    Preop Diagnosis:  Non-expansile hematoma with increasing pain and wound breakdown s/p Mohs surgery for SCC right cheek    Post-Op Diagnosis Codes:     Same    Procedure(s) (LRB):  INCISION AND DRAINAGE (I&D) HEAD/FACE (Right)  Wound closure with local advancement flap of 2arr6fi      Specimen(s):  * No specimens in log *    Estimated Blood Loss:   150mL of old clot    Drains:  Closed/Suction Drain Right  Bulb 7 Fr  (Active)   Number of days: 0       Open Drain Anterior; Left Hand (Active)   Number of days: 116       Open Drain Anterior; Left Hand (Active)   Number of days: 116       Anesthesia Type:   General    Operative Indications:  Wound dehiscence and large hematoma    Operative Findings:  Mr Kervin Bull is a an 82-year-old male who had a Mohs micrographic surgery excision at [facility], the patient was on Eliquis for Afib, and subsequently developed a large hematoma  The patient presented to the emergency department here and was found to have a wound dehiscence and significant oozing that was controlled topically  My service consult for management  The patient was found to have a large hematoma and wound dehiscence on the right cheek underneath what appeared to be an advancement flap  At that point the patient did not have any evidence of expansion in the hematoma was otherwise stable but there was some increasing pain and concern for further wound dehiscence and breakdown of skin  For this reason I discussed with patient the risks, benefits, alternatives of wound exploration and evacuation of the hematoma    I discussed with the patient that he was high risk for further bleeding, and damage to the underlying structures, specifically the facial nerve, discussed with him the risk of bleeding, infection scarring, poor wound healing, damage to surrounding and underlying structures, seroma, DVT, need for further surgery, poor aesthetic result, further wound dehiscence, all the patient's questions were answered to his satisfaction, informed consent obtained and signed and the patient was brought to the OR to have the hematoma evacuated  The patient was brought to the holding area where he was given preoperative antibiotics, his SCDs were activated, he was then brought to the operating room his identified verbally, by site, and by armband on the operating table prior to induction anesthesia  After the induction of anesthesia he was prepped and draped in the standard surgical fashion a time-out was performed the surgical site identified  At that time the wound was inspected and the sutures removed, a large hematoma was immediately expressed from the wound, approximately 150 milliliters of old blood was encountered  The wound was completely released and the flap opened and inspected  Multiple liters of normal saline in one-to-one mixture of normal saline and peroxide were used to irrigate the wound and identify any bleeding  There was no identifiable surgical bleeding at this time of the was some diffuse oozing  Bovie electrocautery was used to obtain meticulous hemostasis  At this point this to seal was applied throughout the wound bed in order to decrease risk of further blood accumulation in seroma  A 7 Azeri flat drain was placed  The flap was then readvanced  The full advancement of the flap measured 7 centimeters x 7 centimeters, of the flap was then anchored to the periosteum with a 2-0 PDS suture  The flap was then advanced and then inset with multiple 5 0 Vicryl sutures  The skin was then closed with a 5 0 Prolene in running fashion  Patient tolerated procedure well without complication, bacitracin was applied to the wound, he was sent to PACU in stable condition where he will be monitored    If there is no further evidence of 1 accumulation in the patient remains stable he can be discharged from Plastic surgery standpoint when okay by Medicine      Complications:   None     I was present for the entire procedure    Patient Disposition:  PACU       SIGNATURE: Esha Ruiz MD  DATE: February 23, 2022  TIME: 1:10 PM

## 2022-02-23 NOTE — ASSESSMENT & PLAN NOTE
· [facility]  · Pt unclear of exact details  · Complicated by hematoma  · Hold DOAC   · Plastic surgery consulted (Dr Anabel Freedman) - plan for OR today 2/23  · Wound care nurse evaluation appreciated

## 2022-02-23 NOTE — ASSESSMENT & PLAN NOTE
Wt Readings from Last 3 Encounters:   02/23/22 86 7 kg (191 lb 2 2 oz)   11/02/21 96 6 kg (212 lb 14 4 oz)   10/27/21 97 kg (213 lb 13 5 oz)     · Diuresis with Intravenous Lasix  · Monitor I/O, daily weight  · Consultation to cardiology appreciated  · Repeat echo 2/22 shows stable EF in the 40s  · Supplemental O2 as required  · Reports resolution of SOB since admission

## 2022-02-23 NOTE — UTILIZATION REVIEW
Inpatient Admission Authorization Request   NOTIFICATION OF INPATIENT ADMISSION/INPATIENT AUTHORIZATION REQUEST   SERVICING FACILITY:   [address] [place] [address]  Tax ID: [id number]  NPI: [id number]  Place of Service: Inpatient [address] [facility]  [address]  Place of Service Code: 24     ATTENDING PROVIDER:  Attending Name and NPI#: Deluca Neshkoro [[id number]]  Address: [address] [place] [address]  Phone: [phone]     UTILIZATION REVIEW CONTACT:  Eddie Rudolph Utilization   Network Utilization Review Department  Phone: [phone]  Fax [phone]  Email: [email or web address]     PHYSICIAN ADVISORY SERVICES:  FOR JQJW-OK-VPSN REVIEW - MEDICAL NECESSITY DENIAL  Phone: [phone]  Fax: [phone]  Email: [email or web address]     TYPE OF REQUEST:  Inpatient Status     ADMISSION INFORMATION:  ADMISSION DATE/TIME: 2/22/22 12:51 PM  PATIENT DIAGNOSIS CODE/DESCRIPTION:  Hypoxemia [R09 02]  Hyponatremia [E87 1]  Bilateral leg edema [R60 0]  Wound dehiscence [T81 30XA]  Post-operative complication [R35  9XXA]  Acute respiratory failure with hypoxia ([address] ) [J96 01]  Cardiomyopathy, unspecified type ([address] ) [I42 9]  DISCHARGE DATE/TIME: No discharge date for patient encounter  DISCHARGE DISPOSITION (IF DISCHARGED): Home with Home Health Care     IMPORTANT INFORMATION:  Please contact the Eddie Rudolph directly with any questions or concerns regarding this request  Department voicemails are confidential     Send requests for admission clinical reviews, concurrent reviews, approvals, and administrative denials due to lack of clinical to fax [phone]

## 2022-02-23 NOTE — H&P
H&P update  Since admission, patient was found to have a large hematoma to his right cheek requiring drainage  No other changes to his history or physical exam     Discussion-- I discussed with patient the risks, benefits, alternatives of evacuation of the hematoma including high risk of bleeding, and poor wound healing  infection, scarring, poor wound healing, damage to underlying structures, need for further surgery, need for multiple procedures, seroma, re-bleeding, damage to the facial nerve, poor aesthetic result, contour deformity  All this questions were answered to his satisfaction, informed consent obtained and signed  Will proceed to OR as scheduled  Patient marked in pre-operative holding area  Reviewed markings with patient and the planned surgery  All hisquestions were answered to his satisfaction, will proceed to OR as scheduled

## 2022-02-23 NOTE — ANESTHESIA POSTPROCEDURE EVALUATION
Post-Op Assessment Note    CV Status:  Stable  Pain Score: 0    Pain management: adequate     Mental Status:  Alert and awake   Hydration Status:  Euvolemic   PONV Controlled:  Controlled   Airway Patency:  Patent      Post Op Vitals Reviewed: Yes      Staff: CRNA         No complications documented      BP   106/66   Temp 97   Pulse 89   Resp 24   SpO2 97

## 2022-02-23 NOTE — PROGRESS NOTES
[facility] Progress Note - Wili Iyer 1935, 82 y o  male MRN: [id number]  Unit/Bed#: -01 Encounter: [id number]  Primary Care Provider: Sergio Nelson MD   Date and time admitted to hospital: 2/21/2022  1:10 PM    * Acute systolic CHF (congestive heart failure) ([facility] )  Assessment & Plan  Wt Readings from Last 3 Encounters:   02/23/22 86 7 kg (191 lb 2 2 oz)   11/02/21 96 6 kg (212 lb 14 4 oz)   10/27/21 97 kg (213 lb 13 5 oz)     · Diuresis with Intravenous Lasix  · Monitor I/O, daily weight  · Consultation to cardiology appreciated  · Repeat echo 2/22 shows stable EF in the 40s  · Supplemental O2 as required  · Reports resolution of SOB since admission       Chronic atrial fibrillation ([facility] )  Assessment & Plan  · Hold DOAC due hematoma of right cheek  Rates controlled    Hypoxia  Assessment & Plan  · Suspect secondary to acute CHF exacerbation  · Supplemental O2 as needed to maintain >92%    Skin cancer of face  Assessment & Plan  · S/p [facility]  2/16/22  · Pt unclear of exact details  · Complicated by hematoma  · Hold DOAC   · Plastic surgery consulted (Dr Wade Rojas) - plan for OR today 2/23  · Wound care nurse evaluation appreciated     Hyponatremia  Assessment & Plan  · Stable and improved with diuresis, monitor     Moderate protein-calorie malnutrition ([facility] )  Assessment & Plan  Malnutrition Findings:           BMI Findings: Body mass index is 27 43 kg/m²  VTE Pharmacologic Prophylaxis: VTE Score: 6 High Risk (Score >/= 5) - Pharmacological DVT Prophylaxis Contraindicated  Sequential Compression Devices Ordered  Patient Centered Rounds: I performed bedside rounds with nursing staff today  Discussions with Specialists or Other Care Team Provider: PRS, cards     Education and Discussions with Family / Patient: Updated  (daughter) via phone  Time Spent for Care: 20 minutes   More than 50% of total time spent on counseling and coordination of care as described above  Current Length of Stay: 1 day(s)  Current Patient Status: Inpatient   Certification Statement: The patient will continue to require additional inpatient hospital stay due to OR today  Discharge Plan: Anticipate discharge in 24-48 hrs to home  Code Status: Level 1 - Full Code    Subjective:   Patient seen this morning, doing well  Denies chest pain, palpitations, shortness of breath  Urinating well  We discussed going to the OR today for the cheek and remains agreeable  Objective:     Vitals:   Temp (24hrs), Av 6 °F (36 4 °C), Min:97 4 °F (36 3 °C), Max:97 8 °F (36 6 °C)    Temp:  [97 4 °F (36 3 °C)-97 8 °F (36 6 °C)] 97 5 °F (36 4 °C)  HR:  [] 96  Resp:  [18] 18  BP: (107-115)/(65-81) 108/72  SpO2:  [91 %-100 %] 91 %  Body mass index is 27 43 kg/m²  Input and Output Summary (last 24 hours): Intake/Output Summary (Last 24 hours) at 2022 0823  Last data filed at 2022 1801  Gross per 24 hour   Intake 1660 ml   Output 1675 ml   Net -15 ml       Physical Exam:   Physical Exam  Vitals and nursing note reviewed  Constitutional:       General: He is not in acute distress  Appearance: He is obese  He is not toxic-appearing  HENT:      Head:      Comments: Hematoma to the right cheek  Cardiovascular:      Rate and Rhythm: Normal rate  Rhythm irregularly irregular  Pulmonary:      Effort: Pulmonary effort is normal  No respiratory distress  Breath sounds: Rales (faint bilat bases) present  Abdominal:      General: Bowel sounds are normal       Palpations: Abdomen is soft  Musculoskeletal:      Right lower leg: No edema  Left lower leg: No edema  Neurological:      Mental Status: He is alert     Psychiatric:         Mood and Affect: Mood normal          Behavior: Behavior normal            Additional Data:     Labs:  Results from last 7 days   Lab Units 2218 2246 22   WBC Thousand/uL 8 61   < > 8 01   HEMOGLOBIN g/dL 14 2   < > 14 1   HEMATOCRIT % 44 7   < > 42 5   PLATELETS Thousands/uL 173   < > 164   NEUTROS PCT %  --   --  60   LYMPHS PCT %  --   --  20   MONOS PCT %  --   --  13*   EOS PCT %  --   --  4    < > = values in this interval not displayed  Results from last 7 days   Lab Units 02/23/22  0518 02/22/22  0546 02/22/22  0546   SODIUM mmol/L 133*   < > 131*   POTASSIUM mmol/L 4 0   < > 3 6   CHLORIDE mmol/L 99*   < > 99*   CO2 mmol/L 27   < > 22   BUN mg/dL 18   < > 17   CREATININE mg/dL 1 45*   < > 1 26   ANION GAP mmol/L 7   < > 10   CALCIUM mg/dL 8 3   < > 8 2*   ALBUMIN g/dL  --   --  2 9*   TOTAL BILIRUBIN mg/dL  --   --  2 08*   ALK PHOS U/L  --   --  131*   ALT U/L  --   --  13   AST U/L  --   --  21   GLUCOSE RANDOM mg/dL 93   < > 83    < > = values in this interval not displayed  Results from last 7 days   Lab Units 02/21/22  1359   INR  1 83*                   Lines/Drains:  Invasive Devices  Report    Peripheral Intravenous Line            Peripheral IV 02/21/22 Right Antecubital 1 day          Drain            Open Drain Anterior; Left Hand 116 days    Open Drain Anterior; Left Hand 115 days                      Imaging: No pertinent imaging reviewed      Recent Cultures (last 7 days):         Last 24 Hours Medication List:   Current Facility-Administered Medications   Medication Dose Route Frequency Provider Last Rate    acetaminophen  650 mg Oral Q6H PRN Radha Friedman MD      albuterol  2 puff Inhalation Q4H PRN Radha Friedman MD      atorvastatin  40 mg Oral Daily With Maurice Horta MD      furosemide  40 mg Intravenous BID (diuretic) Magali Smoker, KEL      guaiFENesin  1,200 mg Oral Q12H PRN Radha Friedman MD      levothyroxine  75 mcg Oral Daily Before Breakfast Radha Friedman MD      metoprolol succinate  25 mg Oral BID Radha Friedman MD      ondansetron  4 mg Intravenous Q6H PRN Pelon Mccarthy Laila Rahman MD      potassium chloride  20 mEq Oral BID Janee Garcia PA-C          Today, Patient Was Seen By: Stefan Hicks PA-C    **Please Note: This note may have been constructed using a voice recognition system  **

## 2022-02-23 NOTE — ANESTHESIA PREPROCEDURE EVALUATION
Procedure:  INCISION AND DRAINAGE (I&D) HEAD/FACE (Right Face)    Relevant Problems   CARDIO   (+) Chronic atrial fibrillation (HCC)      /RENAL   (+) Stage 3b chronic kidney disease (HCC)      PULMONARY   (+) Acute respiratory failure with hypoxia (HCC)   (+) Pleural effusion      Other   (+) Acute systolic CHF (congestive heart failure) (HCC) (presented with evidence of CHF exacerbation, currently receiving lasix)   (+) Hematoma of face, initial encounter   (+) Hypoxia   (+) Skin cancer of face (s/p Mohs procedure)     EKG Afib, HR 98, incomplete RBBB   TTE 2/22/22    Left Ventricle: Left ventricular cavity size is normal  Wall thickness is mildly increased  Systolic function is moderately reduced (40-45%)  There is moderate global hypokinesis  Left atrial filling pressure cannot be estimated    Right Ventricle: Right ventricular cavity size is mildly dilated  Systolic function is mildly reduced    Left Atrium: The atrium is mildly dilated    Right Atrium: The atrium is mildly dilated    Aortic Valve: The aortic valve is trileaflet  The leaflets are moderately thickened  The leaflets are mildly calcified  The leaflets exhibit normal mobility  There is mild stenosis    Mitral Valve: There is mild annular calcification  There is trace regurgitation    Tricuspid Valve: There is trace regurgitation    Pulmonic Valve: There is trace regurgitation  Physical Exam    Airway    Mallampati score: II  TM Distance: >3 FB  Neck ROM: full     Dental       Cardiovascular      Pulmonary      Other Findings  Large right face hematoma with limitation of mouth opening      Anesthesia Plan  ASA Score- 4     Anesthesia Type- general with ASA Monitors  Additional Monitors:   Airway Plan: ETT      Comment: Recent labs personally reviewed:  Lab Results       Component                Value               Date                       WBC                      8 61                02/23/2022                 HGB 14 2                02/23/2022                 PLT                      173                 02/23/2022            Lab Results       Component                Value               Date                       K                        4 0                 02/23/2022                 BUN                      18                  02/23/2022                 CREATININE               1 45 (H)            02/23/2022                 GLUCOSE                  119                 12/03/2019            Lab Results       Component                Value               Date                       PTT                      57 (H)              02/21/2022             Lab Results       Component                Value               Date                       INR                      1 83 (H)            02/21/2022              Blood type O      I, Martha Grant MD, have personally seen and evaluated the patient prior to anesthetic care  I have reviewed the pre-anesthetic record, medical history, allergies, medications and any other medical records if appropriate to the anesthetic care  If a CRNA is involved in the case, I have reviewed the CRNA assessment, if present, and agree  I consented the patient for general anesthesia with appropriate airway support as indicated  We reviewed the risks associated including PONV, sore throat, allergic reaction to anesthetics and management plan to address these issues  We discussed the indication and risks associated with any invasive monitors that would be placed  We discussed post op pain control and expectations  We discussed rare complications including hypoxia, perioperative cardiac and neurologic events, and death based on the patient's baseline risk  All questions and concerns were addressed          Plan Factors-Exercise tolerance (METS): >4 METS  Chart reviewed  EKG reviewed  Imaging results reviewed  Existing labs reviewed  Patient summary reviewed      Patient is not a current smoker  Patient did not smoke on day of surgery  Obstructive sleep apnea risk education given perioperatively  Induction- intravenous  Postoperative Plan- Plan for postoperative opioid use  Planned trial extubation    Informed Consent- Anesthetic plan and risks discussed with patient  I personally reviewed this patient with the CRNA  Discussed and agreed on the Anesthesia Plan with the CRNA  Lacy Ham

## 2022-02-24 LAB
ANION GAP SERPL CALCULATED.3IONS-SCNC: 8 MMOL/L (ref 4–13)
BUN SERPL-MCNC: 20 MG/DL (ref 5–25)
CALCIUM SERPL-MCNC: 8.3 MG/DL (ref 8.3–10.1)
CHLORIDE SERPL-SCNC: 94 MMOL/L (ref 100–108)
CO2 SERPL-SCNC: 28 MMOL/L (ref 21–32)
CREAT SERPL-MCNC: 1.57 MG/DL (ref 0.6–1.3)
ERYTHROCYTE [DISTWIDTH] IN BLOOD BY AUTOMATED COUNT: 15.3 % (ref 11.6–15.1)
GFR SERPL CREATININE-BSD FRML MDRD: 39 ML/MIN/1.73SQ M
GLUCOSE SERPL-MCNC: 125 MG/DL (ref 65–140)
HCT VFR BLD AUTO: 47.3 % (ref 36.5–49.3)
HGB BLD-MCNC: 14.9 G/DL (ref 12–17)
MCH RBC QN AUTO: 31 PG (ref 26.8–34.3)
MCHC RBC AUTO-ENTMCNC: 31.5 G/DL (ref 31.4–37.4)
MCV RBC AUTO: 99 FL (ref 82–98)
PLATELET # BLD AUTO: 198 THOUSANDS/UL (ref 149–390)
PMV BLD AUTO: 10.5 FL (ref 8.9–12.7)
POTASSIUM SERPL-SCNC: 4.4 MMOL/L (ref 3.5–5.3)
RBC # BLD AUTO: 4.8 MILLION/UL (ref 3.88–5.62)
SODIUM SERPL-SCNC: 130 MMOL/L (ref 136–145)
WBC # BLD AUTO: 9.94 THOUSAND/UL (ref 4.31–10.16)

## 2022-02-24 PROCEDURE — 80048 BASIC METABOLIC PNL TOTAL CA: CPT | Performed by: PHYSICIAN ASSISTANT

## 2022-02-24 PROCEDURE — 85027 COMPLETE CBC AUTOMATED: CPT | Performed by: PHYSICIAN ASSISTANT

## 2022-02-24 PROCEDURE — 99232 SBSQ HOSP IP/OBS MODERATE 35: CPT | Performed by: INTERNAL MEDICINE

## 2022-02-24 PROCEDURE — 99232 SBSQ HOSP IP/OBS MODERATE 35: CPT | Performed by: PHYSICIAN ASSISTANT

## 2022-02-24 PROCEDURE — 99024 POSTOP FOLLOW-UP VISIT: CPT | Performed by: PLASTIC SURGERY

## 2022-02-24 RX ORDER — GINSENG 100 MG
1 CAPSULE ORAL 3 TIMES DAILY
Status: DISCONTINUED | OUTPATIENT
Start: 2022-02-24 | End: 2022-02-26 | Stop reason: HOSPADM

## 2022-02-24 RX ADMIN — BACITRACIN 1 SMALL APPLICATION: 500 OINTMENT TOPICAL at 20:34

## 2022-02-24 RX ADMIN — LEVOTHYROXINE SODIUM 75 MCG: 75 TABLET ORAL at 06:21

## 2022-02-24 RX ADMIN — CEPHALEXIN 500 MG: 500 CAPSULE ORAL at 12:01

## 2022-02-24 RX ADMIN — CEPHALEXIN 500 MG: 500 CAPSULE ORAL at 06:21

## 2022-02-24 RX ADMIN — ATORVASTATIN CALCIUM 40 MG: 40 TABLET, FILM COATED ORAL at 17:23

## 2022-02-24 RX ADMIN — CEPHALEXIN 500 MG: 500 CAPSULE ORAL at 00:49

## 2022-02-24 RX ADMIN — CEPHALEXIN 500 MG: 500 CAPSULE ORAL at 23:29

## 2022-02-24 RX ADMIN — BACITRACIN 1 SMALL APPLICATION: 500 OINTMENT TOPICAL at 12:01

## 2022-02-24 RX ADMIN — CEPHALEXIN 500 MG: 500 CAPSULE ORAL at 17:22

## 2022-02-24 RX ADMIN — BACITRACIN 1 SMALL APPLICATION: 500 OINTMENT TOPICAL at 17:23

## 2022-02-24 NOTE — CASE MANAGEMENT
Case Management Discharge Planning Note    Patient name Candie Mei  Location /-17 MRN [id number]  : 1935 Date 2022       Current Admission Date: 2022  Current Admission Diagnosis:Acute systolic CHF (congestive heart failure) [facility])   Patient Active Problem List    Diagnosis Date Noted    Hematoma of face, initial encounter 2022    Skin cancer of face 2022    SIRS (systemic inflammatory response syndrome) ([facility] ) 10/28/2021    Wound of left foot 10/28/2021    CKD (chronic kidney disease) stage 3, GFR 30-59 ml/min ([facility] ) 10/28/2021    Hypoxia 10/28/2021    Flexor tenosynovitis of finger 10/27/2021    Localized swelling on left hand 10/27/2021    Cellulitis of hand, left 10/27/2021    Volume overload 2019    Pleural effusion     Acute systolic CHF (congestive heart failure) ([facility] [facility] ) 2019    Moderate protein-calorie malnutrition ([facility] ) 2019    Acute respiratory failure with hypoxia (HCC) 2019    Hyponatremia 2019    Chronic atrial fibrillation ([facility] ) 2019    Transaminitis 2019      LOS (days): 2  Geometric Mean LOS (GMLOS) (days): 4 80  Days to GMLOS:2 8     OBJECTIVE:  Risk of Unplanned Readmission Score: 14         Current admission status: Inpatient   Preferred Pharmacy:   [address] [place] [address]  Phone: [phone] Fax: [phone]    Primary Care Provider: Fernando Ding MD    Primary Insurance: THE [facility]  Secondary Insurance:     DISCHARGE DETAILS:    Additional Comments: Patient reviewed during care coordination rounds with KEL Murphy who informed that pt will likely be stable for discharge in 48 hours, to restart anticoagulant tomorrow  Plan remains for discharge home with [facility] once medically stable, DOMINIQUE sent message to [facility] via [address] [place] [address] Rd of projected discharge date   CM department to remain available for additional discharge concerns or needs

## 2022-02-24 NOTE — CASE MANAGEMENT
Case Management Discharge Planning Note    Patient name Ronel Quinn  Location /-73 MRN [id number]  : 1935 Date 2022       Current Admission Date: 2022  Current Admission Diagnosis:Acute systolic CHF (congestive heart failure) [facility])   Patient Active Problem List    Diagnosis Date Noted    Hematoma of face, initial encounter 2022    Skin cancer of face 2022    SIRS (systemic inflammatory response syndrome) ([facility] ) 10/28/2021    Wound of left foot 10/28/2021    CKD (chronic kidney disease) stage 3, GFR 30-59 ml/min ([facility] ) 10/28/2021    Hypoxia 10/28/2021    Flexor tenosynovitis of finger 10/27/2021    Localized swelling on left hand 10/27/2021    Cellulitis of hand, left 10/27/2021    Volume overload 2019    Pleural effusion     Acute systolic CHF (congestive heart failure) ([facility] ) 2019    Moderate protein-calorie malnutrition ([facility] ) 2019    Acute respiratory failure with hypoxia (HCC) 2019    Hyponatremia 2019    Chronic atrial fibrillation ([facility] ) 2019    Transaminitis 2019      LOS (days): 2  Geometric Mean LOS (GMLOS) (days): 4 80  Days to GMLOS:3     OBJECTIVE:  Risk of Unplanned Readmission Score: 14         Current admission status: Inpatient   Preferred Pharmacy:   [address] [address]  Phone: [phone] Fax: [phone]    Primary Care Provider: Davie Burrell MD    Primary Insurance: THE [facility]  Secondary Insurance:     DISCHARGE DETAILS:    Additional Comments: Per chart review, pt likely medically stable for discharge in 24 hours  CM sent TT to [facility] [place] requesting a respiratory home 02 eval as pt is currently on 3L 02  Per [facility], [facility] able to accept at time of discharge  AVS updated to reflect same  CM to follow

## 2022-02-24 NOTE — NURSING NOTE
Pt return to the floor no change in mental status  Pt denies any pain at this time  Surgical site is the right side of the face  MORA in place little drainage  Pt able to take in po intake no difficulty  Will continue to monitor

## 2022-02-24 NOTE — ASSESSMENT & PLAN NOTE
· [facility]  · Pt unclear of exact details  · Complicated by hematoma  · Holding DOAC   · Plastic surgery consulted (Dr Yudi Hagen) - s/p evacuation in OR 2/23  · Course of oral Keflex, bacitracin to incision line  · Follow up outpatient

## 2022-02-24 NOTE — ASSESSMENT & PLAN NOTE
· Hold DOAC due hematoma of right cheek    Rates controlled  · Will discuss with PRS regarding restart

## 2022-02-24 NOTE — NURSING NOTE
Pt ambulated and O2 saturation stayed 94-96% with no oxygen  Pt denied SOB and chest pain  Will continue to monitor oxygen saturation

## 2022-02-24 NOTE — ASSESSMENT & PLAN NOTE
Lab Results   Component Value Date    EGFR 39 02/24/2022    EGFR 43 02/23/2022    EGFR 52 02/22/2022    CREATININE 1 57 (H) 02/24/2022    CREATININE 1 45 (H) 02/23/2022    CREATININE 1 26 02/22/2022     · Rising creatinine, qualifies as SHREYA based on increase >0 3 from admission  · Hold further diuresis

## 2022-02-24 NOTE — PROGRESS NOTES
[facility] Progress Note - Wili Iyer 1935, 82 y o  male MRN: [id number]  Unit/Bed#: -01 Encounter: [id number]  Primary Care Provider: Sergio Nelson MD   Date and time admitted to hospital: 2/21/2022  1:10 PM    * Acute systolic CHF (congestive heart failure) ([facility] [address] )  Assessment & Plan  Wt Readings from Last 3 Encounters:   02/24/22 90 2 kg (198 lb 13 7 oz)   11/02/21 96 6 kg (212 lb 14 4 oz)   10/27/21 97 kg (213 lb 13 5 oz)     · Diuresed with Intravenous Lasix - creatinine increasing and Na decreasing, will hold further diuresis   · Monitor I/Os, daily weight  · Consultation to cardiology appreciated  · Repeat echo 2/22 shows stable EF in the 40s  · Supplemental O2 as required - stable on RA now   · Reports resolution of SOB since admission   · Would hold 1 more day and monitor off further diuresis     Chronic atrial fibrillation (HCC)  Assessment & Plan  · Hold DOAC due hematoma of right cheek  Rates controlled  · Will discuss with PRS regarding restart    Hypoxia  Assessment & Plan  · Suspect secondary to acute CHF exacerbation  · Supplemental O2 as needed to maintain >92%    Skin cancer of face  Assessment & Plan  · S/p MOH's at [facility]  2/16/22  · Pt unclear of exact details  · Complicated by hematoma  · Holding DOAC   · Plastic surgery consulted (Dr Wade Rojas) - s/p evacuation in OR 2/23  · Course of oral Keflex, bacitracin to incision line  · Follow up outpatient    Hyponatremia  Assessment & Plan  · Chronic, fluctuating with diuresis  · Recheck BMP in AM    Moderate protein-calorie malnutrition ([facility] )  Assessment & Plan  Malnutrition Findings:           BMI Findings: Body mass index is 28 53 kg/m²         CKD (chronic kidney disease) stage 3, GFR 30-59 ml/min [facility])  Assessment & Plan  Lab Results   Component Value Date    EGFR 39 02/24/2022    EGFR 43 02/23/2022    EGFR 52 02/22/2022    CREATININE 1 57 (H) 02/24/2022    CREATININE 1 45 (H) 02/23/2022 CREATININE 1 26 2022     · Rising creatinine, qualifies as SHREYA based on increase >0 3 from admission  · Hold further diuresis          VTE Pharmacologic Prophylaxis: VTE Score: 6 High Risk (Score >/= 5) - Pharmacological DVT Prophylaxis Contraindicated  Sequential Compression Devices Ordered  Patient Centered Rounds: I performed bedside rounds with nursing staff today  Discussions with Specialists or Other Care Team Provider: reviewed operative note, d/w cardiology and plastic surgery     Education and Discussions with Family / Patient: Updated  (daughter) via phone  Time Spent for Care: 20 minutes  More than 50% of total time spent on counseling and coordination of care as described above  Current Length of Stay: 2 day(s)  Current Patient Status: Inpatient   Certification Statement: The patient will continue to require additional inpatient hospital stay due to pending stable renal functiosn overnight  Discharge Plan: Anticipate discharge in 24-48 hrs to home with home services  revolutionary     Code Status: Level 1 - Full Code    Subjective:   CC "is my face still bruised question?"    Patient seen he is feeling better  No cheek pain  Denies any bleeding otherwise  No shortness of breath or chest pain  He has been ambulatory in the room and urinating well  Objective:     Vitals:   Temp (24hrs), Av 3 °F (36 3 °C), Min:97 °F (36 1 °C), Max:97 9 °F (36 6 °C)    Temp:  [97 °F (36 1 °C)-97 9 °F (36 6 °C)] 97 9 °F (36 6 °C)  HR:  [] 77  Resp:  [16-23] 16  BP: ()/(51-98) 108/69  SpO2:  [91 %-97 %] 91 %  Body mass index is 28 53 kg/m²  Input and Output Summary (last 24 hours): Intake/Output Summary (Last 24 hours) at 2022 9231  Last data filed at 2022 6978  Gross per 24 hour   Intake 620 ml   Output 680 ml   Net -60 ml       Physical Exam:   Physical Exam  Vitals and nursing note reviewed     Constitutional:       General: He is not in acute distress  Appearance: He is obese  He is not ill-appearing or toxic-appearing  HENT:      Head:      Comments: Right cheek hematoma evacuated and drain place; suture looks without infection  Cardiovascular:      Rate and Rhythm: Normal rate  Rhythm irregularly irregular  Pulmonary:      Effort: Pulmonary effort is normal  No respiratory distress  Breath sounds: Normal breath sounds  No wheezing or rales  Abdominal:      General: Bowel sounds are normal  There is no distension  Palpations: Abdomen is soft  Musculoskeletal:      Right lower leg: No edema  Left lower leg: No edema  Neurological:      Mental Status: He is alert and oriented to person, place, and time  Additional Data:     Labs:  Results from last 7 days   Lab Units 02/24/22 0443 02/23/22 0518 02/22/22  0546   WBC Thousand/uL 9 94   < > 8 01   HEMOGLOBIN g/dL 14 9   < > 14 1   HEMATOCRIT % 47 3   < > 42 5   PLATELETS Thousands/uL 198   < > 164   NEUTROS PCT %  --   --  60   LYMPHS PCT %  --   --  20   MONOS PCT %  --   --  13*   EOS PCT %  --   --  4    < > = values in this interval not displayed  Results from last 7 days   Lab Units 02/24/22 0443 02/23/22 0518 02/22/22  0546   SODIUM mmol/L 130*   < > 131*   POTASSIUM mmol/L 4 4   < > 3 6   CHLORIDE mmol/L 94*   < > 99*   CO2 mmol/L 28   < > 22   BUN mg/dL 20   < > 17   CREATININE mg/dL 1 57*   < > 1 26   ANION GAP mmol/L 8   < > 10   CALCIUM mg/dL 8 3   < > 8 2*   ALBUMIN g/dL  --   --  2 9*   TOTAL BILIRUBIN mg/dL  --   --  2 08*   ALK PHOS U/L  --   --  131*   ALT U/L  --   --  13   AST U/L  --   --  21   GLUCOSE RANDOM mg/dL 125   < > 83    < > = values in this interval not displayed  Results from last 7 days   Lab Units 02/21/22  1359   INR  1 83*                   Lines/Drains:  Invasive Devices  Report    Peripheral Intravenous Line            Peripheral IV 02/23/22 Left Forearm <1 day          Drain            Open Drain Anterior; Left Hand 117 days    Open Drain Anterior; Left Hand 116 days    Closed/Suction Drain Right  Bulb 7 Fr  <1 day                      Imaging: No pertinent imaging reviewed  Recent Cultures (last 7 days):         Last 24 Hours Medication List:   Current Facility-Administered Medications   Medication Dose Route Frequency Provider Last Rate    acetaminophen  650 mg Oral Q6H PRN Ryan Figueroa PA-C      albuterol  2 puff Inhalation Q4H PRN Ryan Figueroa PA-C      atorvastatin  40 mg Oral Daily With [employer]      cephalexin  500 mg Oral Q6H [facility] Cathy Alexander MD      guaiFENesin  1,200 mg Oral Q12H PRN Ryan Figueroa PA-C      levothyroxine  75 mcg Oral Daily Before [address]      metoprolol succinate  25 mg Oral BID Ryan Figueroa PA-C      ondansetron  4 mg Intravenous Q6H PRN Ryan Figueroa PA-C      potassium chloride  20 mEq Oral BID Ryan Figueroa PA-C          Today, Patient Was Seen By: Marcelo Granados PA-C    **Please Note: This note may have been constructed using a voice recognition system  **

## 2022-02-24 NOTE — PROGRESS NOTES
Patient seen and examined    No complaints, states minimal pain    Incisions clean, dry and intact, stable ecchymosis, skin viable, no evidence of hematoma re-accumulation    MORA serosanguinous    Stable for discharge from plastic surgery standpoint  Abx for one week  Continue drain  Would hold on eliquis for at least 24 to 48 hours post-op  Follow up one week

## 2022-02-24 NOTE — ASSESSMENT & PLAN NOTE
Wt Readings from Last 3 Encounters:   02/24/22 90 2 kg (198 lb 13 7 oz)   11/02/21 96 6 kg (212 lb 14 4 oz)   10/27/21 97 kg (213 lb 13 5 oz)     · Diuresed with Intravenous Lasix - creatinine increasing and Na decreasing, will hold further diuresis   · Monitor I/Os, daily weight  · Consultation to cardiology appreciated  · Repeat echo 2/22 shows stable EF in the 40s  · Supplemental O2 as required - stable on RA now   · Reports resolution of SOB since admission   · Would hold 1 more day and monitor off further diuresis

## 2022-02-24 NOTE — PROGRESS NOTES
Cardiology Progress Note - Phoebe Perry 82 y o  male MRN: [id number]    Unit/Bed#: -01 Encounter: [id number]      Assessment/Plan:  1  Acute on chronic systolic heart failure, echocardiogram done EF 40-45%, moderate global hypokinesis, mild AS, trace MR/TR/NE  Currently off of diuretics  Daily weights  Salt restriction  Strict I&Os  2  Paroxysmal atrial fibrillation, heart rate stable continue Toprol  Will reach out to plastic surgery about reinitiating anticoagulation  3  Hypertension, stable 104/60  Continue Toprol    4  Hyperlipidemia on Lipitor    5  Aortic stenosis, mild as seen on echo  6  Chronic kidney disease, creatinine today 1 5     7  Skin cancer status post recent resection that required I and D and advanced wound closure, 2/23/2022      Subjective:   Patient seen and examined  No significant events overnight  Im feeling better  Denies chest pain  Objective:     Vitals: Blood pressure 104/60, pulse (!) 108, temperature 97 9 °F (36 6 °C), resp  rate 16, height 5' 10" (1 778 m), weight 90 2 kg (198 lb 13 7 oz), SpO2 96 %  , Body mass index is 28 53 kg/m² ,   Orthostatic Blood Pressures      Most Recent Value   Blood Pressure 104/60 filed at 02/24/2022 1013   Patient Position - Orthostatic VS Sitting filed at 02/21/2022 1919            Intake/Output Summary (Last 24 hours) at 2/24/2022 1346  Last data filed at 2/24/2022 1316  Gross per 24 hour   Intake 1220 ml   Output 680 ml   Net 540 ml         Physical Exam:    GEN: Phoebe Perry appears well, alert and oriented x 3, pleasant and cooperative   HEENT: pupils equal, round, and reactive to light; extraocular muscles intact  NECK: supple, no carotid bruits   HEART: regular rhythm, normal S1 and S2, no murmurs, clicks, gallops or rubs   LUNGS: clear to auscultation bilaterally; no wheezes, rales, or rhonchi   ABDOMEN: normal bowel sounds, soft, no tenderness, no distention  EXTREMITIES: peripheral pulses normal; no clubbing, cyanosis, or edema      Medications:      Current Facility-Administered Medications:     acetaminophen (TYLENOL) tablet 650 mg, 650 mg, Oral, Q6H PRN, Jennifer Gautam, PA-C    albuterol (PROVENTIL HFA,VENTOLIN HFA) inhaler 2 puff, 2 puff, Inhalation, Q4H PRN, Jennifer Gautam, PA-C    atorvastatin (LIPITOR) tablet 40 mg, 40 mg, Oral, Daily With CEDRIC [place]-C, 40 mg at 02/22/22 1618    bacitracin topical ointment 1 small application, 1 small application, Topical, TID, Ana Maria Murphy PA-C, 1 small application at 43/75/63 1201    cephalexin (KEFLEX) capsule 500 mg, 500 mg, Oral, Q6H [address], Kiesha Urbina MD, 500 mg at 02/24/22 1201    guaiFENesin (MUCINEX) 12 hr tablet 1,200 mg, 1,200 mg, Oral, Q12H PRN, Jennifer Gautam PA-C    levothyroxine tablet 75 mcg, 75 mcg, Oral, Daily Before Breakfast, Jennifer Gautam, PA-C, 75 mcg at 02/24/22 6516    metoprolol succinate (TOPROL-XL) 24 hr tablet 25 mg, 25 mg, Oral, BID, Jennifer Gautam, PA-C, 25 mg at 02/23/22 0902    ondansetron (ZOFRAN) injection 4 mg, 4 mg, Intravenous, Q6H PRN, Jennifer Gautam, PA-C     Labs & Results:        Results from last 7 days   Lab Units 02/24/22  0443 02/23/22  0518 02/22/22  0546   WBC Thousand/uL 9 94 8 61 8 01   HEMOGLOBIN g/dL 14 9 14 2 14 1   HEMATOCRIT % 47 3 44 7 42 5   PLATELETS Thousands/uL 198 173 164         Results from last 7 days   Lab Units 02/24/22  0443 02/23/22  0518 02/22/22  0546 02/21/22  1359 02/21/22  1359   POTASSIUM mmol/L 4 4 4 0 3 6   < > 4 6   CHLORIDE mmol/L 94* 99* 99*   < > 98*   CO2 mmol/L 28 27 22   < > 23   BUN mg/dL 20 18 17   < > 18   CREATININE mg/dL 1 57* 1 45* 1 26   < > 1 41*   CALCIUM mg/dL 8 3 8 3 8 2*   < > 8 8   ALK PHOS U/L  --   --  131*  --  153*   ALT U/L  --   --  13  --  15   AST U/L  --   --  21  --  26    < > = values in this interval not displayed       Results from last 7 days   Lab Units 02/21/22  1359   INR  1 83*   PTT seconds 57*

## 2022-02-25 LAB
ANION GAP SERPL CALCULATED.3IONS-SCNC: 9 MMOL/L (ref 4–13)
BUN SERPL-MCNC: 22 MG/DL (ref 5–25)
CALCIUM SERPL-MCNC: 8.6 MG/DL (ref 8.3–10.1)
CHLORIDE SERPL-SCNC: 96 MMOL/L (ref 100–108)
CO2 SERPL-SCNC: 27 MMOL/L (ref 21–32)
CREAT SERPL-MCNC: 1.45 MG/DL (ref 0.6–1.3)
ERYTHROCYTE [DISTWIDTH] IN BLOOD BY AUTOMATED COUNT: 15.1 % (ref 11.6–15.1)
GFR SERPL CREATININE-BSD FRML MDRD: 43 ML/MIN/1.73SQ M
GLUCOSE SERPL-MCNC: 133 MG/DL (ref 65–140)
HCT VFR BLD AUTO: 46.4 % (ref 36.5–49.3)
HGB BLD-MCNC: 14.8 G/DL (ref 12–17)
MCH RBC QN AUTO: 31.3 PG (ref 26.8–34.3)
MCHC RBC AUTO-ENTMCNC: 31.9 G/DL (ref 31.4–37.4)
MCV RBC AUTO: 98 FL (ref 82–98)
PLATELET # BLD AUTO: 197 THOUSANDS/UL (ref 149–390)
PMV BLD AUTO: 9.8 FL (ref 8.9–12.7)
POTASSIUM SERPL-SCNC: 4.1 MMOL/L (ref 3.5–5.3)
RBC # BLD AUTO: 4.73 MILLION/UL (ref 3.88–5.62)
SODIUM SERPL-SCNC: 132 MMOL/L (ref 136–145)
WBC # BLD AUTO: 10.28 THOUSAND/UL (ref 4.31–10.16)

## 2022-02-25 PROCEDURE — 94664 DEMO&/EVAL PT USE INHALER: CPT

## 2022-02-25 PROCEDURE — 85027 COMPLETE CBC AUTOMATED: CPT | Performed by: NURSE PRACTITIONER

## 2022-02-25 PROCEDURE — 99232 SBSQ HOSP IP/OBS MODERATE 35: CPT | Performed by: NURSE PRACTITIONER

## 2022-02-25 PROCEDURE — 99232 SBSQ HOSP IP/OBS MODERATE 35: CPT | Performed by: INTERNAL MEDICINE

## 2022-02-25 PROCEDURE — 94760 N-INVAS EAR/PLS OXIMETRY 1: CPT

## 2022-02-25 PROCEDURE — 80048 BASIC METABOLIC PNL TOTAL CA: CPT | Performed by: NURSE PRACTITIONER

## 2022-02-25 RX ORDER — POLYETHYLENE GLYCOL 3350 17 G/17G
17 POWDER, FOR SOLUTION ORAL DAILY
Status: DISCONTINUED | OUTPATIENT
Start: 2022-02-25 | End: 2022-02-26 | Stop reason: HOSPADM

## 2022-02-25 RX ORDER — SENNOSIDES 8.6 MG
1 TABLET ORAL
Status: DISCONTINUED | OUTPATIENT
Start: 2022-02-25 | End: 2022-02-26 | Stop reason: HOSPADM

## 2022-02-25 RX ADMIN — ATORVASTATIN CALCIUM 40 MG: 40 TABLET, FILM COATED ORAL at 17:30

## 2022-02-25 RX ADMIN — CEPHALEXIN 500 MG: 500 CAPSULE ORAL at 23:01

## 2022-02-25 RX ADMIN — BACITRACIN 1 SMALL APPLICATION: 500 OINTMENT TOPICAL at 17:30

## 2022-02-25 RX ADMIN — BACITRACIN 1 SMALL APPLICATION: 500 OINTMENT TOPICAL at 09:35

## 2022-02-25 RX ADMIN — STANDARDIZED SENNA CONCENTRATE 8.6 MG: 8.6 TABLET ORAL at 22:36

## 2022-02-25 RX ADMIN — APIXABAN 2.5 MG: 2.5 TABLET, FILM COATED ORAL at 17:30

## 2022-02-25 RX ADMIN — LEVOTHYROXINE SODIUM 75 MCG: 75 TABLET ORAL at 05:56

## 2022-02-25 RX ADMIN — POLYETHYLENE GLYCOL 3350 17 G: 17 POWDER, FOR SOLUTION ORAL at 11:03

## 2022-02-25 RX ADMIN — CEPHALEXIN 500 MG: 500 CAPSULE ORAL at 17:30

## 2022-02-25 RX ADMIN — APIXABAN 2.5 MG: 2.5 TABLET, FILM COATED ORAL at 11:03

## 2022-02-25 RX ADMIN — CEPHALEXIN 500 MG: 500 CAPSULE ORAL at 11:03

## 2022-02-25 RX ADMIN — CEPHALEXIN 500 MG: 500 CAPSULE ORAL at 05:56

## 2022-02-25 RX ADMIN — BACITRACIN 1 SMALL APPLICATION: 500 OINTMENT TOPICAL at 22:30

## 2022-02-25 NOTE — CASE MANAGEMENT
Case Management Discharge Planning Note    Patient name Jaz Zaragoza  Location /-11 MRN [id number]  : 1935 Date 2022       Current Admission Date: 2022  Current Admission Diagnosis:Acute systolic CHF (congestive heart failure) [facility])   Patient Active Problem List    Diagnosis Date Noted    Hematoma of face, initial encounter 2022    Skin cancer of face 2022    SIRS (systemic inflammatory response syndrome) ([facility] [address] ) 10/28/2021    Wound of left foot 10/28/2021    CKD (chronic kidney disease) stage 3, GFR 30-59 ml/min ([facility] [address] ) 10/28/2021    Hypoxia 10/28/2021    Flexor tenosynovitis of finger 10/27/2021    Localized swelling on left hand 10/27/2021    Cellulitis of hand, left 10/27/2021    Volume overload 2019    Pleural effusion 15/90/5    Acute systolic CHF (congestive heart failure) ([facility] [address] ) 2019    Moderate protein-calorie malnutrition ([facility] [address] ) 2019    Acute respiratory failure with hypoxia (HCC) 2019    Hyponatremia 2019    Chronic atrial fibrillation ([facility] [address] ) 2019    Transaminitis 2019      LOS (days): 3  Geometric Mean LOS (GMLOS) (days): 4 80  Days to GMLOS:1 7     OBJECTIVE:  Risk of Unplanned Readmission Score: 13         Current admission status: Inpatient   Preferred Pharmacy:   [address] [place], [place] [address] [address] [address]  Phone: [phone] Fax: [phone]    Primary Care Provider: Lizabeth Gillette MD    Primary Insurance: [facility]  Secondary Insurance:     DISCHARGE DETAILS:    IMM Given (Date):: 22  IMM Given to[de-identified] Patient     Additional Comments: Patient reviewed during care coordination rounds with Skip Marroquin who informed that pt will likely be medically stable for discharge in 24 hours  Pt to return home with [facility] department to remain available for additional discharge concerns or needs   IMM reviewed with pt who expressed verbal confirmation of understanding, IMM in scan bin to be entered into pt's EHR, copy at bedside

## 2022-02-25 NOTE — RESPIRATORY THERAPY NOTE
RT Protocol Note  Anneliese Francis 82 y o  male MRN: [id number]  Unit/Bed#: -01 Encounter: [id number]    Assessment    Principal Problem:    Acute systolic CHF (congestive heart failure) (HCC)  Active Problems:    Hyponatremia    Chronic atrial fibrillation (HCC)    Moderate protein-calorie malnutrition (HCC)    CKD (chronic kidney disease) stage 3, GFR 30-59 ml/min (HCC)    Hypoxia    Skin cancer of face      Home Pulmonary Medications:  Albuterol inhaler Q4 prn  Home Devices/Therapy: (P) Other (Comment) (none)    Past Medical History:   Diagnosis Date    A-fib ([place] [address] )     Disease of thyroid gland     Hyperlipidemia     Hypertension     Optic neuropathy     Pleural effusion on right     Pneumonia     S/P lung surgery, follow-up exam     Septic shock ([place] [address] )      Social History     Socioeconomic History    Marital status:      Spouse name: None    Number of children: None    Years of education: None    Highest education level: None   Occupational History    None   Tobacco Use    Smoking status: Former Smoker     Types: Cigarettes    Smokeless tobacco: Never Used   Vaping Use    Vaping Use: Never used   Substance and Sexual Activity    Alcohol use: Not Currently     Comment: occasional    Drug use: Never    Sexual activity: None   Other Topics Concern    None   Social History Narrative    None     Social Determinants of Health     Financial Resource Strain: Not on file   Food Insecurity: No Food Insecurity    Worried About Running Out of Food in the Last Year: Never true    Dannielle of Food in the Last Year: Never true   Transportation Needs: No Transportation Needs    Lack of Transportation (Medical): No    Lack of Transportation (Non-Medical):  No   Physical Activity: Not on file   Stress: Not on file   Social Connections: Not on file   Intimate Partner Violence: Not on file   Housing Stability: Low Risk     Unable to Pay for Housing in the Last Year: No    Number of Places Lived in the Last Year: 1    Unstable Housing in the Last Year: No       Subjective         Objective    Physical Exam:        Vitals:  Blood pressure 108/56, pulse 88, temperature (!) 97 3 °F (36 3 °C), resp  rate 18, height 5' 10" (1 778 m), weight 92 3 kg (203 lb 7 8 oz), SpO2 94 %  Imaging and other studies: I have personally reviewed pertinent reports              Plan    Respiratory Plan: (P) Home Bronchodilator Patient pathway

## 2022-02-25 NOTE — ASSESSMENT & PLAN NOTE
Lab Results   Component Value Date    EGFR 39 02/24/2022    EGFR 43 02/23/2022    EGFR 52 02/22/2022    CREATININE 1 57 (H) 02/24/2022    CREATININE 1 45 (H) 02/23/2022    CREATININE 1 26 02/22/2022     · Chronic  · Baseline creatinine noted to be 1 3-1 6  · Hold further diuresis  · BMP pending for today  · A void hypotension, nephrotoxins

## 2022-02-25 NOTE — PROGRESS NOTES
[address] [facility]  Progress Note - Memo Kimbrough 1935, 82 y o  male MRN: [id number]  Unit/Bed#: -[facility]aro Encounter: [id number]  Primary Care Provider: Reji Rivero MD   Date and time admitted to hospital: 2/21/2022  1:10 PM    * Acute systolic CHF (congestive heart failure) ([facility] [address] )  Assessment & Plan  Wt Readings from Last 3 Encounters:   02/25/22 92 3 kg (203 lb 7 8 oz)   11/02/21 96 6 kg (212 lb 14 4 oz)   10/27/21 97 kg (213 lb 13 5 oz)     · Patient presented to the ED with complaints bleeding status post mole resection  On admission was noted to be dyspneic and cyanotic  · BNP on admission noted 3,166  · Cardiology consult, appreciate input  · Monitor strict I&O's  · Daily weights  · Noted to be euvolemic at this time, diuretics discontinued  · Low salt diet  · Repeat echo 2/22 shows stable, EF in the 40s    Skin cancer of face  Assessment & Plan  · S/p MOH's at [facility] 2/16/22  · Pt unclear of exact details  · Complicated by hematoma  · Restarted Eliquis today  · Plastic surgery consulted (Dr Lucrecia Rodarte) - s/p evacuation in OR 2/23  · Course of oral Keflex, bacitracin to incision line  · Follow up outpatient    Hypoxia  Assessment & Plan  · Suspect secondary to acute CHF exacerbation  · Supplemental O2 as needed to maintain >92%    CKD (chronic kidney disease) stage 3, GFR 30-59 ml/min [facility])  Assessment & Plan  Lab Results   Component Value Date    EGFR 39 02/24/2022    EGFR 43 02/23/2022    EGFR 52 02/22/2022    CREATININE 1 57 (H) 02/24/2022    CREATININE 1 45 (H) 02/23/2022    CREATININE 1 26 02/22/2022     · Chronic  · Baseline creatinine noted to be 1 3-1 6  · Hold further diuresis  · BMP pending for today  · A void hypotension, nephrotoxins    Moderate protein-calorie malnutrition ([facility] [address] )  Assessment & Plan  Malnutrition Findings:           BMI Findings: Body mass index is 29 2 kg/m²         Chronic atrial fibrillation (HCC)  Assessment & Plan  · Restart Eliquis today  · Rates controlled    Hyponatremia  Assessment & Plan  · Chronic, fluctuating with diuresis  · Labs pending for the morning      VTE Pharmacologic Prophylaxis: VTE Score: 6 High Risk (Score >/= 5) - Pharmacological DVT Prophylaxis Ordered: apixaban (Eliquis)  Sequential Compression Devices Ordered  Patient Centered Rounds: I performed bedside rounds with nursing staff today  Discussions with Specialists or Other Care Team Provider: Case Management, Cardiology, Plastic surgery note reviewed    Education and Discussions with Family / Patient: Updated  (daughter) via phone  Time Spent for Care: 20 minutes  More than 50% of total time spent on counseling and coordination of care as described above  Current Length of Stay: 3 day(s)  Current Patient Status: Inpatient   Certification Statement: The patient will continue to require additional inpatient hospital stay due to ongoing treatment in setting of restarting eliquis today, monitor overnight  Discharge Plan: Anticipate discharge tomorrow to home  Code Status: Level 1 - Full Code    Subjective:   CC: "It only hurts if you touch it "    Patient resting in bed, watching TV  Denies complaints of chest pain, shortnes sof breath, fever, or chills  Report she has not had a bowel movement for two days  Otherwise, feels well and wants to go home tomorrow  Objective:     Vitals:   Temp (24hrs), Av 3 °F (36 3 °C), Min:97 3 °F (36 3 °C), Max:97 4 °F (36 3 °C)    Temp:  [97 3 °F (36 3 °C)-97 4 °F (36 3 °C)] 97 3 °F (36 3 °C)  HR:  [80-95] 88  Resp:  [18] 18  BP: (100-118)/(56-81) 108/56  SpO2:  [91 %-96 %] 94 %  Body mass index is 29 2 kg/m²  Input and Output Summary (last 24 hours): Intake/Output Summary (Last 24 hours) at 2022 1036  Last data filed at 2022 0851  Gross per 24 hour   Intake 1990 ml   Output 355 ml   Net 1635 ml       Physical Exam:   Physical Exam  Vitals and nursing note reviewed     Constitutional: General: He is not in acute distress  Appearance: He is obese  He is ill-appearing  Cardiovascular:      Rate and Rhythm: Normal rate  Rhythm irregularly irregular  Pulses: Normal pulses  Heart sounds: Normal heart sounds  Pulmonary:      Effort: Pulmonary effort is normal       Breath sounds: Normal breath sounds  Abdominal:      General: Bowel sounds are normal       Palpations: Abdomen is soft  Musculoskeletal:         General: No tenderness  Normal range of motion  Skin:     General: Skin is warm and dry  Capillary Refill: Capillary refill takes less than 2 seconds  Findings: Bruising (right face, chest ) present  Comments: MORA drain noted x 1  Neurological:      Mental Status: He is alert  Mental status is at baseline  Psychiatric:         Mood and Affect: Mood normal           Additional Data:     Labs:  Results from last 7 days   Lab Units 02/25/22  0842 02/23/22  0518 02/22/22  0546   WBC Thousand/uL 10 28*   < > 8 01   HEMOGLOBIN g/dL 14 8   < > 14 1   HEMATOCRIT % 46 4   < > 42 5   PLATELETS Thousands/uL 197   < > 164   NEUTROS PCT %  --   --  60   LYMPHS PCT %  --   --  20   MONOS PCT %  --   --  13*   EOS PCT %  --   --  4    < > = values in this interval not displayed  Results from last 7 days   Lab Units 02/25/22  0842 02/23/22  0518 02/22/22  0546   SODIUM mmol/L 132*   < > 131*   POTASSIUM mmol/L 4 1   < > 3 6   CHLORIDE mmol/L 96*   < > 99*   CO2 mmol/L 27   < > 22   BUN mg/dL 22   < > 17   CREATININE mg/dL 1 45*   < > 1 26   ANION GAP mmol/L 9   < > 10   CALCIUM mg/dL 8 6   < > 8 2*   ALBUMIN g/dL  --   --  2 9*   TOTAL BILIRUBIN mg/dL  --   --  2 08*   ALK PHOS U/L  --   --  131*   ALT U/L  --   --  13   AST U/L  --   --  21   GLUCOSE RANDOM mg/dL 133   < > 83    < > = values in this interval not displayed       Results from last 7 days   Lab Units 02/21/22  1359   INR  1 83*                   Lines/Drains:  Invasive Devices  Report    Peripheral Intravenous Line            Peripheral IV 02/23/22 Left Forearm 1 day          Drain            Open Drain Anterior; Left Hand 118 days    Open Drain Anterior; Left Hand 118 days    Closed/Suction Drain Right  Bulb 7 Fr  1 day                      Imaging: No pertinent imaging reviewed  Recent Cultures (last 7 days):         Last 24 Hours Medication List:   Current Facility-Administered Medications   Medication Dose Route Frequency Provider Last Rate    acetaminophen  650 mg Oral Q6H PRN Varinder Rae PA-C      albuterol  2 puff Inhalation Q4H PRN Varinder Rae PA-C      apixaban  2 5 mg Oral BID ANNA MARIE Servin      atorvastatin  40 mg Oral Daily With [employer], [place]      bacitracin  1 small application Topical TID Ana Maria Murphy PA-C      cephalexin  500 mg Oral Q6H [address] Wilfredo Mendez MD      guaiFENesin  1,200 mg Oral Q12H PRN Varinder Rae PA-C      levothyroxine  75 mcg Oral Daily Before [address]      metoprolol succinate  25 mg Oral BID Varinder Rae PA-C      ondansetron  4 mg Intravenous Q6H PRN Varinder Rae PA-C      polyethylene glycol  17 g Oral Daily ANNA MARIE Servin      senna  1 tablet Oral HS ANNA MARIE Servin          Today, Patient Was Seen By: ANNA MARIE Servin    **Please Note: This note may have been constructed using a voice recognition system  **

## 2022-02-25 NOTE — PLAN OF CARE
Problem: Potential for Falls  Goal: Patient will remain free of falls  Description: INTERVENTIONS:  - Educate patient/family on patient safety including physical limitations  - Instruct patient to call for assistance with activity   - Consult OT/PT to assist with strengthening/mobility   - Keep Call bell within reach  - Keep bed low and locked with side rails adjusted as appropriate  - Keep care items and personal belongings within reach  - Initiate and maintain comfort rounds  - Make Fall Risk Sign visible to staff  - Offer Toileting every 2 Hours, in advance of need  - Initiate/Maintain bed alarm  - Obtain necessary fall risk management equipment: nonslip socks  - Apply yellow socks and bracelet for high fall risk patients  - Consider moving patient to room near nurses station  Outcome: Progressing

## 2022-02-25 NOTE — ASSESSMENT & PLAN NOTE
Wt Readings from Last 3 Encounters:   02/25/22 92 3 kg (203 lb 7 8 oz)   11/02/21 96 6 kg (212 lb 14 4 oz)   10/27/21 97 kg (213 lb 13 5 oz)     · Patient presented to the ED with complaints bleeding status post mole resection  On admission was noted to be dyspneic and cyanotic    · BNP on admission noted 3,166  · Cardiology consult, appreciate input  · Monitor strict I&O's  · Daily weights  · Noted to be euvolemic at this time, diuretics discontinued  · Low salt diet  · Repeat echo 2/22 shows stable, EF in the 40s

## 2022-02-25 NOTE — ASSESSMENT & PLAN NOTE
· S/p MOH's at [facility] 2/16/22  · Pt unclear of exact details  · Complicated by hematoma  · Restarted Eliquis today  · Plastic surgery consulted (Dr Jannie Garcia) - s/p evacuation in OR 2/23  · Course of oral Keflex, bacitracin to incision line  · Follow up outpatient

## 2022-02-25 NOTE — PROGRESS NOTES
Cardiology Progress Note - Leonel Orellana 82 y o  male MRN: [id number]    Unit/Bed#: -01 Encounter: [id number]      Assessment/Plan:  1  Acute on chronic systolic heart failure, echocardiogram done EF 40-45%, moderate global hypokinesis, mild AS, trace MR/TR/WY  Currently off of diuretics  Remain off of them  Continue daily weights  Salt restriction  Strict I&Os  2  Paroxysmal atrial fibrillation, heart rate stable, continue Toprol  Restart anticoagulation, Eliquis 2 5 b i d  3  Hypertension, stable  Continue Toprol    4  Hyperlipidemia on Lipitor    5  Aortic stenosis, mild as seen on echocardiogram     6  Chronic kidney disease, creatinine today 1 4     7  Skin cancer status post resection that required I&D and advanced wound closure  Management per [facility] Hospitalist and plastics      Subjective:   Patient seen and examined  No significant events overnight  Im feeling good  Denies chest pain    Objective:     Vitals: Blood pressure 108/56, pulse 88, temperature (!) 97 3 °F (36 3 °C), resp  rate 18, height 5' 10" (1 778 m), weight 92 3 kg (203 lb 7 8 oz), SpO2 94 %  , Body mass index is 29 2 kg/m² ,   Orthostatic Blood Pressures      Most Recent Value   Blood Pressure 108/56 filed at 02/25/2022 6545   Patient Position - Orthostatic VS Sitting filed at 02/21/2022 1919            Intake/Output Summary (Last 24 hours) at 2/25/2022 1334  Last data filed at 2/25/2022 0851  Gross per 24 hour   Intake 1310 ml   Output 355 ml   Net 955 ml         Physical Exam:    GEN: Leonel Orellana appears well, alert and oriented x 3, pleasant and cooperative   HEENT: pupils equal, round, and reactive to light; extraocular muscles intact  NECK: supple, no carotid bruits   HEART: regular rhythm, normal S1 and S2, no murmurs, clicks, gallops or rubs   LUNGS: clear to auscultation bilaterally; no wheezes, rales, or rhonchi   ABDOMEN: normal bowel sounds, soft, no tenderness, no distention  EXTREMITIES: peripheral pulses normal; no clubbing, cyanosis, or edema      Medications:      Current Facility-Administered Medications:     acetaminophen (TYLENOL) tablet 650 mg, 650 mg, Oral, Q6H PRN, CEDRIC Chamberlain-TARSHA    albuterol (PROVENTIL HFA,VENTOLIN HFA) inhaler 2 puff, 2 puff, Inhalation, Q4H PRN, Diandra Anderson PA-C    apixaban (ELIQUIS) tablet 2 5 mg, 2 5 mg, Oral, BID, ANNA MARIE Quinn, 2 5 mg at 02/25/22 1103    atorvastatin (LIPITOR) tablet 40 mg, 40 mg, Oral, Daily With [employer], PA-C, 40 mg at 02/24/22 1723    bacitracin topical ointment 1 small application, 1 small application, Topical, TID, Ana Maria Murphy PA-C, 1 small application at 31/95/28 0935    cephalexin (KEFLEX) capsule 500 mg, 500 mg, Oral, Q6H [address], Mini Narvaez MD, 500 mg at 02/25/22 1103    guaiFENesin (MUCINEX) 12 hr tablet 1,200 mg, 1,200 mg, Oral, Q12H PRN, Diandra Anderson PA-C    levothyroxine tablet 75 mcg, 75 mcg, Oral, Daily Before Breakfast, CEDRIC Chamberlain-C, 75 mcg at 02/25/22 0556    metoprolol succinate (TOPROL-XL) 24 hr tablet 25 mg, 25 mg, Oral, BID, CEDRIC Chamberlain-C, 25 mg at 02/23/22 0902    ondansetron (ZOFRAN) injection 4 mg, 4 mg, Intravenous, Q6H PRN, CEDRIC Chamberlain-TARSHA    polyethylene glycol (MIRALAX) packet 17 g, 17 g, Oral, Daily, ANNA MARIE Quinn, 17 g at 02/25/22 1103    senna (SENOKOT) tablet 8 6 mg, 1 tablet, Oral, HS, ANNA MARIE Quinn     Labs & Results:        Results from last 7 days   Lab Units 02/25/22  0842 02/24/22  0443 02/23/22  0518   WBC Thousand/uL 10 28* 9 94 8 61   HEMOGLOBIN g/dL 14 8 14 9 14 2   HEMATOCRIT % 46 4 47 3 44 7   PLATELETS Thousands/uL 197 198 173         Results from last 7 days   Lab Units 02/25/22  0842 02/24/22  0443 02/23/22  0518 02/22/22  0546 02/22/22  0546 02/21/22  1359 02/21/22  1359   POTASSIUM mmol/L 4 1 4 4 4 0   < > 3 6   < > 4 6   CHLORIDE mmol/L 96* 94* 99*   < > 99*   < > 98*   CO2 mmol/L 27 28 27   < > 22   < > 23   BUN mg/dL 22 20 18   < > 17   < > 18   CREATININE mg/dL 1 45* 1 57* 1 45*   < > 1 26   < > 1 41*   CALCIUM mg/dL 8 6 8 3 8 3   < > 8 2*   < > 8 8   ALK PHOS U/L  --   --   --   --  131*  --  153*   ALT U/L  --   --   --   --  13  --  15   AST U/L  --   --   --   --  21  --  26    < > = values in this interval not displayed       Results from last 7 days   Lab Units 02/21/22  1359   INR  1 83*   PTT seconds 57*

## 2022-02-26 VITALS
HEIGHT: 70 IN | DIASTOLIC BLOOD PRESSURE: 79 MMHG | OXYGEN SATURATION: 94 % | BODY MASS INDEX: 29.13 KG/M2 | SYSTOLIC BLOOD PRESSURE: 123 MMHG | RESPIRATION RATE: 18 BRPM | TEMPERATURE: 97.3 F | HEART RATE: 95 BPM | WEIGHT: 203.48 LBS

## 2022-02-26 LAB
ERYTHROCYTE [DISTWIDTH] IN BLOOD BY AUTOMATED COUNT: 15.1 % (ref 11.6–15.1)
HCT VFR BLD AUTO: 47.7 % (ref 36.5–49.3)
HGB BLD-MCNC: 15 G/DL (ref 12–17)
MCH RBC QN AUTO: 31.2 PG (ref 26.8–34.3)
MCHC RBC AUTO-ENTMCNC: 31.4 G/DL (ref 31.4–37.4)
MCV RBC AUTO: 99 FL (ref 82–98)
PLATELET # BLD AUTO: 201 THOUSANDS/UL (ref 149–390)
PMV BLD AUTO: 9.9 FL (ref 8.9–12.7)
RBC # BLD AUTO: 4.81 MILLION/UL (ref 3.88–5.62)
WBC # BLD AUTO: 9.92 THOUSAND/UL (ref 4.31–10.16)

## 2022-02-26 PROCEDURE — 99239 HOSP IP/OBS DSCHRG MGMT >30: CPT | Performed by: NURSE PRACTITIONER

## 2022-02-26 PROCEDURE — 99232 SBSQ HOSP IP/OBS MODERATE 35: CPT | Performed by: INTERNAL MEDICINE

## 2022-02-26 PROCEDURE — 85027 COMPLETE CBC AUTOMATED: CPT | Performed by: NURSE PRACTITIONER

## 2022-02-26 RX ORDER — CEPHALEXIN 500 MG/1
500 CAPSULE ORAL EVERY 6 HOURS SCHEDULED
Qty: 16 CAPSULE | Refills: 0 | Status: SHIPPED | OUTPATIENT
Start: 2022-02-26 | End: 2022-03-02

## 2022-02-26 RX ADMIN — METOPROLOL SUCCINATE 25 MG: 25 TABLET, EXTENDED RELEASE ORAL at 09:51

## 2022-02-26 RX ADMIN — POLYETHYLENE GLYCOL 3350 17 G: 17 POWDER, FOR SOLUTION ORAL at 09:51

## 2022-02-26 RX ADMIN — BACITRACIN 1 SMALL APPLICATION: 500 OINTMENT TOPICAL at 09:51

## 2022-02-26 RX ADMIN — CEPHALEXIN 500 MG: 500 CAPSULE ORAL at 12:27

## 2022-02-26 RX ADMIN — LEVOTHYROXINE SODIUM 75 MCG: 75 TABLET ORAL at 06:27

## 2022-02-26 RX ADMIN — CEPHALEXIN 500 MG: 500 CAPSULE ORAL at 06:27

## 2022-02-26 RX ADMIN — APIXABAN 2.5 MG: 2.5 TABLET, FILM COATED ORAL at 09:51

## 2022-02-26 NOTE — ASSESSMENT & PLAN NOTE
Wt Readings from Last 3 Encounters:   02/25/22 92 3 kg (203 lb 7 8 oz)   11/02/21 96 6 kg (212 lb 14 4 oz)   10/27/21 97 kg (213 lb 13 5 oz)     · Patient presented to the ED with complaints bleeding status post mole resection  On admission was noted to be dyspneic and cyanotic  · BNP on admission noted 3,166  · Cardiology consult, appreciate input  · Monitor daily weights at home    · Noted to be euvolemic at this time, diuretics discontinued  · Low salt diet  · Repeat echo 2/22 shows stable, EF in the 40s

## 2022-02-26 NOTE — DISCHARGE SUMMARY
[address] [facility]  Discharge- Korin Cortez 1935, 82 y o  male MRN: [id number]  Unit/Bed#: -01 Encounter: [id number]  Primary Care Provider: Leanna Pretty MD   Date and time admitted to hospital: 2/21/2022  1:10 PM    * Acute systolic CHF (congestive heart failure) [facility])  Assessment & Plan  Wt Readings from Last 3 Encounters:   02/25/22 92 3 kg (203 lb 7 8 oz)   11/02/21 96 6 kg (212 lb 14 4 oz)   10/27/21 97 kg (213 lb 13 5 oz)     · Patient presented to the ED with complaints bleeding status post mole resection  On admission was noted to be dyspneic and cyanotic  · BNP on admission noted 3,166  · Cardiology consult, appreciate input  · Monitor daily weights at home  · Noted to be euvolemic at this time, diuretics discontinued  · Low salt diet  · Repeat echo 2/22 shows stable, EF in the 40s    Skin cancer of face  Assessment & Plan  · S/p MOH's at [facility] 2/16/22  · Pt unclear of exact details  · Complicated by hematoma  · Restarted Eliquis 2/25  · Plastic surgery consulted (Dr Arash Travis) - s/p evacuation in OR 2/23  · Course of oral Keflex, bacitracin to incision line  · Follow up outpatient    Hypoxia  Assessment & Plan  · Suspect secondary to acute CHF exacerbation  · Hypoxia resolved  On RA  CKD (chronic kidney disease) stage 3, GFR 30-59 ml/min [facility])  Assessment & Plan  Lab Results   Component Value Date    EGFR 43 02/25/2022    EGFR 39 02/24/2022    EGFR 43 02/23/2022    CREATININE 1 45 (H) 02/25/2022    CREATININE 1 57 (H) 02/24/2022    CREATININE 1 45 (H) 02/23/2022     · Chronic  · Baseline creatinine noted to be 1 3-1 6  · Within baseline  Moderate protein-calorie malnutrition ([address] )  Assessment & Plan  Malnutrition Findings:           BMI Findings: Body mass index is 29 2 kg/m²         Chronic atrial fibrillation [facility])  Assessment & Plan  · Patient restarted on Eliquis 2/25  · Rates controlled    Hyponatremia  Assessment & Plan  · Chronic, fluctuating with diuresis  · Stable  Medical Problems             Resolved Problems  Date Reviewed: 2/26/2022    None              Discharging Physician / Practitioner: ANNA MARIE Vanessa  PCP: Misty Stephenson MD  Admission Date:   Admission Orders (From admission, onward)     Ordered        02/22/22 1251  Inpatient Admission  Once            02/21/22 1519  Place in Observation  Once                      Discharge Date: 02/26/22    Consultations During Hospital Stay:  IP CONSULT TO CARDIOLOGY  IP CONSULT TO PLASTIC SURGERY    Procedures Performed:   · I&D right face (2/23/22)    Significant Findings / Test Results:   XR chest 1 view portable   Final Result by Osei Francis MD (02/21 1639)      Findings most consistent with CHF with interstitial edema and small bilateral effusions                  Workstation performed: [id number]             Incidental Findings:   · None     Test Results Pending at Discharge (will require follow up): · None     Outpatient Tests Requested:  · Follow up with PCP within 1 week  · Follow up with Plastic Surgery in 1 week    Complications:  None    Reason for Admission:     Hospital Course:   Meg Lutz is a 82 y o  male patient with past medical history a-fib, hyperlipidemia, hypertension who originally presented to the hospital on 2/21/2022 due to bleeding to right side of face s/p mole resection  Plastic surgery consulted  Patient taken to the OR on 2/23 for I&D  Patient also noted to have acute on chronic systolic chf exacerbation on admission, cardiology consulted  Was given IV diuretics which have now been stopped and held with improvement of symptoms  Discussion with plastic surgery team - send patient home with MOAR drain in place  Please see above list of diagnoses and related plan for additional information       Condition at Discharge: stable    Discharge Day Visit / Exam:   Subjective:  Patient denies complaints of chest pain, shortness of breath, fever, or chills  Vitals: Blood Pressure: 123/79 (02/26/22 0729)  Pulse: 95 (02/26/22 0729)  Temperature: (!) 97 3 °F (36 3 °C) (02/26/22 0729)  Temp Source: Oral (02/24/22 1456)  Respirations: 18 (02/26/22 0729)  Height: 5' 10" (177 8 cm) (02/22/22 0930)  Weight - Scale: 92 3 kg (203 lb 7 8 oz) (02/25/22 0600)  SpO2: 94 % (02/26/22 0729)     Exam:   Physical Exam  Vitals and nursing note reviewed  Constitutional:       General: He is not in acute distress  Cardiovascular:      Rate and Rhythm: Normal rate  Pulses: Normal pulses  Heart sounds: Normal heart sounds  Pulmonary:      Effort: Pulmonary effort is normal       Breath sounds: Normal breath sounds  Abdominal:      General: Bowel sounds are normal       Palpations: Abdomen is soft  Musculoskeletal:         General: Normal range of motion  Skin:     General: Skin is warm and dry  Findings: Bruising (right face; chest ) present  Comments: MORA drain to right face; intact  Neurological:      Mental Status: He is alert  Mental status is at baseline  Psychiatric:         Mood and Affect: Mood normal         Discussion with Family: Updated  (daughter) via phone  Discharge instructions/Information to patient and family:   See after visit summary for information provided to patient and family  Provisions for Follow-Up Care:  See after visit summary for information related to follow-up care and any pertinent home health orders  Disposition:   Home with [facility] Services (Reminder: Complete face to face encounter)    Planned Readmission: None     Discharge Statement:  I spent 35 minutes discharging the patient  This time was spent on the day of discharge  I had direct contact with the patient on the day of discharge  Greater than 50% of the total time was spent examining patient, answering all patient questions, arranging and discussing plan of care with patient as well as directly providing post-discharge instructions  Additional time then spent on discharge activities  Discharge Medications:  See after visit summary for reconciled discharge medications provided to patient and/or family        **Please Note: This note may have been constructed using a voice recognition system**

## 2022-02-26 NOTE — PLAN OF CARE
Problem: Prexisting or High Potential for Compromised Skin Integrity  Goal: Skin integrity is maintained or improved  Description: INTERVENTIONS:  - Identify patients at risk for skin breakdown  - Assess and monitor skin integrity  - Assess and monitor nutrition and hydration status  - Monitor labs   - Assess for incontinence   - Turn and reposition patient  - Assist with mobility/ambulation  - Relieve pressure over bony prominences  - Avoid friction and shearing  - Provide appropriate hygiene as needed including keeping skin clean and dry  - Evaluate need for skin moisturizer/barrier cream  - Collaborate with interdisciplinary team   - Patient/family teaching  - Consider wound care consult   2/26/2022 1228 by Saundra Gusman RN  Outcome: Adequate for Discharge  2/26/2022 1052 by Saundra Gusman RN  Outcome: Progressing  2/26/2022 1052 by Saundra Gusman RN  Outcome: Progressing     Problem: MOBILITY - ADULT  Goal: Maintain or return to baseline ADL function  Description: INTERVENTIONS:  -  Assess patient's ability to carry out ADLs; assess patient's baseline for ADL function and identify physical deficits which impact ability to perform ADLs (bathing, care of mouth/teeth, toileting, grooming, dressing, etc )  - Assess/evaluate cause of self-care deficits   - Assess range of motion  - Assess patient's mobility; develop plan if impaired  - Assess patient's need for assistive devices and provide as appropriate  - Encourage maximum independence but intervene and supervise when necessary  - Involve family in performance of ADLs  - Assess for home care needs following discharge   - Consider OT consult to assist with ADL evaluation and planning for discharge  - Provide patient education as appropriate  Outcome: Adequate for Discharge  Goal: Maintains/Returns to pre admission functional level  Description: INTERVENTIONS:  - Perform BMAT or MOVE assessment daily    - Set and communicate daily mobility goal to care team and patient/family/caregiver  - Collaborate with rehabilitation services on mobility goals if consulted  - Perform Range of Motion 4 times a day  - Reposition patient every 2 hours  - Dangle patient 3 times a day  - Stand patient 3 times a day  - Ambulate patient 3 times a day  - Out of bed to chair 3 times a day   - Out of bed for meals 3 times a day  - Out of bed for toileting  - Record patient progress and toleration of activity level   Outcome: Adequate for Discharge     Problem: Potential for Falls  Goal: Patient will remain free of falls  Description: INTERVENTIONS:  - Educate patient/family on patient safety including physical limitations  - Instruct patient to call for assistance with activity   - Consult OT/PT to assist with strengthening/mobility   - Keep Call bell within reach  - Keep bed low and locked with side rails adjusted as appropriate  - Keep care items and personal belongings within reach  - Initiate and maintain comfort rounds  - Make Fall Risk Sign visible to staff  - Offer Toileting every 2 Hours, in advance of need  - Initiate/Maintain bed alarm  - Apply yellow socks and bracelet for high fall risk patients  - Consider moving patient to room near nurses station  Outcome: Adequate for Discharge     Problem: PHYSICAL THERAPY ADULT  Goal: Performs mobility at highest level of function for planned discharge setting  See evaluation for individualized goals  Description: Treatment/Interventions: Functional transfer training,LE strengthening/ROM,Elevations,Therapeutic exercise,Endurance training,Patient/family training,Bed mobility,Gait training,Spoke to nursing,OT          See flowsheet documentation for full assessment, interventions and recommendations  Outcome: Adequate for Discharge     Problem: OCCUPATIONAL THERAPY ADULT  Goal: Performs self-care activities at highest level of function for planned discharge setting    See evaluation for individualized goals  Description: Treatment Interventions: ADL retraining,Functional transfer training,Endurance training,Patient/family training,Equipment evaluation/education,Compensatory technique education,Energy conservation,Activityengagement          See flowsheet documentation for full assessment, interventions and recommendations     Outcome: Adequate for Discharge     Problem: PAIN - ADULT  Goal: Verbalizes/displays adequate comfort level or baseline comfort level  Description: Interventions:  - Encourage patient to monitor pain and request assistance  - Assess pain using appropriate pain scale  - Administer analgesics based on type and severity of pain and evaluate response  - Implement non-pharmacological measures as appropriate and evaluate response  - Consider cultural and social influences on pain and pain management  - Notify physician/advanced practitioner if interventions unsuccessful or patient reports new pain  Outcome: Adequate for Discharge     Problem: INFECTION - ADULT  Goal: Absence or prevention of progression during hospitalization  Description: INTERVENTIONS:  - Assess and monitor for signs and symptoms of infection  - Monitor lab/diagnostic results  - Monitor all insertion sites, i e  indwelling lines, tubes, and drains  - Monitor endotracheal if appropriate and nasal secretions for changes in amount and color  - [place] appropriate cooling/warming therapies per order  - Administer medications as ordered  - Instruct and encourage patient and family to use good hand hygiene technique  - Identify and instruct in appropriate isolation precautions for identified infection/condition  2/26/2022 1228 by Serafin Ozuna RN  Outcome: Adequate for Discharge  2/26/2022 1052 by Serafin Ozuna RN  Outcome: Progressing  Goal: Absence of fever/infection during neutropenic period  Description: INTERVENTIONS:  - Monitor WBC    Outcome: Adequate for Discharge     Problem: DISCHARGE PLANNING  Goal: Discharge to home or other facility with appropriate resources  Description: INTERVENTIONS:  - Identify barriers to discharge w/patient and caregiver  - Arrange for needed discharge resources and transportation as appropriate  - Identify discharge learning needs (meds, wound care, etc )  - Arrange for interpretive services to assist at discharge as needed  - Refer to Case Management Department for coordinating discharge planning if the patient needs post-hospital services based on physician/advanced practitioner order or complex needs related to functional status, cognitive ability, or social support system  Outcome: Adequate for Discharge     Problem: Knowledge Deficit  Goal: Patient/family/caregiver demonstrates understanding of disease process, treatment plan, medications, and discharge instructions  Description: Complete learning assessment and assess knowledge base    Interventions:  - Provide teaching at level of understanding  - Provide teaching via preferred learning methods  Outcome: Adequate for Discharge

## 2022-02-26 NOTE — ASSESSMENT & PLAN NOTE
Lab Results   Component Value Date    EGFR 43 02/25/2022    EGFR 39 02/24/2022    EGFR 43 02/23/2022    CREATININE 1 45 (H) 02/25/2022    CREATININE 1 57 (H) 02/24/2022    CREATININE 1 45 (H) 02/23/2022     · Chronic  · Baseline creatinine noted to be 1 3-1 6  · Within baseline

## 2022-02-26 NOTE — PLAN OF CARE
Problem: Prexisting or High Potential for Compromised Skin Integrity  Goal: Skin integrity is maintained or improved  Description: INTERVENTIONS:  - Identify patients at risk for skin breakdown  - Assess and monitor skin integrity  - Assess and monitor nutrition and hydration status  - Monitor labs   - Assess for incontinence   - Turn and reposition patient  - Assist with mobility/ambulation  - Relieve pressure over bony prominences  - Avoid friction and shearing  - Provide appropriate hygiene as needed including keeping skin clean and dry  - Evaluate need for skin moisturizer/barrier cream  - Collaborate with interdisciplinary team   - Patient/family teaching  - Consider wound care consult   2/26/2022 1052 by Sandi Horner RN  Outcome: Progressing  2/26/2022 1052 by Sandi Horner RN  Outcome: Progressing     Problem: INFECTION - ADULT  Goal: Absence or prevention of progression during hospitalization  Description: INTERVENTIONS:  - Assess and monitor for signs and symptoms of infection  - Monitor lab/diagnostic results  - Monitor all insertion sites, i e  indwelling lines, tubes, and drains  - Monitor endotracheal if appropriate and nasal secretions for changes in amount and color  - [place] appropriate cooling/warming therapies per order  - Administer medications as ordered  - Instruct and encourage patient and family to use good hand hygiene technique  - Identify and instruct in appropriate isolation precautions for identified infection/condition  Outcome: Progressing

## 2022-02-26 NOTE — ASSESSMENT & PLAN NOTE
· [facility]  · Pt unclear of exact details  · Complicated by hematoma  · Restarted Eliquis 2/25  · Plastic surgery consulted (Dr Sravani Philip) - s/p evacuation in OR 2/23  · Course of oral Keflex, bacitracin to incision line  · Follow up outpatient

## 2022-02-26 NOTE — PROGRESS NOTES
Progress Note - Cardiology     Colin Ariza 82 y o  male MRN: [id number]  Unit/Bed#: -01 Encounter: [id number]      Assessment and Plan:  Acute on chronic systolic heart failure - currently euvolemic  Diuretic was earlier discontinued  Continue Toprol-XL   Not on ACE-inhibitor/ARB secondary to CKD   Low-salt diet   Daily weights   Accurate intake output charting     Dilated cardiomyopathy - EF 40-45%   Continue Toprol-XL   Not on ACE-inhibitor/ARB secondary to CKD     Paroxysmal atrial fibrillation with controlled ventricular response - cont beta-blocker   On Eliquis for anticoagulation     Mild aortic stenosis     Essential hypertension - BP stable   Continue current medications   Continue to monitor vitals and adjust medication as needed     Mixed hyperlipidemia - continue statin and diet control    Stable cardiac-wise for discharge  Cardiology will sign off  Please call if needed    Subjective:   Patient feeling better  He denies chest pain, SOB, palpitations, lightheadedness  REVIEW OF SYSTEMS:  Constitutional: Denies fever or chills  Eyes: Denies eye discharge or change in visual acuity   HENT: Denies sneezing, nasal congestion or sore throat   Respiratory: Denies cough, expectoration or shortness of breath   Cardiovascular: Denies chest pain, palpitations or lower extremity swelling   GI: Denies abdominal pain, nausea, vomiting, bloody stools or diarrhea   : Denies dysuria, difficulty in micturition  Musculoskeletal: Denies back pain or joint pain   Neurologic: Denies lightheadedness, syncope, focal weakness or sensory changes   Endocrine: Denies polyuria or polydipsia   Lymphatic: Denies swollen glands   Psychiatric: Denies depression, anxiety or panic       Objective:   Vitals:  Vitals:    02/26/22 0729   BP: 123/79   Pulse: 95   Resp: 18   Temp: (!) 97 3 °F (36 3 °C)   SpO2: 94%       Body mass index is 29 2 kg/m²    Systolic (20WUI), SOY:177 , Min:108 , XSM:961     Diastolic (61YUY), Av, Min:56, Max:79      Intake/Output Summary (Last 24 hours) at 2022 0930  Last data filed at 2022 0753  Gross per 24 hour   Intake --   Output 705 ml   Net -705 ml     Weight (last 2 days)     Date/Time Weight    22 0600 92 3 (203 48)    22 0542 90 2 (198 86)          PHYSICAL EXAM:  General: Patient is not in acute distress  Laying comfortably in the bed  Awake, alert, responding to commands  Head: Right face surgical scar with draining tube+  HEENT: Nonicteric  Neck: JVP not raised  Trachea central  No thyromegaly  Respiratory: Bilateral bronchovascular breath sounds with no crackles or rhonchi  Cardiovascular: RRR  S1 and S2 normal  No murmur, rub or gallop  GI: Abdomen soft, nontender  No guarding or rigidity  Liver and spleen not palpable  Bowel sounds present  Neurologic: Patient is awake, alert, responding to command  Moving all extremities  Integumentary:  No skin rash  Lymphatic: No cervical lymphadenopathy  Extremities: No clubbing, cyanosis or edema      LABORATORY RESULTS:      CBC with diff:   Results from last 7 days   Lab Units 22  0902 22  0842 22  0443 22  0518 22  0546 22  1359 22  1359   WBC Thousand/uL 9 92 10 28* 9 94   < > 8 01   < > 7 70   HEMOGLOBIN g/dL 15 0 14 8 14 9   < > 14 1   < > 15 2   HEMATOCRIT % 47 7 46 4 47 3   < > 42 5   < > 47 7   MCV fL 99* 98 99*   < > 97   < > 99*   PLATELETS Thousands/uL 201 197 198   < > 164   < > 180   MCH pg 31 2 31 3 31 0   < > 32 3   < > 31 4   MCHC g/dL 31 4 31 9 31 5   < > 33 2   < > 31 9   RDW % 15 1 15 1 15 3*   < > 15 7*   < > 16 0*   MPV fL 9 9 9 8 10 5   < > 10 0   < > 10 0   NRBC AUTO /100 WBCs  --   --   --   --  0  --  0    < > = values in this interval not displayed         CMP:  Results from last 7 days   Lab Units 22  0842 22  0443 22  0518 22  0546 22  0546 22  1359 22  1359   POTASSIUM mmol/L 4 1 4 4 4 0   < > 3 6   < > 4 6 CHLORIDE mmol/L 96* 94* 99*   < > 99*   < > 98*   CO2 mmol/L 27 28 27   < > 22   < > 23   BUN mg/dL 22 20 18   < > 17   < > 18   CREATININE mg/dL 1 45* 1 57* 1 45*   < > 1 26   < > 1 41*   CALCIUM mg/dL 8 6 8 3 8 3   < > 8 2*   < > 8 8   AST U/L  --   --   --   --  21  --  26   ALT U/L  --   --   --   --  13  --  15   ALK PHOS U/L  --   --   --   --  131*  --  153*   EGFR ml/min/1 73sq m 43 39 43   < > 52   < > 45    < > = values in this interval not displayed  BMP:  Results from last 7 days   Lab Units 22  0842 22  0443 22  0518   POTASSIUM mmol/L 4 1 4 4 4 0   CHLORIDE mmol/L 96* 94* 99*   CO2 mmol/L 27 28 27   BUN mg/dL 22 20 18   CREATININE mg/dL 1 45* 1 57* 1 45*   CALCIUM mg/dL 8 6 8 3 8 3       Results from last 7 days   Lab Units 22  1359   NT-PRO BNP pg/mL 3,166*          Results from last 7 days   Lab Units 22  1359   INR  1 83*       Cardiac testing:   Results for orders placed during the hospital encounter of 19    Echo complete with contrast if indicated    [address] [address] [phone]    Transthoracic Echocardiogram  2D, M-mode, Doppler, and Color Doppler    Study date:  05-Dec-2019    Patient: Linda Lind  MR number: [id number]  Account number: [de-identified]  : 1935  Age: 82 years  Gender: Male  Status: Inpatient  Location: Bedside  Height: 69 in  Weight: 189 lb  BP: 122/ 60 mmHg    Indications: Murmur      Diagnoses: R01 1 - Cardiac murmur, unspecified    Sonographer:  April Sourav Pizano RDCS  Referring Physician:  ANNA MARIE Huang  Group:  [facility]  Interpreting Physician:  Burgess Xochitl MD    IMPRESSIONS:  Moderately reduced LV systolic function LVEF around 40%  Biatrial dilation  Moderate aortic stenosis  Mild MR, Mild TR, trace AI  poor endocardial resolution, oblique views  Definity was used    SUMMARY    LEFT VENTRICLE:  Size was normal   Systolic function was moderately reduced  LVEF around 40%  There was mild diffuse hypokinesis  Wall thickness was normal   The study was not technically sufficient to allow evaluation of LV diastolic function  VENTRICULAR SEPTUM:  There was paradoxical motion  RIGHT VENTRICLE:  The ventricle was mildly dilated  Systolic function was mildly reduced  LEFT ATRIUM:  The atrium was mildly to moderately dilated  RIGHT ATRIUM:  The atrium was mildly to moderately dilated  MITRAL VALVE:  There was moderate annular calcification  There was mild thickening  There was mild regurgitation  AORTIC VALVE:  Leaflets exhibited moderate calcification  There was moderate stenosis  Aortic valve area around 1 2cm2 using average VTI  Due to limited view and pateint in A fib accurate aortic valve area cannot be measured  Mean Pressure gradient is <20mmHg and Maximum peak velocity across aortic valve is <2 5m/s which is  in mild range  There was trace regurgitation  TRICUSPID VALVE:  There was trace regurgitation  AORTA:  The root exhibited normal size  PERICARDIUM:  There was no pericardial effusion  HISTORY: Fever  Fatigue  PRIOR HISTORY: Pleural effusion, Septic shock, SHREYA, Positive D dimer, Atrial fibrillation  Risk factors: hypertension and hypercholesterolemia  PROCEDURE: The procedure was performed at the bedside  This was a routine study  The transthoracic approach was used  The study included complete 2D imaging, M-mode, complete spectral Doppler, and color Doppler  The heart rate was 91 bpm,  at the start of the study  Images were obtained from the parasternal, apical, subcostal, and suprasternal notch acoustic windows  Intravenous contrast (  6mL of Definity in NSS) was administered to opacify the left ventricle  Echocardiographic views were limited due to poor acoustic window availability, decreased penetration, and lung interference   This was a technically difficult study     LEFT VENTRICLE: Size was normal  Systolic function was moderately reduced  LVEF around 40% There was mild diffuse hypokinesis  Wall thickness was normal  DOPPLER: The study was not technically sufficient to allow evaluation of LV diastolic  function  VENTRICULAR SEPTUM: There was paradoxical motion  RIGHT VENTRICLE: The ventricle was mildly dilated  Systolic function was mildly reduced  LEFT ATRIUM: The atrium was mildly to moderately dilated  RIGHT ATRIUM: The atrium was mildly to moderately dilated  MITRAL VALVE: There was moderate annular calcification  There was mild thickening  DOPPLER: There was mild regurgitation  AORTIC VALVE: Leaflets exhibited moderate calcification DOPPLER: There was moderate stenosis  Aortic valve area around 1 2cm2 using average VTI  Due to limited view and pateint in A fib accurate aortic valve area cannot be measured  Mean Pressure gradient is <20mmHg and Maximum peak velocity across aortic valve is <2 5m/s which is  in mild range There was trace regurgitation  TRICUSPID VALVE: DOPPLER: There was trace regurgitation  PULMONIC VALVE: Not well visualized  PERICARDIUM: There was no pericardial effusion  AORTA: The root exhibited normal size  SYSTEM MEASUREMENT TABLES    2D  %FS: 20 9 %  Ao Diam: 3 2 cm  EDV(Teich): 99 ml  EF(Teich): 42 5 %  ESV(Teich): 56 9 ml  IVSd: 1 1 cm  LA Diam: 3 3 cm  LVIDd: 4 6 cm  LVIDs: 3 7 cm  LVPWd: 1 cm  SV(Teich): 42 1 ml    CW  AR Dec [place]: 1 8 m/s2  AR Dec Time: 1961 6 ms  AR PHT: 568 9 ms  AR Vmax: 3 5 m/s  AR maxP mmHg  AV Env  Ti: 208 1 ms  AV VTI: 31 7 cm  AV Vmax: 1 9 m/s  AV Vmean: 1 5 m/s  AV maxP 9 mmHg  AV meanPG: 10 mmHg  TR Vmax: 2 5 m/s  TR maxP 1 mmHg    MM  TAPSE: 0 8 cm    PW  LVOT Env  Ti: 239 8 ms  LVOT VTI: 9 3 cm  LVOT Vmax: 0 5 m/s  LVOT Vmean: 0 4 m/s  LVOT maxP 1 mmHg  LVOT meanP 7 mmHg    [employer] Accredited Echocardiography Laboratory    Prepared and electronically signed by    Jim Beyer MD  Signed 05-Dec-2019 17:03:02      Meds/Allergies   current meds:   Current Facility-Administered Medications   Medication Dose Route Frequency    acetaminophen (TYLENOL) tablet 650 mg  650 mg Oral Q6H PRN    albuterol (PROVENTIL HFA,VENTOLIN HFA) inhaler 2 puff  2 puff Inhalation Q4H PRN    apixaban (ELIQUIS) tablet 2 5 mg  2 5 mg Oral BID    atorvastatin (LIPITOR) tablet 40 mg  40 mg Oral Daily With Dinner    bacitracin topical ointment 1 small application  1 small application Topical TID    cephalexin (KEFLEX) capsule 500 mg  500 mg Oral Q6H [address]    guaiFENesin (MUCINEX) 12 hr tablet 1,200 mg  1,200 mg Oral Q12H PRN    levothyroxine tablet 75 mcg  75 mcg Oral Daily Before Breakfast    metoprolol succinate (TOPROL-XL) 24 hr tablet 25 mg  25 mg Oral BID    ondansetron (ZOFRAN) injection 4 mg  4 mg Intravenous Q6H PRN    polyethylene glycol (MIRALAX) packet 17 g  17 g Oral Daily    senna (SENOKOT) tablet 8 6 mg  1 tablet Oral HS     Medications Prior to Admission   Medication    apixaban (ELIQUIS) 5 mg    furosemide (LASIX) 40 mg tablet    levothyroxine 50 mcg tablet    metoprolol succinate (TOPROL-XL) 50 mg 24 hr tablet    albuterol (PROVENTIL HFA,VENTOLIN HFA) 90 mcg/act inhaler    guaiFENesin (MUCINEX) 600 mg 12 hr tablet    rosuvastatin (CRESTOR) 10 MG tablet          Yola Curtis MD  2/26/2022,9:30 AM      Portions of the record may have been created with voice recognition software  Occasional wrong words or sound alike substitutions may have occurred due to the inherent limitations of voice recognition software  Please read the chart carefully and recognize, using context, where substitutions may have occurred

## 2022-02-28 ENCOUNTER — TELEPHONE (OUTPATIENT)
Dept: CARDIOLOGY CLINIC | Facility: CLINIC | Age: 87
End: 2022-02-28

## 2022-02-28 NOTE — UTILIZATION REVIEW
Notification of Discharge   This is a Notification of Discharge from our facility [address]  Please be advised that this patient has been discharge from our facility  Below you will find the admission and discharge date and time including the patients disposition  UTILIZATION REVIEW CONTACT:  Emeka Slaeh  Utilization   Network Utilization Review Department  Phone: [phone] x carefully listen to the prompts  All voicemails are confidential   Email: [email or web address]     PHYSICIAN ADVISORY SERVICES:  FOR IVEQ-OZ-DABW REVIEW - MEDICAL NECESSITY DENIAL  Phone: [phone]  Fax: [phone]  Email: [email or web address]     PRESENTATION DATE: 2/21/2022  1:10 PM  OBERVATION ADMISSION DATE: [id number]    INPATIENT ADMISSION DATE: 2/22/22 12:51 PM   DISCHARGE DATE: 2/26/2022  1:45 PM  [facility]      IMPORTANT INFORMATION:  Send all requests for admission clinical reviews, approved or denied determinations and any other requests to dedicated fax number below belonging to the campus where the patient is receiving treatment   List of dedicated fax numbers:  [address] [place] [address] DENIALS (Administrative/Medical Necessity) [phone]   [address]  (Maternity/NICU/Pediatrics) [phone]   Suellen Handing [phone]   [address] [place] [address] [phone]   [address] [place] [address] [phone]   [address] [place] [address],[address] [phone]   [facility]  [phone]   [address] [place] [address], [facility]  [address] [address] [facility] [phone]

## 2022-02-28 NOTE — TELEPHONE ENCOUNTER
----- Message from ANNA MARIE Bui sent at 2/26/2022  9:32 AM EST -----  Regarding: Hospital follow ups  Cardiology Follow-up:    Patient clinical visit in 1 week at the Cardio location: [place] office. .    Schedule visit with Cardio [facility] Providers: first available provider.    Type of Visit: VISIT TYPE: in-person office visit.    Test ordered: none     Additional Details:

## 2022-03-02 ENCOUNTER — OFFICE VISIT (OUTPATIENT)
Dept: PLASTIC SURGERY | Facility: CLINIC | Age: 87
End: 2022-03-02

## 2022-03-02 DIAGNOSIS — C44.300 SKIN CANCER OF FACE: Primary | ICD-10-CM

## 2022-03-02 DIAGNOSIS — Z98.890 STATUS POST INCISION AND DRAINAGE: ICD-10-CM

## 2022-03-02 PROCEDURE — 99024 POSTOP FOLLOW-UP VISIT: CPT | Performed by: PHYSICIAN ASSISTANT

## 2022-03-02 NOTE — PROGRESS NOTES
Assessment/Plan:   Patient is an 24-year-old male who presents for a 1st postoperative visit after incision and drainage of a hematoma status post Mohs surgery at [facility]  Patient's hematoma drained by Dr Rina Pires on 02/23/2022  Please see HPI  Upon evaluation today, the patient had significant eschar surrounding his incision site  Incision site is intact  Please see photo in media  Drain output had not been tracked or emptied since discharge from the hospital   The patient had scant dark serosanguineous drainage within the bulb  Discussed with Dr Rina Pires  Drain was pulled  Eschar was debrided as able and facial sutures removed  Patient advised to cover incision 2-3 times daily with antibiotic ointment  Patient will return to the office in 2 weeks for an incision check  Patient advised that if he has significant bleeding from his incision site or any reaccumulation of fluid that he should seek care immediately  Patient was understanding  Diagnoses and all orders for this visit:    Skin cancer of face    Status post incision and drainage          Subjective:     Patient ID: Rudi Kessler is a 82 y o  male  HPI     Patient presents to the office today  He states that he has not touched his drain since he left the hospital  He was unable to change his shirt for fear of disrupting the drain  The patient noticed that he had significant bleeding from the incision site yesterday  Patient has a significant amount of dried blood and after surrounding the incision site  Patient denies complaints and states that the area is numb  Review of Systems    See HPI    Objective:     Physical Exam      Incision site with significant dried blood and are as detailed above  Sutures are intact  There was minimal bleeding with removal of eschar  Drain site also covered in eschar  Drain is dark serosanguineous

## 2022-03-15 ENCOUNTER — OFFICE VISIT (OUTPATIENT)
Dept: PLASTIC SURGERY | Facility: CLINIC | Age: 87
End: 2022-03-15

## 2022-03-15 DIAGNOSIS — Z98.890 STATUS POST INCISION AND DRAINAGE: Primary | ICD-10-CM

## 2022-03-15 PROCEDURE — 99024 POSTOP FOLLOW-UP VISIT: CPT | Performed by: PLASTIC SURGERY

## 2022-03-15 NOTE — PROGRESS NOTES
Patient is an 44-year-old male who is here today for follow-up status post hematoma evacuation secondary to Mohs surgery complicated by her large hematoma at outside institution  The patient had his sutures removed approximately 1 week ago, and his drain removed as well  The patient states that he is doing well, he has no pain, he states that he has some numbness in that area, but he has been performing local wound care and is otherwise doing well  He has no fever, chills, nausea, vomiting, no redness, swelling, drainage    Physical exam--patient with some scattered areas of superficial eschar, his incision is well healing, his incisions are clean dry and intact, he has no evidence of reaccumulation of fluid  Discussion--discussed with the patient that he should continue local wound care, he is otherwise very well healing, he can follow-up in approximately 4 weeks for recheck  Patient states he would like to transfer his care here as opposed to [facility] as this is much more convenient to the patient, discussed with the patient I am more than happy to assume his care

## 2022-10-24 ENCOUNTER — APPOINTMENT (EMERGENCY)
Dept: RADIOLOGY | Facility: HOSPITAL | Age: 87
End: 2022-10-24

## 2022-10-24 ENCOUNTER — HOSPITAL ENCOUNTER (INPATIENT)
Facility: HOSPITAL | Age: 87
LOS: 8 days | End: 2022-11-01
Attending: EMERGENCY MEDICINE | Admitting: ANESTHESIOLOGY

## 2022-10-24 DIAGNOSIS — Z01.89 ENCOUNTER FOR COMPETENCY EVALUATION: ICD-10-CM

## 2022-10-24 DIAGNOSIS — I50.9 CHF (CONGESTIVE HEART FAILURE) (HCC): Primary | ICD-10-CM

## 2022-10-24 DIAGNOSIS — R65.10 SIRS (SYSTEMIC INFLAMMATORY RESPONSE SYNDROME) (HCC): ICD-10-CM

## 2022-10-24 DIAGNOSIS — I50.21 ACUTE SYSTOLIC CHF (CONGESTIVE HEART FAILURE) (HCC): ICD-10-CM

## 2022-10-24 DIAGNOSIS — R78.81 BACTEREMIA: ICD-10-CM

## 2022-10-24 DIAGNOSIS — R57.0 CARDIOGENIC SHOCK (HCC): ICD-10-CM

## 2022-10-24 PROBLEM — E03.9 HYPOTHYROIDISM: Status: ACTIVE | Noted: 2022-10-24

## 2022-10-24 PROBLEM — J96.20 ACUTE ON CHRONIC RESPIRATORY FAILURE (HCC): Status: ACTIVE | Noted: 2022-10-24

## 2022-10-24 LAB
ALBUMIN SERPL BCP-MCNC: 2.5 G/DL (ref 3.5–5)
ALP SERPL-CCNC: 143 U/L (ref 46–116)
ALT SERPL W P-5'-P-CCNC: 16 U/L (ref 12–78)
ANION GAP SERPL CALCULATED.3IONS-SCNC: 11 MMOL/L (ref 4–13)
APTT PPP: 45 SECONDS (ref 23–37)
AST SERPL W P-5'-P-CCNC: 41 U/L (ref 5–45)
BASE EX.OXY STD BLDV CALC-SCNC: 47 % (ref 60–80)
BASE EXCESS BLDV CALC-SCNC: -4.1 MMOL/L
BASOPHILS # BLD AUTO: 0.08 THOUSANDS/ÂΜL (ref 0–0.1)
BASOPHILS NFR BLD AUTO: 1 % (ref 0–1)
BILIRUB SERPL-MCNC: 3.35 MG/DL (ref 0.2–1)
BUN SERPL-MCNC: 42 MG/DL (ref 5–25)
CALCIUM ALBUM COR SERPL-MCNC: 9.8 MG/DL (ref 8.3–10.1)
CALCIUM SERPL-MCNC: 8.6 MG/DL (ref 8.3–10.1)
CARDIAC TROPONIN I PNL SERPL HS: 28 NG/L
CHLORIDE SERPL-SCNC: 96 MMOL/L (ref 96–108)
CO2 SERPL-SCNC: 23 MMOL/L (ref 21–32)
CREAT SERPL-MCNC: 2.12 MG/DL (ref 0.6–1.3)
EOSINOPHIL # BLD AUTO: 0.21 THOUSAND/ÂΜL (ref 0–0.61)
EOSINOPHIL NFR BLD AUTO: 3 % (ref 0–6)
ERYTHROCYTE [DISTWIDTH] IN BLOOD BY AUTOMATED COUNT: 22.1 % (ref 11.6–15.1)
GAS + CO PNL BLDA: 2.3 % (ref 0–1.5)
GFR SERPL CREATININE-BSD FRML MDRD: 27 ML/MIN/1.73SQ M
GLUCOSE SERPL-MCNC: 94 MG/DL (ref 65–140)
HCO3 BLDV-SCNC: 22.8 MMOL/L (ref 24–30)
HCT VFR BLD AUTO: 40.9 % (ref 36.5–49.3)
HGB BLD-MCNC: 13.1 G/DL (ref 12–17)
IMM GRANULOCYTES # BLD AUTO: 0.04 THOUSAND/UL (ref 0–0.2)
IMM GRANULOCYTES NFR BLD AUTO: 1 % (ref 0–2)
INR PPP: 1.77 (ref 0.84–1.19)
LACTATE SERPL-SCNC: 1.9 MMOL/L (ref 0.5–2)
LYMPHOCYTES # BLD AUTO: 1.12 THOUSANDS/ÂΜL (ref 0.6–4.47)
LYMPHOCYTES NFR BLD AUTO: 18 % (ref 14–44)
MCH RBC QN AUTO: 27.2 PG (ref 26.8–34.3)
MCHC RBC AUTO-ENTMCNC: 32 G/DL (ref 31.4–37.4)
MCV RBC AUTO: 85 FL (ref 82–98)
MONOCYTES # BLD AUTO: 0.71 THOUSAND/ÂΜL (ref 0.17–1.22)
MONOCYTES NFR BLD AUTO: 12 % (ref 4–12)
NEUTROPHILS # BLD AUTO: 3.93 THOUSANDS/ÂΜL (ref 1.85–7.62)
NEUTS SEG NFR BLD AUTO: 65 % (ref 43–75)
NRBC BLD AUTO-RTO: 0 /100 WBCS
NT-PROBNP SERPL-MCNC: 6597 PG/ML
O2 CT BLDV-SCNC: 9.4 ML/DL
PCO2 BLDV: 48.7 MM HG (ref 42–50)
PH BLDV: 7.29 [PH] (ref 7.3–7.4)
PLATELET # BLD AUTO: 149 THOUSANDS/UL (ref 149–390)
PMV BLD AUTO: 9 FL (ref 8.9–12.7)
PO2 BLDV: 29.1 MM HG (ref 35–45)
POTASSIUM SERPL-SCNC: 5 MMOL/L (ref 3.5–5.3)
PROCALCITONIN SERPL-MCNC: 0.17 NG/ML
PROT SERPL-MCNC: 8 G/DL (ref 6.4–8.4)
PROTHROMBIN TIME: 20.2 SECONDS (ref 11.6–14.5)
RBC # BLD AUTO: 4.81 MILLION/UL (ref 3.88–5.62)
SODIUM SERPL-SCNC: 130 MMOL/L (ref 135–147)
WBC # BLD AUTO: 6.09 THOUSAND/UL (ref 4.31–10.16)

## 2022-10-24 RX ORDER — FUROSEMIDE 10 MG/ML
40 INJECTION INTRAMUSCULAR; INTRAVENOUS ONCE
Status: DISCONTINUED | OUTPATIENT
Start: 2022-10-24 | End: 2022-10-25

## 2022-10-24 RX ORDER — BISACODYL 10 MG
10 SUPPOSITORY, RECTAL RECTAL DAILY PRN
Status: DISCONTINUED | OUTPATIENT
Start: 2022-10-24 | End: 2022-11-01

## 2022-10-24 RX ORDER — HEPARIN SODIUM 5000 [USP'U]/ML
5000 INJECTION, SOLUTION INTRAVENOUS; SUBCUTANEOUS EVERY 8 HOURS SCHEDULED
Status: DISCONTINUED | OUTPATIENT
Start: 2022-10-24 | End: 2022-10-24

## 2022-10-24 RX ORDER — IPRATROPIUM BROMIDE AND ALBUTEROL SULFATE .5; 3 MG/3ML; MG/3ML
1 SOLUTION RESPIRATORY (INHALATION) ONCE
Status: COMPLETED | OUTPATIENT
Start: 2022-10-24 | End: 2022-10-24

## 2022-10-24 RX ORDER — ALBUMIN (HUMAN) 12.5 G/50ML
12.5 SOLUTION INTRAVENOUS ONCE
Status: DISCONTINUED | OUTPATIENT
Start: 2022-10-24 | End: 2022-10-24

## 2022-10-24 RX ADMIN — NOREPINEPHRINE BITARTRATE 5 MCG/MIN: 1 INJECTION, SOLUTION, CONCENTRATE INTRAVENOUS at 22:39

## 2022-10-25 ENCOUNTER — APPOINTMENT (INPATIENT)
Dept: NON INVASIVE DIAGNOSTICS | Facility: HOSPITAL | Age: 87
End: 2022-10-25

## 2022-10-25 ENCOUNTER — APPOINTMENT (INPATIENT)
Dept: RADIOLOGY | Facility: HOSPITAL | Age: 87
End: 2022-10-25
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
## 2022-10-25 LAB
2HR DELTA HS TROPONIN: -3 NG/L
4HR DELTA HS TROPONIN: -3 NG/L
ALBUMIN SERPL BCP-MCNC: 2.3 G/DL (ref 3.5–5)
ALP SERPL-CCNC: 133 U/L (ref 46–116)
ALT SERPL W P-5'-P-CCNC: 16 U/L (ref 12–78)
ANION GAP SERPL CALCULATED.3IONS-SCNC: 10 MMOL/L (ref 4–13)
ANION GAP SERPL CALCULATED.3IONS-SCNC: 11 MMOL/L (ref 4–13)
ANION GAP SERPL CALCULATED.3IONS-SCNC: 12 MMOL/L (ref 4–13)
ANION GAP SERPL CALCULATED.3IONS-SCNC: 13 MMOL/L (ref 4–13)
AORTIC ROOT: 3.3 CM
ASCENDING AORTA: 3.2 CM
AST SERPL W P-5'-P-CCNC: 40 U/L (ref 5–45)
ATRIAL RATE: 101 BPM
ATRIAL RATE: 131 BPM
BACTERIA UR QL AUTO: ABNORMAL /HPF
BASE EX.OXY STD BLDV CALC-SCNC: 53.8 % (ref 60–80)
BASE EX.OXY STD BLDV CALC-SCNC: 78.5 % (ref 60–80)
BASE EX.OXY STD BLDV CALC-SCNC: 78.5 % (ref 60–80)
BASE EX.OXY STD BLDV CALC-SCNC: 81.8 % (ref 60–80)
BASE EX.OXY STD BLDV CALC-SCNC: 82.2 % (ref 60–80)
BASE EXCESS BLDA CALC-SCNC: -5.5 MMOL/L
BASE EXCESS BLDV CALC-SCNC: -1.4 MMOL/L
BASE EXCESS BLDV CALC-SCNC: -2.7 MMOL/L
BASE EXCESS BLDV CALC-SCNC: -3.1 MMOL/L
BASE EXCESS BLDV CALC-SCNC: -3.3 MMOL/L
BASE EXCESS BLDV CALC-SCNC: -4.4 MMOL/L
BASOPHILS # BLD AUTO: 0.08 THOUSANDS/ÂΜL (ref 0–0.1)
BASOPHILS NFR BLD AUTO: 1 % (ref 0–1)
BILIRUB SERPL-MCNC: 2.9 MG/DL (ref 0.2–1)
BILIRUB UR QL STRIP: NEGATIVE
BUN SERPL-MCNC: 37 MG/DL (ref 5–25)
BUN SERPL-MCNC: 38 MG/DL (ref 5–25)
BUN SERPL-MCNC: 38 MG/DL (ref 5–25)
BUN SERPL-MCNC: 42 MG/DL (ref 5–25)
CA-I BLD-SCNC: 1 MMOL/L (ref 1.12–1.32)
CA-I BLD-SCNC: 1.12 MMOL/L (ref 1.12–1.32)
CALCIUM ALBUM COR SERPL-MCNC: 10.4 MG/DL (ref 8.3–10.1)
CALCIUM SERPL-MCNC: 8.3 MG/DL (ref 8.3–10.1)
CALCIUM SERPL-MCNC: 8.3 MG/DL (ref 8.3–10.1)
CALCIUM SERPL-MCNC: 8.7 MG/DL (ref 8.3–10.1)
CALCIUM SERPL-MCNC: 9 MG/DL (ref 8.3–10.1)
CARDIAC TROPONIN I PNL SERPL HS: 25 NG/L
CARDIAC TROPONIN I PNL SERPL HS: 25 NG/L
CHLORIDE SERPL-SCNC: 97 MMOL/L (ref 96–108)
CHLORIDE SERPL-SCNC: 98 MMOL/L (ref 96–108)
CHOLEST SERPL-MCNC: 66 MG/DL
CK SERPL-CCNC: 65 U/L (ref 39–308)
CK SERPL-CCNC: 66 U/L (ref 39–308)
CLARITY UR: ABNORMAL
CO2 SERPL-SCNC: 21 MMOL/L (ref 21–32)
CO2 SERPL-SCNC: 22 MMOL/L (ref 21–32)
CO2 SERPL-SCNC: 23 MMOL/L (ref 21–32)
CO2 SERPL-SCNC: 23 MMOL/L (ref 21–32)
COLOR UR: YELLOW
CREAT SERPL-MCNC: 1.97 MG/DL (ref 0.6–1.3)
CREAT SERPL-MCNC: 1.97 MG/DL (ref 0.6–1.3)
CREAT SERPL-MCNC: 2 MG/DL (ref 0.6–1.3)
CREAT SERPL-MCNC: 2.02 MG/DL (ref 0.6–1.3)
EOSINOPHIL # BLD AUTO: 0.11 THOUSAND/ÂΜL (ref 0–0.61)
EOSINOPHIL NFR BLD AUTO: 1 % (ref 0–6)
ERYTHROCYTE [DISTWIDTH] IN BLOOD BY AUTOMATED COUNT: 21.7 % (ref 11.6–15.1)
FRACTIONAL SHORTENING: 34 % (ref 28–44)
GFR SERPL CREATININE-BSD FRML MDRD: 29 ML/MIN/1.73SQ M
GFR SERPL CREATININE-BSD FRML MDRD: 29 ML/MIN/1.73SQ M
GFR SERPL CREATININE-BSD FRML MDRD: 30 ML/MIN/1.73SQ M
GFR SERPL CREATININE-BSD FRML MDRD: 30 ML/MIN/1.73SQ M
GLUCOSE SERPL-MCNC: 104 MG/DL (ref 65–140)
GLUCOSE SERPL-MCNC: 107 MG/DL (ref 65–140)
GLUCOSE SERPL-MCNC: 113 MG/DL (ref 65–140)
GLUCOSE SERPL-MCNC: 115 MG/DL (ref 65–140)
GLUCOSE SERPL-MCNC: 119 MG/DL (ref 65–140)
GLUCOSE SERPL-MCNC: 122 MG/DL (ref 65–140)
GLUCOSE SERPL-MCNC: 125 MG/DL (ref 65–140)
GLUCOSE UR STRIP-MCNC: NEGATIVE MG/DL
HCO3 BLDA-SCNC: 19 MMOL/L (ref 22–28)
HCO3 BLDV-SCNC: 21 MMOL/L (ref 24–30)
HCO3 BLDV-SCNC: 22 MMOL/L (ref 24–30)
HCO3 BLDV-SCNC: 22.1 MMOL/L (ref 24–30)
HCO3 BLDV-SCNC: 22.7 MMOL/L (ref 24–30)
HCO3 BLDV-SCNC: 23.4 MMOL/L (ref 24–30)
HCT VFR BLD AUTO: 37.2 % (ref 36.5–49.3)
HDLC SERPL-MCNC: 15 MG/DL
HGB BLD-MCNC: 12.3 G/DL (ref 12–17)
HGB UR QL STRIP.AUTO: ABNORMAL
HYALINE CASTS #/AREA URNS LPF: ABNORMAL /LPF
IMM GRANULOCYTES # BLD AUTO: 0.03 THOUSAND/UL (ref 0–0.2)
IMM GRANULOCYTES NFR BLD AUTO: 0 % (ref 0–2)
INR PPP: 1.75 (ref 0.84–1.19)
INTERVENTRICULAR SEPTUM IN DIASTOLE (PARASTERNAL SHORT AXIS VIEW): 0.9 CM
INTERVENTRICULAR SEPTUM: 0.9 CM (ref 0.6–1.1)
IPAP: 12
IPAP: 12
KETONES UR STRIP-MCNC: NEGATIVE MG/DL
LAAS-AP2: 21.5 CM2
LAAS-AP4: 20.5 CM2
LDLC SERPL CALC-MCNC: 37 MG/DL (ref 0–100)
LEFT ATRIUM AREA SYSTOLE SINGLE PLANE A4C: 21 CM2
LEFT ATRIUM SIZE: 5.4 CM
LEFT INTERNAL DIMENSION IN SYSTOLE: 3.5 CM (ref 2.1–4)
LEFT VENTRICULAR INTERNAL DIMENSION IN DIASTOLE: 5.3 CM (ref 3.5–6)
LEFT VENTRICULAR POSTERIOR WALL IN END DIASTOLE: 1 CM
LEFT VENTRICULAR STROKE VOLUME: 81 ML
LEUKOCYTE ESTERASE UR QL STRIP: ABNORMAL
LIPASE SERPL-CCNC: 67 U/L (ref 73–393)
LVSV (TEICH): 81 ML
LYMPHOCYTES # BLD AUTO: 0.85 THOUSANDS/ÂΜL (ref 0.6–4.47)
LYMPHOCYTES NFR BLD AUTO: 11 % (ref 14–44)
MAGNESIUM SERPL-MCNC: 2 MG/DL (ref 1.6–2.6)
MAGNESIUM SERPL-MCNC: 2 MG/DL (ref 1.6–2.6)
MAGNESIUM SERPL-MCNC: 2.1 MG/DL (ref 1.6–2.6)
MCH RBC QN AUTO: 26.7 PG (ref 26.8–34.3)
MCHC RBC AUTO-ENTMCNC: 33.1 G/DL (ref 31.4–37.4)
MCV RBC AUTO: 81 FL (ref 82–98)
MONOCYTES # BLD AUTO: 0.88 THOUSAND/ÂΜL (ref 0.17–1.22)
MONOCYTES NFR BLD AUTO: 11 % (ref 4–12)
MUCOUS THREADS UR QL AUTO: ABNORMAL
NASAL CANNULA: 5
NEUTROPHILS # BLD AUTO: 5.87 THOUSANDS/ÂΜL (ref 1.85–7.62)
NEUTS SEG NFR BLD AUTO: 76 % (ref 43–75)
NITRITE UR QL STRIP: POSITIVE
NON VENT- BIPAP: ABNORMAL
NON VENT- BIPAP: ABNORMAL
NON-SQ EPI CELLS URNS QL MICRO: ABNORMAL /HPF
NONHDLC SERPL-MCNC: 51 MG/DL
NRBC BLD AUTO-RTO: 0 /100 WBCS
O2 CT BLDA-SCNC: 19.8 ML/DL (ref 16–23)
O2 CT BLDV-SCNC: 11.3 ML/DL
O2 CT BLDV-SCNC: 14.6 ML/DL
O2 CT BLDV-SCNC: 14.9 ML/DL
O2 CT BLDV-SCNC: 15.3 ML/DL
O2 CT BLDV-SCNC: 15.6 ML/DL
OXYHGB MFR BLDA: 97.7 % (ref 94–97)
PA SYSTOLIC PRESSURE: 45 MMHG
PCO2 BLDA: 34.4 MM HG (ref 36–44)
PCO2 BLDV: 35.4 MM HG (ref 42–50)
PCO2 BLDV: 38.4 MM HG (ref 42–50)
PCO2 BLDV: 39.7 MM HG (ref 42–50)
PCO2 BLDV: 39.8 MM HG (ref 42–50)
PCO2 BLDV: 49.1 MM HG (ref 42–50)
PEEP MAX SETTING VENT: 6 CM[H2O]
PEEP MAX SETTING VENT: 8 CM[H2O]
PH BLDA: 7.36 [PH] (ref 7.35–7.45)
PH BLDV: 7.28 [PH] (ref 7.3–7.4)
PH BLDV: 7.36 [PH] (ref 7.3–7.4)
PH BLDV: 7.38 [PH] (ref 7.3–7.4)
PH BLDV: 7.39 [PH] (ref 7.3–7.4)
PH BLDV: 7.39 [PH] (ref 7.3–7.4)
PH UR STRIP.AUTO: 5 [PH]
PHOSPHATE SERPL-MCNC: 4.3 MG/DL (ref 2.3–4.1)
PHOSPHATE SERPL-MCNC: 4.3 MG/DL (ref 2.3–4.1)
PLATELET # BLD AUTO: 170 THOUSANDS/UL (ref 149–390)
PMV BLD AUTO: 9.2 FL (ref 8.9–12.7)
PO2 BLDA: 135 MM HG (ref 75–129)
PO2 BLDV: 32.7 MM HG (ref 35–45)
PO2 BLDV: 42.8 MM HG (ref 35–45)
PO2 BLDV: 43 MM HG (ref 35–45)
PO2 BLDV: 46.9 MM HG (ref 35–45)
PO2 BLDV: 50.7 MM HG (ref 35–45)
POTASSIUM SERPL-SCNC: 3.6 MMOL/L (ref 3.5–5.3)
POTASSIUM SERPL-SCNC: 3.6 MMOL/L (ref 3.5–5.3)
POTASSIUM SERPL-SCNC: 3.9 MMOL/L (ref 3.5–5.3)
POTASSIUM SERPL-SCNC: 4.3 MMOL/L (ref 3.5–5.3)
PROT SERPL-MCNC: 7.4 G/DL (ref 6.4–8.4)
PROT UR STRIP-MCNC: ABNORMAL MG/DL
PROTHROMBIN TIME: 20.1 SECONDS (ref 11.6–14.5)
QRS AXIS: 106 DEGREES
QRS AXIS: 79 DEGREES
QRSD INTERVAL: 100 MS
QRSD INTERVAL: 98 MS
QT INTERVAL: 288 MS
QT INTERVAL: 354 MS
QTC INTERVAL: 380 MS
QTC INTERVAL: 451 MS
RBC # BLD AUTO: 4.6 MILLION/UL (ref 3.88–5.62)
RBC #/AREA URNS AUTO: ABNORMAL /HPF
RIGHT ATRIUM AREA SYSTOLE A4C: 19.1 CM2
RIGHT VENTRICLE ID DIMENSION: 3.2 CM
SL CV LEFT ATRIUM LENGTH A2C: 5.9 CM
SL CV LV EF: 45
SL CV PED ECHO LEFT VENTRICLE DIASTOLIC VOLUME (MOD BIPLANE) 2D: 133 ML
SL CV PED ECHO LEFT VENTRICLE SYSTOLIC VOLUME (MOD BIPLANE) 2D: 52 ML
SODIUM SERPL-SCNC: 128 MMOL/L (ref 135–147)
SODIUM SERPL-SCNC: 131 MMOL/L (ref 135–147)
SODIUM SERPL-SCNC: 132 MMOL/L (ref 135–147)
SODIUM SERPL-SCNC: 133 MMOL/L (ref 135–147)
SP GR UR STRIP.AUTO: 1.02 (ref 1–1.03)
SPECIMEN SOURCE: ABNORMAL
T WAVE AXIS: 178 DEGREES
T WAVE AXIS: 195 DEGREES
TR MAX PG: 55 MMHG
TR PEAK VELOCITY: 3.7 M/S
TRICUSPID VALVE PEAK REGURGITATION VELOCITY: 3.69 M/S
TRIGL SERPL-MCNC: 71 MG/DL
UROBILINOGEN UR STRIP-ACNC: <2 MG/DL
VENT BIPAP FIO2: 50 %
VENTRICULAR RATE: 105 BPM
VENTRICULAR RATE: 98 BPM
WBC # BLD AUTO: 7.82 THOUSAND/UL (ref 4.31–10.16)
WBC #/AREA URNS AUTO: ABNORMAL /HPF
WBC CLUMPS # UR AUTO: PRESENT /UL

## 2022-10-25 PROCEDURE — 4A133B1 MONITORING OF ARTERIAL PRESSURE, PERIPHERAL, PERCUTANEOUS APPROACH: ICD-10-PCS | Performed by: ANESTHESIOLOGY

## 2022-10-25 PROCEDURE — 4A133J1 MONITORING OF ARTERIAL PULSE, PERIPHERAL, PERCUTANEOUS APPROACH: ICD-10-PCS | Performed by: ANESTHESIOLOGY

## 2022-10-25 PROCEDURE — 03HY32Z INSERTION OF MONITORING DEVICE INTO UPPER ARTERY, PERCUTANEOUS APPROACH: ICD-10-PCS | Performed by: ANESTHESIOLOGY

## 2022-10-25 PROCEDURE — 0T9B70Z DRAINAGE OF BLADDER WITH DRAINAGE DEVICE, VIA NATURAL OR ARTIFICIAL OPENING: ICD-10-PCS | Performed by: STUDENT IN AN ORGANIZED HEALTH CARE EDUCATION/TRAINING PROGRAM

## 2022-10-25 PROCEDURE — 05HN33Z INSERTION OF INFUSION DEVICE INTO LEFT INTERNAL JUGULAR VEIN, PERCUTANEOUS APPROACH: ICD-10-PCS | Performed by: ANESTHESIOLOGY

## 2022-10-25 RX ORDER — POTASSIUM CHLORIDE 14.9 MG/ML
20 INJECTION INTRAVENOUS ONCE
Status: COMPLETED | OUTPATIENT
Start: 2022-10-26 | End: 2022-10-26

## 2022-10-25 RX ORDER — FUROSEMIDE 10 MG/ML
40 INJECTION INTRAMUSCULAR; INTRAVENOUS ONCE
Status: COMPLETED | OUTPATIENT
Start: 2022-10-25 | End: 2022-10-25

## 2022-10-25 RX ORDER — FAMOTIDINE 10 MG/ML
20 INJECTION, SOLUTION INTRAVENOUS
Status: DISCONTINUED | OUTPATIENT
Start: 2022-10-25 | End: 2022-10-31

## 2022-10-25 RX ORDER — FUROSEMIDE 10 MG/ML
40 INJECTION INTRAMUSCULAR; INTRAVENOUS ONCE
Status: DISCONTINUED | OUTPATIENT
Start: 2022-10-25 | End: 2022-10-25

## 2022-10-25 RX ORDER — MAGNESIUM SULFATE HEPTAHYDRATE 40 MG/ML
2 INJECTION, SOLUTION INTRAVENOUS ONCE
Status: COMPLETED | OUTPATIENT
Start: 2022-10-25 | End: 2022-10-25

## 2022-10-25 RX ORDER — MILRINONE LACTATE 0.2 MG/ML
0.13 INJECTION, SOLUTION INTRAVENOUS CONTINUOUS
Status: DISCONTINUED | OUTPATIENT
Start: 2022-10-25 | End: 2022-10-27

## 2022-10-25 RX ORDER — MAGNESIUM SULFATE HEPTAHYDRATE 40 MG/ML
2 INJECTION, SOLUTION INTRAVENOUS ONCE
Status: COMPLETED | OUTPATIENT
Start: 2022-10-26 | End: 2022-10-26

## 2022-10-25 RX ORDER — FUROSEMIDE 10 MG/ML
10 SYRINGE (ML) INJECTION CONTINUOUS
Status: DISCONTINUED | OUTPATIENT
Start: 2022-10-25 | End: 2022-10-31

## 2022-10-25 RX ORDER — CALCIUM GLUCONATE 20 MG/ML
2 INJECTION, SOLUTION INTRAVENOUS ONCE
Status: COMPLETED | OUTPATIENT
Start: 2022-10-25 | End: 2022-10-25

## 2022-10-25 RX ORDER — METOPROLOL TARTRATE 5 MG/5ML
5 INJECTION INTRAVENOUS EVERY 6 HOURS PRN
Status: DISCONTINUED | OUTPATIENT
Start: 2022-10-25 | End: 2022-10-26

## 2022-10-25 RX ORDER — POTASSIUM CHLORIDE 29.8 MG/ML
40 INJECTION INTRAVENOUS ONCE
Status: COMPLETED | OUTPATIENT
Start: 2022-10-25 | End: 2022-10-25

## 2022-10-25 RX ORDER — LEVOTHYROXINE SODIUM 0.07 MG/1
75 TABLET ORAL
Status: DISCONTINUED | OUTPATIENT
Start: 2022-10-25 | End: 2022-11-01

## 2022-10-25 RX ADMIN — PERFLUTREN 0.6 ML/MIN: 6.52 INJECTION, SUSPENSION INTRAVENOUS at 13:37

## 2022-10-25 RX ADMIN — METOROPROLOL TARTRATE 5 MG: 5 INJECTION, SOLUTION INTRAVENOUS at 08:06

## 2022-10-25 RX ADMIN — VASOPRESSIN 0.04 UNITS/MIN: 20 INJECTION INTRAVENOUS at 04:53

## 2022-10-25 RX ADMIN — NOREPINEPHRINE BITARTRATE 28 MCG/MIN: 1 INJECTION, SOLUTION, CONCENTRATE INTRAVENOUS at 22:03

## 2022-10-25 RX ADMIN — FUROSEMIDE 40 MG: 10 INJECTION, SOLUTION INTRAMUSCULAR; INTRAVENOUS at 02:31

## 2022-10-25 RX ADMIN — FUROSEMIDE 40 MG: 10 INJECTION, SOLUTION INTRAMUSCULAR; INTRAVENOUS at 01:47

## 2022-10-25 RX ADMIN — Medication 10 MG/HR: at 22:03

## 2022-10-25 RX ADMIN — MILRINONE LACTATE IN DEXTROSE 0.38 MCG/KG/MIN: 200 INJECTION, SOLUTION INTRAVENOUS at 01:13

## 2022-10-25 RX ADMIN — CEFTRIAXONE SODIUM 1000 MG: 10 INJECTION, POWDER, FOR SOLUTION INTRAVENOUS at 03:54

## 2022-10-25 RX ADMIN — NOREPINEPHRINE BITARTRATE 24 MCG/MIN: 1 INJECTION, SOLUTION, CONCENTRATE INTRAVENOUS at 12:35

## 2022-10-25 RX ADMIN — Medication 24 MCG/MIN: at 11:11

## 2022-10-25 RX ADMIN — EPINEPHRINE 2 MCG/MIN: 1 INJECTION, SOLUTION, CONCENTRATE INTRAVENOUS at 14:45

## 2022-10-25 RX ADMIN — FAMOTIDINE 20 MG: 10 INJECTION, SOLUTION INTRAVENOUS at 08:19

## 2022-10-25 RX ADMIN — Medication 10 MCG/MIN: at 07:30

## 2022-10-25 RX ADMIN — MAGNESIUM SULFATE HEPTAHYDRATE 2 G: 40 INJECTION, SOLUTION INTRAVENOUS at 08:10

## 2022-10-25 RX ADMIN — Medication 10 MG/HR: at 08:30

## 2022-10-25 RX ADMIN — CALCIUM GLUCONATE 2 G: 20 INJECTION, SOLUTION INTRAVENOUS at 03:01

## 2022-10-25 RX ADMIN — MILRINONE LACTATE IN DEXTROSE 0.38 MCG/KG/MIN: 200 INJECTION, SOLUTION INTRAVENOUS at 17:24

## 2022-10-25 RX ADMIN — AMIODARONE HYDROCHLORIDE 1 MG/MIN: 50 INJECTION, SOLUTION INTRAVENOUS at 15:01

## 2022-10-25 RX ADMIN — VASOPRESSIN 0.08 UNITS/MIN: 20 INJECTION INTRAVENOUS at 17:24

## 2022-10-25 RX ADMIN — VASOPRESSIN 0.04 UNITS/MIN: 20 INJECTION INTRAVENOUS at 22:03

## 2022-10-25 RX ADMIN — FUROSEMIDE 40 MG: 10 INJECTION, SOLUTION INTRAMUSCULAR; INTRAVENOUS at 08:21

## 2022-10-25 RX ADMIN — MILRINONE LACTATE IN DEXTROSE 0.38 MCG/KG/MIN: 200 INJECTION, SOLUTION INTRAVENOUS at 02:26

## 2022-10-25 RX ADMIN — VASOPRESSIN 0.04 UNITS/MIN: 20 INJECTION INTRAVENOUS at 11:08

## 2022-10-25 RX ADMIN — POTASSIUM CHLORIDE 40 MEQ: 29.8 INJECTION, SOLUTION INTRAVENOUS at 15:07

## 2022-10-25 RX ADMIN — MILRINONE LACTATE IN DEXTROSE 0.38 MCG/KG/MIN: 200 INJECTION, SOLUTION INTRAVENOUS at 09:06

## 2022-10-25 RX ADMIN — DEXTROSE MONOHYDRATE 150 MG: 50 INJECTION, SOLUTION INTRAVENOUS at 13:37

## 2022-10-25 RX ADMIN — Medication 9 MCG/MIN: at 01:34

## 2022-10-25 NOTE — ASSESSMENT & PLAN NOTE
· AEB tachypnea and tachycardia  · End-organ damage with elevated creatinine and T bili with hypotension likely in the setting of cardiogenic shock  · No infectious process noted, negative lactate, negative procalcitonin, no leukocytosis    Will monitor off antibiotics  · Blood cultures pending  · Continue vasopressors for blood pressure support  · In the setting of cardiogenic shock fluids are not indicated at this time

## 2022-10-25 NOTE — RESPIRATORY THERAPY NOTE
10/24/22 0106   Respiratory Assessment   Assessment Type Assess only   General Appearance Alert; Awake   Respiratory Pattern Normal   Chest Assessment Chest expansion symmetrical   Bilateral Breath Sounds Diminished; Expiratory wheezes   R Breath Sounds Diminished; Expiratory wheezes   L Breath Sounds Diminished; Expiratory wheezes   Location Specific No   Cough None   Resp Comments Pt placed on Bipap in the ED at this time   O2 Device V60   Non-Invasive Information   O2 Interface Device Face mask   Non-Invasive Ventilation Mode BiPAP   $ Continous NIV Initial   $ Intermittent NIV Yes   SpO2 99 %   $ Pulse Oximetry Spot Check Charge Completed   Non-Invasive Settings   FiO2 (%) 50   IPAP (cm) 12 cm   EPAP (cm) 8 cm   Rate (Set) 8   Pressure Support (cm H2O) 8   Rise Time 2   Non-Invasive Readings   Total Rate 21   Vt (mL) (Mech) 872   MV (Mech) 17   Peak Pressure (Obs) 13   Spontaneous Vt (mL) 662   Non-Invasive Alarms   Insp Pressure High (cm H20) 20   Low Insp Pressure Time (sec) 20 sec   MV Low (L/min) 3   Vt High (mL) 1200   Vt Low (mL) 200   High Resp Rate (BPM) 45 BPM   Low Resp Rate (BPM) 8 BPM   RT Ventilator Management Note  Janna Wolcott 82 y o  male MRN: [id number]  Unit/Bed#: ED 16 Encounter: [id number]      Daily Screen    No data found in the last 10 encounters             Physical Exam:   Assessment Type: Assess only  General Appearance: Alert, Awake  Respiratory Pattern: Normal  Chest Assessment: Chest expansion symmetrical  Bilateral Breath Sounds: Diminished, Expiratory wheezes  R Breath Sounds: Diminished, Expiratory wheezes  L Breath Sounds: Diminished, Expiratory wheezes  Location Specific: No  Cough: None  O2 Device: V60      Resp Comments: Pt placed on Bipap in the ED at this time

## 2022-10-25 NOTE — CONSULTS
Consultation - Cardiology   Michael Covarrubias 82 y o  male MRN: [id number]  Unit/Bed#:  Encounter: [id number]  10/25/22  3:40 PM    Assessment/ Plan:  1  Cardiogenic shock  • Blood pressure 91/53; continue to monitor  • Currently receiving epinephrine, Milrinone, norepinephrine and vasopressin  • Echo shows EF 40-45% with severe AS  • Palliative care discussing goals of care with patient's family    2  Severe aortic stenosis  • Revealed on echocardiogram; patient would be a poor candidate for TAVR  • Palliative care discussing goals of care with patient's family    3  Chronic atrial fibrillation  • Tachycardic at 128 bpm; continue monitor  • Amiodarone drip for rhythm control  • Receiving Eliquis 2 5 mg b i d  for anticoagulation    4  Acute on chronic respiratory failure  • Management per CCU    5  Acute kidney injury  • Creatinine 1 97; continue to monitor  • Management per CCU      History of Present Illness   Physician Requesting Consult: Gissel Douglas,*    Reason for Consult / Principal Problem:  Cardiogenic shock    HPI: Michael Covarrubias is a 82y o  year old male with history of AFib on Eliquis, CHF, hypothyroidism, chronic respiratory failure who presents with increasing shortness of breath from home  Daughter patient called EMS after finding father on floor s/p fall  Patient denied head trauma, however admits to frequent falls at home  He wears 5 L of O2 via nasal cannula chronically, but has had progressive shortness of breath over the last month restricting his ability to ambulate  Due to this patient has primarily been evaluated  Family reports their assistance to him for ADLs  Patient is noted to live alone  In ED he was placed on BiPAP dyspnea  She XR shows diffuse vascular congestion  Patient appears significantly volume  VT hypotension in ED patient was placed on the though admitted to ICU      Consults    EKG:  Atrial fibrillation; low voltage QRS; incomplete right bundle branch block; nonspecific T-wave abnormality      Review of Systems   Constitutional: Positive for activity change and fatigue  Negative for chills and fever  HENT: Negative for ear pain and sore throat  Eyes: Negative for pain and visual disturbance  Respiratory: Positive for shortness of breath  Negative for cough and wheezing  Cardiovascular: Positive for leg swelling  Negative for chest pain and palpitations  Gastrointestinal: Positive for abdominal distention and constipation  Negative for abdominal pain and vomiting  Genitourinary: Positive for decreased urine volume, difficulty urinating and scrotal swelling  Negative for dysuria and hematuria  Musculoskeletal: Positive for gait problem  Negative for arthralgias and back pain  Skin: Positive for color change and wound  Negative for rash  Neurological: Positive for syncope and weakness  Negative for seizures  Psychiatric/Behavioral: Negative  All other systems reviewed and are negative        Historical Information   Past Medical History:   Diagnosis Date   • A-fib [facility])    • Disease of thyroid gland    • Hyperlipidemia    • Hypertension    • Optic neuropathy    • Pleural effusion on right    • Pneumonia    • S/P lung surgery, follow-up exam    • Septic shock [facility])      Past Surgical History:   Procedure Laterality Date   • HAND DEBRIDEMENT Left 10/30/2021    Procedure: DEBRIDEMENT HAND/FINGER ([address]), second, index and ring finger;  Surgeon: Radha Hope MD;  Location: [facility];  Service: Plastics   • INCISION AND DRAINAGE OF WOUND Right 2/23/2022    Procedure: INCISION AND DRAINAGE (I&D) HEAD/FACE;  Surgeon: Trisha Haley MD;  Location: [facility];  Service: Plastics   • SC THORACOSCOPY SURG PART PULM DECORT Right 12/12/2019    Procedure: RIGHT THORACOSCOPIC LUNG DECORTICATION;  Surgeon: Mulugeta Anderson MD;  Location: [facility];  Service: Thoracic   • THORACOSCOPY VIDEO ASSISTED SURGERY (VATS) Right 12/12/2019 Procedure: THORACOSCOPY VIDEO ASSISTED SURGERY (VATS); Surgeon: Gunner Tabor MD;  Location: [facility];  Service: Thoracic     Social History     Substance and Sexual Activity   Alcohol Use Not Currently    Comment: occasional     Social History     Substance and Sexual Activity   Drug Use Never     Social History     Tobacco Use   Smoking Status Former Smoker   • Types: Cigarettes   Smokeless Tobacco Never Used       Family History: History reviewed  No pertinent family history  Meds/Allergies   all current active meds have been reviewed  Allergies   Allergen Reactions   • Ace Inhibitors Cough     Rash       Objective   Vitals: Blood pressure 91/53, pulse (!) 139, temperature 98 8 °F (37 1 °C), temperature source Tympanic, resp  rate (!) 34, height 5' 10" (1 778 m), weight 96 6 kg (213 lb), SpO2 91 % , Body mass index is 30 56 kg/m² ,   Orthostatic Blood Pressures    Flowsheet Row Most Recent Value   Blood Pressure 91/53 filed at 10/25/2022 1530   Patient Position - Orthostatic VS Lying filed at 10/25/2022 0806          Systolic (12TOK), JUO:194 , Min:79 , OWP:443     Diastolic (77DIW), XQF:81, Min:50, Max:80        Intake/Output Summary (Last 24 hours) at 10/25/2022 1540  Last data filed at 10/25/2022 1535  Gross per 24 hour   Intake 1632 69 ml   Output 4775 ml   Net -3142 31 ml       Invasive Devices  Report    Central Venous Catheter Line  Duration           CVC Central Lines 10/25/22 Triple 20cm <1 day          Peripheral Intravenous Line  Duration           Peripheral IV 10/24/22 Distal;Right;Upper;Ventral (anterior) Arm <1 day    Peripheral IV 10/25/22 Dorsal (posterior); Right Hand <1 day          Arterial Line  Duration           Arterial Line 10/25/22 Radial <1 day          Drain  Duration           Open Drain Anterior; Left Hand 360 days    Open Drain Anterior; Left Hand 360 days    Closed/Suction Drain Right  Bulb 7 Fr   244 days    Urethral Catheter <1 day                    Physical Exam:  Physical Exam  Vitals and nursing note reviewed  Constitutional:       Appearance: He is well-developed  He is ill-appearing  HENT:      Head: Normocephalic and atraumatic  Nose: Nose normal    Eyes:      Conjunctiva/sclera: Conjunctivae normal    Cardiovascular:      Rate and Rhythm: Normal rate  Rhythm irregularly irregular  Heart sounds: No murmur heard  Pulmonary:      Effort: Pulmonary effort is normal  No respiratory distress  Breath sounds: Wheezing and rales present  Abdominal:      General: There is distension  Palpations: Abdomen is soft  Tenderness: There is no abdominal tenderness  Musculoskeletal:      Cervical back: Neck supple  Right lower leg: Edema (+1) present  Left lower leg: Edema (+1) present  Skin:     General: Skin is warm and dry  Findings: Erythema (B/L LE) present  Neurological:      Mental Status: He is alert and easily aroused     Psychiatric:         Mood and Affect: Mood normal           Lab Results:     Cardiac Profile:   Results from last 7 days   Lab Units 10/25/22  1231 10/25/22  0410 10/25/22  0114 10/24/22  2128   CK TOTAL U/L 66  --   --  65   HS TNI 0HR ng/L  --   --   --  28   HS TNI 2HR ng/L  --   --  25  --    HSTNI D2 ng/L  --   --  -3  --    HS TNI 4HR ng/L  --  25  --   --    HSTNI D4 ng/L  --  -3  --   --    NT-PRO BNP pg/mL  --   --   --  3,659*       CBC with diff:   Results from last 7 days   Lab Units 10/25/22  0437 10/24/22  2128   WBC Thousand/uL 7 82 6 09   HEMOGLOBIN g/dL 12 3 13 1   HEMATOCRIT % 37 2 40 9   MCV fL 81* 85   PLATELETS Thousands/uL 170 149   MCH pg 26 7* 27 2   MCHC g/dL 33 1 32 0   RDW % 21 7* 22 1*   MPV fL 9 2 9 0   NRBC AUTO /100 WBCs 0 0         CMP:   Results from last 7 days   Lab Units 10/25/22  1231 10/25/22  0437 10/24/22  2128   POTASSIUM mmol/L 3 6 4 3 5 0   CHLORIDE mmol/L 97 97 96   CO2 mmol/L 22 21 23   BUN mg/dL 38* 42* 42*   CREATININE mg/dL 1 97* 2 02* 2 12*   CALCIUM mg/dL 8 7 9 0 8 6   AST U/L  --  40 41   ALT U/L  --  16 16   ALK PHOS U/L  --  133* 143*   EGFR ml/min/1 73sq m 30 29 27

## 2022-10-25 NOTE — PROCEDURES
Arterial Line Insertion    Date/Time: 10/25/2022 2:22 AM  Performed by: ANNA MARIE Marquez  Authorized by: ANNA MARIE aMrquez     Patient location:  ICU and bedside  Consent:     Consent obtained:  Verbal    Consent given by:  Patient    Risks discussed:  Bleeding, infection, ischemia, pain and repeat procedure  Universal protocol:     Procedure explained and questions answered to patient or proxy's satisfaction: yes      Relevant documents present and verified: yes      Site/side marked: yes      Immediately prior to procedure a time out was called: yes      Patient identity confirmed:  Verbally with patient, arm band and hospital-assigned identification number  Indications:     Indications: hemodynamic monitoring, continuous blood pressure monitoring and frequent labs / infusion    Pre-procedure details:     Skin preparation:  Chlorhexidine  Procedure details:     Location / Tip of Catheter:  Radial    Laterality:  Left    Needle gauge:  20 G    Number of attempts:  1    Successful placement: yes    Post-procedure details:     Post-procedure:  Sterile dressing applied and sutured    Patient tolerance of procedure:   Tolerated well, no immediate complications

## 2022-10-25 NOTE — ED PROVIDER NOTES
History  Chief Complaint   Patient presents with   • Shortness of Breath     Pts daughter called 911 after finding the pt on the floor following a fall pt reports this is this third fall recently  States he was dizzy before falling, denies head strike, is on eliquis  Pt with wheezing per EMS, leg swelling, cyanotic around the mouth, in afib     Year old male presents emergency department for evaluation of shortness of breath  Patient's family called because he was having trouble breathing, has been very weak, unable to get off of the couch/better the past few days, has history of CHF, has significant leg edema, swelling, scrotal edema as well  Denies fevers or chills, productive, denies any abdominal nausea or vomiting  Prior to Admission Medications   Prescriptions Last Dose Informant Patient Reported? Taking?    albuterol (PROVENTIL HFA,VENTOLIN HFA) 90 mcg/act inhaler   Yes No   Sig: Inhale 2 puffs every 4 (four) hours as needed for wheezing or shortness of breath   Patient not taking: Reported on 2/21/2022    apixaban (ELIQUIS) 2 5 mg   No No   Sig: Take 1 tablet (2 5 mg total) by mouth 2 (two) times a day   guaiFENesin (MUCINEX) 600 mg 12 hr tablet   Yes No   Sig: Take 1,200 mg by mouth every 12 (twelve) hours as needed for congestion   Patient not taking: Reported on 2/21/2022    levothyroxine 50 mcg tablet   Yes No   Sig: Take 75 mcg by mouth daily before breakfast    metoprolol succinate (TOPROL-XL) 50 mg 24 hr tablet   Yes No   Sig: Take 25 mg by mouth 2 (two) times a day    rosuvastatin (CRESTOR) 10 MG tablet   Yes No   Sig: Take 10 mg by mouth daily   Patient not taking: Reported on 2/21/2022       Facility-Administered Medications: None       Past Medical History:   Diagnosis Date   • A-fib [facility])    • Disease of thyroid gland    • Hyperlipidemia    • Hypertension    • Optic neuropathy    • Pleural effusion on right    • Pneumonia    • S/P lung surgery, follow-up exam    • Septic shock ([facility] [address] ) Past Surgical History:   Procedure Laterality Date   • HAND DEBRIDEMENT Left 10/30/2021    Procedure: DEBRIDEMENT HAND/FINGER [facility] OUT), second, index and ring finger;  Surgeon: Shereen Siddiqui MD;  Location: [facility];  Service: Plastics   • INCISION AND DRAINAGE OF WOUND Right 2/23/2022    Procedure: INCISION AND DRAINAGE (I&D) HEAD/FACE;  Surgeon: Nicci Sarabia MD;  Location: [facility];  Service: Plastics   • HI THORACOSCOPY SURG PART PULM DECORT Right 12/12/2019    Procedure: RIGHT THORACOSCOPIC LUNG DECORTICATION;  Surgeon: Soumya Harris MD;  Location: [facility];  Service: Thoracic   • THORACOSCOPY VIDEO ASSISTED SURGERY (VATS) Right 12/12/2019    Procedure: THORACOSCOPY VIDEO ASSISTED SURGERY (VATS); Surgeon: Soumya Harris MD;  Location: [facility];  Service: Thoracic       History reviewed  No pertinent family history  I have reviewed and agree with the history as documented  E-Cigarette/Vaping   • E-Cigarette Use Never User      E-Cigarette/Vaping Substances   • Nicotine No    • Flavoring No      Social History     Tobacco Use   • Smoking status: Former Smoker     Types: Cigarettes   • Smokeless tobacco: Never Used   Vaping Use   • Vaping Use: Never used   Substance Use Topics   • Alcohol use: Not Currently     Comment: occasional   • Drug use: Never       Review of Systems   Constitutional: Positive for fatigue  Negative for appetite change, chills and fever  HENT: Negative for sneezing and sore throat  Eyes: Negative for visual disturbance  Respiratory: Positive for shortness of breath and wheezing  Negative for cough, choking and chest tightness  Cardiovascular: Negative for chest pain and palpitations  Gastrointestinal: Negative for abdominal pain, constipation, diarrhea, nausea and vomiting  Genitourinary: Negative for difficulty urinating and dysuria  Neurological: Negative for dizziness, weakness, light-headedness, numbness and headaches     All other systems reviewed and are negative  Physical Exam  Physical Exam  Constitutional:       General: He is not in acute distress  Appearance: He is well-developed  He is not diaphoretic  HENT:      Head: Normocephalic and atraumatic  Eyes:      Pupils: Pupils are equal, round, and reactive to light  Neck:      Vascular: No JVD  Trachea: No tracheal deviation  Cardiovascular:      Rate and Rhythm: Normal rate and regular rhythm  Heart sounds: Normal heart sounds  No murmur heard  No friction rub  No gallop  Pulmonary:      Effort: Pulmonary effort is normal  Tachypnea present  No respiratory distress  Breath sounds: Decreased breath sounds and rhonchi present  No wheezing or rales  Abdominal:      General: Bowel sounds are normal  There is no distension  Palpations: Abdomen is soft  Tenderness: There is no abdominal tenderness  There is no guarding or rebound  Skin:     General: Skin is warm and dry  Coloration: Skin is cyanotic and pale  Neurological:      Mental Status: He is alert and oriented to person, place, and time  Cranial Nerves: No cranial nerve deficit  Motor: No abnormal muscle tone     Psychiatric:         Behavior: Behavior normal          Vital Signs  ED Triage Vitals   Temperature Pulse Respirations Blood Pressure SpO2   10/24/22 2126 10/24/22 2113 10/24/22 2113 10/24/22 2117 10/24/22 2113   98 9 °F (37 2 °C) 94 22 (!) 85/65 99 %      Temp Source Heart Rate Source Patient Position - Orthostatic VS BP Location FiO2 (%)   10/24/22 2126 10/24/22 2113 10/24/22 2126 10/24/22 2126 --   Oral Monitor Sitting Left arm       Pain Score       --                  Vitals:    10/24/22 2126 10/24/22 2230 10/24/22 2300 10/24/22 2345   BP: 145/79 (!) 86/62 105/61 92/66   Pulse:  76 77 71   Patient Position - Orthostatic VS: Sitting   Sitting         Visual Acuity      ED Medications  Medications   furosemide (LASIX) injection 40 mg (0 mg Intravenous Hold 10/24/22 2353)   bisacodyl (DULCOLAX) rectal suppository 10 mg (has no administration in time range)   milrinone (PRIMACOR) 20 mg in 100 mL infusion (premix) (0 38 mcg/kg/min × 92 3 kg Intravenous New Bag 10/25/22 0113)   NOREPINEPHRINE 4 MG  ML NSS (CMPD ORDER) infusion (has no administration in time range)   ipratropium-albuterol (FOR EMS ONLY) (DUO-NEB) 0 5-2 5 mg/3 mL inhalation solution 3 mL (0 mL Does not apply Given to EMS 10/24/22 2147)       Diagnostic Studies  Results Reviewed     Procedure Component Value Units Date/Time    Blood gas, venous [[id number]] Collected: 10/25/22 0114    Lab Status: In process Specimen: Blood from Arm, Left Updated: 10/25/22 0116    HS Troponin I 2hr [[id number]] Collected: 10/25/22 0114    Lab Status: In process Specimen: Blood from Arm, Left Updated: 10/25/22 0116    Calcium, ionized [[id number]] Collected: 10/25/22 0114    Lab Status: In process Specimen: Blood from Arm, Left Updated: 10/25/22 0116    Blood culture #1 [[id number]] Collected: 10/24/22 2128    Lab Status: Preliminary result Specimen: Blood from Arm, Right Updated: 10/25/22 0103     Blood Culture Received in Microbiology Lab  Culture in Progress  Blood culture #2 [[id number]] Collected: 10/24/22 2128    Lab Status: Preliminary result Specimen: Blood from Arm, Right Updated: 10/25/22 0103     Blood Culture Received in Microbiology Lab  Culture in Progress      Phosphorus [[id number]]  (Abnormal) Collected: 10/24/22 2128    Lab Status: Final result Specimen: Blood from Arm, Right Updated: 10/25/22 0013     Phosphorus 4 3 mg/dL     CK (with reflex to MB) [[id number]]  (Normal) Collected: 10/24/22 2128    Lab Status: Final result Specimen: Blood from Arm, Right Updated: 10/25/22 0013     Total CK 65 U/L     Lipase [[id number]]  (Abnormal) Collected: 10/24/22 2128    Lab Status: Final result Specimen: Blood from Arm, Right Updated: 10/25/22 0013     Lipase 67 u/L     Magnesium [[id number]]  (Normal) Collected: 10/24/22 2128    Lab Status: Final result Specimen: Blood from Arm, Right Updated: 10/25/22 0013     Magnesium 2 0 mg/dL     HS Troponin I 4hr [[id number]]     Lab Status: No result Specimen: Blood     Procalcitonin [[id number]]  (Normal) Collected: 10/24/22 2128    Lab Status: Final result Specimen: Blood from Arm, Right Updated: 10/24/22 2205     Procalcitonin 0 17 ng/ml     HS Troponin 0hr (reflex protocol) [[id number]]  (Normal) Collected: 10/24/22 2128    Lab Status: Final result Specimen: Blood from Arm, Right Updated: 10/24/22 2202     hs TnI 0hr 28 ng/L     NT-BNP PRO [[id number]]  (Abnormal) Collected: 10/24/22 2128    Lab Status: Final result Specimen: Blood from Arm, Right Updated: 10/24/22 2200     NT-proBNP 6,597 pg/mL     Carboxyhemoglobin [[id number]]  (Abnormal) Collected: 10/24/22 2128    Lab Status: Final result Specimen: Blood from Arm, Right Updated: 10/24/22 2159     Carbon Monoxide, Blood 2 3 %     Narrative: Therapeutic levels (1 mg/mL and 2 mg/mL) of hydroxocobalamin may interfere with the fCOHb and fMetHb where it may cause lower than expected values  Normal Carboxyhemoglobin range for nonsmokers is <1 5%   Normal Carboxyhemoglobin range for smokers is 1 5% to 5 1%     Lactic acid [[id number]]  (Normal) Collected: 10/24/22 2128    Lab Status: Final result Specimen: Blood from Arm, Right Updated: 10/24/22 2156     LACTIC ACID 1 9 mmol/L     Narrative:      Result may be elevated if tourniquet was used during collection      Comprehensive metabolic panel [[id number]]  (Abnormal) Collected: 10/24/22 2128    Lab Status: Final result Specimen: Blood from Arm, Right Updated: 10/24/22 2153     Sodium 130 mmol/L      Potassium 5 0 mmol/L      Chloride 96 mmol/L      CO2 23 mmol/L      ANION GAP 11 mmol/L      BUN 42 mg/dL      Creatinine 2 12 mg/dL      Glucose 94 mg/dL      Calcium 8 6 mg/dL      Corrected Calcium 9 8 mg/dL      AST 41 U/L      ALT 16 U/L      Alkaline Phosphatase 143 U/L      Total Protein 8 0 g/dL      Albumin 2 5 g/dL      Total Bilirubin 3 35 mg/dL      eGFR 27 ml/min/1 73sq m     Narrative:      [place] guidelines for Chronic Kidney Disease (CKD):   •  Stage 1 with normal or high GFR (GFR > 90 mL/min/1 73 square meters)  •  Stage 2 Mild CKD (GFR = 60-89 mL/min/1 73 square meters)  •  Stage 3A Moderate CKD (GFR = 45-59 mL/min/1 73 square meters)  •  Stage 3B Moderate CKD (GFR = 30-44 mL/min/1 73 square meters)  •  Stage 4 Severe CKD (GFR = 15-29 mL/min/1 73 square meters)  •  Stage 5 End Stage CKD (GFR <15 mL/min/1 73 square meters)  Note: GFR calculation is accurate only with a steady state creatinine    Protime-INR [[id number]]  (Abnormal) Collected: 10/24/22 2128    Lab Status: Final result Specimen: Blood from Arm, Right Updated: 10/24/22 2152     Protime 20 2 seconds      INR 1 77    APTT [[id number]]  (Abnormal) Collected: 10/24/22 2128    Lab Status: Final result Specimen: Blood from Arm, Right Updated: 10/24/22 2152     PTT 45 seconds     Blood gas, venous [[id number]]  (Abnormal) Collected: 10/24/22 2139    Lab Status: Final result Specimen: Blood from Arm, Right Updated: 10/24/22 2149     pH, Soto 7 289     pCO2, Soto 48 7 mm Hg      pO2, Soto 29 1 mm Hg      HCO3, Soto 22 8 mmol/L      Base Excess, Soto -4 1 mmol/L      O2 Content, Soto 9 4 ml/dL      O2 HGB, VENOUS 47 0 %     CBC and differential [[id number]]  (Abnormal) Collected: 10/24/22 2128    Lab Status: Final result Specimen: Blood from Arm, Right Updated: 10/24/22 2136     WBC 6 09 Thousand/uL      RBC 4 81 Million/uL      Hemoglobin 13 1 g/dL      Hematocrit 40 9 %      MCV 85 fL      MCH 27 2 pg      MCHC 32 0 g/dL      RDW 22 1 %      MPV 9 0 fL      Platelets 151 Thousands/uL      nRBC 0 /100 WBCs      Neutrophils Relative 65 %      Immat GRANS % 1 %      Lymphocytes Relative 18 %      Monocytes Relative 12 %      Eosinophils Relative 3 %      Basophils Relative 1 %      Neutrophils Absolute 3 93 Thousands/µL      Immature Grans Absolute 0 04 Thousand/uL      Lymphocytes Absolute 1 12 Thousands/µL      Monocytes Absolute 0 71 Thousand/µL      Eosinophils Absolute 0 21 Thousand/µL      Basophils Absolute 0 08 Thousands/µL     UA w Reflex to Microscopic w Reflex to Culture [[id number]]     Lab Status: No result Specimen: Urine                  XR chest 1 view portable    (Results Pending)              Procedures  CriticalCare Time  Performed by: Kimberley Melendrez MD  Authorized by: Kimberley Melenrdez MD     Critical care provider statement:     Critical care time (minutes):  45    Critical care start time:  10/24/2022 11:15 AM    Critical care end time:  10/25/2022 12:00 AM    Critical care time was exclusive of:  Separately billable procedures and treating other patients    Critical care was necessary to treat or prevent imminent or life-threatening deterioration of the following conditions:  Cardiac failure, circulatory failure, CNS failure or compromise, dehydration, endocrine crisis, metabolic crisis, hepatic failure, renal failure, respiratory failure and shock    Critical care was time spent personally by me on the following activities:  Blood draw for specimens, obtaining history from patient or surrogate, development of treatment plan with patient or surrogate, discussions with primary provider, discussions with consultants, evaluation of patient's response to treatment, examination of patient, interpretation of cardiac output measurements, ordering and performing treatments and interventions, ordering and review of laboratory studies, ordering and review of radiographic studies, re-evaluation of patient's condition, review of old charts and ventilator management             ED Course               Identification of Seniors at [facility] Most Recent Value   (ISAR) Identification of Seniors at Risk    Before the illness or injury that brought you to the Emergency, did you need someone to help you on a regular basis? 0 Filed at: 10/24/2022 2115   In the last 24 hours, have you needed more help than usual? 1 Filed at: 10/24/2022 2115   Have you been hospitalized for one or more nights during the past 6 months? 1 Filed at: 10/24/2022 2115   In general, do you see well? 0 Filed at: 10/24/2022 2115   In general, do you have serious problems with your memory? 0 Filed at: 10/24/2022 2115   Do you take more than three different medications every day? 1 Filed at: 10/24/2022 2115   ISAR Score 3 Filed at: 10/24/2022 2115                   Initial Sepsis Screening     Row Name 10/25/22 0012                Is the patient's history suggestive of a new or worsening infection? No  -CC        Suspected source of infection --        Are two or more of the following signs & symptoms of infection both present and new to the patient? --        Indicate SIRS criteria Tachycardia > 90 bpm;Tachypnea > 20 resp per min  -CC        If the answer is yes to both questions, suspicion of sepsis is present --        If severe sepsis is present AND tissue hypoperfusion perists in the hour after fluid resuscitation or lactate > 4, the patient meets criteria for SEPTIC SHOCK --        Are any of the following organ dysfunction criteria present within 6 hours of suspected infection and SIRS criteria that are NOT considered to be chronic conditions?  --        Organ dysfunction --        Date of presentation of severe sepsis --        Time of presentation of severe sepsis --        Tissue hypoperfusion persists in the hour after crystalloid fluid administration, evidenced, by either: --        Was hypotension present within one hour of the conclusion of crystalloid fluid administration? --        Date of presentation of septic shock --        Time of presentation of septic shock --              User Key  (r) = Recorded By, (t) = Taken By, (c) = Cosigned By    [address] Name Provider Type    CC Maryjane Bains MD Physician MDM  Number of Diagnoses or Management Options  CHF (congestive heart failure) ([facility] )  Diagnosis management comments: 22-year-old male presents emergency department in respiratory distress, appears volume overloaded here, anasarca on exam, does not have much increased oxygen requirement or breathing, will start high-flow nasal cannula, initially was hypertensive but repeat blood pressure normal, will continue to monitor, check cardiac labs, reassess  Repeat blood pressure is now low after multiple checks, given patient's anasarca stay, did not believe fluid be beneficial, discussed with critical care, will place on pressors, BiPAP, admit to ICU  Patient has significantly edematous foreskin causing problems/inability to place [place] catheter, discussed case with urology will see and evaluate  Disposition  Final diagnoses:   [facility]     Time reflects when diagnosis was documented in both MDM as applicable and the Disposition within this note     Time User Action Codes Description Comment    10/24/2022 11:10 PM Maggie, [facility] [I50 [id number]] CHF (congestive heart failure) [facility])       ED Disposition     ED Disposition   Admit    Condition   Stable    Date/Time   Mon Oct 24, 2022 11:10 PM    Comment   Case was discussed with CCM and the patient's admission status was agreed to be Admission Status: inpatient status to the service of Dr Jose Luis Begum             Follow-up Information    None         Current Discharge Medication List      CONTINUE these medications which have NOT CHANGED    Details   albuterol (PROVENTIL HFA,VENTOLIN HFA) 90 mcg/act inhaler Inhale 2 puffs every 4 (four) hours as needed for wheezing or shortness of breath      apixaban (ELIQUIS) 2 5 mg Take 1 tablet (2 5 mg total) by mouth 2 (two) times a day  Qty: 60 tablet, Refills: 0    Associated Diagnoses: Chronic atrial fibrillation (HCC)      guaiFENesin (MUCINEX) 600 mg 12 hr tablet Take 1,200 mg by mouth every 12 (twelve) hours as needed for congestion      levothyroxine 50 mcg tablet Take 75 mcg by mouth daily before breakfast       metoprolol succinate (TOPROL-XL) 50 mg 24 hr tablet Take 25 mg by mouth 2 (two) times a day       rosuvastatin (CRESTOR) 10 MG tablet Take 10 mg by mouth daily             No discharge procedures on file      PDMP Review     None          ED Provider  Electronically Signed by           Arnol Nelson MD  10/25/22 0186       Arnol Nelson MD  10/25/22 8025

## 2022-10-25 NOTE — ASSESSMENT & PLAN NOTE
· Daughter reports she called EMS today after her father fell at home, denies hitting his head   · Daughter reports frequent falls at home

## 2022-10-25 NOTE — ASSESSMENT & PLAN NOTE
· AEB increasing shortness of breath at home, requiring BiPAP in ED, significant JVD on assessment, cool extremities, anasarca, hypotension, decreased urine output, SHREYA  and elevated T bili signifying hypoperfusion  · Echo February 0373: Systolic function is moderately reduced (40-45%)   There is moderate global hypokinesis  · Repeat ECHO pending  · Lactic negative on admission  · BNP on admission 6,597  · Trend troponins  · Levo started  · Will start Milrinone drip  · Follow endpoints and VBG  · Cardiology consult

## 2022-10-25 NOTE — RESPIRATORY THERAPY NOTE
10/24/22 2113   Respiratory Assessment   Assessment Type Assess only   General Appearance Alert; Awake   Respiratory Pattern Normal   Chest Assessment Chest expansion symmetrical   Bilateral Breath Sounds Diminished; Expiratory wheezes   R Breath Sounds Diminished; Expiratory wheezes   L Breath Sounds Diminished; Expiratory wheezes   Location Specific No   Cough None   Resp Comments Pt is placed on HFNC at this time   O2 Device HFNC   Non-Invasive Information   O2 Interface Device HFNC prongs   Non-Invasive Ventilation Mode HFNC (High flow)   $ Continous NIV Initial   SpO2 99 %   $ Pulse Oximetry Spot Check Charge Completed   Non-Invasive Settings   Flow (lpm) 45   Temperature (Set) 37   Non-Invasive Readings   Skin Intervention Skin intact   Heater Temperature (Obs) 37   Maintenance   Water bag changed No   RT Ventilator Management Note  Yeny Pro 82 y o  male MRN: [id number]  Unit/Bed#: ED 16 Encounter: [id number]      Daily Screen    No data found in the last 10 encounters             Physical Exam:   Assessment Type: Assess only  General Appearance: Alert, Awake  Respiratory Pattern: Normal  Chest Assessment: Chest expansion symmetrical  Bilateral Breath Sounds: Diminished, Expiratory wheezes  R Breath Sounds: Diminished, Expiratory wheezes  L Breath Sounds: Diminished, Expiratory wheezes  Location Specific: No  Cough: None  O2 Device: HFNC      Resp Comments: Pt is placed on HFNC at this time

## 2022-10-25 NOTE — WOUND OSTOMY CARE
Progress Note - Wound   Janna Mar 82 y o  male MRN: [id number]  Unit/Bed#:  Encounter: [id number]      Assessment:   Patient admitted due to cardiogenic shock  History of a-fib , HLD, HTN, optic neuropathy, hypothyroid  Wound care consulted for multiple wounds  Patient agreeable to assessment, alert and oriented x3, le catheter in place for urine management, no bowel incontinence on assessment, assist of 2 to turn for assessment, wedges in place for turning/repositioning, heels elevated off of mattress, is in ICU on a critical care low air loss mattress, on vasopressors  Primary RN at bedside for assessment  1  Bilateral sacrum and buttock- skin is dry, intact, blanchable  Patient's family reports that patient has been bed bound mostly for the past month- therefore patient is high risk of skin breakdown and due to being bed bound prior to admission there is risk of pressure injuries continuing to evolve out during the first 96 hours of admission  2  Left arm abrasion- Suspect etiology from recent fall  Wounds are oval in shape, approx  30% pink epithelialization and 70% dry brown adhered scabbing, no drainage noted  Malika-wounds are dry, intact, no redness  The most distal scab is noted to have a raised area- unclear if this is chronic or possible hematoma from fall - please continue to monitor  3  Right posterior tibial- Unclear etiology, family states wound has been healing for several weeks  Wound is oval/irregular in shape, approx  40% pink partial thickness tissue and 60% dry brown adhered scabbing, with scant amount of serous drainage noted  Malika-wound is dry, intact, scaly skin, no redness  4  Left distal plantar aspect of foot- Unclear etiology- per primary RN patient has had this removed surgically multiple times and the calloused like skin always grows back  There is a round area of dry brown adhered eschar vs calloused skin  There are no open aspects, no drainage, no fluctuance  Malika-wound is dry, intact, pink skin, no redness, no induration  5  Right upper back- Skin tear at site of previous skin tag  Wound is oval in shape, partial thickness, 100% pink tissue, with scant amount of sanguineous drainage noted  Malika-wound is dry, intact, no redness  6  Bilateral groin- Skin is dry, intact, no open aspects with a red fungal rash with satellite lesions- recommend InterDry for treatment at this time  Educated patient on importance of frequent offloading of pressure via turning, repositioning, and weight-shifting  No induration, fluctuance, odor, warmth, redness, or purulence noted to the above noted wounds  New dressings applied  Patient tolerated well  Primary nurse aware of plan of care  See flow sheets for more detailed assessment findings  Will follow along  Skin care Plan:  1-Protect sacrum w/Allevyn foam, rosangela with P, change q3d and PRN, peel back and check skin q-shift  2-Turn/reposition q2h for pressure re-distribution on skin   3-Elevate heels to offload pressure  4-Moisturize skin daily with skin nourishing cream  5-Ehob cushion in chair when out of bed  6-Hydraguard to bilateral heels and left plantar foot BID and PRN  7-L arm and right posterior leg- Cleanse wounds with NSS, pat dry  Apply Dermagran over wound beds, cover with ABD pad and wrap with Alfred  Change every other day and as needed  8-Right back- Cleanse wound with NSS, pat dry  Apply Allevyn foam dressing over wound bed  Change every 3 days and as needed  9-Cleanse b/l groin with soap and water, and pat completely dry  Cut strips of InterDry and place them flat in the skin folds, leaving 2 inches outside of the skin fold  Change every 3 days or sooner if soiled  Do not place any creams or powders on the skin that InterDry is being placed  Wound 10/25/22 Abrasion(s) Arm Anterior;Left;Upper (Active)   Wound Image   10/25/22 1128   Wound Description Dry;Brown; Other (Comment); Epithelialization 10/25/22 1128   Malika-wound Assessment Dry; Intact 10/25/22 1128   Wound Length (cm) 17 cm 10/25/22 1128   Wound Width (cm) 1 5 cm 10/25/22 1128   Wound Depth (cm) 0 1 cm 10/25/22 1128   Wound Surface Area (cm^2) 25 5 cm^2 10/25/22 1128   Wound Volume (cm^3) 2 55 cm^3 10/25/22 1128   Calculated Wound Volume (cm^3) 2 55 cm^3 10/25/22 1128   Tunneling 0 cm 10/25/22 1128   Undermining 0 10/25/22 1128   Drainage Amount None 10/25/22 1128   Non-staged Wound Description Not applicable 70/15/72 0111   Treatments Cleansed;Site care 10/25/22 1128   Dressing Dermagran gauze;ABD;Dry dressing 10/25/22 1128   Wound packed? No 10/25/22 1128   Dressing Changed Changed 10/25/22 1128   Patient Tolerance Tolerated well 10/25/22 1128   Dressing Status Clean;Dry; Intact 10/25/22 1128       Wound 10/25/22 Skin tear Back Left;Medial (Active)   Wound Image   10/25/22 1132   Wound Description Pink 10/25/22 1132   Malika-wound Assessment Dry; Intact 10/25/22 1132   Wound Length (cm) 1 cm 10/25/22 1132   Wound Width (cm) 0 6 cm 10/25/22 1132   Wound Depth (cm) 0 1 cm 10/25/22 1132   Wound Surface Area (cm^2) 0 6 cm^2 10/25/22 1132   Wound Volume (cm^3) 0 06 cm^3 10/25/22 1132   Calculated Wound Volume (cm^3) 0 06 cm^3 10/25/22 1132   Tunneling 0 cm 10/25/22 1132   Undermining 0 10/25/22 1132   Drainage Amount Scant 10/25/22 1132   Drainage Description Sanguineous 10/25/22 1132   Non-staged Wound Description Partial thickness 10/25/22 1132   Treatments Cleansed;Irrigation with NSS;Site care 10/25/22 1132   Dressing Foam, Silicon (eg  Allevyn, etc) 10/25/22 1132   Wound packed? No 10/25/22 1132   Dressing Changed Changed 10/25/22 1132   Patient Tolerance Tolerated well 10/25/22 1132   Dressing Status Clean;Dry; Intact 10/25/22 1132       Wound 10/25/22 Tibial Posterior;Right (Active)   Wound Image   10/25/22 1134   Wound Description Pink;Brown; Other (Comment) 10/25/22 1134   Malika-wound Assessment Dry; Intact;Scaly 10/25/22 1134   Wound Length (cm) 1 5 cm 10/25/22 1134   Wound Width (cm) 3 cm 10/25/22 1134   Wound Depth (cm) 0 1 cm 10/25/22 1134   Wound Surface Area (cm^2) 4 5 cm^2 10/25/22 1134   Wound Volume (cm^3) 0 45 cm^3 10/25/22 1134   Calculated Wound Volume (cm^3) 0 45 cm^3 10/25/22 1134   Tunneling 0 cm 10/25/22 1134   Undermining 0 10/25/22 1134   Drainage Amount Scant 10/25/22 1134   Drainage Description Serous 10/25/22 1134   Non-staged Wound Description Partial thickness 10/25/22 1134   Treatments Cleansed;Irrigation with NSS;Site care 10/25/22 1134   Dressing Dermagran gauze; Foam, Silicon (eg  Allevyn, etc) 10/25/22 1134   Wound packed? No 10/25/22 1134   Dressing Changed Changed 10/25/22 1134   Patient Tolerance Tolerated well 10/25/22 1134   Dressing Status Clean;Dry; Intact 10/25/22 1134       Wound 10/25/22 Foot Left;Plantar;Distal (Active)   Wound Image   10/25/22 1130   Wound Description Dry;Brown 10/25/22 1130   Malika-wound Assessment Callus;Dry; Intact 10/25/22 1130   Wound Length (cm) 2 2 cm 10/25/22 1130   Wound Width (cm) 2 4 cm 10/25/22 1130   Wound Depth (cm) 0 cm 10/25/22 1130   Wound Surface Area (cm^2) 5 28 cm^2 10/25/22 1130   Wound Volume (cm^3) 0 cm^3 10/25/22 1130   Calculated Wound Volume (cm^3) 0 cm^3 10/25/22 1130   Tunneling 0 cm 10/25/22 1130   Undermining 0 10/25/22 1130   Drainage Amount None 10/25/22 1130   Non-staged Wound Description Not applicable 17/87/75 7766   Treatments Cleansed;Site care 10/25/22 1130   Dressing Moisture barrier 10/25/22 1130   Wound packed?  No 10/25/22 1130   Dressing Changed New 10/25/22 1130   Patient Tolerance Tolerated well 10/25/22 1130       Call or tigertext with any questions  Wound Care will continue to follow    Vitor GRANADON RN CWON  Wound and Ostomy care

## 2022-10-25 NOTE — UTILIZATION REVIEW
Initial Clinical Review    Admission: Date/Time/Statement:   Admission Orders (From admission, onward)     Ordered        10/24/22 2310  1 [facility],[address]  Once                      Orders Placed This Encounter   Procedures   • Inpatient Admission     Standing Status:   Standing     Number of Occurrences:   1     Order Specific Question:   Level of Care     Answer:   Critical Care [15]     Order Specific Question:   Estimated length of stay     Answer:   More than 2 Midnights     Order Specific Question:   Certification     Answer:   I certify that inpatient services are medically necessary for this patient for a duration of greater than two midnights  See H&P and MD Progress Notes for additional information about the patient's course of treatment  ED Arrival Information     Expected   -    Arrival   10/24/2022 21:04    Acuity   Emergent            Means of arrival   Ambulance    Escorted by   [facility]    Service   Anesthesiology    Admission type   Emergency            Arrival complaint   -           Chief Complaint   Patient presents with   • Shortness of Breath     Pts daughter called 911 after finding the pt on the floor following a fall pt reports this is this third fall recently  States he was dizzy before falling, denies head strike, is on eliquis  Pt with wheezing per EMS, leg swelling, cyanotic around the mouth, in afib       Initial Presentation: 82 y o  male to the ED from home via EMS with complaints of shortness of breath, unable to ambulate for the past few days, fell on day of arrival   Admitted to [facility]  for Cardiogenic shock, acute on chronic respiratory failure, SIRS  H/O CHF, afib, hypothyroidism  Arrives tachypneic, bilateral lower extremity and scrotal edema, Decreased breath sounds with rhonchi, cyanotic and pale, + JVD  Has elevated probnp, CXR shows small pleural effusions  Placed on high flow nasal cannula  Repeat ECHO  Hypotensive  Started on Levophed, milrinone  Cardiology consult  Escalated to BIpap  End organ damage with elevated crea and tbili, hypotensive in setting of cardiogenic shock  Trend  Blood cultures pending  Date: 10/25   Day 2:   PROCEDURE NOTE  10/25:  WALKER placed  Central line to Left IJ triple lumen  Arterial line to left radial    GCs 14  SV02 improving  Continue with milrinone and vasopressin, norepi  Lasix infusion started  NPO   PPI  Monitor transaminitis  UA +   Ceftriaxone IV started  Monitor WBCs and fever curve  Trend procal  Crackles heard in bilateral bases of lungs  Wheezing in all fields  Feet cool  Bilateral cyanosis and edema  Strict I/o         ED Triage Vitals   Temperature Pulse Respirations Blood Pressure SpO2   10/24/22 2126 10/24/22 2113 10/24/22 2113 10/24/22 2117 10/24/22 2113   98 9 °F (37 2 °C) 94 22 (!) 85/65 99 %      Temp Source Heart Rate Source Patient Position - Orthostatic VS BP Location FiO2 (%)   10/24/22 2126 10/24/22 2113 10/24/22 2126 10/24/22 2126 --   Oral Monitor Sitting Left arm       Pain Score       10/25/22 0230       No Pain          Wt Readings from Last 1 Encounters:   10/24/22 96 9 kg (213 lb 10 oz)     Additional Vital Signs:   /Time Temp Pulse Resp BP MAP (mmHg) Arterial Line BP MAP SpO2 Calculated FIO2 (%) - Nasal Cannula Nasal Cannula O2 Flow Rate (L/min) O2 Device O2 Interface Device Patient Position - Orthostatic VS   10/25/22 1115 -- 100 13 -- -- 83/40 54 mmHg Abnormal  96 % -- -- -- -- --   10/25/22 1100 -- 98 8 Abnormal  91/57 70 87/44 58 mmHg Abnormal  94 % -- -- -- -- --   10/25/22 1030 -- 108 Abnormal  17 110/60 78 103/74 87 mmHg 99 % -- -- -- -- --   10/25/22 1000 -- 88 17 104/57 76 90/41 57 mmHg Abnormal  95 % -- -- -- -- --   10/25/22 0815 -- 98 13 -- -- 87/44 59 mmHg Abnormal  95 % -- -- -- -- --   10/25/22 0800 -- 119 Abnormal  28 Abnormal  107/62 81 112/56 72 mmHg 93 % -- -- -- -- --   10/25/22 0745 -- 132 Abnormal  20 -- -- 104/48 67 mmHg 93 % -- -- -- -- --   10/25/22 0744 97 8 °F (36 6 °C) 113 Abnormal  14 104/60 77 -- -- 95 % -- -- -- -- Lying   10/25/22 0742 -- -- -- -- -- -- -- 94 % 40 5 L/min Nasal cannula -- --   10/25/22 0730 -- 117 Abnormal  23 Abnormal  104/60 77 104/50 68 mmHg 98 % -- -- -- -- --   10/25/22 0645 -- 104 22 119/67 85 98/45 63 mmHg Abnormal  99 % -- -- -- -- --   10/25/22 0456 97 5 °F (36 4 °C) 113 Abnormal  9 Abnormal  -- -- 108/49 69 mmHg 100 % -- -- -- -- --   10/25/22 0400 -- -- -- -- -- -- -- 100 % -- -- -- Face mask --   10/25/22 0350 -- 92 16 109/64 80 103/46 64 mmHg Abnormal  100 % -- -- -- -- --   10/25/22 0329 -- 92 10 Abnormal  -- -- 96/43 60 mmHg Abnormal  100 % -- -- -- -- --   10/25/22 0145 -- 86 25 Abnormal  103/57 75 -- -- 96 % -- -- -- -- --   10/25/22 0134 -- 74 17 112/80 90 -- -- 98 % -- -- -- -- --   10/24/22 2345 -- 71 19 92/66 73 -- -- 100 % -- -- BiPAP -- Sitting   10/24/22 2300 -- 77 24 Abnormal  105/61 77 -- -- 100 % -- -- BiPAP -- --   10/24/22 2259 -- -- -- -- -- -- -- 99 % -- -- -- Face mask --   10/24/22 2230 -- 76 18 86/62 Abnormal  70 -- -- 99 % -- -- -- -- --   10/24/22 2126 98 9 °F (37 2 °C) -- -- 145/79 -- -- -- -- -- -- -- -- Sitting   10/24/22 2117 -- -- -- 85/65 Abnormal  -- -- -- -- -- -- -- -- --   10/24/22 2113 -- 94 22 -- -- -- -- 99 % 44 6 L/min Nasal cannula HFNC prongs        Pertinent Labs/Diagnostic Test Results:   10/25/22 TTE:     Left Ventricle Left ventricular cavity size is normal  Wall thickness is normal  The left ventricular ejection fraction is 45%  Systolic function is mildly reduced   Wall motion cannot be accurately assessed  Unable to assess diastolic function  Right Ventricle Right ventricular cavity size is dilated  Systolic function is reduced  Wall thickness is normal    Left Atrium The atrium is moderately dilated  Right Atrium The atrium is normal in size  Aortic Valve The aortic valve was not well visualized  The leaflets are severely thickened  The leaflets are severely calcified  Unable to assess aortic valve regurgitation due to poor Doppler exam  Unable to assess aortic valve stenosis due to poor Doppler exam    Mitral Valve The mitral valve was not well visualized  Tricuspid Valve Tricuspid valve structure is normal  There is mild to moderate regurgitation  There is no evidence of stenosis  Pulmonic Valve The pulmonic valve was not assessed due to poor image quality  Ascending Aorta The aorta was not assessed due to poor image quality  IVC/SVC The inferior vena cava is dilated  Pericardium There is no definitive pericardial effusion  There is a left pleural effusion  Pulmonary Artery The estimated pulmonary artery systolic pressure is 33 1 mmHg  The pulmonary artery systolic pressure is mildly increased       10/24 EKG:Atrial fibrillation with rapid ventricular response with premature ventricular or aberrantly conducted complexes  Rightward axis  Low voltage QRS  Nonspecific T wave abnormality  Abnormal ECG  When compared with ECG of 21-FEB-2022 14:00,  QRS axis Shifted right  Nonspecific T wave abnormality now evident in Lateral leads  QT has shortened  XR chest portable ICU   Final Result by Christian Rossi MD (10/25 0207)      1  Left-sided jugular venous catheter with its tip overlying the superior cavoatrial junction  2   Bilateral perihilar opacities increased since February 21, 2022 likely due to pulmonary edema  Focal opacity overlying the right midlung field, underlying consolidation cannot be excluded  3   Slight increase in bilateral small pleural effusions  Workstation performed: [id number]         XR chest 1 view portable   Final Result by Christian Rossi MD (10/25 0207)      1  Left-sided jugular venous catheter with its tip overlying the superior cavoatrial junction  2   Bilateral perihilar opacities increased since February 21, 2022 likely due to pulmonary edema    Focal opacity overlying the right midlung field, underlying consolidation cannot be excluded  3   Slight increase in bilateral small pleural effusions                    Workstation performed: [id number]               Results from last 7 days   Lab Units 10/25/22  0437 10/24/22  2128   WBC Thousand/uL 7 82 6 09   HEMOGLOBIN g/dL 12 3 13 1   HEMATOCRIT % 37 2 40 9   PLATELETS Thousands/uL 170 149   NEUTROS ABS Thousands/µL 5 87 3 93         Results from last 7 days   Lab Units 10/25/22  0437 10/25/22  0114 10/24/22  2128   SODIUM mmol/L 128*  --  130*   POTASSIUM mmol/L 4 3  --  5 0   CHLORIDE mmol/L 97  --  96   CO2 mmol/L 21  --  23   ANION GAP mmol/L 10  --  11   BUN mg/dL 42*  --  42*   CREATININE mg/dL 2 02*  --  2 12*   EGFR ml/min/1 73sq m 29  --  27   CALCIUM mg/dL 9 0  --  8 6   CALCIUM, IONIZED mmol/L 1 12 1 00*  --    MAGNESIUM mg/dL 2 1  --  2 0   PHOSPHORUS mg/dL 4 3*  --  4 3*     Results from last 7 days   Lab Units 10/25/22  0437 10/24/22  2128   AST U/L 40 41   ALT U/L 16 16   ALK PHOS U/L 133* 143*   TOTAL PROTEIN g/dL 7 4 8 0   ALBUMIN g/dL 2 3* 2 5*   TOTAL BILIRUBIN mg/dL 2 90* 3 35*         Results from last 7 days   Lab Units 10/25/22  0437 10/24/22  2128   GLUCOSE RANDOM mg/dL 113 94       Results from last 7 days   Lab Units 10/25/22  0220   PH ART  7 361   PCO2 ART mm Hg 34 4*   PO2 ART mm Hg 135 0*   HCO3 ART mmol/L 19 0*   BASE EXC ART mmol/L -5 5   O2 CONTENT ART mL/dL 19 8   O2 HGB, ARTERIAL % 97 7*   ABG SOURCE  Line, Arterial     Results from last 7 days   Lab Units 10/25/22  0443 10/25/22  0114 10/24/22  2139   PH OLIMPIA  7 361 7 282* 7 289*   PCO2 OLIMPIA mm Hg 39 7* 49 1 48 7   PO2 OLIMPIA mm Hg 50 7* 32 7* 29 1*   HCO3 OLIMPIA mmol/L 22 0* 22 7* 22 8*   BASE EXC OLIMPIA mmol/L -3 1 -4 4 -4 1   O2 CONTENT OLIMPIA ml/dL 15 6 11 3 9 4   O2 HGB, VENOUS % 82 2* 53 8* 47 0*         Results from last 7 days   Lab Units 10/24/22  2128   CK TOTAL U/L 65     Results from last 7 days   Lab Units 10/25/22  0410 10/25/22  0114 10/24/22  2128   HS TNI 0HR ng/L  --   --  28   HS TNI 2HR ng/L --  25  --    HSTNI D2 ng/L  --  -3  --    HS TNI 4HR ng/L 25  --   --    HSTNI D4 ng/L -3  --   --          Results from last 7 days   Lab Units 10/25/22  0437 10/24/22  2128   PROTIME seconds 20 1* 20 2*   INR  1 75* 1 77*   PTT seconds  --  45*       Results from last 7 days   Lab Units 10/24/22  2128   PROCALCITONIN ng/ml 0 17     Results from last 7 days   Lab Units 10/24/22  2128   LACTIC ACID mmol/L 1 9       Results from last 7 days   Lab Units 10/24/22  2128   NT-PRO BNP pg/mL 7,604*       Results from last 7 days   Lab Units 10/24/22  2128   LIPASE u/L 67*         Results from last 7 days   Lab Units 10/25/22  0121   CLARITY UA  Turbid   COLOR UA  Yellow   SPEC GRAV UA  1 018   PH UA  5 0   GLUCOSE UA mg/dl Negative   KETONES UA mg/dl Negative   BLOOD UA  Large*   PROTEIN UA mg/dl Trace*   NITRITE UA  Positive*   BILIRUBIN UA  Negative   UROBILINOGEN UA (BE) mg/dl <2 0   LEUKOCYTES UA  Moderate*   WBC UA /hpf Innumerable*   RBC UA /hpf Innumerable*   BACTERIA UA /hpf Occasional   EPITHELIAL CELLS WET PREP /hpf Occasional   MUCUS THREADS  Occasional*         Results from last 7 days   Lab Units 10/24/22  2128   BLOOD CULTURE  Received in Microbiology Lab  Culture in Progress  Received in Microbiology Lab  Culture in Progress           ED Treatment:   Medication Administration from 10/24/2022 2104 to 10/25/2022 0057       Date/Time Order Dose Route Action Comments     10/24/2022 2239 norepinephrine (LEVOPHED) 4 mg (STANDARD CONCENTRATION) IV in sodium chloride 0 9% 250 mL 5 mcg/min Intravenous New Bag BP 86/60        Past Medical History:   Diagnosis Date   • A-fib ([facility])    • Disease of thyroid gland    • Hyperlipidemia    • Hypertension    • Optic neuropathy    • Pleural effusion on right    • Pneumonia    • S/P lung surgery, follow-up exam    • Septic shock ([facility])        Admitting Diagnosis: CHF (congestive heart failure) ([facility]) [I50 9]  SOB (shortness of breath) [R06 02]  Age/Sex: 82 y o  male  Admission Orders:  BMP, mag, vbg every 6 hours  Urine culture    Scheduled Medications:  apixaban, 2 5 mg, Oral, BID  [START ON 10/26/2022] cefTRIAXone, 1,000 mg, Intravenous, Q24H  famotidine, 20 mg, Intravenous, Q24H HERBERT  furosemide, 40 mg, Intravenous, Once  levothyroxine, 75 mcg, Oral, Daily Before Breakfast      Continuous IV Infusions:  furosemide, 10 mg/hr, Intravenous, Continuous  milrinone (PRIMACOR) infusion, 0 38 mcg/kg/min, Intravenous, Continuous  norepinephrine, 1-30 mcg/min, Intravenous, Titrated  vasopressin, 0 04 Units/min, Intravenous, Continuous      PRN Meds:  bisacodyl, 10 mg, Rectal, Daily PRN  metoprolol, 5 mg, Intravenous, Q6H PRN        IP CONSULT TO CASE MANAGEMENT  IP CONSULT TO CASE MANAGEMENT  IP CONSULT TO PALLIATIVE CARE  IP CONSULT TO CARDIOLOGY    Network Utilization Review Department  ATTENTION: Please call with any questions or concerns to [phone] and carefully listen to the prompts so that you are directed to the right person  All voicemails are confidential   Danny Samuels all requests for admission clinical reviews, approved or denied determinations and any other requests to dedicated fax number below belonging to the campus where the patient is receiving treatment   List of dedicated fax numbers for the Facilities:  [address] DENIALS (Administrative/Medical Necessity) [phone]   [address] (Maternity/NICU/Pediatrics) [phone]   [address] [place] [address] [phone]   [place] [address] [phone]   [facility] [address] [phone]   [facility] 138 [facility] [phone]

## 2022-10-25 NOTE — ASSESSMENT & PLAN NOTE
· Currently on 2 5 Eliquis b i d   · Follows with cardiology outpatient  · Currently rate controlled  · Holding home beta-blocker 2/2 hypotension

## 2022-10-25 NOTE — QUICK NOTE
Brief introductions and discussion with patient's daughter, Alcon Husain  Discussed patient's decompensated heart failure, multi system organ failure, escalating vasopressor support requirements, and by system approach to therapy  Also discussed my concerns about his overall recovery  Encouraged Ms Edenilson Barrera to reach out to family and consider next steps regarding goals of care  Patient remains DNR/DNI  All question answered and concerns addressed       Eddie Melara MD

## 2022-10-25 NOTE — PLAN OF CARE
Problem: Potential for Falls  Goal: Patient will remain free of falls  Description: INTERVENTIONS:  - Educate patient/family on patient safety including physical limitations  - Instruct patient to call for assistance with activity   - Consult OT/PT to assist with strengthening/mobility   - Keep Call bell within reach  - Keep bed low and locked with side rails adjusted as appropriate  - Keep care items and personal belongings within reach  - Initiate and maintain comfort rounds  - Make Fall Risk Sign visible to staff  - Initiate/Maintain  bed alarm   - Apply yellow socks and bracelet for high fall risk patients    Outcome: Progressing

## 2022-10-25 NOTE — ED NOTES
Bladder scan revealed ~150-200 mL of urine, Dr Megan Sherwood made aware  Provider to update ICU and Urology       Sherly Agosto, MORAIMA  10/25/22 0001

## 2022-10-25 NOTE — UTILIZATION REVIEW
NOTIFICATION OF INPATIENT ADMISSION   AUTHORIZATION REQUEST   SERVICING FACILITY:   [address] [place] [address]  Tax ID: [id number]  NPI: [id number] ATTENDING PROVIDER:  Attending Name and NPI#: Pantera Rome [[id number]]  Address: [address] [place] [address]  Phone: [phone]     ADMISSION INFORMATION:  Place of Service: [place] [address]  Place of Service Code: 21  Inpatient Admission Date/Time: 10/24/22 11:10 PM  Discharge Date/Time: No discharge date for patient encounter  Admitting Diagnosis Code/Description:  [facility]est[facility] [I50 9]  SOB (shortness of breath) [R06 02]     UTILIZATION REVIEW CONTACT:  Carmen Sears Utilization   Network Utilization Review Department  Phone: [phone]  Fax [phone]  Email: [email or web address]  Contact for approvals/pending authorizations, clinical reviews, and discharge  PHYSICIAN ADVISORY SERVICES:  Medical Necessity Denial & [id number] Review  Phone: [phone]  Fax: [phone]  Email: [email or web address]

## 2022-10-25 NOTE — ED NOTES
RN and ED physician unsuccessful x2 with le catheter placement with coude tip  Dr Precious Cardoza to inform ICU and contact Urology       Tray Miller RN  10/24/22 3957

## 2022-10-25 NOTE — ASSESSMENT & PLAN NOTE
· On admission creatinine was 2 12  · Continue to follow renal function  · Likely in the setting of volume overload  · Continue p r n   Diuretics  · Continue Chacon catheter with strict I&O

## 2022-10-25 NOTE — RESPIRATORY THERAPY NOTE
10/25/22 0400   Respiratory Assessment   Assessment Type Assess only   General Appearance Sedated;Drowsy   Respiratory Pattern Normal   Chest Assessment Chest expansion symmetrical   Bilateral Breath Sounds Diminished; Expiratory wheezes   R Breath Sounds Diminished; Expiratory wheezes   L Breath Sounds Diminished; Expiratory wheezes   Location Specific No   Cough None   Resp Comments Pt still resting on Bipap   O2 Device V60   Non-Invasive Information   O2 Interface Device Face mask   Non-Invasive Ventilation Mode BiPAP   $ Intermittent NIV Yes   SpO2 100 %   $ Pulse Oximetry Spot Check Charge Completed   Non-Invasive Settings   FiO2 (%) 50   IPAP (cm) 12 cm   EPAP (cm) 8 cm   Rate (Set) 8   Pressure Support (cm H2O) 6   Rise Time 2   Non-Invasive Readings   Skin Intervention Skin intact   Heater Temperature (Obs) 37   Total Rate 20   Vt (mL) (Mech) 508   MV (Mech) 15   Peak Pressure (Obs) 13   Auto PEEP Observed (cm H2O) 648   Non-Invasive Alarms   Insp Pressure High (cm H20) 20   Low Insp Pressure Time (sec) 20 sec   MV Low (L/min) 3   Vt High (mL) 1200   Vt Low (mL) 200   High Resp Rate (BPM) 45 BPM   Low Resp Rate (BPM) 8 BPM   RT Ventilator Management Note  Hung Cervantes 82 y o  male MRN: [id number]  Unit/Bed#:  Encounter: [id number]      Daily Screen    No data found in the last 10 encounters             Physical Exam:   Assessment Type: Assess only  General Appearance: Sedated, Drowsy  Respiratory Pattern: Normal  Chest Assessment: Chest expansion symmetrical  Bilateral Breath Sounds: Diminished, Expiratory wheezes  R Breath Sounds: Diminished, Expiratory wheezes  L Breath Sounds: Diminished, Expiratory wheezes  Location Specific: No  Cough: None  O2 Device: V60      Resp Comments: Pt still resting on Bipap

## 2022-10-25 NOTE — PROGRESS NOTES
Progress Note - Urology  Rajat Walker 1935, 82 y o  male MRN: [id number]    Unit/Bed#:  Encounter: [id number]    Assessment & Plan:  1  Severe phimosis  2  Cardiogenic shock  - Difficult le catheterization performed by urology early this AM  - Maintain le to straight drainage  Monitor I&O's   - Plan for void trial in rehab once edema resolves  - Will sign off at this time  Urology can be contacted with any questions or concerns  Subjective:   HPI:  Le catheter inserted by urology team early this AM due to severe phimosis and penile edema  He is afebrile and vitals appear stable other than slight tachycardia  Cr this AM 2 02  No leukocytosis  Urine culture in process  He currently has no complaints  Review of Systems   Constitutional: Negative for chills and fever  Respiratory: Negative for shortness of breath  Cardiovascular: Negative for chest pain  Gastrointestinal: Negative for abdominal pain  Genitourinary: Positive for penile swelling  Negative for dysuria, flank pain, hematuria and penile pain  Neurological: Negative for dizziness  Objective:  Vitals: Blood pressure 104/60, pulse (!) 113, temperature 97 8 °F (36 6 °C), temperature source Oral, resp  rate 14, weight 96 9 kg (213 lb 10 oz), SpO2 95 %  ,Body mass index is 30 65 kg/m²  Physical Exam  Constitutional:       General: He is not in acute distress  Appearance: Normal appearance  He is ill-appearing  He is not toxic-appearing  HENT:      Head: Normocephalic and atraumatic  Right Ear: External ear normal       Left Ear: External ear normal    Eyes:      General: No scleral icterus  Conjunctiva/sclera: Conjunctivae normal    Cardiovascular:      Rate and Rhythm: Tachycardia present  Pulmonary:      Effort: Pulmonary effort is normal    Genitourinary:     Comments: Penile edema and phimosis   Le catheter in place and patent for clear yellow urine  Musculoskeletal:      Cervical back: Normal range of motion  Skin:     General: Skin is warm and dry  Neurological:      General: No focal deficit present  Mental Status: He is alert and oriented to person, place, and time  Psychiatric:         Mood and Affect: Mood normal          Behavior: Behavior normal          Thought Content: Thought content normal          Judgment: Judgment normal           Labs:  Recent Labs     10/24/22  2128 10/25/22  0437   WBC 6 09 7 82     Recent Labs     10/24/22  2128 10/25/22  0437   HGB 13 1 12 3     Recent Labs     10/24/22  2128 10/25/22  0437   HCT 40 9 37 2     Recent Labs     10/24/22  2128 10/25/22  0437   CREATININE 2 12* 2 02*       History:  Past Medical History:   Diagnosis Date   • A-fib (HCC)    • Disease of thyroid gland    • Hyperlipidemia    • Hypertension    • Optic neuropathy    • Pleural effusion on right    • Pneumonia    • S/P lung surgery, follow-up exam    • Septic shock (HCC)      Social History     Socioeconomic History   • Marital status:      Spouse name: None   • Number of children: None   • Years of education: None   • Highest education level: None   Occupational History   • None   Tobacco Use   • Smoking status: Former Smoker     Types: Cigarettes   • Smokeless tobacco: Never Used   Vaping Use   • Vaping Use: Never used   Substance and Sexual Activity   • Alcohol use: Not Currently     Comment: occasional   • Drug use: Never   • Sexual activity: None   Other Topics Concern   • None   Social History Narrative   • None     Social Determinants of Health     Financial Resource Strain: Not on file   Food Insecurity: No Food Insecurity   • Worried About Running Out of Food in the Last Year: Never true   • Ran Out of Food in the Last Year: Never true   Transportation Needs: No Transportation Needs   • Lack of Transportation (Medical): No   • Lack of Transportation (Non-Medical):  No   Physical Activity: Not on file   Stress: Not on file   Social Connections: Not on file   Intimate Partner Violence: Not on file   Housing Stability: Low Risk    • Unable to Pay for Housing in the Last Year: No   • Number of Places Lived in the Last Year: 1   • Unstable Housing in the Last Year: No     Past Surgical History:   Procedure Laterality Date   • HAND DEBRIDEMENT Left 10/30/2021    Procedure: DEBRIDEMENT HAND/FINGER [facility] OUT), second, index and ring finger;  Surgeon: Kennedy Love MD;  [facility];  Service: Plastics   • INCISION AND DRAINAGE OF WOUND Right 2/23/2022    Procedure: INCISION AND DRAINAGE (I&D) HEAD/FACE;  Surgeon: Giselle Hubbard MD;  [facility];  Service: Plastics   • [place] THORACOSCOPY SURG PART PULM DECORT Right 12/12/2019    Procedure: RIGHT THORACOSCOPIC LUNG DECORTICATION;  Surgeon: Ivan Stewart MD;  [facility];  Service: Thoracic   • THORACOSCOPY VIDEO ASSISTED SURGERY (VATS) Right 12/12/2019    Procedure: THORACOSCOPY VIDEO ASSISTED SURGERY (VATS); Surgeon: Ivan Stewart MD;  [facility];  Service: Thoracic     History reviewed  No pertinent family history      Mike Rodas  Date: 10/25/2022 Time: 7:57 AM

## 2022-10-25 NOTE — PROCEDURES
BEDSIDE PROCEDURE  Indwelling Catheter PROCEDURE NOTE    Pre-operative Diagnosis:  Phimosis        Post-operative Diagnosis:  Phimosis, difficult catheterization    INDICATION   Chaparro Boyer is an 79-year-old male without prior  history admitted for cardiogenic shock  Significant phimosis with inability to cannulate urethra after multiple attempts by emergency room staff  Patient will require aggressive IV diuresis  Consultation requested to perform Chacon Catheter Placement  TIME OUT: Correct patient identity  PROCEDURE   Consent: Patient was agreeable  Formal  Informed consent however, not applicable  Under sterile conditions the patient was positioned  Betadine solution and sterile drapes were utilized  Significant preputial is edematous foreskin  Manually reduced after rigorous wheezing and pulling back until glans penis palpated and urethra medialized  Non- Petroleum based gel was used to lubricate urethral meatus insertion site  Utilized 18 [address] Catheter  Urine was obtained without any difficulties and  Without evidence of hematuria or trauma  Findings  200 ml of clear yellow urine was obtained  Complications:  None; patient tolerated the procedure well  Condition: stable    Plan  · Maintain Chacon to straight drainage  · Do not remove  · Flush BID using Williams syringe and 120 ml NSS  · No further  intervention required  · Void trial outpatient when edema resolves          ANNA MARIE Howard        Attending Attestation: Not Applicable

## 2022-10-25 NOTE — H&P
[facility]  H&P- Brittani Neat 1935, 82 y o  male MRN: [id number]  Unit/Bed#:  Encounter: [id number]  Primary Care Provider: Desiree Headley DO   Date and time admitted to hospital: 10/24/2022  9:04 PM    * Cardiogenic shock [facility])  Assessment & Plan  · AEB increasing shortness of breath at home, requiring BiPAP in ED, significant JVD on assessment, cool extremities, anasarca, hypotension, decreased urine output, SHREYA  and elevated T bili signifying hypoperfusion  · Echo February 0048: Systolic function is moderately reduced (40-45%)  There is moderate global hypokinesis  · Repeat ECHO pending  · Lactic negative on admission  · BNP on admission 6,597  · Trend troponins  · Levo started  · Will start Milrinone drip  · Follow endpoints and VBG  · Cardiology consult    Acute on chronic respiratory failure (HCC)  Assessment & Plan  · AEB increasing oxygen requirements on admission  · Likely in the setting of cardiogenic shock and volume overload  · Chronically wears 5 L nasal cannula at home  · Continued on BiPAP  · Continue diuretics p r n   · NPO for now    SIRS (systemic inflammatory response syndrome) ([facility])  Assessment & Plan  · AEB tachypnea and tachycardia  · End-organ damage with elevated creatinine and T bili with hypotension likely in the setting of cardiogenic shock  · No infectious process noted, negative lactate, negative procalcitonin, no leukocytosis  Will monitor off antibiotics  · Blood cultures pending  · Continue vasopressors for blood pressure support  · In the setting of cardiogenic shock fluids are not indicated at this time    SHREYA (acute kidney injury) ([facility] [address] )  Assessment & Plan  · On admission creatinine was 2 12  · Continue to follow renal function  · Likely in the setting of volume overload  · Continue p r n   Diuretics  · Continue Chacon catheter with strict I&O    Hyponatremia  Assessment & Plan  · Likely in the setting of volume overload  · Will recheck BMP in the morning    Chronic atrial fibrillation (HCC)  Assessment & Plan  · Currently on 2 5 Eliquis b i d   · Follows with cardiology outpatient  · Currently rate controlled  · Holding home beta-blocker 2/2 hypotension    Hypothyroidism  Assessment & Plan  · Holding home Synthroid while NPO    Fall  Assessment & Plan  · Daughter reports she called EMS today after her father fell at home, denies hitting his head   · Daughter reports frequent falls at home    -------------------------------------------------------------------------------------------------------------  Chief Complaint:  Shortness of breath    History of Present Illness   HX and PE limited by:  BiPAP  Kellen Wheeler is a 82 y o  male who presents with increasing shortness of breath from home  Patient's daughter called EMS after finding her father on the floor at post fall  Patient denies hitting his head but admits to frequent falls at home  Patient wears 5 L chronically but has had increasing shortness of breath at home over the past month to the point where he is unable to even walk short distances and has been primarily bed ridden  Family reports that they assist him with ADLs and bring him food, but he is very limited in his mobility due to increasing shortness of breath  Family also reports frequent falls at home and he does live alone  In the ED he was placed on BiPAP for work of breathing  Chest x-ray shows diffuse vascular congestion  On assessment the patient has significant JVD, cool extremities, is cyanotic generalized, has pitting edema and anasarca  Lactic was negative, procalcitonin negative, no leukocytosis on admission  BNP 6500, SHREYA, elevated T bili, hyponatremia likely in the setting of volume overload  The patient was ordered 40 of Lasix in the ED and Chacon insertion was attempted  Due to significant edema, the ED was unable to successfully place a Chacon catheter and Urology was consulted    Due to hypotension in the ED the patient was placed on low-dose Levo and will be admitted to the ICU for management with Milrinone  Patient has a history significant for AFib, CHF, hypothyroidism, and chronic respiratory failure  History obtained from chart review  -------------------------------------------------------------------------------------------------------------  Dispo: Admit to Critical Care     Code Status: Level 3 - DNAR and DNI  --------------------------------------------------------------------------------------------------------------  Review of Systems   Constitutional: Positive for activity change and fatigue  HENT: Negative  Eyes: Negative  Respiratory: Positive for shortness of breath  Cardiovascular: Positive for leg swelling  Negative for chest pain  Gastrointestinal: Positive for abdominal distention and constipation  Genitourinary: Positive for decreased urine volume, difficulty urinating and scrotal swelling  Skin: Positive for color change and wound  Neurological: Positive for syncope and weakness  Psychiatric/Behavioral: Negative  A 12-point, complete review of systems was reviewed and negative except as stated above     Physical Exam  Vitals and nursing note reviewed  Constitutional:       Appearance: He is ill-appearing  HENT:      Head: Normocephalic  Mouth/Throat:      Mouth: Mucous membranes are dry  Pharynx: Oropharynx is clear  Eyes:      Pupils: Pupils are equal, round, and reactive to light  Cardiovascular:      Rate and Rhythm: Normal rate  Rhythm irregular  Pulses: Normal pulses  Heart sounds: No murmur heard  Pulmonary:      Breath sounds: Wheezing and rales present  Abdominal:      General: There is distension  Musculoskeletal:      Right lower leg: Edema present  Left lower leg: Edema present  Skin:     General: Skin is dry  Capillary Refill: Capillary refill takes more than 3 seconds  Findings: Erythema present  Neurological:      Mental Status: He is alert and oriented to person, place, and time  --------------------------------------------------------------------------------------------------------------  Vitals:   Vitals:    10/25/22 0145 10/25/22 0329 10/25/22 0350 10/25/22 0400   BP: 103/57  109/64    BP Location:       Pulse: 86 92 92    Resp: (!) 25 (!) 10 16    Temp:       TempSrc:       SpO2: 96% 100% 100% 100%   Weight:         Temp  Min: 98 9 °F (37 2 °C)  Max: 98 9 °F (37 2 °C)        Body mass index is 30 65 kg/m²      Laboratory and Diagnostics:  Results from last 7 days   Lab Units 10/24/22  2128   WBC Thousand/uL 6 09   HEMOGLOBIN g/dL 13 1   HEMATOCRIT % 40 9   PLATELETS Thousands/uL 149   NEUTROS PCT % 65   MONOS PCT % 12     Results from last 7 days   Lab Units 10/24/22  2128   SODIUM mmol/L 130*   POTASSIUM mmol/L 5 0   CHLORIDE mmol/L 96   CO2 mmol/L 23   ANION GAP mmol/L 11   BUN mg/dL 42*   CREATININE mg/dL 2 12*   CALCIUM mg/dL 8 6   GLUCOSE RANDOM mg/dL 94   ALT U/L 16   AST U/L 41   ALK PHOS U/L 143*   ALBUMIN g/dL 2 5*   TOTAL BILIRUBIN mg/dL 3 35*     Results from last 7 days   Lab Units 10/24/22  2128   MAGNESIUM mg/dL 2 0   PHOSPHORUS mg/dL 4 3*      Results from last 7 days   Lab Units 10/24/22  2128   INR  1 77*   PTT seconds 45*          Results from last 7 days   Lab Units 10/24/22  2128   LACTIC ACID mmol/L 1 9     ABG:  Results from last 7 days   Lab Units 10/25/22  0220   PH ART  7 361   PCO2 ART mm Hg 34 4*   PO2 ART mm Hg 135 0*   HCO3 ART mmol/L 19 0*   BASE EXC ART mmol/L -5 5   ABG SOURCE  Line, Arterial     VBG:  Results from last 7 days   Lab Units 10/25/22  0220 10/25/22  0114   PH OLIMPIA   --  7 282*   PCO2 OLIMPIA mm Hg  --  49 1   PO2 OLIMPIA mm Hg  --  32 7*   HCO3 OLIMPIA mmol/L  --  22 7*   BASE EXC OLIMPIA mmol/L  --  -4 4   ABG SOURCE  Line, Arterial  --      Results from last 7 days   Lab Units 10/24/22  2128   PROCALCITONIN ng/ml 0 17       Micro:  Results from last 7 days   Lab Units 10/24/22  2128   BLOOD CULTURE  Received in Microbiology Lab  Culture in Progress  Received in Microbiology Lab  Culture in Progress  EKG: reviewed    Imaging: I have personally reviewed pertinent reports  Historical Information   Past Medical History:   Diagnosis Date   • A-fib [facility])    • Disease of thyroid gland    • Hyperlipidemia    • Hypertension    • Optic neuropathy    • Pleural effusion on right    • Pneumonia    • S/P lung surgery, follow-up exam    • Septic shock [facility])      Past Surgical History:   Procedure Laterality Date   • HAND DEBRIDEMENT Left 10/30/2021    Procedure: DEBRIDEMENT HAND/FINGER ([address]), second, index and ring finger;  Surgeon: Maris Vega MD;  [facility];  Service: Plastics   • INCISION AND DRAINAGE OF WOUND Right 2/23/2022    Procedure: INCISION AND DRAINAGE (I&D) HEAD/FACE;  Surgeon: Michael Kirkland MD;  [facility];  Service: Plastics   • [place] THORACOSCOPY SURG PART PULM DECORT Right 12/12/2019    Procedure: RIGHT THORACOSCOPIC LUNG DECORTICATION;  Surgeon: Sean Ruiz MD;  [facility];  Service: Thoracic   • THORACOSCOPY VIDEO ASSISTED SURGERY (VATS) Right 12/12/2019    Procedure: THORACOSCOPY VIDEO ASSISTED SURGERY (VATS); Surgeon: Sean Ruiz MD;  [facility];  Service: Thoracic     Social History   Social History     Substance and Sexual Activity   Alcohol Use Not Currently    Comment: occasional     Social History     Substance and Sexual Activity   Drug Use Never     Social History     Tobacco Use   Smoking Status Former Smoker   • Types: Cigarettes   Smokeless Tobacco Never Used     Exercise History:   Family History:   History reviewed  No pertinent family history    I have reviewed this patient's family history and commented on sigificant items within the HPI      Medications:  Current Facility-Administered Medications   Medication Dose Route Frequency   • apixaban (ELIQUIS) tablet 2 5 mg  2 5 mg Oral BID   • bisacodyl (DULCOLAX) rectal suppository 10 mg  10 mg Rectal Daily PRN   • cefTRIAXone (ROCEPHIN) 1,000 mg in dextrose 5 % 50 mL IVPB  1,000 mg Intravenous Q24H   • furosemide (LASIX) injection 40 mg  40 mg Intravenous Once   • levothyroxine tablet 75 mcg  75 mcg Oral Daily Before Breakfast   • milrinone (PRIMACOR) 20 mg in 100 mL infusion (premix)  0 38 mcg/kg/min Intravenous Continuous   • NOREPINEPHRINE 4 MG  ML NSS (CMPD ORDER) infusion  1-30 mcg/min Intravenous Titrated     Home medications:  Prior to Admission Medications   Prescriptions Last Dose Informant Patient Reported? Taking? albuterol (PROVENTIL HFA,VENTOLIN HFA) 90 mcg/act inhaler   Yes Yes   Sig: Inhale 2 puffs every 4 (four) hours as needed for wheezing or shortness of breath   apixaban (ELIQUIS) 2 5 mg   No Yes   Sig: Take 1 tablet (2 5 mg total) by mouth 2 (two) times a day   guaiFENesin (MUCINEX) 600 mg 12 hr tablet   Yes Yes   Sig: Take 1,200 mg by mouth every 12 (twelve) hours as needed for congestion   levothyroxine 50 mcg tablet   Yes No   Sig: Take 75 mcg by mouth daily before breakfast    metoprolol succinate (TOPROL-XL) 50 mg 24 hr tablet   Yes Yes   Sig: Take 25 mg by mouth 2 (two) times a day    rosuvastatin (CRESTOR) 10 MG tablet   Yes No   Sig: Take 10 mg by mouth daily   Patient not taking: Reported on 2/21/2022       Facility-Administered Medications: None     Allergies:   Allergies   Allergen Reactions   • Ace Inhibitors Cough     Rash       ------------------------------------------------------------------------------------------------------------  Advance Directive and Living Will:      Power of :    POLST:    ------------------------------------------------------------------------------------------------------------  Anticipated Length of Stay is > 2 midnights    Care Time Delivered:   Upon my evaluation, this patient had a high probability of imminent or life-threatening deterioration due to Cardiogenic shock, which required my direct attention, intervention, and personal management  I have personally provided 30 minutes (0000 to 0030) of critical care time, exclusive of procedures, teaching, family meetings, and any prior time recorded by providers other than myself  ANNA MARIE Rojas        Portions of the record may have been created with voice recognition software  Occasional wrong word or "sound a like" substitutions may have occurred due to the inherent limitations of voice recognition software    Read the chart carefully and recognize, using context, where substitutions have occurred

## 2022-10-25 NOTE — CONSULTS
Consultation - [id number] [facility] 82 y o  male MRN: [id number]  Unit/Bed#:  Encounter: [id number]      Assessment/Plan   Patient Active Problem List   Diagnosis   • Acute respiratory failure with hypoxia (HCC)   • Hyponatremia   • SHREYA (acute kidney injury) ([facility] )   • Chronic atrial fibrillation (HCC)   • Transaminitis   • Moderate protein-calorie malnutrition (HCC)   • Pleural effusion   • Volume overload   • Flexor tenosynovitis of finger   • Localized swelling on left hand   • Cellulitis of hand, left   • SIRS (systemic inflammatory response syndrome) ([facility])   • Wound of left foot   • CKD (chronic kidney disease) stage 3, GFR 30-59 ml/min ([facility])   • Hypoxia   • Skin cancer of face   • Hematoma of face, initial encounter   • Status post incision and drainage   • Acute on chronic respiratory failure (HCC)   • Hypothyroidism   • Cardiogenic shock ([facility] )   • Fall     Active issues specifically addressed today include:   - cardiogenic shock, vasopressor requirement   - acute on chronic hypoxemic respiratory failure   - multiorgan failure   - acute decompensated CHF, volume overload   - SHREYA   - chronic atrial fibrillation   - goals of care support    Plan:  #symptom management   - per CCM team    #goals of care   - goals of care conversation with Francisco Javier Puentes [daughter] via telephone [14:25-14:50]  - discussed current medical condition, including multiorgan failure, cardiogenic shock  Discussed medical trajectory, concerning for immediate life threat given MOF and requirement for vasopressors x 3  Daughter en route to bedside, will arrive around 15:00    - no prior AD/LW completed   - confirmed DNAR/DNI, Level 3 code status  Briefly introduced hemodialysis, daughter was not sure if she would feel that would be in alignment with a quality of life patient would find acceptable  - briefly explored comfort care if continued clinical deterioration   No decisions until daughter is at bedside    - states minimal communication between the patient and other children  At this time goal is to see if the patient can improve with current medical cares, recognizing that his clinical condition could deteriorate at any time  If the patient does not improve or clinically worsens, the family are interested in end-of-life discussions  #psychosocial support   - emotional support provided   -    - nine adult children [6 living, [address] [daughter] [phone]   - retired    - 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, 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      We appreciate the opportunity to participate in this patient's care  We will continue to follow  Please do not hesitate to contact our on-call provider through our clinic answering service at [phone] should you have acute symptom control concerns  Controlled Substance [place]    [place] or [place]  reviewed: No red flags were identified; safe to proceed with prescription  [place] PDMP Review       Value Time User    PDMP Reviewed  Yes 10/25/2022  1:54 PM Kaden Hoyt MD          History of Present Illness   Physician Requesting Consult: Corry Lane,*  Reason for Consult / Principal Problem: goals of care support  Hx and PE limited by: encephalopathy  HPI: Javier Cedeño is a 82y o  year old male wwith a PMH of CHF, chronic respiratory failure on 5L NC, chronic atrial fibrillation who initially p/w worsening SOB and found down by daughter at home  Admitted to ICU  Initial management requiring BiPAP support, CVC placement, arterial line placement, and vasopressor support  Initial w/u concerning for cardiogenic shock with end-organ involvement as evidenced by SHREYA, elevated LFTs, and persistent hypotension  Expanded goals of care discussion by CCM team with code status updated to DNAR/DNI  [facility] consulted for goals of care support  Patient unable to provide history given encephalopathy in the setting of multiorgan failure   History provided by review of the medical records, discussion with CCM team, and discussion with patient's daughter via telephone  Inpatient consult to Palliative Care  Consult performed by: Te Barragan MD  Consult ordered by: ANNA MARIE Gamez          Review of Systems   Unable to perform ROS: Mental status change       Historical Information   Past Medical History:   Diagnosis Date   • A-fib [facility])    • Disease of thyroid gland    • Hyperlipidemia    • Hypertension    • Optic neuropathy    • Pleural effusion on right    • Pneumonia    • S/P lung surgery, follow-up exam    • Septic shock [facility])      Past Surgical History:   Procedure Laterality Date   • HAND DEBRIDEMENT Left 10/30/2021    Procedure: DEBRIDEMENT HAND/FINGER ([address]), second, index and ring finger;  Surgeon: Christopher Mota MD;  Location: [facility] Main OR;  Service: Plastics   • INCISION AND DRAINAGE OF WOUND Right 2/23/2022    Procedure: INCISION AND DRAINAGE (I&D) HEAD/FACE;  Surgeon: Domingo Riggs MD;  Location: [facility];  Service: Plastics   • MO THORACOSCOPY SURG PART PULM DECORT Right 12/12/2019    Procedure: RIGHT THORACOSCOPIC LUNG DECORTICATION;  Surgeon: Juwan Welch MD;  Location: [facility] MAIN OR;  Service: Thoracic   • THORACOSCOPY VIDEO ASSISTED SURGERY (VATS) Right 12/12/2019    Procedure: THORACOSCOPY VIDEO ASSISTED SURGERY (VATS); Surgeon: Juwan Welch MD;  Location: [facility] MA[facility] OR;  Service: Thoracic     E-Cigarette/Vaping   • E-Cigarette Use Never User      E-Cigarette/Vaping Substances   • Nicotine No    • Flavoring No      Social History     Socioeconomic History   • Marital status:       Spouse name: None   • Number of children: None   • Years of education: None   • Highest education level: None   Occupational History   • None   Tobacco Use   • Smoking status: Former Smoker     Types: Cigarettes   • Smokeless tobacco: Never Used   Vaping Use   • Vaping Use: Never used   Substance and Sexual Activity   • Alcohol use: Not Currently     Comment: occasional   • Drug use: Never   • Sexual activity: None   Other Topics Concern   • None   Social History Narrative   • None     Social Determinants of Health     Financial Resource Strain: Not on file   Food Insecurity: No Food Insecurity   • Worried About Running Out of Food in the Last Year: Never true   • Ran Out of Food in the Last Year: Never true   Transportation Needs: No Transportation Needs   • Lack of Transportation (Medical): No   • Lack of Transportation (Non-Medical): No   Physical Activity: Not on file   Stress: Not on file   Social Connections: Not on file   Intimate Partner Violence: Not on file   Housing Stability: Low Risk    • Unable to Pay for Housing in the Last Year: No   • Number of Places Lived in the Last Year: 1   • Unstable Housing in the Last Year: No     History reviewed  No pertinent family history      Meds/Allergies   current meds:   Current Facility-Administered Medications   Medication Dose Route Frequency   • apixaban (ELIQUIS) tablet 2 5 mg  2 5 mg Oral BID   • bisacodyl (DULCOLAX) rectal suppository 10 mg  10 mg Rectal Daily PRN   • [START ON 10/26/2022] cefTRIAXone (ROCEPHIN) 1,000 mg in dextrose 5 % 50 mL IVPB  1,000 mg Intravenous Q24H   • Famotidine (PF) (PEPCID) injection 20 mg  20 mg Intravenous Q24H HERBERT   • furosemide (LASIX) 500 mg infusion 50 mL  10 mg/hr Intravenous Continuous   • furosemide (LASIX) injection 40 mg  40 mg Intravenous Once   • levothyroxine tablet 75 mcg  75 mcg Oral Daily Before Breakfast   • metoprolol (LOPRESSOR) injection 5 mg  5 mg Intravenous Q6H PRN   • milrinone (PRIMACOR) 20 mg in 100 mL infusion (premix)  0 38 mcg/kg/min Intravenous Continuous   • norepinephrine (LEVOPHED) 8 mg (DOUBLE CONCENTRATION) IV in sodium chloride 0 9% 250 mL  1-30 mcg/min Intravenous Titrated   • vasopressin (PITRESSIN) 20 Units in sodium chloride 0 9 % 100 mL infusion  0 04 Units/min Intravenous Continuous         Allergies Allergen Reactions   • Ace Inhibitors Cough     Rash       Objective     Physical Exam  Vitals and nursing note reviewed  Constitutional:       Appearance: He is not diaphoretic  Comments: Critically ill appearing in NAD   HENT:      Head: Normocephalic and atraumatic  Right Ear: External ear normal       Left Ear: External ear normal    Eyes:      Comments: No gaze preference   Cardiovascular:      Rate and Rhythm: Tachycardia present  Pulmonary:      Effort: No tachypnea or respiratory distress  Comments: 5L NC in place  Neurological:      Mental Status: He is lethargic and confused  Psychiatric:      Comments: Unable to assess         Lab Results:   I have personally reviewed pertinent labs  , CBC:   Lab Results   Component Value Date    WBC 7 82 10/25/2022    HGB 12 3 10/25/2022    HCT 37 2 10/25/2022    MCV 81 (L) 10/25/2022     10/25/2022    MCH 26 7 (L) 10/25/2022    MCHC 33 1 10/25/2022    RDW 21 7 (H) 10/25/2022    MPV 9 2 10/25/2022    NRBC 0 10/25/2022   , CMP:   Lab Results   Component Value Date    SODIUM 131 (L) 10/25/2022    K 3 6 10/25/2022    CL 97 10/25/2022    CO2 22 10/25/2022    BUN 38 (H) 10/25/2022    CREATININE 1 97 (H) 10/25/2022    CALCIUM 8 7 10/25/2022    AST 40 10/25/2022    ALT 16 10/25/2022    ALKPHOS 133 (H) 10/25/2022    EGFR 30 10/25/2022   , PT/PTT:  Lab Results   Component Value Date    PTT 45 (H) 10/24/2022     Imaging Studies: I have personally reviewed pertinent reports  EKG, Pathology, and Other Studies: I have personally reviewed pertinent reports  Code Status: Level 3 - DNAR and DNI  Advance Directive and Living Will:   Not on File, not previously completed  Power of :   n/a  POLST:   n/a    Counseling / Coordination of Care  Total floor / unit time spent today 35 minutes  Greater than 50% of total time was spent with the patient and / or family counseling and / or coordination of care   A description of the counseling / coordination of care: provided medical updates, discussed palliative care, determined competency, determined goals of care, determined POA, determined social/family support, discussed plans of care, discussed symptom management, provided psychosocial support  Expanded goals of care discussion via telephone  PDMP Reviewed   Coordinated plan of care with primary team

## 2022-10-25 NOTE — DISCHARGE INSTR - OTHER ORDERS
Skin care Plan:  1-Hydraguard to bilateral heels and left plantar foot and buttock twice a day and as needed  2-Turn/reposition every 2 hours for pressure re-distribution on skin   3-Elevate heels to offload pressure  4-Moisturize skin daily with skin nourishing cream  5-Waffle cushion in chair when out of bed  6-L arm and right posterior leg- Cleanse wounds with NSS, pat dry  Apply Dermagran over wound beds, cover with ABD pad and wrap with Alfred  Change every other day and as needed  7-Right back- Cleanse wound with NSS, pat dry  Apply Allevyn foam dressing over wound bed  Change every 3 days and as needed

## 2022-10-25 NOTE — ASSESSMENT & PLAN NOTE
· AEB increasing oxygen requirements on admission  · Likely in the setting of cardiogenic shock and volume overload  · Chronically wears 5 L nasal cannula at home  · Continued on BiPAP  · Continue diuretics p r n   · NPO for now

## 2022-10-25 NOTE — ACP (ADVANCE CARE PLANNING)
Critical Care Advanced Care Planning Note  Hung Cervantes 82 y o  male MRN: [id number]  Unit/Bed#: ED 16 Encounter: [id number]    Hung Cervantes is a 82 y o  male requiring critical care evaluation and advanced care planning  The patient has chronic comorbidities, including but not limited to CHF, hypothyroidism, chronic respiratory failure on 5 L nasal cannula, AFib on Eliquis which is now further complicated by the following acute conditions:  Cardiogenic shock  Due to the severity of the patient's acute condition and/or the extent of chronic conditions, additional conversations pertaining to advanced care planning were required  Today's discussion, which was held in a face-to-face meeting, included The patient and his daughter, and it was established that all stake holders understood the rationale for the advanced care planning  The patient was able to participate in the discussion  Summary of Discussion:  The patient was on BiPAP in the ED and I discussed with him his code status in light of the fact that he may have increasing oxygen requirements  The patient stated that he does not wish to be on a ventilator or to have chest compressions  The daughter was at bedside during the discussion  Total time spent, (15) minutes (1145 to 1200)        CODE STATUS: DNR/DNI - Level 3  POA:    POLST:        SIGNATURE: ANNA MARIE Gillis  DATE: October 25, 2022  TIME: 12:44 AM

## 2022-10-26 ENCOUNTER — APPOINTMENT (INPATIENT)
Dept: RADIOLOGY | Facility: HOSPITAL | Age: 87
End: 2022-10-26

## 2022-10-26 PROBLEM — E87.1 HYPONATREMIA: Status: RESOLVED | Noted: 2019-12-03 | Resolved: 2022-10-26

## 2022-10-26 PROBLEM — R78.81 BACTEREMIA: Status: ACTIVE | Noted: 2021-10-28

## 2022-10-26 PROBLEM — J96.11 CHRONIC RESPIRATORY FAILURE WITH HYPOXIA (HCC): Status: ACTIVE | Noted: 2022-10-24

## 2022-10-26 PROBLEM — B95.7 COAG NEGATIVE STAPHYLOCOCCUS BACTEREMIA: Status: ACTIVE | Noted: 2021-10-28

## 2022-10-26 LAB
ABO GROUP BLD: NORMAL
ALBUMIN SERPL BCP-MCNC: 2.3 G/DL (ref 3.5–5)
ALP SERPL-CCNC: 130 U/L (ref 46–116)
ALT SERPL W P-5'-P-CCNC: 9 U/L (ref 12–78)
ANION GAP SERPL CALCULATED.3IONS-SCNC: 12 MMOL/L (ref 4–13)
ARTERIAL PATENCY WRIST A: YES
ARTERIAL PATENCY WRIST A: YES
AST SERPL W P-5'-P-CCNC: 31 U/L (ref 5–45)
BASE EX.OXY STD BLDV CALC-SCNC: 76.4 % (ref 60–80)
BASE EX.OXY STD BLDV CALC-SCNC: 81.9 % (ref 60–80)
BASE EX.OXY STD BLDV CALC-SCNC: 83.5 % (ref 60–80)
BASE EXCESS BLDV CALC-SCNC: 1.3 MMOL/L
BASE EXCESS BLDV CALC-SCNC: 1.3 MMOL/L
BASE EXCESS BLDV CALC-SCNC: 2 MMOL/L
BILIRUB SERPL-MCNC: 3.9 MG/DL (ref 0.2–1)
BLD GP AB SCN SERPL QL: NEGATIVE
BODY TEMPERATURE: 8.4 DEGREES FEHRENHEIT
BUN SERPL-MCNC: 30 MG/DL (ref 5–25)
BUN SERPL-MCNC: 32 MG/DL (ref 5–25)
BUN SERPL-MCNC: 33 MG/DL (ref 5–25)
CALCIUM ALBUM COR SERPL-MCNC: 9.4 MG/DL (ref 8.3–10.1)
CALCIUM SERPL-MCNC: 7.8 MG/DL (ref 8.3–10.1)
CALCIUM SERPL-MCNC: 8 MG/DL (ref 8.3–10.1)
CALCIUM SERPL-MCNC: 8.1 MG/DL (ref 8.3–10.1)
CHLORIDE SERPL-SCNC: 97 MMOL/L (ref 96–108)
CHLORIDE SERPL-SCNC: 97 MMOL/L (ref 96–108)
CHLORIDE SERPL-SCNC: 98 MMOL/L (ref 96–108)
CO2 SERPL-SCNC: 25 MMOL/L (ref 21–32)
CO2 SERPL-SCNC: 26 MMOL/L (ref 21–32)
CO2 SERPL-SCNC: 27 MMOL/L (ref 21–32)
CREAT SERPL-MCNC: 1.97 MG/DL (ref 0.6–1.3)
CREAT SERPL-MCNC: 2.03 MG/DL (ref 0.6–1.3)
CREAT SERPL-MCNC: 2.09 MG/DL (ref 0.6–1.3)
ERYTHROCYTE [DISTWIDTH] IN BLOOD BY AUTOMATED COUNT: 21.8 % (ref 11.6–15.1)
GFR SERPL CREATININE-BSD FRML MDRD: 28 ML/MIN/1.73SQ M
GFR SERPL CREATININE-BSD FRML MDRD: 29 ML/MIN/1.73SQ M
GFR SERPL CREATININE-BSD FRML MDRD: 30 ML/MIN/1.73SQ M
GLUCOSE SERPL-MCNC: 131 MG/DL (ref 65–140)
GLUCOSE SERPL-MCNC: 142 MG/DL (ref 65–140)
GLUCOSE SERPL-MCNC: 169 MG/DL (ref 65–140)
GLUCOSE SERPL-MCNC: 174 MG/DL (ref 65–140)
GLUCOSE SERPL-MCNC: 175 MG/DL (ref 65–140)
HCO3 BLDV-SCNC: 26.2 MMOL/L (ref 24–30)
HCO3 BLDV-SCNC: 27 MMOL/L (ref 24–30)
HCO3 BLDV-SCNC: 27.8 MMOL/L (ref 24–30)
HCT VFR BLD AUTO: 37.4 % (ref 36.5–49.3)
HGB BLD-MCNC: 12.1 G/DL (ref 12–17)
MAGNESIUM SERPL-MCNC: 2.3 MG/DL (ref 1.6–2.6)
MAGNESIUM SERPL-MCNC: 2.4 MG/DL (ref 1.6–2.6)
MAGNESIUM SERPL-MCNC: 2.5 MG/DL (ref 1.6–2.6)
MCH RBC QN AUTO: 27.1 PG (ref 26.8–34.3)
MCHC RBC AUTO-ENTMCNC: 32.4 G/DL (ref 31.4–37.4)
MCV RBC AUTO: 84 FL (ref 82–98)
NASAL CANNULA: 6
O2 CT BLDV-SCNC: 14.3 ML/DL
O2 CT BLDV-SCNC: 15.3 ML/DL
O2 CT BLDV-SCNC: 15.6 ML/DL
PCO2 BLDV: 42.6 MM HG (ref 42–50)
PCO2 BLDV: 47.4 MM HG (ref 42–50)
PCO2 BLDV: 48.2 MM HG (ref 42–50)
PH BLDV: 7.37 [PH] (ref 7.3–7.4)
PH BLDV: 7.38 [PH] (ref 7.3–7.4)
PH BLDV: 7.41 [PH] (ref 7.3–7.4)
PLATELET # BLD AUTO: 156 THOUSANDS/UL (ref 149–390)
PMV BLD AUTO: 9.3 FL (ref 8.9–12.7)
PO2 BLDV: 40.2 MM HG (ref 35–45)
PO2 BLDV: 46.3 MM HG (ref 35–45)
PO2 BLDV: 49.8 MM HG (ref 35–45)
POTASSIUM SERPL-SCNC: 3 MMOL/L (ref 3.5–5.3)
POTASSIUM SERPL-SCNC: 3.2 MMOL/L (ref 3.5–5.3)
POTASSIUM SERPL-SCNC: 3.9 MMOL/L (ref 3.5–5.3)
PROT SERPL-MCNC: 7.4 G/DL (ref 6.4–8.4)
RBC # BLD AUTO: 4.47 MILLION/UL (ref 3.88–5.62)
RH BLD: POSITIVE
SODIUM SERPL-SCNC: 135 MMOL/L (ref 135–147)
SODIUM SERPL-SCNC: 135 MMOL/L (ref 135–147)
SODIUM SERPL-SCNC: 136 MMOL/L (ref 135–147)
SPECIMEN EXPIRATION DATE: NORMAL
WBC # BLD AUTO: 11.41 THOUSAND/UL (ref 4.31–10.16)

## 2022-10-26 RX ORDER — POTASSIUM CHLORIDE 20 MEQ/1
40 TABLET, EXTENDED RELEASE ORAL ONCE
Status: COMPLETED | OUTPATIENT
Start: 2022-10-26 | End: 2022-10-26

## 2022-10-26 RX ORDER — FUROSEMIDE 10 MG/ML
80 INJECTION INTRAMUSCULAR; INTRAVENOUS ONCE
Status: COMPLETED | OUTPATIENT
Start: 2022-10-26 | End: 2022-10-26

## 2022-10-26 RX ORDER — CALCIUM GLUCONATE 20 MG/ML
2 INJECTION, SOLUTION INTRAVENOUS ONCE
Status: COMPLETED | OUTPATIENT
Start: 2022-10-26 | End: 2022-10-26

## 2022-10-26 RX ORDER — POTASSIUM CHLORIDE 20 MEQ/1
20 TABLET, EXTENDED RELEASE ORAL ONCE
Status: COMPLETED | OUTPATIENT
Start: 2022-10-26 | End: 2022-10-26

## 2022-10-26 RX ORDER — POTASSIUM CHLORIDE 29.8 MG/ML
40 INJECTION INTRAVENOUS ONCE
Status: COMPLETED | OUTPATIENT
Start: 2022-10-26 | End: 2022-10-26

## 2022-10-26 RX ADMIN — POTASSIUM CHLORIDE 40 MEQ: 29.8 INJECTION, SOLUTION INTRAVENOUS at 16:15

## 2022-10-26 RX ADMIN — VASOPRESSIN 0.08 UNITS/MIN: 20 INJECTION INTRAVENOUS at 02:51

## 2022-10-26 RX ADMIN — MILRINONE LACTATE IN DEXTROSE 0.25 MCG/KG/MIN: 200 INJECTION, SOLUTION INTRAVENOUS at 04:41

## 2022-10-26 RX ADMIN — VASOPRESSIN 0.08 UNITS/MIN: 20 INJECTION INTRAVENOUS at 21:38

## 2022-10-26 RX ADMIN — POTASSIUM CHLORIDE 20 MEQ: 1500 TABLET, EXTENDED RELEASE ORAL at 20:35

## 2022-10-26 RX ADMIN — POTASSIUM CHLORIDE 40 MEQ: 1500 TABLET, EXTENDED RELEASE ORAL at 11:00

## 2022-10-26 RX ADMIN — POTASSIUM CHLORIDE 40 MEQ: 1500 TABLET, EXTENDED RELEASE ORAL at 15:49

## 2022-10-26 RX ADMIN — LEVOTHYROXINE SODIUM 75 MCG: 75 TABLET ORAL at 08:26

## 2022-10-26 RX ADMIN — VANCOMYCIN HYDROCHLORIDE 1250 MG: 5 INJECTION, POWDER, LYOPHILIZED, FOR SOLUTION INTRAVENOUS at 23:54

## 2022-10-26 RX ADMIN — CEFEPIME 2000 MG: 2 INJECTION, POWDER, FOR SOLUTION INTRAVENOUS at 00:00

## 2022-10-26 RX ADMIN — NOREPINEPHRINE BITARTRATE 24 MCG/MIN: 1 INJECTION, SOLUTION, CONCENTRATE INTRAVENOUS at 08:48

## 2022-10-26 RX ADMIN — MAGNESIUM SULFATE HEPTAHYDRATE 2 G: 40 INJECTION, SOLUTION INTRAVENOUS at 00:10

## 2022-10-26 RX ADMIN — FUROSEMIDE 80 MG: 10 INJECTION, SOLUTION INTRAMUSCULAR; INTRAVENOUS at 12:04

## 2022-10-26 RX ADMIN — APIXABAN 2.5 MG: 2.5 TABLET, FILM COATED ORAL at 17:53

## 2022-10-26 RX ADMIN — APIXABAN 2.5 MG: 2.5 TABLET, FILM COATED ORAL at 08:26

## 2022-10-26 RX ADMIN — POTASSIUM CHLORIDE 20 MEQ: 14.9 INJECTION, SOLUTION INTRAVENOUS at 00:10

## 2022-10-26 RX ADMIN — NOREPINEPHRINE BITARTRATE 26 MCG/MIN: 1 INJECTION, SOLUTION, CONCENTRATE INTRAVENOUS at 15:49

## 2022-10-26 RX ADMIN — VASOPRESSIN 0.08 UNITS/MIN: 20 INJECTION INTRAVENOUS at 07:04

## 2022-10-26 RX ADMIN — VANCOMYCIN HYDROCHLORIDE 1250 MG: 5 INJECTION, POWDER, LYOPHILIZED, FOR SOLUTION INTRAVENOUS at 00:57

## 2022-10-26 RX ADMIN — MILRINONE LACTATE IN DEXTROSE 0.13 MCG/KG/MIN: 200 INJECTION, SOLUTION INTRAVENOUS at 19:58

## 2022-10-26 RX ADMIN — CEFEPIME 2000 MG: 2 INJECTION, POWDER, FOR SOLUTION INTRAVENOUS at 12:05

## 2022-10-26 RX ADMIN — NOREPINEPHRINE BITARTRATE 26 MCG/MIN: 1 INJECTION, SOLUTION, CONCENTRATE INTRAVENOUS at 19:55

## 2022-10-26 RX ADMIN — VASOPRESSIN 0.08 UNITS/MIN: 20 INJECTION INTRAVENOUS at 16:44

## 2022-10-26 RX ADMIN — NOREPINEPHRINE BITARTRATE 27 MCG/MIN: 1 INJECTION, SOLUTION, CONCENTRATE INTRAVENOUS at 02:51

## 2022-10-26 RX ADMIN — POTASSIUM CHLORIDE 40 MEQ: 29.8 INJECTION, SOLUTION INTRAVENOUS at 11:02

## 2022-10-26 RX ADMIN — FAMOTIDINE 20 MG: 10 INJECTION, SOLUTION INTRAVENOUS at 08:26

## 2022-10-26 RX ADMIN — CALCIUM GLUCONATE 2 G: 20 INJECTION, SOLUTION INTRAVENOUS at 15:51

## 2022-10-26 RX ADMIN — CEFEPIME 2000 MG: 2 INJECTION, POWDER, FOR SOLUTION INTRAVENOUS at 22:39

## 2022-10-26 NOTE — QUICK NOTE
Family meeting with patient's daughter Kolby Jiménez and Gilberto Garcia (and her ), and grandchildren  Discussed patient's by systems issues and concerns  Discussed patient's decompensated heart failure and multisystem organ failure  Discussed current interventions including vasopressor and inotropic support, diuretics, and end point monitoring  Discussed likelihood of recovery of current episode, and what best and worst case scenarios would look like  Explained that current interventions will not change any underlying pathology  Discussed goals of care and quality of life and difficult conversations that the family will need to have if patient's clinical course continues to be poor  All questions answered and concerns addressed       Ty Jorge MD

## 2022-10-26 NOTE — PHYSICAL THERAPY NOTE
Physical Therapy Evaluation     Patient's Name: José Luis Both    Admitting Diagnosis  CHF (congestive heart failure) ([facility] ) [I50 9]  SOB (shortness of breath) [R06 02]    Problem List  Patient Active Problem List   Diagnosis    Acute respiratory failure with hypoxia ([facility])    Hyponatremia    SHREAY (acute kidney injury) ([facility] )    Chronic atrial fibrillation (HCC)    Transaminitis    Moderate protein-calorie malnutrition (HCC)    Pleural effusion    Volume overload    Flexor tenosynovitis of finger    Localized swelling on left hand    Cellulitis of hand, left    Bacteremia    Wound of left foot    CKD (chronic kidney disease) stage 3, GFR 30-59 ml/min (HCC)    Hypoxia    Skin cancer of face    Hematoma of face, initial encounter    Status post incision and drainage    Acute on chronic respiratory failure ([facility] )    Hypothyroidism    Cardiogenic shock ([place] [address] )    Fall     Past Medical History  Past Medical History:   Diagnosis Date    A-fib ([place] [address] )     Disease of thyroid gland     Hyperlipidemia     Hypertension     Optic neuropathy     Pleural effusion on right     Pneumonia     S/P lung surgery, follow-up exam     Septic shock [facility])      Past Surgical History  Past Surgical History:   Procedure Laterality Date    HAND DEBRIDEMENT Left 10/30/2021    Procedure: DEBRIDEMENT HAND/FINGER [facility] OUT), second, index and ring finger;  Surgeon: Christopher Mota MD;  Location: [facility];  Service: Plastics    INCISION AND DRAINAGE OF WOUND Right 2/23/2022    Procedure: INCISION AND DRAINAGE (I&D) HEAD/FACE;  Surgeon: Domingo Riggs MD;  Location: [facility];  Service: Plastics    ID THORACOSCOPY SURG PART PULM DECORT Right 12/12/2019    Procedure: RIGHT THORACOSCOPIC LUNG DECORTICATION;  Surgeon: Juwan Welch MD;  Location: [facility];  Service: Thoracic    THORACOSCOPY VIDEO ASSISTED SURGERY (VATS) Right 12/12/2019    Procedure: THORACOSCOPY VIDEO ASSISTED SURGERY (VATS);   Surgeon: Juwan Welch MD;  Location: [facility];  Service: Thoracic      10/26/22 1128   PT Last Visit   PT Visit Date 10/26/22   Note Type   Note type Evaluation   Pain Assessment   Pain Assessment Tool 0-10   Pain Score No Pain   Restrictions/Precautions   Weight Bearing Precautions Per Order No   Braces or Orthoses Other (Comment)  (none per patient)   Other Precautions Chair Alarm; Bed Alarm;Multiple lines;Telemetry;O2;Fall Risk   Home Living   Type of [address] One level; Able to live on main level with bedroom/bathroom;Stairs to enter with rails  (3 MAURO with bilateral railing)   Bathroom Shower/Tub Tub/shower unit   Bathroom Toilet Standard   Bathroom Equipment Grab bars in shower; Shower chair   Bathroom Accessibility Accessible   Home Equipment Walker;Cane;Other (Comment)  (home O2)   Additional Comments Pt ambulates with a walker  Prior Function   Level of [place] Independent with functional mobility; Independent with ADLs; Needs assistance with IADLS   Lives With Alone   Receives Help From Family   IADLs Family/Friend/Other provides transportation; Family/Friend/Other provides meals; Family/Friend/Other provides medication management   Falls in the last 6 months 1 to 4  (3 falls)   Vocational Retired   General   Family/Caregiver Present Yes  (pt's grandson at bedside)   Cognition   Overall Cognitive Status WFL   Arousal/Participation Alert   Orientation Level Oriented to person;Oriented to place;Oriented to situation  (oriented to year only)   Memory Decreased recall of recent events;Decreased short term memory   Following Commands Follows one step commands without difficulty   Comments Pt agreeable to PT     Subjective   Subjective "I'll get up "   RUE Assessment   RUE Assessment   (defer to OT assessment)   LUE Assessment   LUE Assessment   (defer to OT assessment)   RLE Assessment   RLE Assessment X   Strength RLE   RLE Overall Strength 3+/5   LLE Assessment   LLE Assessment X   Strength LLE   LLE Overall Strength 3+/5   Light Touch   RLE Light Touch Grossly intact   LLE Light Touch Grossly intact   Bed Mobility   Supine to Sit 3  Moderate assistance   Additional items Assist x 2;HOB elevated; Bedrails; Increased time required;Verbal cues;LE management   Transfers   Sit to Stand 3  Moderate assistance   Additional items Assist x 2; Increased time required;Verbal cues   Stand to Sit 3  Moderate assistance   Additional items Assist x 2;Armrests; Increased time required;Verbal cues   Ambulation/Elevation   Gait pattern Decreased toe off;Decreased heel strike;Decreased hip extension; Excessively slow; Step to;Short stride; Shuffling   Gait Assistance 3  Moderate assist   Additional items Assist x 2;Verbal cues   Assistive Device Rolling walker   Distance 3 feet, bed to chair   Stair Management Assistance Not tested   Balance   Static Sitting Fair   Dynamic Sitting Fair -   Static Standing Poor   Dynamic Standing Poor -   Ambulatory Poor -   Endurance Deficit   Endurance Deficit Yes   Endurance Deficit Description decreased activity tolerance; pt requiring supplemental oxygen   Activity Tolerance   Activity Tolerance Patient limited by fatigue   Medical Staff Made Aware OT Misael Mendoza  Co-evaluation performed with OT secondary to complex medical condition of patient  PT/OT goals were addressed separately  Nurse Made Aware Discussed case with MORAIMA Reis   Assessment   Prognosis Good   Problem List Decreased strength;Decreased endurance; Impaired balance;Decreased mobility   Assessment Pt is 82year old male seen for PT evaluation s/p admit to [facility] on 10/24/2022 with Cardiogenic shock ([address] )  PT consulted to assess pt's functional mobility and d/c needs  Order placed for PT eval and tx, with up with assist order  Comorbidities affecting pt's physical performance at time of assessment include hyponatremia, SHREYA, chronic atrial fibrillation, bacteremia, acute on chronic respiratory failure, hypothyroidism, and fall   PTA, pt was independent with all functional mobility with a walker  Pt ambulates household distances  Pt resides alone in a one level house with 3 steps to enter  Personal factors affecting pt at time of IE include stairs to enter home, inability to ambulate household distances, inability to navigate level surfaces without external assistance, unable to perform dynamic tasks in community, limited home support, positive fall history, inability to perform IADLs, and inability to perform ADLs  Please find objective findings from PT assessment regarding body systems outlined above with impairments and limitations including weakness, impaired balance, decreased endurance, gait deviations, decreased activity tolerance, decreased functional mobility tolerance, and fall risk  The following objective measures performed on IE also reveal limitations: Barthel Index: 30/100, Modified Briscoe: 4 (moderate/severe disability) and AM-PAC 6-Clicks: 9/54  Pt's clinical presentation is currently unstable/unpredictable seen in pt's presentation of need for ongoing medical management/monitoring, pt is a fall risk, and pt requires cues and assist of two for safety with functional mobility  Pt to benefit from continued PT tx to address deficits as defined above and maximize level of functional independent mobility and consistency  From PT/mobility standpoint, recommendation at time of d/c would be STR pending progress in order to facilitate return to PLOF     Barriers to Discharge Inaccessible home environment;Decreased caregiver support   Goals   STG Expiration Date 11/05/22   Short Term Goal #1 In 10 days: Increase bilateral LE strength 1/2 grade to facilitate independent mobility, Perform all bed mobility tasks with min A of 1 to decrease caregiver burden, Perform all transfers with min A of 1 to improve independence, Ambulate > 100 ft  with RW with min A of 1 w/o LOB and w/ normalized gait pattern 100% of the time, Increase all balance 1/2 grade to decrease risk for falls and PT to see and establish goals for stairs when appropriate   Plan   Treatment/Interventions Functional transfer training;LE strengthening/ROM; Therapeutic exercise; Endurance training;Patient/family training;Bed mobility;Gait training;Spoke to nursing;OT;Family   PT Frequency 3-5x/wk   Recommendation   PT Discharge Recommendation Post acute rehabilitation services   AM-PAC Basic Mobility Inpatient   Turning in Bed Without Bedrails 1   Lying on Back to Sitting on Edge of Flat Bed 1   Moving Bed to Chair 1   Standing Up From Chair 1   Walk in Room 1   Climb 3-5 Stairs 1   Basic Mobility Inpatient Raw Score 6   Turning Head Towards Sound 4   Follow Simple Instructions 4   Low Function Basic Mobility Raw Score 14   Low Function Basic Mobility Standardized Score 22 01   Highest Level Of Mobility   -[facility] Goal 2: Bed activities/Dependent transfer   -[facility] Achieved 4: Move to chair/commode   Modified Elverson Scale   Modified [place] Scale 4   Barthel Index   Feeding 5   Bathing 0   Grooming Score 0   Dressing Score 5   Bladder Score 0   Bowels Score 10   Toilet Use Score 5   Transfers (Bed/Chair) Score 5   Mobility (Level Surface) Score 0   Stairs Score 0   Barthel Index Score 30     PT Evaluation Time: 5432-2833  Ashlee Mott, PT, DPT

## 2022-10-26 NOTE — ASSESSMENT & PLAN NOTE
· Baseline creatinine 1 4-1 5, 2 12 on admission  · Continue to follow renal function  · Likely in the setting of volume overload  · Continue with Lasix infusion  · Chacon catheter with strict I&O

## 2022-10-26 NOTE — ASSESSMENT & PLAN NOTE
· AEB increasing shortness of breath at home, requiring BiPAP in ED, significant JVD on assessment, cool extremities, anasarca, hypotension, decreased urine output, SHREYA  and elevated T bili signifying hypoperfusion  · Echo February 9920: Systolic function is moderately reduced (40-45%)   There is moderate global hypokinesis  · Repeat ECHO demonstrated EF 40-45%, RV dilated, aortic valve with severely thickened leaflets, mildly elevated PASP   · Lactic negative on admission  · BNP on admission 6,597  · Troponins negative  · Levo and Vaso initiated, attempted Epi infusion but patient developed tachyarrhythmias   · Received Amio bolus and 10/25 gtt for tachyarrhythmias, became profoundly hypotensive and gtt was discontinued  · Maintain MAP > 65  · Milrinone started at 0 38 with improvement in SVO2, weaned to 0 25 overnight  · Continue to follow BMP and VBG  · Cardiology consulted, appreciate input  · Given 80mg IV Lasix initially, now on Lasix infusion

## 2022-10-26 NOTE — ASSESSMENT & PLAN NOTE
"Subjective:       Patient ID: Ady Meadows is a 54 y.o. male.    Vitals:  height is 5' 9" (1.753 m) and weight is 84.4 kg (186 lb). His oral temperature is 98.8 °F (37.1 °C). His blood pressure is 138/91 (abnormal) and his pulse is 73. His respiration is 18 and oxygen saturation is 97%.     Chief Complaint: Knee Pain (right)    Pt presents today with left knee pain, pt states he had MRI done several months back due pain and swelling. Pt has used ice, heat alternating X's 2 weeks. Pt taking 800 mg Ibuprofen for pain    Knee Pain    The incident occurred more than 1 week ago. There was no injury mechanism. The pain is present in the left knee. The pain is at a severity of 10/10. The pain is severe. The pain has been constant since onset. Associated symptoms include an inability to bear weight. Pertinent negatives include no loss of motion, loss of sensation, muscle weakness, numbness or tingling. He reports no foreign bodies present. The symptoms are aggravated by weight bearing and movement. He has tried ice, heat and acetaminophen for the symptoms. The treatment provided no relief.       Constitution: Negative for chills, fatigue and fever.   HENT: Negative for congestion and sore throat.    Neck: Negative for painful lymph nodes.   Cardiovascular: Negative for chest pain and leg swelling.   Eyes: Negative for double vision and blurred vision.   Respiratory: Negative for cough and shortness of breath.    Gastrointestinal: Negative for nausea, vomiting and diarrhea.   Genitourinary: Negative for dysuria, frequency and urgency.   Musculoskeletal: Positive for pain, joint pain and joint swelling. Negative for muscle cramps and muscle ache.   Skin: Negative for color change, pale and rash.   Allergic/Immunologic: Negative for seasonal allergies.   Neurological: Negative for dizziness, history of vertigo, light-headedness, passing out, headaches and numbness.   Hematologic/Lymphatic: Negative for swollen lymph " · Day 3 of vancomycin/cefepime  · Can likely discontinue cefepime  · Awaiting susceptibilities  · Send repeat blood cultures after 48 hours of appropriate antimicrobial therapy  · ECHO performed on 10/25 without vegetation, however poor study  · If repeat blood cultures positive, will need DENNY nodes, easy bruising/bleeding and history of blood clots. Does not bruise/bleed easily.   Psychiatric/Behavioral: Negative for nervous/anxious, sleep disturbance and depression. The patient is not nervous/anxious.        Objective:      Physical Exam   Constitutional: He is oriented to person, place, and time. He appears well-developed and well-nourished. He is cooperative.  Non-toxic appearance. He does not appear ill. No distress.   HENT:   Head: Normocephalic and atraumatic.   Right Ear: Hearing, tympanic membrane, external ear and ear canal normal.   Left Ear: Hearing, tympanic membrane, external ear and ear canal normal.   Nose: Nose normal. No mucosal edema, rhinorrhea or nasal deformity. No epistaxis. Right sinus exhibits no maxillary sinus tenderness and no frontal sinus tenderness. Left sinus exhibits no maxillary sinus tenderness and no frontal sinus tenderness.   Mouth/Throat: Uvula is midline, oropharynx is clear and moist and mucous membranes are normal. No trismus in the jaw. Normal dentition. No uvula swelling. No posterior oropharyngeal erythema.   Eyes: Conjunctivae and lids are normal. Right eye exhibits no discharge. Left eye exhibits no discharge. No scleral icterus.   Neck: Trachea normal, normal range of motion, full passive range of motion without pain and phonation normal. Neck supple.   Cardiovascular: Normal rate, regular rhythm, normal heart sounds, intact distal pulses and normal pulses.   Pulmonary/Chest: Effort normal and breath sounds normal. No respiratory distress.   Abdominal: Soft. Normal appearance and bowel sounds are normal. He exhibits no distension, no pulsatile midline mass and no mass. There is no tenderness.   Musculoskeletal: Normal range of motion. He exhibits no edema or deformity.   Moderate effusion left knee.  No erythema induration or skin wounds.  Full range motion with no evidence of ligament instability.  Distal neurovascular intact.  Tenderness appears to be  centered along the left medial joint line   Neurological: He is alert and oriented to person, place, and time. He exhibits normal muscle tone. Coordination normal.   Skin: Skin is warm, dry, intact, not diaphoretic and not pale.   Psychiatric: He has a normal mood and affect. His speech is normal and behavior is normal. Judgment and thought content normal. Cognition and memory are normal.   Nursing note and vitals reviewed.        Assessment:       1. Effusion of left knee joint    2. Internal derangement of left knee        Plan:         Effusion of left knee joint    Internal derangement of left knee    Other orders  -     dexamethasone injection 10 mg  -     meloxicam (MOBIC) 15 MG tablet; Take 1 tablet (15 mg total) by mouth once daily. for 14 days  Dispense: 14 tablet; Refill: 1    Take meloxicam once daily with food for 2 weeks to help reduce joint inflammation.    If your symptoms return after stopping the medicine, you can refill it and take a 2nd 2 week course.    If your symptoms persist or recur despite this you should probably follow up with your family doctor or your orthopedist.

## 2022-10-26 NOTE — PROGRESS NOTES
[facility]  Progress Note - James Nino 1935, 82 y o  male MRN: [id number]  Unit/Bed#:  Encounter: [id number]  Primary Care Provider: Josette Fatima DO   Date and time admitted to hospital: 10/24/2022  9:04 PM    * Cardiogenic shock [facility])  Assessment & Plan  · AEB increasing shortness of breath at home, requiring BiPAP in ED, significant JVD on assessment, cool extremities, anasarca, hypotension, decreased urine output, SHREYA  and elevated T bili signifying hypoperfusion  · Echo February 5574: Systolic function is moderately reduced (40-45%)   There is moderate global hypokinesis  · Repeat ECHO demonstrated EF 40-45%, RV dilated, aortic valve with severely thickened leaflets, mildly elevated PASP   · Lactic negative on admission  · BNP on admission 6,597  · Troponins negative  · Levo and Vaso initiated, attempted Epi infusion but patient developed tachyarrhythmias   · Received Amio bolus and 10/25 gtt for tachyarrhythmias, became profoundly hypotensive and gtt was discontinued  · Maintain MAP > 65  · Milrinone started at 0 38 with improvement in SVO2, weaned to 0 25 overnight  · Continue to follow BMP and VBG  · Cardiology consulted, appreciate input  · Given 80mg IV Lasix initially, now on Lasix infusion    Acute on chronic respiratory failure ([facility] [address] )  Assessment & Plan  · AEB increasing oxygen requirements on admission  · Likely in the setting of cardiogenic shock and volume overload  · Chronically wears 5 L nasal cannula at home  · Initially placed on Bipap, transitioned back to home O2 10/25  · Maintain SpO2 > 90%    Bacteremia  Assessment & Plan  · On admission presented in shock state, primarily cardiogenic but may have component of septic   · End-organ damage with elevated creatinine and T bili with hypotension likely in the setting of cardiogenic shock  · UA positive for nitrite, culture pending  · Blood cultures 2/2 GPC in clusters, early identification panel with staphylococcus detected  · Initially placed on Ceftriaxone, transitioned to Cefepime/Vanco  · Follow up speciation and sensitivity  · MRSA pending  · Continue vasopressors for blood pressure support as above  · In the setting of cardiogenic shock fluids are not indicated    SHREYA (acute kidney injury) ([facility] [address] )  Assessment & Plan  · Baseline creatinine 1 4-1 5, 2 12 on admission  · Continue to follow renal function  · Likely in the setting of volume overload  · Continue with Lasix infusion  · [place]acon catheter with strict I&O    Hyponatremia  Assessment & Plan  · Likely in the setting of volume overload  · Continue to trend BMP    Chronic atrial fibrillation (HCC)  Assessment & Plan  · Currently on 2 5 Eliquis b i d , will continue  · Follows with cardiology outpatient  · Currently rate controlled  · Holding home beta-blocker 2/2 hypotension    Hypothyroidism  Assessment & Plan  · Continue home Synthroid when tolerating PO intake    Fall  Assessment & Plan  · Daughter reports she called EMS today after her father fell at home, denies hitting his head   · Daughter reports frequent falls at home  · Patient appears very deconditioned and per family has essentially been bed bound for the last month or so  · PT/OT when medically appropriate      ----------------------------------------------------------------------------------------  HPI/24hr events: Remains on high dose pressors  Milrinone weaned to 0 25 given SVO2 of 81  Blood cultures 2/2 positive GPC with early identification panel showing staph  Transitioned to Cefepime/Vanco  Otherwise no acute events  Patient appropriate for transfer out of the ICU today?: No  Disposition: Continue Critical Care   Code Status: Level 3 - DNAR and DNI  ---------------------------------------------------------------------------------------  SUBJECTIVE  Offers no complaints    Review of Systems   Constitutional: Positive for fatigue  HENT: Negative  Eyes: Negative      Respiratory: Negative for shortness of breath  Cardiovascular: Negative for chest pain  Gastrointestinal: Negative for abdominal pain  Endocrine: Negative  Genitourinary: Negative  Musculoskeletal: Negative  Skin: Negative  Neurological: Negative for dizziness and light-headedness  All other systems reviewed and are negative  Review of systems was reviewed and negative unless stated above in HPI/24-hour events   ---------------------------------------------------------------------------------------  OBJECTIVE    Vitals   Vitals:    10/26/22 0400 10/26/22 0505 10/26/22 0515 10/26/22 0522   BP: 101/58 101/59 103/54    BP Location: Left arm      Pulse: (!) 110 (!) 114 (!) 107    Resp: 18 17 17    Temp: 98 7 °F (37 1 °C)      TempSrc: Oral      SpO2: 92% 91% 91% 91%   Weight:       Height:         Temp (24hrs), Av 5 °F (36 9 °C), Min:97 8 °F (36 6 °C), Max:98 8 °F (37 1 °C)  Current: Temperature: 98 7 °F (37 1 °C)  Arterial Line BP: 84/44  Arterial Line MAP (mmHg): (!) 59 mmHg    Respiratory:  SpO2: SpO2: 91 %, SpO2 Activity: SpO2 Activity: At Rest, SpO2 Device: O2 Device: Nasal cannula (patient remains on nasal cannula no indication for bipap at this time)  Nasal Cannula O2 Flow Rate (L/min): (S) 6 L/min (patient found on 6 lpm)    Invasive/non-invasive ventilation settings   Respiratory  Report   Lab Data (Last 4 hours)    None         O2/Vent Data (Last 4 hours)    None                Physical Exam  Vitals and nursing note reviewed  Constitutional:       Appearance: He is ill-appearing  HENT:      Head: Normocephalic  Mouth/Throat:      Mouth: Mucous membranes are dry and cyanotic  Pharynx: Oropharynx is clear  Eyes:      Pupils: Pupils are equal, round, and reactive to light  Cardiovascular:      Rate and Rhythm: Tachycardia present  Rhythm irregular  Pulses: Normal pulses  Heart sounds: Murmur heard     Pulmonary:      Effort: Pulmonary effort is normal       Breath sounds: Normal breath sounds  Abdominal:      General: Bowel sounds are normal       Palpations: Abdomen is soft  Musculoskeletal:         General: Normal range of motion  Cervical back: Normal range of motion  Right lower leg: Edema present  Left lower leg: Edema present  Skin:     General: Skin is warm and dry  Neurological:      General: No focal deficit present  Mental Status: He is alert  GCS: GCS eye subscore is 4  GCS verbal subscore is 4  GCS motor subscore is 6               Laboratory and Diagnostics:  Results from last 7 days   Lab Units 10/25/22  0437 10/24/22  2128   WBC Thousand/uL 7 82 6 09   HEMOGLOBIN g/dL 12 3 13 1   HEMATOCRIT % 37 2 40 9   PLATELETS Thousands/uL 170 149   NEUTROS PCT % 76* 65   MONOS PCT % 11 12     Results from last 7 days   Lab Units 10/25/22  2323 10/25/22  1800 10/25/22  1231 10/25/22  0437 10/24/22  2128   SODIUM mmol/L 133* 132* 131* 128* 130*   POTASSIUM mmol/L 3 6 3 9 3 6 4 3 5 0   CHLORIDE mmol/L 97 98 97 97 96   CO2 mmol/L 23 23 22 21 23   ANION GAP mmol/L 13 11 12 10 11   BUN mg/dL 37* 38* 38* 42* 42*   CREATININE mg/dL 2 00* 1 97* 1 97* 2 02* 2 12*   CALCIUM mg/dL 8 3 8 3 8 7 9 0 8 6   GLUCOSE RANDOM mg/dL 125 122 107 113 94   ALT U/L  --   --   --  16 16   AST U/L  --   --   --  40 41   ALK PHOS U/L  --   --   --  133* 143*   ALBUMIN g/dL  --   --   --  2 3* 2 5*   TOTAL BILIRUBIN mg/dL  --   --   --  2 90* 3 35*     Results from last 7 days   Lab Units 10/25/22  2323 10/25/22  1800 10/25/22  1231 10/25/22  0437 10/24/22  2128   MAGNESIUM mg/dL 2 0 2 1 2 1 2 1 2 0   PHOSPHORUS mg/dL  --   --   --  4 3* 4 3*      Results from last 7 days   Lab Units 10/25/22  0437 10/24/22  2128   INR  1 75* 1 77*   PTT seconds  --  45*          Results from last 7 days   Lab Units 10/24/22  2128   LACTIC ACID mmol/L 1 9     ABG:  Results from last 7 days   Lab Units 10/25/22  0220   PH ART  7 361   PCO2 ART mm Hg 34 4*   PO2 ART mm Hg 135 0*   HCO3 ART mmol/L 19 0*   BASE EXC ART mmol/L -5 5   ABG SOURCE  Line, Arterial     VBG:  Results from last 7 days   Lab Units 10/25/22  2323 10/25/22  0443 10/25/22  0220   PH OLIMPIA  7 387   < >  --    PCO2 OLIMPIA mm Hg 39 8*   < >  --    PO2 OLIMPIA mm Hg 46 9*   < >  --    HCO3 OLIMPIA mmol/L 23 4*   < >  --    BASE EXC OLIMPIA mmol/L -1 4   < >  --    ABG SOURCE   --   --  Line, Arterial    < > = values in this interval not displayed  Results from last 7 days   Lab Units 10/24/22  2128   PROCALCITONIN ng/ml 0 17       Micro  Results from last 7 days   Lab Units 10/24/22  2128   GRAM STAIN RESULT  Gram positive cocci in clusters*  Gram positive cocci in clusters*       EKG: atrial fibrillation   Imaging: I have personally reviewed pertinent reports  and I have personally reviewed pertinent films in PACS    Intake and Output  I/O       10/24 0701  10/25 0700 10/25 0701  10/26 0700    P  O   0    I V  (mL/kg) 343 1 (3 5) 1619 3 (16 8)    IV Piggyback 200 482 5    Total Intake(mL/kg) 543 1 (5 6) 2101 8 (21 8)    Urine (mL/kg/hr) 1900 5550 (2 4)    Total Output 1900 5550    Net -1356 9 -0918 2                Height and Weights   Height: 5' 10" (177 8 cm)  IBW (Ideal Body Weight): 73 kg  Body mass index is 30 56 kg/m²  Weight (last 2 days)     Date/Time Weight    10/25/22 1200 96 6 (213)    10/24/22 2319 96 9 (213 63)            Nutrition       Diet Orders   (From admission, onward)             Start     Ordered    10/24/22 2312  Diet NPO  Diet effective now        References:    Nutrtion Support Algorithm Enteral vs  Parenteral   Question Answer Comment   Diet Type NPO    RD to adjust diet per protocol?  Yes        10/24/22 2318                  Active Medications  Scheduled Meds:  Current Facility-Administered Medications   Medication Dose Route Frequency Provider Last Rate   • apixaban  2 5 mg Oral BID Elnita Megan CRNP     • bisacodyl  10 mg Rectal Daily PRN ElANNA MARIE Arredondo     • cefepime  2,000 mg Intravenous Q12H Lilly Headley CRNP Stopped (10/26/22 0056)   • famotidine  20 mg Intravenous Q24H [address] Maci Sulema, CRNP     • furosemide  10 mg/hr Intravenous Continuous Serjio [place], CRNP 10 mg/hr (10/25/22 2203)   • levothyroxine  75 mcg Oral Daily Before Breakfast ANNA MARIE Rodriguez     • metoprolol  5 mg Intravenous Q6H PRN [place], CRNP     • milrinone (PRIMACOR) infusion  0 25 mcg/kg/min Intravenous Continuous Valjean Quarry, CRNP 0 25 mcg/kg/min (10/26/22 0441)   • norepinephrine  1-30 mcg/min Intravenous Titrated Mcai Dawe, CRNP 25 mcg/min (10/26/22 0505)   • vancomycin  15 mg/kg (Adjusted) Intravenous Q24H Valjean Quarry, CRNP Stopped (10/26/22 0200)   • vasopressin  0 08 Units/min Intravenous Continuous Valjean Quarry, CRNP 0 08 Units/min (10/26/22 0251)     Continuous Infusions:  furosemide, 10 mg/hr, Last Rate: 10 mg/hr (10/25/22 2203)  milrinone (PRIMACOR) infusion, 0 25 mcg/kg/min, Last Rate: 0 25 mcg/kg/min (10/26/22 0441)  norepinephrine, 1-30 mcg/min, Last Rate: 25 mcg/min (10/26/22 0505)  vasopressin, 0 08 Units/min, Last Rate: 0 08 Units/min (10/26/22 0251)      PRN Meds:   bisacodyl, 10 mg, Daily PRN  metoprolol, 5 mg, Q6H PRN        Invasive Devices Review  Invasive Devices  Report    Central Venous Catheter Line  Duration           CVC Central Lines 10/25/22 Triple 20cm 1 day          Peripheral Intravenous Line  Duration           Peripheral IV 10/24/22 Distal;Right;Upper;Ventral (anterior) Arm 1 day    Peripheral IV 10/25/22 Dorsal (posterior); Right Hand 1 day          Arterial Line  Duration           Arterial Line 10/25/22 Radial 1 day          Drain  Duration           Open Drain Anterior; Left Hand 360 days    Open Drain Anterior; Left Hand 360 days    Closed/Suction Drain Right  Bulb 7 Fr   244 days    Urethral Catheter 1 day                Rationale for remaining devices: medication administration, hemodynamic monitoring, I & O monitoring  ---------------------------------------------------------------------------------------  Advance Directive and Living Will:      Power of :    POLST:    ---------------------------------------------------------------------------------------  Care Time Delivered:   Upon my evaluation, this patient had a high probability of imminent or life-threatening deterioration due to shock, which required my direct attention, intervention, and personal management  I have personally provided 45 minutes ([phone]) of critical care time, exclusive of procedures, teaching, family meetings, and any prior time recorded by providers other than myself  Arnaud Estimable, CRNP      Portions of the record may have been created with voice recognition software  Occasional wrong word or "sound a like" substitutions may have occurred due to the inherent limitations of voice recognition software    Read the chart carefully and recognize, using context, where substitutions have occurred

## 2022-10-26 NOTE — PROGRESS NOTES
Vancomycin Assessment    Louis Pierson is a 82 y o  male who is currently receiving vancomycin 1500mg IV q12h for MRSA suspected, other UTI   Relevant clinical data and objective history reviewed:  Creatinine   Date Value Ref Range Status   10/25/2022 1 97 (H) 0 60 - 1 30 mg/dL Final     Comment:     Standardized to IDMS reference method   10/25/2022 1 97 (H) 0 60 - 1 30 mg/dL Final     Comment:     Standardized to IDMS reference method   10/25/2022 2 02 (H) 0 60 - 1 30 mg/dL Final     Comment:     Standardized to IDMS reference method     BP 92/55   Pulse (!) 109   Temp 98 8 °F (37 1 °C) (Tympanic)   Resp (!) 11   Ht 5' 10" (1 778 m)   Wt 96 6 kg (213 lb)   SpO2 94%   BMI 30 56 kg/m²   I/O last 3 completed shifts: In: 1949 5 [I V :1427; IV Piggyback:522 5]  Out: 5274 [Urine:5375]  Lab Results   Component Value Date/Time    BUN 38 (H) 10/25/2022 06:00 PM    WBC 7 82 10/25/2022 04:37 AM    HGB 12 3 10/25/2022 04:37 AM    HCT 37 2 10/25/2022 04:37 AM    MCV 81 (L) 10/25/2022 04:37 AM     10/25/2022 04:37 AM     Temp Readings from Last 3 Encounters:   10/25/22 98 8 °F (37 1 °C) (Tympanic)   02/26/22 (!) 97 3 °F (36 3 °C)   11/02/21 98 1 °F (36 7 °C) (Oral)     Vancomycin Days of Therapy: 1    Assessment/Plan  The patient is currently on vancomycin utilizing scheduled dosing based on adjusted body weight (due to obesity)  Baseline risks associated with therapy include: pre-existing renal impairment, concomitant nephrotoxic medications, advanced age, and dehydration  The patient is currently receiving 1500mg IV q12h and after clinical evaluation will be changed to 1250mg IV q24h  Pharmacy will also follow closely for s/sx of nephrotoxicity, infusion reactions, and appropriateness of therapy  BMP and CBC will be ordered per protocol  Plan for trough as patient approaches steady state, prior to the 4th  dose at approximately 2300 on 10/28/22    Due to infection severity, will target a trough of 15-20 (appropriate for most indications)   Pharmacy will continue to follow the patient’s culture results and clinical progress daily      Hansel Julien, Pharmacist

## 2022-10-26 NOTE — PLAN OF CARE
Problem: PHYSICAL THERAPY ADULT  Goal: Performs mobility at highest level of function for planned discharge setting  See evaluation for individualized goals  Description: Treatment/Interventions: Functional transfer training, LE strengthening/ROM, Therapeutic exercise, Endurance training, Patient/family training, Bed mobility, Gait training, Spoke to nursing, OT, Family          See flowsheet documentation for full assessment, interventions and recommendations  Note: Prognosis: Good  Problem List: Decreased strength, Decreased endurance, Impaired balance, Decreased mobility  Assessment: Pt is 82year old male seen for PT evaluation s/p admit to [facility] on 10/24/2022 with Cardiogenic shock ([facility] [address] )  PT consulted to assess pt's functional mobility and d/c needs  Order placed for PT eval and tx, with up with assist order  Comorbidities affecting pt's physical performance at time of assessment include hyponatremia, SHREYA, chronic atrial fibrillation, bacteremia, acute on chronic respiratory failure, hypothyroidism, and fall  PTA, pt was independent with all functional mobility with a walker  Pt ambulates household distances  Pt resides alone in a one level house with 3 steps to enter  Personal factors affecting pt at time of IE include stairs to enter home, inability to ambulate household distances, inability to navigate level surfaces without external assistance, unable to perform dynamic tasks in community, limited home support, positive fall history, inability to perform IADLs, and inability to perform ADLs  Please find objective findings from PT assessment regarding body systems outlined above with impairments and limitations including weakness, impaired balance, decreased endurance, gait deviations, decreased activity tolerance, decreased functional mobility tolerance, and fall risk   The following objective measures performed on IE also reveal limitations: Barthel Index: 30/100, Modified [place]: 4 (moderate/severe disability) and AM-PAC 6-Clicks: 7/13  Pt's clinical presentation is currently unstable/unpredictable seen in pt's presentation of need for ongoing medical management/monitoring, pt is a fall risk, and pt requires cues and assist of two for safety with functional mobility  Pt to benefit from continued PT tx to address deficits as defined above and maximize level of functional independent mobility and consistency  From PT/mobility standpoint, recommendation at time of d/c would be STR pending progress in order to facilitate return to PLOF  Barriers to Discharge: Inaccessible home environment, Decreased caregiver support     PT Discharge Recommendation: Post acute rehabilitation services    See flowsheet documentation for full assessment

## 2022-10-26 NOTE — ASSESSMENT & PLAN NOTE
· Continue pressors for MAP>65, presently on norepinephrine and vasopressin  · Milrinone decreased to 0 13 on 10/26  · Trend labs q6  · Continue Lasix infusion  · Consider 24 hours of Diamox  · Appreciate cardiology recommendations

## 2022-10-26 NOTE — PROGRESS NOTES
Progress note - Palliative and Supportive Care   Javier Cedeño 82 y o  male [id number]    Patient Active Problem List   Diagnosis   • Acute respiratory failure with hypoxia (HCC)   • Hyponatremia   • SHREYA (acute kidney injury) ([facility] [address] )   • Chronic atrial fibrillation (HCC)   • Transaminitis   • Moderate protein-calorie malnutrition (HCC)   • Pleural effusion   • Volume overload   • Flexor tenosynovitis of finger   • Localized swelling on left hand   • Cellulitis of hand, left   • Bacteremia   • Wound of left foot   • CKD (chronic kidney disease) stage 3, GFR 30-59 ml/min (HCC)   • Hypoxia   • Skin cancer of face   • Hematoma of face, initial encounter   • Status post incision and drainage   • Acute on chronic respiratory failure (HCC)   • Hypothyroidism   • Cardiogenic shock (HCC)   • Fall     Active issues specifically addressed today include:    - multifactorial shock; cardiogenic and septic components, vasopressor requirement   - acute on chronic hypoxemic respiratory failure   - multiorgan failure   - acute decompensated CHF, volume overload   - SHREYA   - chronic atrial fibrillation   - goals of care support    Plan:  #symptom management   - per CCM team    #goals of care   - met at bedside this AM with patient and grandson   - patient able to maintain prolonged conversation, still confused regarding current medical condition, however was able to engage in conversation regarding his career as a    - all questions/concerns of the patient's grandson addressed     - spoke with Sachi Gallo via telephone [15:45-15:55]    - briefly discussed comfort care in the setting that if patient clinically deteriorates in the setting of maximize vasopressor support, consideration for transition to comfort-oriented cares may be in alignment to preserve dignity at end of life  All questions/concerns addressed     - at this time, daughter is grateful for interactions with the patient and goal is to continue current medical cares with hopes for clinical improvement  - will continue to closely follow for continued supportive cares    #psychosocial support   - emotional support provided   -    - nine adult children [6 living, [address] [daughter] [phone]   - retired               - 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, 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      We appreciate the opportunity to participate in this patient's care  We will continue to follow  Please do not hesitate to contact our on-call provider through our clinic answering service at [phone] should you have acute symptom control concerns  Code Status: DNAR/DNI - Level 3   Decisional apparatus:  Patient is not competent on my exam today  If competence is lost, patient's substitute decision maker would default to adult children by [place] 169     Advance Directive / Living Will / POLST:  Not on File, not previously completed    Interval history:   - improved mental status   - remains on vasopressor support, remains on 5L NC    MEDICATIONS / ALLERGIES:     current meds:   Current Facility-Administered Medications   Medication Dose Route Frequency   • apixaban (ELIQUIS) tablet 2 5 mg  2 5 mg Oral BID   • bisacodyl (DULCOLAX) rectal suppository 10 mg  10 mg Rectal Daily PRN   • cefepime (MAXIPIME) 2,000 mg in dextrose 5 % 50 mL IVPB  2,000 mg Intravenous Q12H   • Famotidine (PF) (PEPCID) injection 20 mg  20 mg Intravenous Q24H HERBERT   • furosemide (LASIX) 500 mg infusion 50 mL  10 mg/hr Intravenous Continuous   • levothyroxine tablet 75 mcg  75 mcg Oral Daily Before Breakfast   • metoprolol (LOPRESSOR) injection 5 mg  5 mg Intravenous Q6H PRN   • milrinone (PRIMACOR) 20 mg in 100 mL infusion (premix)  0 25 mcg/kg/min Intravenous Continuous   • norepinephrine (LEVOPHED) 8 mg (DOUBLE CONCENTRATION) IV in sodium chloride 0 9% 250 mL  1-30 mcg/min Intravenous Titrated   • vancomycin (VANCOCIN) 1,250 mg in sodium chloride 0 9 % 250 mL IVPB 15 mg/kg (Adjusted) Intravenous Q24H   • vasopressin (PITRESSIN) 20 Units in sodium chloride 0 9 % 100 mL infusion  0 08 Units/min Intravenous Continuous       Allergies   Allergen Reactions   • Ace Inhibitors Cough     Rash       OBJECTIVE:    Physical Exam  Physical Exam  Vitals and nursing note reviewed  Constitutional:       General: He is awake  Comments: Critically ill appearing in NAD  BMI 29 1   HENT:      Head: Normocephalic and atraumatic  Right Ear: External ear normal       Left Ear: External ear normal    Cardiovascular:      Rate and Rhythm: Tachycardia present  Pulmonary:      Effort: No tachypnea or respiratory distress  Comments: NC in place  Genitourinary:     Comments: [place] in place  Musculoskeletal:      Comments: Extremities cool to palpation  Violaceous finger tips bilateral hands   Neurological:      Mental Status: He is alert  He is confused  Psychiatric:         Attention and Perception: Attention normal          Mood and Affect: Mood and affect normal       Comments: Unable to assess         Lab Results: I have personally reviewed pertinent labs  , CBC:   Lab Results   Component Value Date    WBC 11 41 (H) 10/26/2022    HGB 12 1 10/26/2022    HCT 37 4 10/26/2022    MCV 84 10/26/2022     10/26/2022    MCH 27 1 10/26/2022    MCHC 32 4 10/26/2022    RDW 21 8 (H) 10/26/2022    MPV 9 3 10/26/2022   , CMP:   Lab Results   Component Value Date    SODIUM 135 10/26/2022    K 3 2 (L) 10/26/2022    CL 98 10/26/2022    CO2 25 10/26/2022    BUN 33 (H) 10/26/2022    CREATININE 1 97 (H) 10/26/2022    CALCIUM 8 0 (L) 10/26/2022    AST 31 10/26/2022    ALT 9 (L) 10/26/2022    ALKPHOS 130 (H) 10/26/2022    EGFR 30 10/26/2022   , PT/PTT:No results found for: PT, PTT  Imaging Studies: Reviewed  EKG, Pathology, and Other Studies: Reviewed    Counseling / Coordination of Care    Total floor / unit time spent today 30 minutes   Greater than 50% of total time was spent with the patient and / or family counseling and / or coordination of care  A description of the counseling / coordination of care: provided medical updates, discussed palliative care, determined competency, determined goals of care, determined POA, determined social/family support, discussed plans of care, discussed symptom management, provided psychosocial support   Coordinated plan of care with primary team

## 2022-10-26 NOTE — ASSESSMENT & PLAN NOTE
· On admission presented in shock state, primarily cardiogenic but may have component of septic   · End-organ damage with elevated creatinine and T bili with hypotension likely in the setting of cardiogenic shock  · UA positive for nitrite, culture pending  · Blood cultures 2/2 GPC in clusters, early identification panel with staphylococcus detected  · Initially placed on Ceftriaxone, transitioned to Cefepime/Vanco  · Follow up speciation and sensitivity  · MRSA pending  · Continue vasopressors for blood pressure support as above  · In the setting of cardiogenic shock fluids are not indicated

## 2022-10-26 NOTE — ASSESSMENT & PLAN NOTE
· Chronically wears 5 L nasal cannula at home  · Initially placed on Bipap, transitioned back to home O2 10/25  · Maintain SpO2 > 88%

## 2022-10-26 NOTE — OCCUPATIONAL THERAPY NOTE
Occupational Therapy Evaluation        Patient Name: Trudy Phan  Today's Date: 10/26/2022       10/26/22 1105   OT Last Visit   OT Visit Date 10/26/22   Note Type   Note type Evaluation   Pain Assessment   Pain Assessment Tool 0-10   Pain Score No Pain   Restrictions/Precautions   Weight Bearing Precautions Per Order No   Braces or Orthoses Other (Comment)  (none reported)   Other Precautions Chair Alarm; Bed Alarm;Multiple lines;Telemetry;O2;Fall Risk   Home Living   Type of [address] One level;Performs ADLs on one level;Stairs to enter with rails  ([address])   Bathroom Shower/Tub Tub/shower unit   Bathroom Toilet Standard   Bathroom Equipment Grab bars in shower; Shower chair   Bathroom Accessibility Accessible   Home Equipment Walker;Cane   Additional Comments ambulatory with a walker   Prior Function   Level of [place] Independent with ADLs   Lives With Alone   Receives Help From Family   IADLs Family/Friend/Other provides transportation; Family/Friend/Other provides meals; Family/Friend/Other provides medication management   Falls in the last 6 months 1 to 4  (3 falls/ one of them in the Tub)   Vocational Retired   Comments Patient does not drive   Lifestyle   Autonomy Patient and grandson present in room, reported patient was independnet with ADLs, ambulates with a walker and has had multiple falls in the last few months  patient lives alone in a one story house, [address]  Family lives nearby and assist patient on a daily basis     Reciprocal Relationships Supportive Family   Service to Others Retired   General   Family/Caregiver Present Yes  (grandson)   ADL   Eating Assistance 5  Supervision/Setup   Eating Deficit Increased time to complete   Grooming Assistance 5  Supervision/Setup   Grooming Deficit Increased time to complete   UB Bathing Assistance 3  Moderate Assistance   UB Bathing Deficit Increased time to complete   LB Bathing Assistance 2  Maximal Assistance   LB Bathing Deficit Increased time to complete   UB Dressing Assistance 3  Moderate Assistance   UB Dressing Deficit Increased time to complete   LB Dressing Assistance 2  Maximal Assistance   LB Dressing Deficit Setup; Increased time to complete   Toileting Assistance  2  Maximal Assistance   Toileting Deficit Increased time to complete   Functional Assistance 3  Moderate Assistance   Functional Deficit Supervision/safety   Bed Mobility   Supine to Sit 3  Moderate assistance   Additional items Assist x 2;HOB elevated; Bedrails; Increased time required;Verbal cues;LE management   Transfers   Sit to Stand 3  Moderate assistance   Additional items Assist x 2; Increased time required;Verbal cues   Stand to Sit 3  Moderate assistance   Additional items Assist x 2; Increased time required;Verbal cues   Functional Mobility   Functional Mobility 3  Moderate assistance   Additional Comments asssit of 2 , verbal cues for directiona of movementsand correct hand/ foot placement     Additional items Rolling walker   Balance   Static Sitting Fair   Dynamic Sitting Fair -   Static Standing Poor   Dynamic Standing Poor -   Ambulatory Poor -   Activity Tolerance   Activity Tolerance Patient limited by fatigue   RUE Assessment   RUE Assessment X  (limited overhead movements)   RUE Strength   RUE Overall Strength Deficits  (3/5)   LUE Assessment   LUE Assessment X  (limited overhead movements)   LUE Strength   LUE Overall Strength Deficits  (3/5)   Hand Function   Gross Motor Coordination Impaired   Fine Motor Coordination Impaired   Hand Function Comments functional  to both Uppers   Vision-Basic Assessment   Current Vision Wears glasses all the time   Psychosocial   Psychosocial (WDL) X   Ability to Express Feelings Able to express   Ability to Express Needs Able to express   Ability to Express Thoughts Able to express   Ability to Understand Others Usually understands   Cognition   Overall Cognitive Status [facility]   Arousal/Participation Alert; Responsive; Cooperative   Attention Attends with cues to redirect   Orientation Level Oriented to person;Oriented to place;Oriented to situation  (oriented to year)   Memory Decreased recall of recent events;Decreased short term memory   Following Commands Follows one step commands without difficulty   Assessment   Limitation Decreased ADL status; Decreased UE strength;Decreased Safe judgement during ADL;Decreased UE ROM; Decreased endurance;Decreased self-care trans;Decreased high-level ADLs   Prognosis Good   Assessment Patient is a 82 y o  male seen for OT evaluation s/p admit to [id number] [place] [address] on 10/24/2022 w/Cardiogenic shock ([facility] [address] )  Commorbidities affecting patient's functional performance at time of assessment include: hyponatremia, SHREYA, chronic atrial fibrillation, bacteremia, acute on chronic respiratory failure, hypothyroidism, and fall  Orders placed for OT evaluation and treatment  Performed at least two patient identifiers during session including name and wristband  Prior to admission, Patient and grandson present in room, reported patient was independnet with ADLs, ambulates with a walker and has had multiple falls in the last few months  patient lives alone in a one story house, [address]  Family lives nearby and assist patient on a daily basis  Personal factors affecting patient at time of initial evaluation include: steps to enter, decreased initiation and engagement, difficulty performing ADLs and difficulty performing IADLs  Upon evaluation, patient requires minimal  and moderate assist for UB ADLs, maximal assist for LB ADLs   Occupational performance is affected by the following deficits: decreased functional use of BUEs, limited active ROM, decreased muscle strength, degenerative arthritic joint changes, impaired gross motor coordination, impaired fine motor coordination, dynamic sit/ stand balance deficit with poor standing tolerance time for self care and functional mobility, decreased activity tolerance, decreased safety awareness, increased pain and postural control and postural alignment deficit, requiring external assistance to complete transitional movements  Patient to benefit from continued Occupational Therapy treatment while in the hospital to address deficits as defined above and maximize level of functional independence with ADLs and functional mobility  Occupational Performance areas to address include: bathing/ shower, dressing, toilet hygiene, transfer to all surfaces, functional ambulation, functional mobility, health maintenance, IADLs: safety procedures and Leisure Participation  From OT standpoint, recommendation at time of d/c would be Short Term Rehab  Plan   Treatment Interventions ADL retraining;Functional transfer training;UE strengthening/ROM; Endurance training;Cognitive reorientation;Equipment evaluation/education; Fine motor coordination activities; Compensatory technique education;Continued evaluation; Energy conservation; Activityengagement   Goal Expiration Date 11/10/22   OT Frequency 3-5x/wk   Recommendation   OT Discharge Recommendation Post acute rehabilitation services   AM-PAC Daily Activity Inpatient   Lower Body Dressing 2   Bathing 2   Toileting 2   Upper Body Dressing 2   Grooming 3   Eating 3   Daily Activity Raw Score 14   Daily Activity Standardized Score (Calc for Raw Score >=11) 33 39   AM-PAC Applied Cognition Inpatient   Following a Speech/Presentation 4   Understanding Ordinary Conversation 4   Taking Medications 3   Remembering Where Things Are Placed or Put Away 3   Remembering List of 4-5 Errands 3   Taking Care of Complicated Tasks 2   Applied Cognition Raw Score 19   Applied Cognition Standardized Score 39 77   Barthel Index   Feeding 5   Bathing 0   Grooming Score 0   Dressing Score 5   Bladder Score 0   Bowels Score 10   Toilet Use Score 5   Transfers (Bed/Chair) Score 5   Mobility (Level Surface) Score 0   Stairs Score 0   Barthel Index Score 30             1 - Patient will verbalize and demonstrate use of energy conservation/ deep breathing technique and work simplification skills during functional activity with no verbal cues  2 - Patient will verbalize and demonstrate good body mechanics and joint protection techniques during  ADLs/ IADLs with no verbal cues  3 - Patient will increase OOB/ sitting tolerance to 2-4 hours per day for increased participation in self care and leisure tasks with no s/s of exertion  4 - Patient will increase standing tolerance time to 5  minutes with unilateral UE support to complete sink level ADLs@ mod I level  5 - Patient will increase sitting tolerance at edge of bed to 20 minutes to complete UB ADLs @ set up assist level  6 - Patient will transfer bed to Chair / toilet at Set up assist level with AD as indicated  7 - Patient will complete UB ADLs with set up assist      8 - Patient will complete LB ADLs with min assist with the use of adaptive equipment       9 - Patient will complete toileting hygiene with set up assist/ supervision for thoroughness    10 - Patient/ Family  will demonstrate competency with UE Home Exercise Program

## 2022-10-26 NOTE — PLAN OF CARE
Problem: OCCUPATIONAL THERAPY ADULT  Goal: Performs self-care activities at highest level of function for planned discharge setting  See evaluation for individualized goals  Description: Treatment Interventions: ADL retraining, Functional transfer training, UE strengthening/ROM, Endurance training, Cognitive reorientation, Equipment evaluation/education, Fine motor coordination activities, Compensatory technique education, Continued evaluation, Energy conservation, Activityengagement          See flowsheet documentation for full assessment, interventions and recommendations  Note: Limitation: Decreased ADL status, Decreased UE strength, Decreased Safe judgement during ADL, Decreased UE ROM, Decreased endurance, Decreased self-care trans, Decreased high-level ADLs  Prognosis: Good  Assessment: Patient is a 82 y o  male seen for OT evaluation s/p admit to [address] [facility] on 10/24/2022 w/Cardiogenic shock ([facility] [address] )  Commorbidities affecting patient's functional performance at time of assessment include: hyponatremia, SHREYA, chronic atrial fibrillation, bacteremia, acute on chronic respiratory failure, hypothyroidism, and fall  Orders placed for OT evaluation and treatment  Performed at least two patient identifiers during session including name and wristband  Prior to admission, Patient and grandson present in room, reported patient was independnet with ADLs, ambulates with a walker and has had multiple falls in the last few months  patient lives alone in a one story house, [address]  Family lives nearby and assist patient on a daily basis  Personal factors affecting patient at time of initial evaluation include: steps to enter, decreased initiation and engagement, difficulty performing ADLs and difficulty performing IADLs  Upon evaluation, patient requires minimal  and moderate assist for UB ADLs, maximal assist for LB ADLs   Occupational performance is affected by the following deficits: decreased functional use of BUEs, limited active ROM, decreased muscle strength, degenerative arthritic joint changes, impaired gross motor coordination, impaired fine motor coordination, dynamic sit/ stand balance deficit with poor standing tolerance time for self care and functional mobility, decreased activity tolerance, decreased safety awareness, increased pain and postural control and postural alignment deficit, requiring external assistance to complete transitional movements  Patient to benefit from continued Occupational Therapy treatment while in the hospital to address deficits as defined above and maximize level of functional independence with ADLs and functional mobility  Occupational Performance areas to address include: bathing/ shower, dressing, toilet hygiene, transfer to all surfaces, functional ambulation, functional mobility, health maintenance, IADLs: safety procedures and Leisure Participation  From OT standpoint, recommendation at time of d/c would be Short Term Rehab       OT Discharge Recommendation: Post acute rehabilitation services

## 2022-10-26 NOTE — ASSESSMENT & PLAN NOTE
· AEB increasing oxygen requirements on admission  · Likely in the setting of cardiogenic shock and volume overload  · Chronically wears 5 L nasal cannula at home  · Initially placed on Bipap, transitioned back to home O2 10/25  · Maintain SpO2 > 90%

## 2022-10-26 NOTE — RESPIRATORY THERAPY NOTE
Patient assessed for need for BiPAP therapy patient resting without great apparent respiratory distress no indication for BiPAP therapy needed at this time

## 2022-10-26 NOTE — PLAN OF CARE
Problem: Nutrition/Hydration-ADULT  Goal: Nutrient/Hydration intake appropriate for improving, restoring or maintaining nutritional needs  Description: Monitor and assess patient's nutrition/hydration status for malnutrition  Collaborate with interdisciplinary team and initiate plan and interventions as ordered  Monitor patient's weight and dietary intake as ordered or per policy  Utilize nutrition screening tool and intervene as necessary  Determine patient's food preferences and provide high-protein, high-caloric foods as appropriate       INTERVENTIONS:  - Monitor oral intake, urinary output, labs, and treatment plans  - Assess nutrition and hydration status and recommend course of action  - Evaluate amount of meals eaten  - Assist patient with eating if necessary   - Allow adequate time for meals  - Recommend/ encourage appropriate diets, oral nutritional supplements, and vitamin/mineral supplements  - Order, calculate, and assess calorie counts as needed  - Recommend, monitor, and adjust tube feedings and TPN/PPN based on assessed needs  - Assess need for intravenous fluids  - Provide specific nutrition/hydration education as appropriate  - Include patient/family/caregiver in decisions related to nutrition  Outcome: Progressing     Problem: MOBILITY - ADULT  Goal: Maintain or return to baseline ADL function  Description: INTERVENTIONS:  -  Assess patient's ability to carry out ADLs; assess patient's baseline for ADL function and identify physical deficits which impact ability to perform ADLs (bathing, care of mouth/teeth, toileting, grooming, dressing, etc )  - Assess/evaluate cause of self-care deficits   - Assess range of motion  - Assess patient's mobility; develop plan if impaired  - Assess patient's need for assistive devices and provide as appropriate  - Encourage maximum independence but intervene and supervise when necessary  - Involve family in performance of ADLs  - Assess for home care needs SUBJECTIVE  Ms. Erica Coffey presents today for complaint of fatigue; also she is about due for routine follow up. Chief Complaint   Patient presents with    Hypertension     pt here today for a routine f.u     Dizziness     pt c.o feeling dizzy in the morning and throughout the day      \"I'm not feeling well. \" She is still experiencing \"dizziness\" (as \"room spinning\"). She also notes that she is \"not hungry\", worried she is losing weight:   Wt Readings from Last 5 Encounters:   01/13/20 111 lb 6.4 oz (50.5 kg)   09/06/19 117 lb 9.6 oz (53.3 kg)   05/03/19 119 lb 9.6 oz (54.3 kg)   01/23/19 123 lb 12.8 oz (56.2 kg)   09/10/18 124 lb 12.8 oz (56.6 kg)     Left knee pain is ongoing but better; Dr. Chamorro following. \"I've been getting the needle. \" Last shot was about a month ago. Off cymbalta and celebrex. She was unable to tolerate gabapentin . The tramadol we gave her didn't seem to help the pain and caused insomnia. We noted in early 2016:  L knee swells. In the past has received corticosteroid injections from Dr. Brody Sampson. She sees Dr. Chamorro, and he has done injections. \"He says you can do the needle\"--no plans for further follow up with him. History of GI bleed and anemia: No recent melena (other than once about 3 months ago) or hematochezia. She has had problems with diclofenac (itching) and celebrex--As we noted in January 2015: \"I had to stop the pills [celebrex] because of bleeding, and had a scope. \"  Dr. Whit Vega did colonoscopy finding a cecal polyp and diverticuli. Still has the neuropathic pain in leg, as noted in prior notes. This is doing better on cymbalta. She has seen Dr. Nydia Freire for spinal stenosis / sciatica. Asthma stable, uses inhaler \"now and then. \"    She still has arthritis pain: R neck pain is stable. Saw Dr. Peter Martínez, who diagnosed cervical spine arthritis. Depression is doing fine. No suicidal ideation. For all issues addressed today, see A/P below.     PMH: She has a past following discharge   - Consider OT consult to assist with ADL evaluation and planning for discharge  - Provide patient education as appropriate  Outcome: Progressing  Goal: Maintains/Returns to pre admission functional level  Description: INTERVENTIONS:  - Perform BMAT or MOVE assessment daily    - Set and communicate daily mobility goal to care team and patient/family/caregiver     - Collaborate with rehabilitation services on mobility goals if consulted  - Dangle patient 2 times a day  - Stand patient 2 times a day  - Out of bed for toileting  - Record patient progress and toleration of activity level   Outcome: Progressing     Problem: Potential for Falls  Goal: Patient will remain free of falls  Description: INTERVENTIONS:  - Educate patient/family on patient safety including physical limitations  - Instruct patient to call for assistance with activity   - Consult OT/PT to assist with strengthening/mobility   - Keep Call bell within reach  - Keep bed low and locked with side rails adjusted as appropriate  - Keep care items and personal belongings within reach  - Initiate and maintain comfort rounds  - Make Fall Risk Sign visible to staff  - Apply yellow socks and bracelet for high fall risk patients  - Consider moving patient to room near nurses station  Outcome: Progressing     Problem: Prexisting or High Potential for Compromised Skin Integrity  Goal: Skin integrity is maintained or improved  Description: INTERVENTIONS:  - Identify patients at risk for skin breakdown  - Assess and monitor skin integrity  - Assess and monitor nutrition and hydration status  - Monitor labs   - Assess for incontinence   - Turn and reposition patient  - Assist with mobility/ambulation  - Relieve pressure over bony prominences  - Avoid friction and shearing  - Provide appropriate hygiene as needed including keeping skin clean and dry  - Evaluate need for skin moisturizer/barrier cream  - Collaborate with interdisciplinary team   - medical history of Anemia NEC, Asthma, Cervical spondylarthritis, Depression, DJD (degenerative joint disease) of knee, Esophageal spasm, GERD (gastroesophageal reflux disease), Hypercholesterolemia, Hypertension, Lumbar stenosis, Neuropathy, upper extremity, Vertigo (5053-5710), and Vitamin D deficiency (1/28/2011). Had pneumovax once. PSH:  has a past surgical history that includes hx hysterectomy; hx cataract removal; hx tonsillectomy; hx orthopaedic; and hx other surgical.  Colonoscopy 2014 showed cecal polyp, diverticuli, and internal hemorrhoids, Dr. Marli Leavitt. All and MED reviewed. I have taken aspirin off the list; caused N/V once, but she is on it now and tolerating. Current Outpatient Medications on File Prior to Visit   Medication Sig Dispense Refill    atorvastatin (LIPITOR) 20 mg tablet TAKE 1 TABLET BY MOUTH ONCE DAILY 90 Tab 0    sertraline (ZOLOFT) 25 mg tablet Take 1 Tab by mouth daily. 90 Tab 3    diclofenac (VOLTAREN) 1 % gel Apply  to affected area four (4) times daily.  amLODIPine-benazepril (LOTREL) 5-20 mg per capsule Take 1 Cap by mouth daily. 90 Cap 3    albuterol (PROVENTIL HFA, VENTOLIN HFA, PROAIR HFA) 90 mcg/actuation inhaler Take 1 Puff by inhalation as needed for Wheezing. 1 Inhaler 5    ergocalciferol (ERGOCALCIFEROL) 50,000 unit capsule Take 1 Cap by mouth every seven (7) days. (Patient taking differently: Take 50,000 Units by mouth every Sunday.) 1 Cap 0    MULTIVITAMINS (MULTIVITAMIN PO) Take  by mouth daily. No current facility-administered medications on file prior to visit. FH: Her family history includes Heart Disease in her brother, brother, father, and mother; Hypertension in her mother; Stroke in her mother. SH: She is . Her son lives with her. She reports that she has never used tobacco.  She reports that she drinks alcohol. ROS: Complete review of systems was performed and is otherwise unremarkable except as noted elsewhere. OBJECTIVE  Vitals:   Visit Vitals  /65 (BP 1 Location: Left arm, BP Patient Position: Sitting)   Pulse 79   Temp 99.2 °F (37.3 °C) (Oral)   Resp 16   Ht 5' 1\" (1.549 m)   Wt 111 lb 6.4 oz (50.5 kg)   SpO2 98%   BMI 21.05 kg/m²    Gen: Pleasant 82 y.o. AA female in NAD.   HEENT: PERRLA. EOMI. OP moist and pink. +TTP R cheek and forehead.  Neck: Supple.  No LAD.  HEART: RRR, No M/G/R.   LUNGS: CTAB No W/R.   ABDOMEN: S, NT, ND, BS+.   EXTREMITIES: Warm. No C/C/E. MUSCULOSKELETAL: Normal ROM, muscle strength 5/5 all groups. NEURO: Alert and oriented x 3.  Cranial nerves grossly intact.  No focal sensory or motor deficits noted. SKIN: Warm. Dry. No rashes or other lesions noted. Lab Results   Component Value Date/Time    Cholesterol, total 173 09/20/2019 08:15 AM    HDL Cholesterol 64 09/20/2019 08:15 AM    LDL, calculated 89 09/20/2019 08:15 AM    VLDL, calculated 20 09/20/2019 08:15 AM    Triglyceride 98 09/20/2019 08:15 AM    CHOL/HDL Ratio 1.6 04/29/2017 05:38 AM      Lab Results   Component Value Date/Time    WBC 6.9 09/20/2019 08:15 AM    WBC 6.7 05/21/2012 08:23 AM    HGB 13.0 09/20/2019 08:15 AM    HCT 37.9 09/20/2019 08:15 AM    PLATELET 440 59/08/3097 08:15 AM    MCV 88 09/20/2019 08:15 AM     Lab Results   Component Value Date/Time    Vitamin D 25-Hydroxy 13.8 (L) 04/29/2017 05:37 AM    VITAMIN D, 25-HYDROXY 35.0 09/20/2019 08:15 AM       Lab Results   Component Value Date/Time    Sodium 144 09/20/2019 08:15 AM    Potassium 4.7 09/20/2019 08:15 AM    Chloride 105 09/20/2019 08:15 AM    CO2 26 09/20/2019 08:15 AM    Anion gap 5 05/10/2017 03:50 AM    Glucose 90 09/20/2019 08:15 AM    BUN 15 09/20/2019 08:15 AM    Creatinine 0.93 09/20/2019 08:15 AM    BUN/Creatinine ratio 16 09/20/2019 08:15 AM    GFR est AA 62 09/20/2019 08:15 AM    GFR est non-AA 54 (L) 09/20/2019 08:15 AM    Calcium 10.6 (H) 09/20/2019 08:15 AM         ASSESSMENT / PLAN:    1.  Hypertension: High today; better in past. It is Patient/family teaching  - Consider wound care consult   Outcome: Progressing recalled that due to med shortage, we had to stop exforge. 2. Fatigue and malaise: Ongoing. Recent labs reviewed and okay. Meds unlikely to be contributing. ?Depression. 3. ?Weight loss--Continues. Suggested Boost. It is noted that she has had scopes/GI workup in the past few years--however, we'll refer back to Dr. Roslyn Schultz. For now, encourage liberalized diet. Can add Boost or similar supplement. 4. Hyperglycemia on recent labs; Glc and A1C okay. 5. Knee pain: DJD. Better with cymbalta. Tylenol. On celebrex. Follow up with orthopedics as desired. 6. L leg neuropathic pain of lower extremity: Ongoing. Likely secondary to her spinal stenosis. Sees Dr. Kenny Flowers. Off cymbalta. 7. GI bleed: S/p colonoscopy. As per Dr. Roslyn Schultz. None recently. Off NSAIDs and ASA. 8. Cervical spine stenosis: Not wanting surgery. 9. Hypercholesterolemia: Reasonable at last check. 10. Anemia: Hb okay. 11. Depression: Stable. No SI. We'll try cutting back the zoloft to 25, and see if she tolerates this. 12. GERD (gastroesophageal reflux disease): Stable. 13. Asthma: Controlled. 14. Head and neck pain: Stable. Arthritis. 15. Vit D deficiency: Continue supplement. Recheck.         Follow-up and Dispositions    · Return in about 4 months (around 5/13/2020) for HTN, etc.

## 2022-10-26 NOTE — SPEECH THERAPY NOTE
Speech-Language Pathology Bedside Swallow Evaluation        Patient Name: Ej Nunez    Today's Date: 10/26/2022     Problem List  Patient Active Problem List   Diagnosis   • Acute respiratory failure with hypoxia (HCC)   • SHREYA (acute kidney injury) ([facility] [address] )   • Chronic atrial fibrillation (HCC)   • Transaminitis   • Moderate protein-calorie malnutrition (HCC)   • Pleural effusion   • Volume overload   • Flexor tenosynovitis of finger   • Localized swelling on left hand   • Cellulitis of hand, left   • Coag negative Staphylococcus bacteremia   • Wound of left foot   • CKD (chronic kidney disease) stage 3, GFR 30-59 ml/min (HCC)   • Hypoxia   • Skin cancer of face   • Hematoma of face, initial encounter   • Status post incision and drainage   • Chronic respiratory failure with hypoxia (HCC)   • Hypothyroidism   • Cardiogenic shock ([facility] [address] )   • Fall       Past Medical History  Past Medical History:   Diagnosis Date   • A-fib [facility])    • Disease of thyroid gland    • Hyperlipidemia    • Hypertension    • Optic neuropathy    • Pleural effusion on right    • Pneumonia    • S/P lung surgery, follow-up exam    • Septic shock [facility])        Past Surgical History  Past Surgical History:   Procedure Laterality Date   • HAND DEBRIDEMENT Left 10/30/2021    Procedure: DEBRIDEMENT HAND/FINGER [facility] OUT), second, index and ring finger;  Surgeon: Kartik Patel MD;  Location: [facility];  Service: Plastics   • INCISION AND DRAINAGE OF WOUND Right 2/23/2022    Procedure: INCISION AND DRAINAGE (I&D) HEAD/FACE;  Surgeon: Morris Reece MD;  Location: [facility];  Service: Plastics   • RI THORACOSCOPY SURG PART PULM DECORT Right 12/12/2019    Procedure: RIGHT THORACOSCOPIC LUNG DECORTICATION;  Surgeon: Jose Carlos Villalobos MD;  [facility]: [facility];  Service: Thoracic   • THORACOSCOPY VIDEO ASSISTED SURGERY (VATS) Right 12/12/2019    Procedure: THORACOSCOPY VIDEO ASSISTED SURGERY (VATS);   Surgeon: Jose Carlos Villalobos MD; Location: [facility];  Service: Thoracic         Current Medical Status  Pt is a 82 y o  male who presented to [facility] with increasing SOB s/p fall at home  Hi daughter found him and called EMS  Patient denies hitting his head but admits to frequent falls at home  Patient wears 5 L chronically but has had increasing shortness of breath at home over the past month to the point where he is unable to even walk short distances and has been primarily bed ridden  Family reports that they assist him with ADLs and bring him food, but he is very limited in his mobility due to increasing shortness of breath  Family also reports frequent falls at home and he does live alone  Initial w/u concerning for cardiogenic shock with end-organ involvement as evidenced by SHREYA, elevated LFTs, and persistent hypotension  ST consult requested to further assess swallow function  Upon my arrival the patient is awake and alert but is noted to have a hoarse vocal quality which he reports is new        Past medical history:   Please see H&P for details    Special Studies:  10/26 CXR: Bilateral airspace infiltrates, minimally improved  10/25 CXR: 1   Left-sided jugular venous catheter with its tip overlying the superior cavoatrial junction  2   Bilateral perihilar opacities increased since February 21, 2022 likely due to pulmonary edema  Focal opacity overlying the right midlung field, underlying consolidation cannot be excluded  3   Slight increase in bilateral small pleural effusions  10/24 CXR: 1   Left-sided jugular venous catheter with its tip overlying the superior cavoatrial junction  2   Bilateral perihilar opacities increased since February 21, 2022 likely due to pulmonary edema  Focal opacity overlying the right midlung field, underlying consolidation cannot be excluded  3   Slight increase in bilateral small pleural effusions      Social/Education/Vocational Hx:  Pt lives alone    Swallow Information   Current Risks for Dysphagia & Aspiration: AMS and change in respiratory status     Current Symptoms/Concerns: change in respiratory status    Current Diet: NPO      Baseline Diet: regular diet and thin liquids per patient report      Baseline Assessment   Behavior/Cognition: alert    Speech/Language Status: able to participate in conversation and able to follow commands    Patient Positioning: upright in bed      Swallow Mechanism Exam   Facial: slight L eye ptosis? no significant asymmetry noted  Labial: WFL  Lingual: bilateral decreased strength  Velum: unable to visualize  Mandible: adequate ROM  Dentition: x2 anterior lower dentition- patient reports it has been like this for sometimes- he does not own/wear dentures and denies difficulty with mastication  Vocal quality:dysphonic and hoarse   Volitional Cough: fair+   Resp: 6L O2    Consistencies Assessed and Performance   Consistencies Administered: nectar thick, honey thick, puree and mechanical soft solids as well as meds crushed in puree  Specific materials administered included: applesauce, x2 bites of tanisha cracker, nectar thick and honey thick    Oral Stage:   Bolus retrieval, spoon stripping and anterior containment were adequate  Mastication was slow and mildly prolonged but eventually adequate appearing with the materials administered today  Bolus formation and transfer were slow but functional with no significant oral residue noted  No overt s/s reduced oral control  Pharyngeal Stage:  Swallowing initiation appeared mildly delayed  Laryngeal rise was palpated and suspected to be at least mildly reduced  Throat clear noted across tanisha crackers with delayed coughing  Throat clear noted x1 with honey thick liquid with large sip and hyper extension of the neck ( at the end)  Throat clear noted with initial sips of nectar thick with eventual overt coughing and choking which lasted for several minutes benefiting from oropharyngeal suctioning   No additional coughing, throat clearing, change in vocal quality or respiratory status noted today  Esophageal Concerns: none reported    Summary   s/s suggestive of mild-moderate oral and suspected at least moderate pharyngeal dysphagia  Additional objective assessment is indicated to further assess  Recommendations: puree/level 1 diet and honey thick liquids for now pending VBSS     Recommended Form of Meds: crushed with puree     Aspiration precautions and compensatory swallowing strategies: upright posture, only feed when fully alert, slow rate of feeding and small bites/sips    Results Reviewed with: patient, RN and WILLEMNP     Dysphagia Goals: pt will participate in VBS   Additional goals TBD pending results    Plan  Will f/u VBS planned for tomorrow      PARESH Villeda S , [address] [facility]  Speech Language Pathologist   Available via [address] [place] [address] #[id number]  [place] #[id number]

## 2022-10-26 NOTE — PROGRESS NOTES
Vancomycin IV Pharmacy-to-Dose Consultation    Jerod Viera is a 82 y o  male who is currently receiving Vancomycin IV with management by the Pharmacy Consult service  Assessment/Plan:  The patient was reviewed  Renal function is stable and no signs or symptoms of nephrotoxicity and/or infusion reactions were documented in the chart  Based on today’s assessment, continue current vancomycin (day # 2) dosing of 1250mg IV q24h, with a plan for trough to be drawn at 2300 on 10/28/22  We will continue to follow the patient’s culture results and clinical progress daily      Torri Collazo

## 2022-10-26 NOTE — ASSESSMENT & PLAN NOTE
· Daughter reports she called EMS today after her father fell at home, denies hitting his head   · Daughter reports frequent falls at home  · Patient appears very deconditioned and per family has essentially been bed bound for the last month or so  · PT/OT when medically appropriate

## 2022-10-26 NOTE — PROGRESS NOTES
Cardiology Progress Note - Oscar Russell 82 y o  male MRN: [id number]    Unit/Bed#:  Encounter: [id number]      Assessment/Plan:  1  Cardiogenic shock  · Blood pressure 94/52; continue to monitor  · Currently receiving Milrinone, norepinephrine and vasopressin  · Echo shows EF 40-45% with visibly severe AS  · Palliative care discussing goals of care with patient's family     2  Severe aortic stenosis  · Revealed on echocardiogram;  given significant comorbidities and clinical status he is likely not a candidate for TAVR at this time  · Palliative care discussing goals of care with patient's family    3  Acute on chronic HFrEF  · Hypervolemic status improving  · I/O net -7254 9 mL  · Echo previous EF 40-45% with visibly severe AS  · Continue Lasix GTT    4  Chronic atrial fibrillation  · Tachycardic at 128 bpm; continue monitor  · Continue Amiodarone drip for rhythm control  · Continue Eliquis 2 5 mg b i d  for anticoagulation     5  Acute on chronic hypoxic respiratory failure  · Management per CCU     6   chronic kidney disease  · Creatinine 1 97; continue to monitor  · Management per CCU    7  Frequent falls  · Management per CCU      Subjective:   Patient seen and examined  No significant events overnight  Denies new complaints at this time    Objective:     Vitals: Blood pressure 94/52, pulse (!) 106, temperature 98 4 °F (36 9 °C), temperature source Oral, resp  rate 22, height 5' 10" (1 778 m), weight 92 kg (202 lb 13 2 oz), SpO2 (!) 89 %  , Body mass index is 29 1 kg/m² ,   Orthostatic Blood Pressures    Flowsheet Row Most Recent Value   Blood Pressure 94/52 filed at 10/26/2022 1200   Patient Position - Orthostatic VS Lying filed at 10/26/2022 0400            Intake/Output Summary (Last 24 hours) at 10/26/2022 1406  Last data filed at 10/26/2022 1200  Gross per 24 hour   Intake 3477 07 ml   Output 6700 ml   Net -3222 93 ml         Physical Exam:  Physical Exam  Vitals and nursing note reviewed  Constitutional:       General: He is not in acute distress  Appearance: He is well-developed  He is ill-appearing  HENT:      Head: Normocephalic and atraumatic  Nose: Nose normal    Eyes:      Conjunctiva/sclera: Conjunctivae normal    Cardiovascular:      Rate and Rhythm: Tachycardia present  Rhythm irregularly irregular  Heart sounds: Murmur heard  Systolic (ejection) murmur is present with a grade of 2/6  Pulmonary:      Effort: Pulmonary effort is normal  No respiratory distress  Breath sounds: Normal breath sounds  No wheezing or rales  Chest:      Chest wall: No tenderness  Abdominal:      Palpations: Abdomen is soft  Tenderness: There is no abdominal tenderness  Musculoskeletal:      Cervical back: Neck supple  Right lower leg: Edema (1+) present  Left lower leg: Edema (1+) present  Skin:     General: Skin is warm and dry  Neurological:      Mental Status: He is easily aroused     Psychiatric:         Mood and Affect: Mood normal               Medications:      Current Facility-Administered Medications:   •  apixaban (ELIQUIS) tablet 2 5 mg, 2 5 mg, Oral, BID, ANNA MARIE Shah, 2 5 mg at 10/26/22 8190  •  bisacodyl (DULCOLAX) rectal suppository 10 mg, 10 mg, Rectal, Daily PRN, ANNA MARIE Shah  •  cefepime (MAXIPIME) 2,000 mg in dextrose 5 % 50 mL IVPB, 2,000 mg, Intravenous, Q12H, ANNA MARIE Olmos, Last Rate: 100 mL/hr at 10/26/22 1205, 2,000 mg at 10/26/22 1205  •  Famotidine (PF) (PEPCID) injection 20 mg, 20 mg, Intravenous, Q24H [facility], ANNA MARIE Etienne, 20 mg at 10/26/22 3782  •  furosemide (LASIX) 500 mg infusion 50 mL, 20 mg/hr, Intravenous, Continuous, ANNA MARIE Santiago, Last Rate: 2 mL/hr at 10/26/22 1145, 20 mg/hr at 10/26/22 1145  •  levothyroxine tablet 75 mcg, 75 mcg, Oral, Daily Before Breakfast, ANNA MARIE Shah, 75 mcg at 10/26/22 0826  •  milrinone (PRIMACOR) 20 mg in 100 mL infusion (premix), 0 25 mcg/kg/min, Intravenous, Continuous, Carlitos Lutz, CRNP, Last Rate: 6 9 mL/hr at 10/26/22 0441, 0 25 mcg/kg/min at 10/26/22 0441  •  norepinephrine (LEVOPHED) 8 mg (DOUBLE CONCENTRATION) IV in sodium chloride 0 9% 250 mL, 1-30 mcg/min, Intravenous, Titrated, Maci Leone, CRNP, Last Rate: 48 8 mL/hr at 10/26/22 0917, 26 mcg/min at 10/26/22 0917  •  potassium chloride 40 mEq IVPB (premix), 40 mEq, Intravenous, Once, Laila Sosa, CRNP, Last Rate: 25 mL/hr at 10/26/22 1102, 40 mEq at 10/26/22 1102  •  vancomycin (VANCOCIN) 1,250 mg in sodium chloride 0 9 % 250 mL IVPB, 15 mg/kg (Adjusted), Intravenous, Q24H, Carlitos Lutz, CRNP, Stopped at 10/26/22 0200  •  vasopressin (PITRESSIN) 20 Units in sodium chloride 0 9 % 100 mL infusion, 0 08 Units/min, Intravenous, Continuous, Carlitos Lutz, CRNP, Last Rate: 24 mL/hr at 10/26/22 0704, 0 08 Units/min at 10/26/22 0704     Labs & Results:     Results from last 7 days   Lab Units 10/25/22  1231 10/25/22  0410 10/25/22  0114 10/24/22  2128   CK TOTAL U/L 66  --   --  65   HS TNI 0HR ng/L  --   --   --  28   HS TNI 2HR ng/L  --   --  25  --    HSTNI D2 ng/L  --   --  -3  --    HS TNI 4HR ng/L  --  25  --   --    HSTNI D4 ng/L  --  -3  --   --    NT-PRO BNP pg/mL  --   --   --  8,728*     Results from last 7 days   Lab Units 10/26/22  0616 10/25/22  0437 10/24/22  2128   WBC Thousand/uL 11 41* 7 82 6 09   HEMOGLOBIN g/dL 12 1 12 3 13 1   HEMATOCRIT % 37 4 37 2 40 9   PLATELETS Thousands/uL 156 170 149     Results from last 7 days   Lab Units 10/25/22  0437   TRIGLYCERIDES mg/dL 71   HDL mg/dL 15*     Results from last 7 days   Lab Units 10/26/22  1259 10/26/22  0616 10/25/22  2323 10/25/22  1231 10/25/22  0437 10/24/22  2128   POTASSIUM mmol/L 3 0* 3 2* 3 6   < > 4 3 5 0   CHLORIDE mmol/L 97 98 97   < > 97 96   CO2 mmol/L 26 25 23   < > 21 23   BUN mg/dL 32* 33* 37*   < > 42* 42*   CREATININE mg/dL 2 03* 1 97* 2 00*   < > 2 02* 2 12*   CALCIUM mg/dL 7 8* 8 0* 8 3   < > 9 0 8 6   ALK PHOS U/L --  130*  --   --  133* 143*   ALT U/L  --  9*  --   --  16 16   AST U/L  --  31  --   --  40 41    < > = values in this interval not displayed  Results from last 7 days   Lab Units 10/25/22  0437 10/24/22  2128   INR  1 75* 1 77*   PTT seconds  --  45*     Results from last 7 days   Lab Units 10/26/22  1259 10/26/22  0616 10/25/22  2323   MAGNESIUM mg/dL 2 5 2 3 2 0       Vitals: Blood pressure 94/52, pulse (!) 106, temperature 98 4 °F (36 9 °C), temperature source Oral, resp  rate 22, height 5' 10" (1 778 m), weight 92 kg (202 lb 13 2 oz), SpO2 (!) 89 %  , Body mass index is 29 1 kg/m² ,   Orthostatic Blood Pressures    Flowsheet Row Most Recent Value   Blood Pressure 94/52 filed at 10/26/2022 1200   Patient Position - Orthostatic VS Lying filed at 10/26/2022 5322          Systolic (26IEG), TQJ:415 , Min:83 , NJR:157     Diastolic (44QSB), FDA:09, Min:51, Max:74        Intake/Output Summary (Last 24 hours) at 10/26/2022 1406  Last data filed at 10/26/2022 1200  Gross per 24 hour   Intake 3477 07 ml   Output 6700 ml   Net -3222 93 ml       Invasive Devices  Report    Central Venous Catheter Line  Duration           CVC Central Lines 10/25/22 Triple 20cm 1 day          Peripheral Intravenous Line  Duration           Peripheral IV 10/24/22 Distal;Right;Upper;Ventral (anterior) Arm 1 day    Peripheral IV 10/25/22 Dorsal (posterior); Right Hand 1 day          Arterial Line  Duration           Arterial Line 10/25/22 Radial 1 day          Drain  Duration           Open Drain Anterior; Left Hand 361 days    Open Drain Anterior; Left Hand 361 days    Closed/Suction Drain Right  Bulb 7 Fr   245 days    Urethral Catheter 1 day                  EKG:  Atrial fibrillation; low voltage QRS; incomplete right bundle-branch block; nonspecific ST wave abnormality      BP Readings from Last 3 Encounters:   10/26/22 94/52   02/26/22 123/79   11/02/21 102/58      Wt Readings from Last 3 Encounters:   10/26/22 92 kg (202 lb 13 2 oz) 02/25/22 92 3 kg (203 lb 7 8 oz)   11/02/21 96 6 kg (212 lb 14 4 oz)

## 2022-10-26 NOTE — ASSESSMENT & PLAN NOTE
· Currently on 2 5 Eliquis b i d , will continue  · Follows with cardiology outpatient  · Currently rate controlled  · Holding home beta-blocker 2/2 hypotension

## 2022-10-27 ENCOUNTER — APPOINTMENT (INPATIENT)
Dept: RADIOLOGY | Facility: HOSPITAL | Age: 87
End: 2022-10-27

## 2022-10-27 LAB
ANION GAP SERPL CALCULATED.3IONS-SCNC: 10 MMOL/L (ref 4–13)
ANION GAP SERPL CALCULATED.3IONS-SCNC: 11 MMOL/L (ref 4–13)
ARTERIAL PATENCY WRIST A: YES
BACTERIA UR CULT: ABNORMAL
BASE EX.OXY STD BLDV CALC-SCNC: 68.3 % (ref 60–80)
BASE EX.OXY STD BLDV CALC-SCNC: 77.4 % (ref 60–80)
BASE EX.OXY STD BLDV CALC-SCNC: 80.5 % (ref 60–80)
BASE EX.OXY STD BLDV CALC-SCNC: 80.7 % (ref 60–80)
BASE EXCESS BLDV CALC-SCNC: 0.4 MMOL/L
BASE EXCESS BLDV CALC-SCNC: 5.8 MMOL/L
BASE EXCESS BLDV CALC-SCNC: 6.1 MMOL/L
BASE EXCESS BLDV CALC-SCNC: 6.5 MMOL/L
BODY TEMPERATURE: 97.7 DEGREES FEHRENHEIT
BODY TEMPERATURE: 97.8 DEGREES FEHRENHEIT
BUN SERPL-MCNC: 27 MG/DL (ref 5–25)
BUN SERPL-MCNC: 27 MG/DL (ref 5–25)
BUN SERPL-MCNC: 29 MG/DL (ref 5–25)
BUN SERPL-MCNC: 29 MG/DL (ref 5–25)
CALCIUM SERPL-MCNC: 7.2 MG/DL (ref 8.3–10.1)
CALCIUM SERPL-MCNC: 7.5 MG/DL (ref 8.3–10.1)
CALCIUM SERPL-MCNC: 8 MG/DL (ref 8.3–10.1)
CALCIUM SERPL-MCNC: 8.1 MG/DL (ref 8.3–10.1)
CHLORIDE SERPL-SCNC: 97 MMOL/L (ref 96–108)
CHLORIDE SERPL-SCNC: 98 MMOL/L (ref 96–108)
CO2 SERPL-SCNC: 28 MMOL/L (ref 21–32)
CO2 SERPL-SCNC: 28 MMOL/L (ref 21–32)
CO2 SERPL-SCNC: 29 MMOL/L (ref 21–32)
CO2 SERPL-SCNC: 30 MMOL/L (ref 21–32)
CREAT SERPL-MCNC: 1.8 MG/DL (ref 0.6–1.3)
CREAT SERPL-MCNC: 1.82 MG/DL (ref 0.6–1.3)
CREAT SERPL-MCNC: 1.93 MG/DL (ref 0.6–1.3)
CREAT SERPL-MCNC: 2.04 MG/DL (ref 0.6–1.3)
ERYTHROCYTE [DISTWIDTH] IN BLOOD BY AUTOMATED COUNT: 22.3 % (ref 11.6–15.1)
GFR SERPL CREATININE-BSD FRML MDRD: 28 ML/MIN/1.73SQ M
GFR SERPL CREATININE-BSD FRML MDRD: 30 ML/MIN/1.73SQ M
GFR SERPL CREATININE-BSD FRML MDRD: 33 ML/MIN/1.73SQ M
GFR SERPL CREATININE-BSD FRML MDRD: 33 ML/MIN/1.73SQ M
GLUCOSE SERPL-MCNC: 120 MG/DL (ref 65–140)
GLUCOSE SERPL-MCNC: 129 MG/DL (ref 65–140)
GLUCOSE SERPL-MCNC: 145 MG/DL (ref 65–140)
GLUCOSE SERPL-MCNC: 149 MG/DL (ref 65–140)
GLUCOSE SERPL-MCNC: 159 MG/DL (ref 65–140)
GLUCOSE SERPL-MCNC: 161 MG/DL (ref 65–140)
GLUCOSE SERPL-MCNC: 167 MG/DL (ref 65–140)
HCO3 BLDV-SCNC: 26.6 MMOL/L (ref 24–30)
HCO3 BLDV-SCNC: 32.2 MMOL/L (ref 24–30)
HCO3 BLDV-SCNC: 32.3 MMOL/L (ref 24–30)
HCO3 BLDV-SCNC: 33.2 MMOL/L (ref 24–30)
HCT VFR BLD AUTO: 39.7 % (ref 36.5–49.3)
HGB BLD-MCNC: 12.8 G/DL (ref 12–17)
MAGNESIUM SERPL-MCNC: 1.9 MG/DL (ref 1.6–2.6)
MAGNESIUM SERPL-MCNC: 2 MG/DL (ref 1.6–2.6)
MAGNESIUM SERPL-MCNC: 2.2 MG/DL (ref 1.6–2.6)
MAGNESIUM SERPL-MCNC: 2.3 MG/DL (ref 1.6–2.6)
MCH RBC QN AUTO: 27.1 PG (ref 26.8–34.3)
MCHC RBC AUTO-ENTMCNC: 32.2 G/DL (ref 31.4–37.4)
MCV RBC AUTO: 84 FL (ref 82–98)
MRSA NOSE QL CULT: NORMAL
NASAL CANNULA: 6
NASAL CANNULA: 6
O2 CT BLDV-SCNC: 12.8 ML/DL
O2 CT BLDV-SCNC: 14.8 ML/DL
O2 CT BLDV-SCNC: 15 ML/DL
O2 CT BLDV-SCNC: 15.8 ML/DL
PCO2 BLDV: 49.1 MM HG (ref 42–50)
PCO2 BLDV: 52.8 MM HG (ref 42–50)
PCO2 BLDV: 54.1 MM HG (ref 42–50)
PCO2 BLDV: 56.6 MM HG (ref 42–50)
PH BLDV: 7.35 [PH] (ref 7.3–7.4)
PH BLDV: 7.39 [PH] (ref 7.3–7.4)
PH BLDV: 7.39 [PH] (ref 7.3–7.4)
PH BLDV: 7.4 [PH] (ref 7.3–7.4)
PLATELET # BLD AUTO: 127 THOUSANDS/UL (ref 149–390)
PMV BLD AUTO: 8.9 FL (ref 8.9–12.7)
PO2 BLDV: 34.4 MM HG (ref 35–45)
PO2 BLDV: 40.5 MM HG (ref 35–45)
PO2 BLDV: 43.3 MM HG (ref 35–45)
PO2 BLDV: 45.7 MM HG (ref 35–45)
POTASSIUM SERPL-SCNC: 3.2 MMOL/L (ref 3.5–5.3)
POTASSIUM SERPL-SCNC: 3.4 MMOL/L (ref 3.5–5.3)
POTASSIUM SERPL-SCNC: 3.4 MMOL/L (ref 3.5–5.3)
POTASSIUM SERPL-SCNC: 3.5 MMOL/L (ref 3.5–5.3)
RBC # BLD AUTO: 4.73 MILLION/UL (ref 3.88–5.62)
SODIUM SERPL-SCNC: 136 MMOL/L (ref 135–147)
SODIUM SERPL-SCNC: 137 MMOL/L (ref 135–147)
WBC # BLD AUTO: 10.1 THOUSAND/UL (ref 4.31–10.16)

## 2022-10-27 RX ORDER — AMOXICILLIN 250 MG
2 CAPSULE ORAL 2 TIMES DAILY
Status: DISCONTINUED | OUTPATIENT
Start: 2022-10-27 | End: 2022-11-01

## 2022-10-27 RX ORDER — POTASSIUM CHLORIDE 20 MEQ/1
20 TABLET, EXTENDED RELEASE ORAL ONCE
Status: COMPLETED | OUTPATIENT
Start: 2022-10-27 | End: 2022-10-27

## 2022-10-27 RX ORDER — POTASSIUM CHLORIDE 29.8 MG/ML
40 INJECTION INTRAVENOUS ONCE
Status: COMPLETED | OUTPATIENT
Start: 2022-10-27 | End: 2022-10-27

## 2022-10-27 RX ORDER — DIGOXIN 0.25 MG/ML
125 INJECTION INTRAMUSCULAR; INTRAVENOUS ONCE
Status: COMPLETED | OUTPATIENT
Start: 2022-10-27 | End: 2022-10-27

## 2022-10-27 RX ORDER — ALBUMIN (HUMAN) 12.5 G/50ML
12.5 SOLUTION INTRAVENOUS EVERY 6 HOURS
Status: COMPLETED | OUTPATIENT
Start: 2022-10-27 | End: 2022-10-28

## 2022-10-27 RX ORDER — POTASSIUM CHLORIDE 29.8 MG/ML
40 INJECTION INTRAVENOUS ONCE
Status: COMPLETED | OUTPATIENT
Start: 2022-10-27 | End: 2022-10-28

## 2022-10-27 RX ORDER — CALCIUM GLUCONATE 20 MG/ML
2 INJECTION, SOLUTION INTRAVENOUS ONCE
Status: COMPLETED | OUTPATIENT
Start: 2022-10-27 | End: 2022-10-27

## 2022-10-27 RX ADMIN — VASOPRESSIN 0.08 UNITS/MIN: 20 INJECTION INTRAVENOUS at 16:15

## 2022-10-27 RX ADMIN — APIXABAN 2.5 MG: 2.5 TABLET, FILM COATED ORAL at 08:30

## 2022-10-27 RX ADMIN — Medication 20 MG/HR: at 04:01

## 2022-10-27 RX ADMIN — POTASSIUM CHLORIDE 40 MEQ: 29.8 INJECTION, SOLUTION INTRAVENOUS at 15:00

## 2022-10-27 RX ADMIN — CEFEPIME 2000 MG: 2 INJECTION, POWDER, FOR SOLUTION INTRAVENOUS at 23:05

## 2022-10-27 RX ADMIN — APIXABAN 2.5 MG: 2.5 TABLET, FILM COATED ORAL at 18:23

## 2022-10-27 RX ADMIN — VASOPRESSIN 0.08 UNITS/MIN: 20 INJECTION INTRAVENOUS at 20:47

## 2022-10-27 RX ADMIN — NOREPINEPHRINE BITARTRATE 21 MCG/MIN: 1 INJECTION, SOLUTION, CONCENTRATE INTRAVENOUS at 08:16

## 2022-10-27 RX ADMIN — SENNOSIDES AND DOCUSATE SODIUM 2 TABLET: 50; 8.6 TABLET ORAL at 18:23

## 2022-10-27 RX ADMIN — LEVOTHYROXINE SODIUM 75 MCG: 75 TABLET ORAL at 08:30

## 2022-10-27 RX ADMIN — POTASSIUM CHLORIDE 40 MEQ: 29.8 INJECTION, SOLUTION INTRAVENOUS at 19:55

## 2022-10-27 RX ADMIN — POTASSIUM CHLORIDE 40 MEQ: 29.8 INJECTION, SOLUTION INTRAVENOUS at 11:18

## 2022-10-27 RX ADMIN — NOREPINEPHRINE BITARTRATE 23 MCG/MIN: 1 INJECTION, SOLUTION, CONCENTRATE INTRAVENOUS at 01:26

## 2022-10-27 RX ADMIN — DIGOXIN 125 MCG: 0.25 INJECTION INTRAMUSCULAR; INTRAVENOUS at 12:36

## 2022-10-27 RX ADMIN — POTASSIUM CHLORIDE 40 MEQ: 29.8 INJECTION, SOLUTION INTRAVENOUS at 00:58

## 2022-10-27 RX ADMIN — CALCIUM GLUCONATE 2 G: 20 INJECTION, SOLUTION INTRAVENOUS at 19:53

## 2022-10-27 RX ADMIN — VASOPRESSIN 0.08 UNITS/MIN: 20 INJECTION INTRAVENOUS at 06:28

## 2022-10-27 RX ADMIN — NOREPINEPHRINE BITARTRATE 20 MCG/MIN: 1 INJECTION, SOLUTION, CONCENTRATE INTRAVENOUS at 15:25

## 2022-10-27 RX ADMIN — VASOPRESSIN 0.08 UNITS/MIN: 20 INJECTION INTRAVENOUS at 01:59

## 2022-10-27 RX ADMIN — VASOPRESSIN 0.08 UNITS/MIN: 20 INJECTION INTRAVENOUS at 11:14

## 2022-10-27 RX ADMIN — CEFEPIME 2000 MG: 2 INJECTION, POWDER, FOR SOLUTION INTRAVENOUS at 11:32

## 2022-10-27 RX ADMIN — FAMOTIDINE 20 MG: 10 INJECTION, SOLUTION INTRAVENOUS at 08:30

## 2022-10-27 RX ADMIN — POTASSIUM CHLORIDE 20 MEQ: 1500 TABLET, EXTENDED RELEASE ORAL at 15:00

## 2022-10-27 RX ADMIN — NOREPINEPHRINE BITARTRATE 20 MCG/MIN: 1 INJECTION, SOLUTION, CONCENTRATE INTRAVENOUS at 21:53

## 2022-10-27 RX ADMIN — ALBUMIN (HUMAN) 12.5 G: 0.25 INJECTION, SOLUTION INTRAVENOUS at 18:23

## 2022-10-27 RX ADMIN — ALBUMIN (HUMAN) 12.5 G: 0.25 INJECTION, SOLUTION INTRAVENOUS at 12:36

## 2022-10-27 RX ADMIN — ALBUMIN (HUMAN) 12.5 G: 0.25 INJECTION, SOLUTION INTRAVENOUS at 23:10

## 2022-10-27 NOTE — PROGRESS NOTES
[facility]  Progress Note - Katherin Confer 1935, 82 y o  male MRN: [id number]  Unit/Bed#:  Encounter: [id number]  Primary Care Provider: Sheryl Gray DO   Date and time admitted to hospital: 10/24/2022  9:04 PM    * Cardiogenic shock [facility])  Assessment & Plan  · Continue pressors for MAP>65, presently on norepinephrine and vasopressin  · Milrinone decreased to 0 13 on 10/26  · Trend labs q6  · Continue Lasix infusion  · Consider 24 hours of Diamox  · Appreciate cardiology recommendations    Chronic respiratory failure with hypoxia [facility])  Assessment & Plan  · Chronically wears 5 L nasal cannula at home  · Initially placed on Bipap, transitioned back to home O2 10/25  · Maintain SpO2 > 88%    Coag negative Staphylococcus bacteremia  Assessment & Plan  · Day 3 of vancomycin/cefepime  · Can likely discontinue cefepime  · Awaiting susceptibilities  · Send repeat blood cultures after 48 hours of appropriate antimicrobial therapy  · ECHO performed on 10/25 without vegetation, however poor study  · If repeat blood cultures positive, will need DENNY    [facility]  Assessment & Plan  · Baseline creatinine 1 4-1 5, 2 12 on admission  · Continue to follow renal function  · Likely in the setting of volume overload  · Continue with Lasix infusion  · Chacon catheter with strict I&O    Hyponatremia-resolved as of 10/26/2022  Assessment & Plan  · Likely in the setting of volume overload  · Continue to trend BMP    Chronic atrial fibrillation ([facility] )  Assessment & Plan  · Currently on 2 5 Eliquis b i d , will continue  · Follows with cardiology outpatient  · Currently rate controlled  · Holding home beta-blocker 2/2 hypotension    Hypothyroidism  Assessment & Plan  · Continue home Synthroid when tolerating PO intake    Fall  Assessment & Plan  · Daughter reports she called EMS today after her father fell at home, denies hitting his head   · Daughter reports frequent falls at home  · Patient appears very deconditioned and per family has essentially been bed bound for the last month or so  · PT/OT when medically appropriate      ----------------------------------------------------------------------------------------  HPI/24hr events:  Patient with decreasing Milrinone and improved SUV O2 over 24 hours, slightly decreased pressor needs, blood cultures PCI to distal flow coccus bacteremia with Enterococcus in the urine, Lasix infusion increased with excellent output, patient passed speech evaluation    Patient appropriate for transfer out of the ICU today?: No  Disposition: Continue Critical Care   Code Status: Level 3 - DNAR and DNI  ---------------------------------------------------------------------------------------  SUBJECTIVE  “I am doing pretty good brendan”    Review of Systems   Constitutional: Negative for fatigue  HENT: Negative for trouble swallowing  Respiratory: Negative for shortness of breath  Cardiovascular: Negative for chest pain  Gastrointestinal: Negative for nausea and vomiting  Musculoskeletal: Positive for arthralgias (Patient reports some difficulty moving 2 fingers on his left hand related to a prior stroke)  Neurological: Positive for weakness ( digits in the left hand)     All other systems reviewed and are negative       ---------------------------------------------------------------------------------------  OBJECTIVE    Vitals   Vitals:    10/26/22 2314 10/26/22 4144 10/27/22 0000 10/27/22 0126   BP: 107/65 122/56 111/67 147/68   BP Location:   Right arm    Pulse: (!) 120 (!) 108 (!) 111 (!) 117   Resp: (!) 25 17 13 14   Temp:   97 8 °F (36 6 °C)    TempSrc:   Oral    SpO2: 95% 94% 95% 96%   Weight:       Height:         Temp (24hrs), Av 2 °F (36 8 °C), Min:97 8 °F (36 6 °C), Max:98 7 °F (37 1 °C)  Current: Temperature: 97 8 °F (36 6 °C)  Arterial Line BP: 100/61  Arterial Line MAP (mmHg): 78 mmHg    Respiratory:  SpO2: SpO2: 96 %, SpO2 Activity: SpO2 Activity: At Rest, SpO2 Device: O2 Device: Nasal cannula  Nasal Cannula O2 Flow Rate (L/min): (S) 6 L/min (patient found on 6 lpm)    Invasive/non-invasive ventilation settings   Respiratory  Report   Lab Data (Last 4 hours)    None         O2/Vent Data (Last 4 hours)    None                Physical Exam  Constitutional:       General: He is not in acute distress  Appearance: He is ill-appearing  Interventions: Nasal cannula in place  HENT:      Head: Normocephalic and atraumatic  Mouth/Throat:      Mouth: Mucous membranes are dry  Eyes:      Pupils: Pupils are equal, round, and reactive to light  Cardiovascular:      Rate and Rhythm: Tachycardia present  Rhythm irregular  Heart sounds: Murmur heard  Pulmonary:      Effort: Pulmonary effort is normal  No respiratory distress  Breath sounds: Normal breath sounds  Abdominal:      General: Abdomen is flat  There is no distension  Palpations: Abdomen is soft  Tenderness: There is no abdominal tenderness  Genitourinary:     Comments: Chacon in place with yellow urine  Musculoskeletal:         General: No tenderness or signs of injury  Right lower leg: No edema  Left lower leg: No edema  Skin:     General: Skin is warm and dry  Neurological:      Mental Status: He is alert  GCS: GCS eye subscore is 4  GCS verbal subscore is 4  GCS motor subscore is 6         Laboratory and Diagnostics:  Results from last 7 days   Lab Units 10/26/22  0616 10/25/22  0437 10/24/22  2128   WBC Thousand/uL 11 41* 7 82 6 09   HEMOGLOBIN g/dL 12 1 12 3 13 1   HEMATOCRIT % 37 4 37 2 40 9   PLATELETS Thousands/uL 156 170 149   NEUTROS PCT %  --  76* 65   MONOS PCT %  --  11 12     Results from last 7 days   Lab Units 10/27/22  0005 10/26/22  1752 10/26/22  1259 10/26/22  0616 10/25/22  2323 10/25/22  1800 10/25/22  1231 10/25/22  0437 10/24/22  2128   SODIUM mmol/L 136 136 135 135 133* 132* 131* 128* 130*   POTASSIUM mmol/L 3 5 3 9 3 0* 3 2* 3 6 3 9 3 6 4 3 5 0   CHLORIDE mmol/L 97 97 97 98 97 98 97 97 96   CO2 mmol/L 28 27 26 25 23 23 22 21 23   ANION GAP mmol/L 11 12 12 12 13 11 12 10 11   BUN mg/dL 29* 30* 32* 33* 37* 38* 38* 42* 42*   CREATININE mg/dL 2 04* 2 09* 2 03* 1 97* 2 00* 1 97* 1 97* 2 02* 2 12*   CALCIUM mg/dL 8 0* 8 1* 7 8* 8 0* 8 3 8 3 8 7 9 0 8 6   GLUCOSE RANDOM mg/dL 161* 169* 174* 131 125 122 107 113 94   ALT U/L  --   --   --  9*  --   --   --  16 16   AST U/L  --   --   --  31  --   --   --  40 41   ALK PHOS U/L  --   --   --  130*  --   --   --  133* 143*   ALBUMIN g/dL  --   --   --  2 3*  --   --   --  2 3* 2 5*   TOTAL BILIRUBIN mg/dL  --   --   --  3 90*  --   --   --  2 90* 3 35*     Results from last 7 days   Lab Units 10/27/22  0005 10/26/22  1752 10/26/22  1259 10/26/22  0616 10/25/22  2323 10/25/22  1800 10/25/22  1231 10/25/22  0437 10/24/22  2128   MAGNESIUM mg/dL 2 3 2 4 2 5 2 3 2 0 2 1 2 1 2 1 2 0   PHOSPHORUS mg/dL  --   --   --   --   --   --   --  4 3* 4 3*      Results from last 7 days   Lab Units 10/25/22  0437 10/24/22  2128   INR  1 75* 1 77*   PTT seconds  --  45*          Results from last 7 days   Lab Units 10/24/22  2128   LACTIC ACID mmol/L 1 9     ABG:  Results from last 7 days   Lab Units 10/25/22  0220   PH ART  7 361   PCO2 ART mm Hg 34 4*   PO2 ART mm Hg 135 0*   HCO3 ART mmol/L 19 0*   BASE EXC ART mmol/L -5 5   ABG SOURCE  Line, Arterial     VBG:  Results from last 7 days   Lab Units 10/27/22  0005 10/25/22  0443 10/25/22  0220   PH OLIMPIA  7 351   < >  --    PCO2 OLIMPIA mm Hg 49 1   < >  --    PO2 OLIMPIA mm Hg 45 7*   < >  --    HCO3 OLIMPIA mmol/L 26 6   < >  --    BASE EXC OLIMPIA mmol/L 0 4   < >  --    ABG SOURCE   --   --  Line, Arterial    < > = values in this interval not displayed       Results from last 7 days   Lab Units 10/24/22  2128   PROCALCITONIN ng/ml 0 17       Micro  Results from last 7 days   Lab Units 10/25/22  0121 10/24/22  2128   BLOOD CULTURE   --  Staphylococcus coagulase negative*  Staphylococcus coagulase negative*   GRAM STAIN RESULT   --  Gram positive cocci in clusters*  Gram positive cocci in clusters*   URINE CULTURE  >100,000 cfu/ml Enterococcus faecalis*  --        EKG:  Atrial fibrillation with rapid ventricular response  Imaging: I have personally reviewed pertinent reports  and I have personally reviewed pertinent films in PACS    Intake and Output  I/O       10/25 0701  10/26 0700 10/26 0701  10/27 0700    P  O  0     I V  (mL/kg) 2064 2 (22 4) 1479 3 (16 1)    IV Piggyback 892 5 250    Total Intake(mL/kg) 2956 7 (32 1) 1729 3 (18 8)    Urine (mL/kg/hr) 7475 (3 4) 4925 (2 2)    Total Output 7475 4925    Net -4515 3 -8753 7                Height and Weights   Height: 5' 10" (177 8 cm)  IBW (Ideal Body Weight): 73 kg  Body mass index is 29 1 kg/m²  Weight (last 2 days)     Date/Time Weight    10/26/22 0600 92 (202 82)    10/25/22 1200 96 6 (213)            Nutrition       Diet Orders   (From admission, onward)             Start     Ordered    10/26/22 0845  Diet Dysphagia/Modified Consistency; Dysphagia 1-Pureed; Honey Thick Liquid  Diet effective now        References:    Nutrtion Support Algorithm Enteral vs  Parenteral   Question Answer Comment   Diet Type Dysphagia/Modified Consistency    Dysphagia/Modified Consistency Dysphagia 1-Pureed    Liquid Modifier Honey Thick Liquid    RD to adjust diet per protocol?  Yes        10/26/22 0845                  Active Medications  Scheduled Meds:  Current Facility-Administered Medications   Medication Dose Route Frequency Provider Last Rate   • apixaban  2 5 mg Oral BID ANNA MARIE Kelly     • bisacodyl  10 mg Rectal Daily PRN ANNA MARIE Kelly     • cefepime  2,000 mg Intravenous Q12H [place] ANNA MARIE Black 2,000 mg (10/26/22 2239)   • famotidine  20 mg Intravenous Q24H [address] ANNA MARIE Etienne     • furosemide  20 mg/hr Intravenous Continuous ANNA MARIE Still 20 mg/hr (10/26/22 1145)   • levothyroxine 75 mcg Oral Daily Before Breakfast ANNA MARIE Rodriguez     • milrinone [facility]) infusion  0 13 mcg/kg/min Intravenous Continuous Gonzalez Gurmeet, CRNP 0 13 mcg/kg/min (10/26/22 1958)   • norepinephrine  1-30 mcg/min Intravenous Titrated Maci Leone CRNP 23 mcg/min (10/27/22 0126)   • potassium chloride  40 mEq Intravenous Once Cynadenike Gurmeet CRNP 40 mEq (10/27/22 0058)   • vancomycin  15 mg/kg (Adjusted) Intravenous Q24H Darlina Emmett, CRNP 1,250 mg (10/26/22 2354)   • vasopressin  0 08 Units/min Intravenous Continuous Darlina Emmett, CRNP 0 08 Units/min (10/26/22 2138)     Continuous Infusions:  furosemide, 20 mg/hr, Last Rate: 20 mg/hr (10/26/22 1145)  milrinone (PRIMACOR) infusion, 0 13 mcg/kg/min, Last Rate: 0 13 mcg/kg/min (10/26/22 1958)  norepinephrine, 1-30 mcg/min, Last Rate: 23 mcg/min (10/27/22 0126)  vasopressin, 0 08 Units/min, Last Rate: 0 08 Units/min (10/26/22 2138)      PRN Meds:   bisacodyl, 10 mg, Daily PRN        Invasive Devices Review  Invasive Devices  Report    Central Venous Catheter Line  Duration           CVC Central Lines 10/25/22 Triple 20cm 1 day          Peripheral Intravenous Line  Duration           Peripheral IV 10/24/22 Distal;Right;Upper;Ventral (anterior) Arm 2 days    Peripheral IV 10/25/22 Dorsal (posterior); Right Hand 2 days          Arterial Line  Duration           Arterial Line 10/25/22 Radial 1 day          Drain  Duration           Open Drain Anterior; Left Hand 361 days    Open Drain Anterior; Left Hand 361 days    Closed/Suction Drain Right  Bulb 7 Fr   245 days    Urethral Catheter 1 day                Rationale for remaining devices:  IV access, consider removal of Chacon catheter today  ---------------------------------------------------------------------------------------  Advance Directive and Living Will:      Power of :    POLST:    ---------------------------------------------------------------------------------------  Care Time Delivered:   No Critical Care time spent       [facility], ANNA MARIE      Portions of the record may have been created with voice recognition software  Occasional wrong word or "sound a like" substitutions may have occurred due to the inherent limitations of voice recognition software    Read the chart carefully and recognize, using context, where substitutions have occurred

## 2022-10-27 NOTE — PROCEDURES
Speech-Language Pathology Video Barium Swallow Study        Patient Name: Ron Wade    Today's Date: 10/27/2022     Problem List  Patient Active Problem List   Diagnosis   • Acute respiratory failure with hypoxia ([facility] )   • SHREYA (acute kidney injury) ([facility] )   • Chronic atrial fibrillation (HCC)   • Transaminitis   • Moderate protein-calorie malnutrition (HCC)   • Pleural effusion   • Volume overload   • Flexor tenosynovitis of finger   • Localized swelling on left hand   • Cellulitis of hand, left   • Coag negative Staphylococcus bacteremia   • Wound of left foot   • CKD (chronic kidney disease) stage 3, GFR 30-59 ml/min (HCC)   • Hypoxia   • Skin cancer of face   • Hematoma of face, initial encounter   • Status post incision and drainage   • Chronic respiratory failure with hypoxia (HCC)   • Hypothyroidism   • Cardiogenic shock ([facility] )   • Fall       Past Medical History  Past Medical History:   Diagnosis Date   • A-fib [facility])    • Disease of thyroid gland    • Hyperlipidemia    • Hypertension    • Optic neuropathy    • Pleural effusion on right    • Pneumonia    • S/P lung surgery, follow-up exam    • Septic shock [facility])        Past Surgical History  Past Surgical History:   Procedure Laterality Date   • HAND DEBRIDEMENT Left 10/30/2021    Procedure: DEBRIDEMENT HAND/FINGER [facility] OUT), second, index and ring finger;  Surgeon: Radha Hope MD;  Location: [facility] Main OR;  Service: Plastics   • INCISION AND DRAINAGE OF WOUND Right 2/23/2022    Procedure: INCISION AND DRAINAGE (I&D) HEAD/FACE;  Surgeon: Trisha Haley MD;  Location: [facility];  Service: Plastics   • [facility] THORACOSCOPY SURG PART PULM DECORT Right 12/12/2019    Procedure: RIGHT THORACOSCOPIC LUNG DECORTICATION;  Surgeon: Mulugeta Anderson MD;  Location: [facility] MAIN OR;  Service: Thoracic   • THORACOSCOPY VIDEO ASSISTED SURGERY (VATS) Right 12/12/2019    Procedure: THORACOSCOPY VIDEO ASSISTED SURGERY (VATS); Surgeon: Reji Victoria MD;  Location: [facility] OR;  Service: Thoracic         General Information;  Pt is a 82 y o  male who was seen at bedside with recommendations for VBS to further assess swallow function  Previous VBS: none    Consistencies Assessed and Performance   Pt was seen in radiology for a Video Barium Swallow Study, seated in the upright position and viewed laterally with the following consistencies:      Administered: thin liquids, nectar thick, honey thick, puree and mechanical soft solids  Specific materials administered: Barium laden applesauce, tanisha cracker, honey thick, nectar thick, and thin liquids  Liquids were administered by cup and tsp  Results are as follows:     **Images are available for review on PACS    Oral stage:  Lip closure: adequate  Mastication: reduced, prolonged and incomplete with soft solids  Bolus formation: slow but adequate  Bolus control: minimally reduced  Transfer: piecemeal transfer- reduced with soft solids +slight lingual pumping noted at times  Residue: min- none    Pharyngeal stage:  Swallow initiation was mildly delayed with adequate hyolaryngeal rise but mildly reduced anterior displacement and epiglottic inversion  Pharyngeal constriction was reduced  PUREE:  Min spill noted to the valleculae ( GWFL)  Diffuse retention noted throughout the hypopharynx which increased with to mild-mod in valleculae and pyriform sinuses as po progressed  Min- no benefit from 2' or effortful swallows  He did benefit from use of liquid wash  HTL TSP  No spill, penetration, aspiration or retention  HTL CUP  Deep penetration to the cords with paresh silent aspiration during the swallow  Mild laryngeal retention noted after the swallow which cleared on its own with secondary swallow  Cued TC/cough did not clear aspirated material  Aspiration was ELIMINATED with use of prep set and breath hold       NTL TSP ( with prep set and breath hold)  No spill, penetration, aspiration or retention  NTL CUP ( with prep set and breath hold)  Penetration to the cords during the swallow with trace retention on the cords  Unable to clear with cued cough/TC  Silent aspiration was noted when NTL cup with strategy was utilized as liquid wash  THINS CUP ( with prep set and breath hold)  Spill to pyriform sinuses just as the swallow was initiated with paresh gross aspiration and overt coughing for several minutes  SOFT SOLIDS  Mild spill to lima ( GWFL)  With mild-mod retention of unmasticated material noted  Screening of Esophageal stage:  Assessment limited to the oropharyngeal stages  Assessment Summary; Pt presents with moderate-severe oropharyngeal dysphagia as described above  Primary concerns include reduced oral mastication and processing as well as generalized pharyngeal weakness and airway closure/protection resulting in penetration and aspiration  For a majority of aspiration occurrences aspiration is silent and unfortunately patients cough is too weak to clear aspirated material  Likely a laryngeal pathology vs generalized weakness  He was able to utilize strategies to eliminate aspiration with honey thick liquids however unfortunately this did not eliminate risk with nectar thick or thin liquids  With soft solids he is unable to completely masticate with reduced oral processing noted with overall weakness  Patient with good recall and carryover of strategy noted- will continue to assess patients ability to carryover as patient will aspirate if he does not utilize this strategy  In the meantime utilize tsp presentations for liquids  Patient expressed he is not happy with current diet  Recommendations; Recommend puree/level 1 diet and honey thick liquids, with upright posture, only feed when fully alert, slow rate of feeding, small bites/sips, liquids by teaspoon only and alternating bites and sips     Recommended Form of Meds: crushed with puree   Intermittent Supervision  Aspiration precautions   Aspiration precautions posted  Results reviewed with patient, RN and Palliative care  Goals:  Pt will tolerate least restrictive diet w/out s/s aspiration or oral/pharyngeal difficulties  Dysphagia Goals: pt will tolerate puree with honey thick liquids without s/s of aspiration x3 and pt will demonstrate accurate use of prep set and breath hold with liquids strategy with 80% accuracy given no cues      Will f/u      PARESH Aguilar S , [address] [facility]  Speech Language Pathologist   Available via [address] [place] [address] #[id number]  [id number] #[id number]

## 2022-10-27 NOTE — QUICK NOTE
Brief discussion with patient's daughter Jac Baum, and son regarding updates on patient's current clinical status, concerns regarding discussion family members may need to have regarding end of life arrangements, and an explanation of patient's ongoing decompensated heart failure  All questions answered and concerns addressed       Crystal Mejia MD

## 2022-10-27 NOTE — SPEECH THERAPY NOTE
Speech Language/Pathology    Speech/Language Pathology Progress Note    Patient Name: Dominga Flowers  Today's Date: 10/27/2022    Subjective:  "Can I have coffee?" Grandson present during my visit  Objective:  Patient seen upright in bed with lunch  He was consuming pudding noted to have slow rate of intake with small bites/sip  No sxs of aspiration noted  Reviewed results of VBS and A&P of the swallow with the patient and his grandson as well as diet recommendations, strategies and POC  Reciprocal comprehension was verbally expressed and all questions answered  Assessment:  See VBS from earlier today      Plan/Recommendations:  See VBS from earlier today    PARESH Rosario , [address] [facility]  Speech Language Pathologist   Available via [address] [place] [address] #[id number]  [place] #[id number]

## 2022-10-27 NOTE — PROGRESS NOTES
Spiritual Care Progress Note    10/27/2022  Patient: Kellen Wheeler : 1935  Admission Date & Time: 10/24/2022 2104  MRN: [id number] CSN: [id number]       visited patient per RN referral  Pt's daughter Darel Epley at bedside   provided introduction to spiritual care, normalized pt and family's experience, offered emotional support, and provided active listening  Patient noted discomfort with commode and fatigue from hospitalization  Pt and family expressed gratitude for visit  Spiritual care will remain available               Chaplaincy Interventions Utilized:   Empowerment: Provided chaplaincy education    Exploration: Explored emotional needs & resources    Collaboration: Consulted with interdisciplinary team    Relationship Building: Cultivated a relationship of care and support      Chaplaincy Outcomes Achieved:  Expressed gratitude       10/27/22 1500   Clinical Encounter Type   Visited With Patient and family together   Routine Visit Introduction   Referral From Nurse

## 2022-10-27 NOTE — PROGRESS NOTES
Progress note - Palliative and Supportive Care   Isrrael Limb 82 y o  male [id number]    Patient Active Problem List   Diagnosis   • Acute respiratory failure with hypoxia (HCC)   • SHREYA (acute kidney injury) ([facility] )   • Chronic atrial fibrillation (HCC)   • Transaminitis   • Moderate protein-calorie malnutrition (HCC)   • Pleural effusion   • Volume overload   • Flexor tenosynovitis of finger   • Localized swelling on left hand   • Cellulitis of hand, left   • Coag negative Staphylococcus bacteremia   • Wound of left foot   • CKD (chronic kidney disease) stage 3, GFR 30-59 ml/min (HCC)   • Hypoxia   • Skin cancer of face   • Hematoma of face, initial encounter   • Status post incision and drainage   • Chronic respiratory failure with hypoxia (HCC)   • Hypothyroidism   • Cardiogenic shock (HCC)   • Fall     Active issues specifically addressed today include:    - multifactorial shock; cardiogenic and septic components, vasopressor requirement   - acute on chronic hypoxemic respiratory failure   - multiorgan failure   - acute decompensated CHF, volume overload   - SHREYA   - chronic atrial fibrillation   - goals of care support    Plan:  #symptom management   - per CCM team    #goals of care   - patient stated that he "wishes to cross the bridge", however, was unable to clearly communicate understanding/insight into what outcome of discontinuing current vasopressors/medications  States that if ICU medications are stopped, he would get home to restart his home medications  - conversation occurred with Facundo Jaimes and ICU RN   - discussed recommendation for Facundo Jaimes to coordinate with Gem Stoner for a family meeting to discuss current plan of care and how to proceed with medical cares        PALLIATIVE AND SUPPORTIVE CARE FAMILY CONFERENCE:    Time of Meetin    Participants:  Family: Gem Coles [daughter], Harry [KAYODE], grandchildren x 3  PSC: Dr Reynaldo Gutiérrez    Patient Participation: No    Meeting Location: ICU Waiting Room    Advanced Directive of POLST available: no    A family meeting was held for medical updates, plan of care discussion  This meeting was necessary for determine the appropriate course of treatment  Topics of Discussion:   - introduction of family and [facility] provider   - Christopher confirmed that Cecily Li and Christopher are two children maintained in patient's life, other four children are estranged and have not been present at all    Hussain Dennis communicated understanding of patient's medical condition and trajectory, specifically communicated insight that the patient is currently unable to maintain a sustainable blood pressure, requiring vasopressor support, and without vasopressors, the patient would no longer be able to maintain a life sustaining blood pressure which would lead to a natural death  - [facility] provider discussed treatment focused care [continuing current medical care] v comfort-oriented cares [transitioning from life sustaining therapies to comfort focused medications]  Given patient's vasopressor requirement, multiorgan dysfunction, advanced heart failure, waxing/waning encephalopathy; discontinuation of vasopressors would likely lead to a rapid transition to natural death  - family asked how long patient could sustain on current medical cares, no clear timeline, however, discussed long-term adverse events a/w prolonged vasopressor care; along with if vasopressors are maintaining life, is the QOL maintained appropriate by best understanding of patient's previously communicated wishes    - all questions/concerns addressed      PLAN:   - continue current medical cares   - family to further discuss amongst each other regarding next steps of care      Time Involved in Meetin minutes, beginning at approximately 1630 and ending at approximately [phone]           #psychosocial support   - emotional support provided   -    - nine adult children [6 living, 3 ]              - Christopher [daughter] [phone]   Ryan Atkins [daughter] [phone]   - retired               - 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, 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      We appreciate the opportunity to participate in this patient's care  We will continue to follow  Please do not hesitate to contact our on-call provider through our clinic answering service at [phone] should you have acute symptom control concerns  Code Status: DNAR/DNI - Level 3   Decisional apparatus:  Patient is not competent on my exam today  If competence is lost, patient's substitute decision maker would default to adult children by [place] Act 169  Advance Directive / Living Will / POLST:  Not on File, not previously completed    Interval history:   - NAEO   - continued vasopressor requirements, continued diuretic support, Abx therapy continued    MEDICATIONS / ALLERGIES:     current meds:   Current Facility-Administered Medications   Medication Dose Route Frequency   • apixaban (ELIQUIS) tablet 2 5 mg  2 5 mg Oral BID   • bisacodyl (DULCOLAX) rectal suppository 10 mg  10 mg Rectal Daily PRN   • cefepime (MAXIPIME) 2,000 mg in dextrose 5 % 50 mL IVPB  2,000 mg Intravenous Q12H   • Famotidine (PF) (PEPCID) injection 20 mg  20 mg Intravenous Q24H HERBERT   • furosemide (LASIX) 500 mg infusion 50 mL  10 mg/hr Intravenous Continuous   • levothyroxine tablet 75 mcg  75 mcg Oral Daily Before Breakfast   • norepinephrine (LEVOPHED) 8 mg (DOUBLE CONCENTRATION) IV in sodium chloride 0 9% 250 mL  1-30 mcg/min Intravenous Titrated   • vancomycin (VANCOCIN) 1,250 mg in sodium chloride 0 9 % 250 mL IVPB  15 mg/kg (Adjusted) Intravenous Q24H   • vasopressin (PITRESSIN) 20 Units in sodium chloride 0 9 % 100 mL infusion  0 08 Units/min Intravenous Continuous       Allergies   Allergen Reactions   • Ace Inhibitors Cough     Rash       OBJECTIVE:    Physical Exam  Physical Exam  Vitals and nursing note reviewed  Constitutional:       General: He is awake  Appearance: He is not diaphoretic  Comments: Critically ill appearing in NAD  BMI 27 4   HENT:      Head: Normocephalic and atraumatic  Right Ear: External ear normal       Left Ear: External ear normal    Eyes:      Comments: No gaze preference   Cardiovascular:      Rate and Rhythm: Normal rate  Pulmonary:      Effort: No tachypnea or respiratory distress  Comments: NC in place  Musculoskeletal:      Cervical back: Normal range of motion  Neurological:      Mental Status: He is alert  Psychiatric:         Attention and Perception: Attention normal          Mood and Affect: Mood normal          Lab Results: I have personally reviewed pertinent labs  , CBC:   Lab Results   Component Value Date    WBC 10 10 10/27/2022    HGB 12 8 10/27/2022    HCT 39 7 10/27/2022    MCV 84 10/27/2022     (L) 10/27/2022    MCH 27 1 10/27/2022    MCHC 32 2 10/27/2022    RDW 22 3 (H) 10/27/2022    MPV 8 9 10/27/2022   , CMP:   Lab Results   Component Value Date    SODIUM 137 10/27/2022    K 3 4 (L) 10/27/2022    CL 97 10/27/2022    CO2 30 10/27/2022    BUN 29 (H) 10/27/2022    CREATININE 1 82 (H) 10/27/2022    CALCIUM 8 1 (L) 10/27/2022    EGFR 33 10/27/2022   , PT/PTT:No results found for: PT, PTT  Imaging Studies: Reviewed  EKG, Pathology, and Other Studies: Reviewed    Counseling / Coordination of Care    Total floor / unit time spent today 55 minutes  Greater than 50% of total time was spent with the patient and / or family counseling and / or coordination of care  A description of the counseling / coordination of care: provided medical updates, discussed palliative care, determined competency, determined goals of care, determined POA, determined social/family support, discussed plans of care, discussed symptom management, provided psychosocial support  Expanded goals of care discussion with Alvin Flores and patient  Family meeting   Coordinated plan of care with primary team

## 2022-10-27 NOTE — CASE MANAGEMENT
Case Management Assessment & Discharge Planning Note    Patient name Mj Cesar  Location / MRN [id number]  : 1935 Date 10/27/2022       Current Admission Date: 10/24/2022  Current Admission Diagnosis:Cardiogenic shock [facility])   Patient Active Problem List    Diagnosis Date Noted   • Fall 10/25/2022   • Chronic respiratory failure with hypoxia ([address] ) 10/24/2022   • Hypothyroidism 10/24/2022   • Cardiogenic shock ([address] ) 10/24/2022   • Status post incision and drainage 2022   • Hematoma of face, initial encounter 2022   • Skin cancer of face 2022   • Coag negative Staphylococcus bacteremia 10/28/2021   • Wound of left foot 10/28/2021   • CKD (chronic kidney disease) stage 3, GFR 30-59 ml/min ([facility] ) 10/28/2021   • Hypoxia 10/28/2021   • Flexor tenosynovitis of finger 10/27/2021   • Localized swelling on left hand 10/27/2021   • Cellulitis of hand, left 10/27/2021   • Volume overload 2019   • Pleural effusion 2019   • Moderate protein-calorie malnutrition ([address] ) 2019   • Acute respiratory failure with hypoxia ([facility] [address] ) 2019   • SHREYA (acute kidney injury) ([facility] [address] ) 2019   • Chronic atrial fibrillation ([facility] [address] ) 2019   • Transaminitis 2019      LOS (days): 3  Geometric Mean LOS (GMLOS) (days): 3 90  Days to GMLOS:1 5     OBJECTIVE:    Risk of Unplanned Readmission Score: 19 76         Current admission status: Inpatient       Preferred Pharmacy:   [address] [place] [address]  Phone: [phone] Fax: [phone]    Primary Care Provider: Carly Payne DO    Primary Insurance: Eros Perera [facility]  Secondary Insurance:     ASSESSMENT:  [id number] [facility] , [address] [employer] Representative - Daughter   Primary Phone: [phone] (Mobile)               Advance Directives  Does patient have a [address] [place] [address] Avenue?: Yes  Does patient currently have a [facility] [place] decision maker?: Yes, please see Health Care Proxy section  Does patient have Advance Directives?: Yes  Advance Directives: Living will, Power of  for health care, Power of  for finance, POLST  Primary Contact: Patient's Daughter Jimmie Huitron will confirm which daughter is the POA )    Patient Information  Admitted from[de-identified] Home  Mental Status: Alert  During Assessment patient was accompanied by: Not accompanied during assessment  Assessment information provided by[de-identified] Daughter  Primary Caregiver: Self  Support Systems: Family members, [address] of Residence: [place] [address] do you live in?: [address] entry access options   Select all that apply : Stairs  Number of steps to enter home : 3  Do the steps have railings?: Yes  Type of Current Residence: [place]  In the last 12 months, was there a time when you were not able to pay the mortgage or rent on time?: No  In the last 12 months, how many places have you lived?: 1  In the last 12 months, was there a time when you did not have a steady place to sleep or slept in a shelter (including now)?: No  Homeless/housing insecurity resource given?: N/A  Living Arrangements: Lives Alone  Is patient a ?: No    Activities of Daily Living Prior to Admission  Functional Status: Independent  Completes ADLs independently?: Yes  Ambulates independently?: Yes  Does patient use assisted devices?: Yes  Assisted Devices (DME) used: Sawyer Carrillo  Does patient currently own DME?: Yes  What DME does the patient currently own?: Sawyer Carrillo  Does patient have a history of Outpatient Therapy (PT/OT)?: No  Does the patient have a history of Short-Term Rehab?: No  Does patient have a history of HHC?: Yes  Does patient currently have [facility]?: No    Patient Information Continued  Income Source: Pension/shelter  Does patient have prescription coverage?: Yes  Within the past 12 months, you worried that your food would run out before you got the money to buy more : Never true  Within the past 12 months, the food you bought just didn't last and you didn't have money to get more : Never true  Food insecurity resource given?: N/A  Does patient receive dialysis treatments?: No  Does patient have a history of substance abuse?: No  Does patient have a history of Mental Health Diagnosis?: No    Means of Transportation  [facility] Family transport  In the past 12 months, has lack of transportation kept you from medical appointments or from getting medications?: No  In the past 12 months, has lack of transportation kept you from meetings, work, or from getting things needed for daily living?: No  Was application for public transport provided?: N/A    DISCHARGE DETAILS:    Discharge planning discussed with[de-identified] Patient's Daughter  Freedom of Choice: Yes  Comments - Freedom of Choice: CM discussed freedom of choice as it pertains to discharge planning  Patient's daughter reported that she is agreeable with STR and denied any preferences as to where she would like patient to go  Patient's daughter is agreeable with CM sending a blanket referral to a 25 mile radius to determine who can accept  Patient's daughter also reported that she was informed that patient is not doing well at this time, but she is not interested in hospice    CM contacted family/caregiver?: Yes  Were Treatment Team discharge recommendations reviewed with patient/caregiver?: Yes  Did patient/caregiver verbalize understanding of patient care needs?: Yes  Were patient/caregiver advised of the risks associated with not following Treatment Team discharge recommendations?: Yes    Contacts  Patient Contacts: Radha Munoz  Relationship to Patient[de-identified] Family  Contact Method: Phone  Phone Number: [phone]  Reason/Outcome: Continuity of Care, Discharge [address]         Is the patient interested in [address] at discharge?: No    DME Referral Provided  Referral made for DME?: No    Other Referral/Resources/Interventions Provided:  Interventions: Short Term Rehab  Referral Comments: CM sent a blanket referral to [facility] facilities within a 25 mile radius via [employer] to determine who can accept patient  Would you like to participate in our [facility] [address] service program?  : No - Declined    Treatment Team Recommendation: Short Term Rehab  Discharge Destination Plan[de-identified] Short Term Rehab  Transport at Discharge : [facility] Ambulance  Dispatcher Contacted:  No

## 2022-10-27 NOTE — PLAN OF CARE
Problem: Nutrition/Hydration-ADULT  Goal: Nutrient/Hydration intake appropriate for improving, restoring or maintaining nutritional needs  Description: Monitor and assess patient's nutrition/hydration status for malnutrition  Collaborate with interdisciplinary team and initiate plan and interventions as ordered  Monitor patient's weight and dietary intake as ordered or per policy  Utilize nutrition screening tool and intervene as necessary  Determine patient's food preferences and provide high-protein, high-caloric foods as appropriate       INTERVENTIONS:  - Monitor oral intake, urinary output, labs, and treatment plans  - Assess nutrition and hydration status and recommend course of action  - Evaluate amount of meals eaten  - Assist patient with eating if necessary   - Allow adequate time for meals  - Recommend/ encourage appropriate diets, oral nutritional supplements, and vitamin/mineral supplements  - Order, calculate, and assess calorie counts as needed  - Recommend, monitor, and adjust tube feedings and TPN/PPN based on assessed needs  - Assess need for intravenous fluids  - Provide specific nutrition/hydration education as appropriate  - Include patient/family/caregiver in decisions related to nutrition  Outcome: Progressing     Problem: MOBILITY - ADULT  Goal: Maintain or return to baseline ADL function  Description: INTERVENTIONS:  -  Assess patient's ability to carry out ADLs; assess patient's baseline for ADL function and identify physical deficits which impact ability to perform ADLs (bathing, care of mouth/teeth, toileting, grooming, dressing, etc )  - Assess/evaluate cause of self-care deficits   - Assess range of motion  - Assess patient's mobility; develop plan if impaired  - Assess patient's need for assistive devices and provide as appropriate  - Encourage maximum independence but intervene and supervise when necessary  - Involve family in performance of ADLs  - Assess for home care needs following discharge   - Consider OT consult to assist with ADL evaluation and planning for discharge  - Provide patient education as appropriate  Outcome: Progressing  Goal: Maintains/Returns to pre admission functional level  Description: INTERVENTIONS:  - Perform BMAT or MOVE assessment daily    - Set and communicate daily mobility goal to care team and patient/family/caregiver  - Collaborate with rehabilitation services on mobility goals if consulted  - Perform Range of Motion 2 times a day  - Reposition patient every 2 hours    - Out of bed for toileting  - Record patient progress and toleration of activity level   Outcome: Progressing     Problem: Potential for Falls  Goal: Patient will remain free of falls  Description: INTERVENTIONS:  - Educate patient/family on patient safety including physical limitations  - Instruct patient to call for assistance with activity   - Consult OT/PT to assist with strengthening/mobility   - Keep Call bell within reach  - Keep bed low and locked with side rails adjusted as appropriate  - Keep care items and personal belongings within reach  - Initiate and maintain comfort rounds  - Apply yellow socks and bracelet for high fall risk patients  - Consider moving patient to room near nurses station  Outcome: Progressing     Problem: Prexisting or High Potential for Compromised Skin Integrity  Goal: Skin integrity is maintained or improved  Description: INTERVENTIONS:  - Identify patients at risk for skin breakdown  - Assess and monitor skin integrity  - Assess and monitor nutrition and hydration status  - Monitor labs   - Assess for incontinence   - Turn and reposition patient  - Assist with mobility/ambulation  - Relieve pressure over bony prominences  - Avoid friction and shearing  - Provide appropriate hygiene as needed including keeping skin clean and dry  - Evaluate need for skin moisturizer/barrier cream  - Collaborate with interdisciplinary team   - Patient/family teaching  - Consider wound care consult   Outcome: Progressing

## 2022-10-27 NOTE — PROGRESS NOTES
Cardiology Progress Note - Garnette Scheuermann 82 y o  male MRN: [id number]    Unit/Bed#:  Encounter: [id number]      Assessment/Plan:  1  Cardiogenic shock  · Blood pressure  98/55; continue to monitor  · Currently receiving norepinephrine and vasopressin  · Milrinone was discontinued  · Echo shows EF 40-45% with visibly severe AS  · Goals of care discussion ongoing with patient's family     2  Severe aortic stenosis  · Revealed on echocardiogram;  given significant comorbidities and clinical status he is likely not a candidate for TAVR at this time  · Goals of care discussion ongoing with patient's family     3  Acute on chronic HFrEF  § Hypervolemic status improving; I/O net -11,492 5  mL  § Echo previous EF 40-45% with visibly severe AS  § Continue Lasix GTT     4  Chronic atrial fibrillation  · Tachycardic at 113 bpm; continue monitor  · Rate control on hold at this time due to hypotension  · Continue Eliquis 2 5 mg b i d  for anticoagulation     5  Acute on chronic hypoxic respiratory failure  · Management per CCU     6   chronic kidney disease  · Creatinine 1 82; continue to monitor  · Management per CCU     7  Frequent falls  § Management per CCU      Subjective:   Patient seen and examined  No significant events overnight  Patient more alert and talkative today  He took part in goals of care discussion offering questions and feedback  One of patient's daughters was at bedside  Objective:     Vitals: Blood pressure 100/68, pulse (!) 113, temperature 97 8 °F (36 6 °C), temperature source Axillary, resp  rate 18, height 5' 10" (1 778 m), weight 86 6 kg (190 lb 14 7 oz), SpO2 95 %  , Body mass index is 27 39 kg/m² ,   Orthostatic Blood Pressures    Flowsheet Row Most Recent Value   Blood Pressure 100/68 filed at 10/27/2022 6186   Patient Position - Orthostatic VS Lying filed at 10/27/2022 0600            Intake/Output Summary (Last 24 hours) at 10/27/2022 0859  Last data filed at 10/27/2022 0723  Gross per 24 hour   Intake 2732 75 ml   Output 8350 ml   Net -5617 25 ml         Physical Exam:  Physical Exam  Vitals and nursing note reviewed  Constitutional:       General: He is not in acute distress  Appearance: He is well-developed  He is ill-appearing  He is not diaphoretic  HENT:      Head: Normocephalic and atraumatic  Nose: Nose normal    Eyes:      Conjunctiva/sclera: Conjunctivae normal    Cardiovascular:      Rate and Rhythm: Normal rate  Rhythm irregularly irregular  Heart sounds: Murmur heard  Systolic (ejection) murmur is present with a grade of 2/6  No gallop  Pulmonary:      Effort: Pulmonary effort is normal  No respiratory distress  Breath sounds: Normal breath sounds  No wheezing or rales  Chest:      Chest wall: No tenderness  Abdominal:      Palpations: Abdomen is soft  Tenderness: There is no abdominal tenderness  Musculoskeletal:      Cervical back: Neck supple  Right lower leg: Edema (+1) present  Left lower leg: Edema (+1) present  Skin:     General: Skin is warm and dry  Neurological:      Mental Status: He is alert and oriented to person, place, and time     Psychiatric:         Mood and Affect: Mood normal               Medications:      Current Facility-Administered Medications:   •  apixaban (ELIQUIS) tablet 2 5 mg, 2 5 mg, Oral, BID, ANNA MARIE Ceballos, 2 5 mg at 10/27/22 0830  •  bisacodyl (DULCOLAX) rectal suppository 10 mg, 10 mg, Rectal, Daily PRN, ANNA MARIE Falcon  •  cefepime (MAXIPIME) 2,000 mg in dextrose 5 % 50 mL IVPB, 2,000 mg, Intravenous, Q12H, ANNA MARIE Fishman, Last Rate: 100 mL/hr at 10/26/22 2239, 2,000 mg at 10/26/22 2239  •  Famotidine (PF) (PEPCID) injection 20 mg, 20 mg, Intravenous, Q24H HERBERT, ANNA MARIE Etienne, 20 mg at 10/27/22 0830  •  furosemide (LASIX) 500 mg infusion 50 mL, 10 mg/hr, Intravenous, Continuous, ANNA MARIE Fishman, Last Rate: 1 mL/hr at 10/27/22 0718, 10 mg/hr at 10/27/22 0718  • levothyroxine tablet 75 mcg, 75 mcg, Oral, Daily Before Breakfast, Lyle Hollingsworth, CRNP, 75 mcg at 10/27/22 0830  •  norepinephrine (LEVOPHED) 8 mg (DOUBLE CONCENTRATION) IV in sodium chloride 0 9% 250 mL, 1-30 mcg/min, Intravenous, Titrated, Maci Leone, CRNP, Last Rate: 37 5 mL/hr at 10/27/22 0854, 20 mcg/min at 10/27/22 0854  •  vancomycin (VANCOCIN) 1,250 mg in sodium chloride 0 9 % 250 mL IVPB, 15 mg/kg (Adjusted), Intravenous, Q24H, Arnaud Estimable, CRNP, Last Rate: 166 7 mL/hr at 10/26/22 2354, 1,250 mg at 10/26/22 2354  •  vasopressin (PITRESSIN) 20 Units in sodium chloride 0 9 % 100 mL infusion, 0 08 Units/min, Intravenous, Continuous, Arnaud Estimable, CRNP, Last Rate: 24 mL/hr at 10/27/22 0628, 0 08 Units/min at 10/27/22 0628     Labs & Results:     Results from last 7 days   Lab Units 10/25/22  1231 10/25/22  0410 10/25/22  0114 10/24/22  2128   CK TOTAL U/L 66  --   --  65   HS TNI 0HR ng/L  --   --   --  28   HS TNI 2HR ng/L  --   --  25  --    HSTNI D2 ng/L  --   --  -3  --    HS TNI 4HR ng/L  --  25  --   --    HSTNI D4 ng/L  --  -3  --   --    NT-PRO BNP pg/mL  --   --   --  4,151*     Results from last 7 days   Lab Units 10/27/22  0546 10/26/22  0616 10/25/22  0437   WBC Thousand/uL 10 10 11 41* 7 82   HEMOGLOBIN g/dL 12 8 12 1 12 3   HEMATOCRIT % 39 7 37 4 37 2   PLATELETS Thousands/uL 127* 156 170     Results from last 7 days   Lab Units 10/25/22  0437   TRIGLYCERIDES mg/dL 71   HDL mg/dL 15*     Results from last 7 days   Lab Units 10/27/22  0546 10/27/22  0005 10/26/22  1752 10/26/22  1259 10/26/22  0616 10/25/22  1231 10/25/22  0437 10/24/22  2128   POTASSIUM mmol/L 3 4* 3 5 3 9   < > 3 2*   < > 4 3 5 0   CHLORIDE mmol/L 97 97 97   < > 98   < > 97 96   CO2 mmol/L 30 28 27   < > 25   < > 21 23   BUN mg/dL 29* 29* 30*   < > 33*   < > 42* 42*   CREATININE mg/dL 1 82* 2 04* 2 09*   < > 1 97*   < > 2 02* 2 12*   CALCIUM mg/dL 8 1* 8 0* 8 1*   < > 8 0*   < > 9 0 8 6   ALK PHOS U/L  --   --   --   -- 130*  --  133* 143*   ALT U/L  --   --   --   --  9*  --  16 16   AST U/L  --   --   --   --  31  --  40 41    < > = values in this interval not displayed  Results from last 7 days   Lab Units 10/25/22  0437 10/24/22  2128   INR  1 75* 1 77*   PTT seconds  --  45*     Results from last 7 days   Lab Units 10/27/22  0546 10/27/22  0005 10/26/22  1752   MAGNESIUM mg/dL 2 2 2 3 2 4       Vitals: Blood pressure 100/68, pulse (!) 113, temperature 97 8 °F (36 6 °C), temperature source Axillary, resp  rate 18, height 5' 10" (1 778 m), weight 86 6 kg (190 lb 14 7 oz), SpO2 95 %  , Body mass index is 27 39 kg/m² ,   Orthostatic Blood Pressures    Flowsheet Row Most Recent Value   Blood Pressure 100/68 filed at 10/27/2022 0854   Patient Position - Orthostatic VS Lying filed at 10/27/2022 4604          Systolic (44NNG), PWA:730 , Min:77 , NKU:090     Diastolic (41BNU), TBV:99, Min:51, Max:81        Intake/Output Summary (Last 24 hours) at 10/27/2022 0859  Last data filed at 10/27/2022 1951  Gross per 24 hour   Intake 2732 75 ml   Output 8350 ml   Net -5617 25 ml       Invasive Devices  Report    Central Venous Catheter Line  Duration           CVC Central Lines 10/25/22 Triple 20cm 2 days          Peripheral Intravenous Line  Duration           Peripheral IV 10/24/22 Distal;Right;Upper;Ventral (anterior) Arm 2 days    Peripheral IV 10/25/22 Dorsal (posterior); Right Hand 2 days          Arterial Line  Duration           Arterial Line 10/25/22 Radial 2 days          Drain  Duration           Open Drain Anterior; Left Hand 361 days    Open Drain Anterior; Left Hand 361 days    Closed/Suction Drain Right  Bulb 7 Fr  245 days    Urethral Catheter 2 days                  EKG:  Atrial fibrillation, low-voltage QRS, incomplete right bundle-branch block, nonspecific ST wave abnormality    Telemetry:    Telemetry Reviewed: Atrial fibrillation   HR averaging 120 and PVCs  Indication for Continued Telemetry Use: Arrthymias requiring medical therapy    BP Readings from Last 3 Encounters:   10/27/22 100/68   02/26/22 123/79   11/02/21 102/58      Wt Readings from Last 3 Encounters:   10/27/22 86 6 kg (190 lb 14 7 oz)   02/25/22 92 3 kg (203 lb 7 8 oz)   11/02/21 96 6 kg (212 lb 14 4 oz)

## 2022-10-27 NOTE — QUICK NOTE
Patient expressed a desire to stop ongoing medical care with the understanding that he may pass away without the aggressive vasopressor support  Patient's daughter Bertram [place] at bedside  Patient and family initiating discussions among themselves about how to proceed       Deric Sotomayor MD

## 2022-10-27 NOTE — PROGRESS NOTES
Vancomycin IV Pharmacy-to-Dose Consultation    Mony Sandhu is a 82 y o  male who is currently receiving Vancomycin IV with management by the Pharmacy Consult service  Assessment/Plan:  The patient was reviewed  Renal function is stable and no signs or symptoms of nephrotoxicity and/or infusion reactions were documented in the chart  Based on today’s assessment, continue current vancomycin (day # 3) dosing of 1250mg IV q24h, with a plan for trough to be drawn at 2300 on 10/28  We will continue to follow the patient’s culture results and clinical progress daily      Norman Raygoza

## 2022-10-27 NOTE — CASE MANAGEMENT
Case Management Progress Note    Patient name Michael Covarrubias  Location / MRN [id number]  : 1935 Date 10/27/2022       LOS (days): 3  Geometric Mean LOS (GMLOS) (days): 3 90  Days to GMLOS:1 3        OBJECTIVE:        Current admission status: Inpatient  Preferred Pharmacy:   [address] [address]  Phone: [phone] Fax: [phone]    Primary Care Provider: Campbell Tang DO    Primary Insurance: [facility]  Secondary Insurance:     PROGRESS NOTE:    CM called patient's daughter Lynne Pollack at [phone]  CM requested POA or living will documentation  Patient's daughter Lynne Pollack reported that she believes that she is patient's POA, and her name is on all of his documentation  CM reported that CM vs  ICU will need a copy of this documentation and would not be able to take a verbal  Patient's daughter is going to speak with patient and see if he knows about documentation and will contact CM vs  ICU team with an update  CM informed ICU AP of the same via TT  CM department will continue to follow patient through discharge

## 2022-10-28 LAB
ANION GAP SERPL CALCULATED.3IONS-SCNC: 3 MMOL/L (ref 4–13)
ANION GAP SERPL CALCULATED.3IONS-SCNC: 6 MMOL/L (ref 4–13)
ANION GAP SERPL CALCULATED.3IONS-SCNC: 7 MMOL/L (ref 4–13)
ANION GAP SERPL CALCULATED.3IONS-SCNC: 7 MMOL/L (ref 4–13)
ANION GAP SERPL CALCULATED.3IONS-SCNC: 8 MMOL/L (ref 4–13)
BASE EX.OXY STD BLDV CALC-SCNC: 61.5 % (ref 60–80)
BASE EX.OXY STD BLDV CALC-SCNC: 61.6 % (ref 60–80)
BASE EX.OXY STD BLDV CALC-SCNC: 75.6 % (ref 60–80)
BASE EX.OXY STD BLDV CALC-SCNC: 77.4 % (ref 60–80)
BASE EX.OXY STD BLDV CALC-SCNC: 85.9 % (ref 60–80)
BASE EXCESS BLDV CALC-SCNC: 10.6 MMOL/L
BASE EXCESS BLDV CALC-SCNC: 5.5 MMOL/L
BASE EXCESS BLDV CALC-SCNC: 7.5 MMOL/L
BASE EXCESS BLDV CALC-SCNC: 7.7 MMOL/L
BASE EXCESS BLDV CALC-SCNC: 8.4 MMOL/L
BODY TEMPERATURE: 97.6 DEGREES FEHRENHEIT
BODY TEMPERATURE: 98 DEGREES FEHRENHEIT
BUN SERPL-MCNC: 24 MG/DL (ref 5–25)
BUN SERPL-MCNC: 24 MG/DL (ref 5–25)
BUN SERPL-MCNC: 25 MG/DL (ref 5–25)
BUN SERPL-MCNC: 26 MG/DL (ref 5–25)
BUN SERPL-MCNC: 27 MG/DL (ref 5–25)
CALCIUM SERPL-MCNC: 7.8 MG/DL (ref 8.3–10.1)
CALCIUM SERPL-MCNC: 8 MG/DL (ref 8.3–10.1)
CALCIUM SERPL-MCNC: 8 MG/DL (ref 8.3–10.1)
CALCIUM SERPL-MCNC: 8.1 MG/DL (ref 8.3–10.1)
CALCIUM SERPL-MCNC: 8.4 MG/DL (ref 8.3–10.1)
CHLORIDE SERPL-SCNC: 100 MMOL/L (ref 96–108)
CHLORIDE SERPL-SCNC: 97 MMOL/L (ref 96–108)
CHLORIDE SERPL-SCNC: 98 MMOL/L (ref 96–108)
CO2 SERPL-SCNC: 33 MMOL/L (ref 21–32)
CO2 SERPL-SCNC: 33 MMOL/L (ref 21–32)
CO2 SERPL-SCNC: 34 MMOL/L (ref 21–32)
CO2 SERPL-SCNC: 35 MMOL/L (ref 21–32)
CO2 SERPL-SCNC: 36 MMOL/L (ref 21–32)
CREAT SERPL-MCNC: 1.73 MG/DL (ref 0.6–1.3)
CREAT SERPL-MCNC: 1.75 MG/DL (ref 0.6–1.3)
CREAT SERPL-MCNC: 1.75 MG/DL (ref 0.6–1.3)
CREAT SERPL-MCNC: 1.78 MG/DL (ref 0.6–1.3)
CREAT SERPL-MCNC: 1.85 MG/DL (ref 0.6–1.3)
ERYTHROCYTE [DISTWIDTH] IN BLOOD BY AUTOMATED COUNT: 22.2 % (ref 11.6–15.1)
GFR SERPL CREATININE-BSD FRML MDRD: 32 ML/MIN/1.73SQ M
GFR SERPL CREATININE-BSD FRML MDRD: 34 ML/MIN/1.73SQ M
GFR SERPL CREATININE-BSD FRML MDRD: 35 ML/MIN/1.73SQ M
GLUCOSE SERPL-MCNC: 110 MG/DL (ref 65–140)
GLUCOSE SERPL-MCNC: 115 MG/DL (ref 65–140)
GLUCOSE SERPL-MCNC: 122 MG/DL (ref 65–140)
GLUCOSE SERPL-MCNC: 127 MG/DL (ref 65–140)
GLUCOSE SERPL-MCNC: 127 MG/DL (ref 65–140)
GLUCOSE SERPL-MCNC: 128 MG/DL (ref 65–140)
GLUCOSE SERPL-MCNC: 139 MG/DL (ref 65–140)
GLUCOSE SERPL-MCNC: 142 MG/DL (ref 65–140)
GLUCOSE SERPL-MCNC: 251 MG/DL (ref 65–140)
HCO3 BLDV-SCNC: 31.9 MMOL/L (ref 24–30)
HCO3 BLDV-SCNC: 34.5 MMOL/L (ref 24–30)
HCO3 BLDV-SCNC: 34.6 MMOL/L (ref 24–30)
HCO3 BLDV-SCNC: 35.1 MMOL/L (ref 24–30)
HCO3 BLDV-SCNC: 38 MMOL/L (ref 24–30)
HCT VFR BLD AUTO: 40.2 % (ref 36.5–49.3)
HGB BLD-MCNC: 12.7 G/DL (ref 12–17)
MAGNESIUM SERPL-MCNC: 1.8 MG/DL (ref 1.6–2.6)
MAGNESIUM SERPL-MCNC: 1.9 MG/DL (ref 1.6–2.6)
MAGNESIUM SERPL-MCNC: 1.9 MG/DL (ref 1.6–2.6)
MAGNESIUM SERPL-MCNC: 2 MG/DL (ref 1.6–2.6)
MAGNESIUM SERPL-MCNC: 2 MG/DL (ref 1.6–2.6)
MCH RBC QN AUTO: 26.6 PG (ref 26.8–34.3)
MCHC RBC AUTO-ENTMCNC: 31.6 G/DL (ref 31.4–37.4)
MCV RBC AUTO: 84 FL (ref 82–98)
NASAL CANNULA: 4
NASAL CANNULA: 7
NASAL CANNULA: 7
O2 CT BLDV-SCNC: 12.1 ML/DL
O2 CT BLDV-SCNC: 12.1 ML/DL
O2 CT BLDV-SCNC: 14.7 ML/DL
O2 CT BLDV-SCNC: 15.1 ML/DL
O2 CT BLDV-SCNC: 17 ML/DL
PCO2 BLDV: 53.6 MM HG (ref 42–50)
PCO2 BLDV: 57.5 MM HG (ref 42–50)
PCO2 BLDV: 57.9 MM HG (ref 42–50)
PCO2 BLDV: 59.4 MM HG (ref 42–50)
PCO2 BLDV: 62.9 MM HG (ref 42–50)
PH BLDV: 7.38 [PH] (ref 7.3–7.4)
PH BLDV: 7.39 [PH] (ref 7.3–7.4)
PH BLDV: 7.39 [PH] (ref 7.3–7.4)
PH BLDV: 7.4 [PH] (ref 7.3–7.4)
PH BLDV: 7.4 [PH] (ref 7.3–7.4)
PLATELET # BLD AUTO: 128 THOUSANDS/UL (ref 149–390)
PMV BLD AUTO: 9.7 FL (ref 8.9–12.7)
PO2 BLDV: 30.2 MM HG (ref 35–45)
PO2 BLDV: 30.3 MM HG (ref 35–45)
PO2 BLDV: 39.1 MM HG (ref 35–45)
PO2 BLDV: 40.8 MM HG (ref 35–45)
PO2 BLDV: 48.4 MM HG (ref 35–45)
POTASSIUM SERPL-SCNC: 3.1 MMOL/L (ref 3.5–5.3)
POTASSIUM SERPL-SCNC: 3.2 MMOL/L (ref 3.5–5.3)
POTASSIUM SERPL-SCNC: 3.4 MMOL/L (ref 3.5–5.3)
POTASSIUM SERPL-SCNC: 3.7 MMOL/L (ref 3.5–5.3)
POTASSIUM SERPL-SCNC: 5.7 MMOL/L (ref 3.5–5.3)
RBC # BLD AUTO: 4.77 MILLION/UL (ref 3.88–5.62)
SODIUM SERPL-SCNC: 137 MMOL/L (ref 135–147)
SODIUM SERPL-SCNC: 138 MMOL/L (ref 135–147)
SODIUM SERPL-SCNC: 139 MMOL/L (ref 135–147)
SODIUM SERPL-SCNC: 139 MMOL/L (ref 135–147)
SODIUM SERPL-SCNC: 140 MMOL/L (ref 135–147)
VANCOMYCIN TROUGH SERPL-MCNC: 17.7 UG/ML (ref 10–20)
WBC # BLD AUTO: 8.2 THOUSAND/UL (ref 4.31–10.16)

## 2022-10-28 RX ORDER — POTASSIUM CHLORIDE 29.8 MG/ML
40 INJECTION INTRAVENOUS ONCE
Status: COMPLETED | OUTPATIENT
Start: 2022-10-28 | End: 2022-10-28

## 2022-10-28 RX ORDER — DIGOXIN 125 MCG
125 TABLET ORAL EVERY OTHER DAY
Status: DISCONTINUED | OUTPATIENT
Start: 2022-10-28 | End: 2022-11-01

## 2022-10-28 RX ORDER — CALCIUM GLUCONATE 20 MG/ML
2 INJECTION, SOLUTION INTRAVENOUS ONCE
Status: COMPLETED | OUTPATIENT
Start: 2022-10-28 | End: 2022-10-28

## 2022-10-28 RX ADMIN — ALBUMIN (HUMAN) 12.5 G: 0.25 INJECTION, SOLUTION INTRAVENOUS at 06:03

## 2022-10-28 RX ADMIN — NOREPINEPHRINE BITARTRATE 14 MCG/MIN: 1 INJECTION, SOLUTION, CONCENTRATE INTRAVENOUS at 21:52

## 2022-10-28 RX ADMIN — Medication 10 MG/HR: at 23:54

## 2022-10-28 RX ADMIN — DIGOXIN 125 MCG: 125 TABLET ORAL at 11:19

## 2022-10-28 RX ADMIN — VANCOMYCIN HYDROCHLORIDE 1250 MG: 5 INJECTION, POWDER, LYOPHILIZED, FOR SOLUTION INTRAVENOUS at 00:07

## 2022-10-28 RX ADMIN — SENNOSIDES AND DOCUSATE SODIUM 2 TABLET: 50; 8.6 TABLET ORAL at 08:24

## 2022-10-28 RX ADMIN — CEFEPIME 2000 MG: 2 INJECTION, POWDER, FOR SOLUTION INTRAVENOUS at 23:17

## 2022-10-28 RX ADMIN — CEFEPIME 2000 MG: 2 INJECTION, POWDER, FOR SOLUTION INTRAVENOUS at 11:19

## 2022-10-28 RX ADMIN — POTASSIUM CHLORIDE 40 MEQ: 29.8 INJECTION, SOLUTION INTRAVENOUS at 20:40

## 2022-10-28 RX ADMIN — FAMOTIDINE 20 MG: 10 INJECTION, SOLUTION INTRAVENOUS at 08:24

## 2022-10-28 RX ADMIN — APIXABAN 2.5 MG: 2.5 TABLET, FILM COATED ORAL at 17:39

## 2022-10-28 RX ADMIN — LEVOTHYROXINE SODIUM 75 MCG: 75 TABLET ORAL at 07:43

## 2022-10-28 RX ADMIN — NOREPINEPHRINE BITARTRATE 16 MCG/MIN: 1 INJECTION, SOLUTION, CONCENTRATE INTRAVENOUS at 05:00

## 2022-10-28 RX ADMIN — CALCIUM GLUCONATE 2 G: 20 INJECTION, SOLUTION INTRAVENOUS at 20:40

## 2022-10-28 RX ADMIN — SENNOSIDES AND DOCUSATE SODIUM 2 TABLET: 50; 8.6 TABLET ORAL at 17:39

## 2022-10-28 RX ADMIN — POTASSIUM CHLORIDE 40 MEQ: 29.8 INJECTION, SOLUTION INTRAVENOUS at 07:56

## 2022-10-28 RX ADMIN — VASOPRESSIN 0.08 UNITS/MIN: 20 INJECTION INTRAVENOUS at 05:00

## 2022-10-28 RX ADMIN — VASOPRESSIN 0.08 UNITS/MIN: 20 INJECTION INTRAVENOUS at 23:22

## 2022-10-28 RX ADMIN — VASOPRESSIN 0.08 UNITS/MIN: 20 INJECTION INTRAVENOUS at 19:19

## 2022-10-28 RX ADMIN — VASOPRESSIN 0.08 UNITS/MIN: 20 INJECTION INTRAVENOUS at 15:24

## 2022-10-28 RX ADMIN — APIXABAN 2.5 MG: 2.5 TABLET, FILM COATED ORAL at 08:24

## 2022-10-28 RX ADMIN — VANCOMYCIN HYDROCHLORIDE 1250 MG: 5 INJECTION, POWDER, LYOPHILIZED, FOR SOLUTION INTRAVENOUS at 23:31

## 2022-10-28 NOTE — ASSESSMENT & PLAN NOTE
· Day 4 of vancomycin/cefepime  · Can likely discontinue cefepime  · Awaiting susceptibilities  · Send repeat blood cultures after 48 hours of appropriate antimicrobial therapy  · ECHO performed on 10/25 without vegetation, however poor study  · If repeat blood cultures positive, will need DNENY

## 2022-10-28 NOTE — CONSULTS
Consultation - Neuropsychology/Psychology Department  James Nino 82 y o  male MRN: [id number]  Unit/Bed#:  Encounter: [id number]        Reason for Consultation:  James Nino is a 82y o  year old male who was referred for a Neuropsychological Exam to assess cognitive functioning and comment on capacity to make fully informed medical decisions        History of Present Illness  Fall at home per daughter    Physician Requesting Consult: Nisha Dai,*    PROBLEM LIST:  Patient Active Problem List   Diagnosis   • Acute respiratory failure with hypoxia (HCC)   • SHREYA (acute kidney injury) ([facility] [address] )   • Chronic atrial fibrillation (HCC)   • Transaminitis   • Moderate protein-calorie malnutrition (HCC)   • Pleural effusion   • Volume overload   • Flexor tenosynovitis of finger   • Localized swelling on left hand   • Cellulitis of hand, left   • Coag negative Staphylococcus bacteremia   • Wound of left foot   • CKD (chronic kidney disease) stage 3, GFR 30-59 ml/min ([facility])   • Hypoxia   • Skin cancer of face   • Hematoma of face, initial encounter   • Status post incision and drainage   • Chronic respiratory failure with hypoxia (HCC)   • Hypothyroidism   • Cardiogenic shock ([facility] [address] )   • Fall         Historical Information   Past Medical History:   Diagnosis Date   • A-fib [facility])    • Disease of thyroid gland    • Hyperlipidemia    • Hypertension    • Optic neuropathy    • Pleural effusion on right    • Pneumonia    • S/P lung surgery, follow-up exam    • Septic shock [facility])      Past Surgical History:   Procedure Laterality Date   • HAND DEBRIDEMENT Left 10/30/2021    Procedure: DEBRIDEMENT HAND/FINGER ([address]), second, index and ring finger;  Surgeon: Maris Vega MD;  Location: [facility];  Service: Plastics   • INCISION AND DRAINAGE OF WOUND Right 2/23/2022    Procedure: INCISION AND DRAINAGE (I&D) HEAD/FACE;  Surgeon: Michael Kirkland MD;  Location: [facility];  Service: Plastics   • SC THORACOSCOPY SURG PART PULM DECORT Right 12/12/2019    Procedure: RIGHT THORACOSCOPIC LUNG DECORTICATION;  Surgeon: Soumya Harris MD;  Location: [facility];  Service: Thoracic   • THORACOSCOPY VIDEO ASSISTED SURGERY (VATS) Right 12/12/2019    Procedure: THORACOSCOPY VIDEO ASSISTED SURGERY (VATS); Surgeon: Soumya Harris MD;  Location: [facility];  Service: Thoracic     Social History   Social History     Substance and Sexual Activity   Alcohol Use Not Currently    Comment: occasional     Social History     Substance and Sexual Activity   Drug Use Never     Social History     Tobacco Use   Smoking Status Former Smoker   • Types: Cigarettes   Smokeless Tobacco Never Used     Family History: History reviewed  No pertinent family history      Meds/Allergies   current meds:   Current Facility-Administered Medications   Medication Dose Route Frequency   • apixaban (ELIQUIS) tablet 2 5 mg  2 5 mg Oral BID   • bisacodyl (DULCOLAX) rectal suppository 10 mg  10 mg Rectal Daily PRN   • cefepime (MAXIPIME) 2,000 mg in dextrose 5 % 50 mL IVPB  2,000 mg Intravenous Q12H   • digoxin (LANOXIN) tablet 125 mcg  125 mcg Oral Every Other Day   • Famotidine (PF) (PEPCID) injection 20 mg  20 mg Intravenous Q24H HERBERT   • furosemide (LASIX) 500 mg infusion 50 mL  10 mg/hr Intravenous Continuous   • levothyroxine tablet 75 mcg  75 mcg Oral Daily Before Breakfast   • norepinephrine (LEVOPHED) 8 mg (DOUBLE CONCENTRATION) IV in sodium chloride 0 9% 250 mL  1-30 mcg/min Intravenous Titrated   • senna-docusate sodium (SENOKOT S) 8 6-50 mg per tablet 2 tablet  2 tablet Oral BID   • vancomycin (VANCOCIN) 1,250 mg in sodium chloride 0 9 % 250 mL IVPB  15 mg/kg (Adjusted) Intravenous Q24H   • vasopressin (PITRESSIN) 20 Units in sodium chloride 0 9 % 100 mL infusion  0 08 Units/min Intravenous Continuous       Allergies   Allergen Reactions   • Ace Inhibitors Cough     Rash         Family and Social Support:   Living Arrangements: Lives Alone  Support Systems: Family members; Children  Type of Current Residence: [facility]  Discharge planning discussed with[de-identified] Patient's Daughter  Freedom of Choice: Yes      Behavioral Observations: Alert, UNABLE to accurately state the season of year, day/week and month; patient reported difficulty speaking and admitted to depressed mood and anxiety; patient reported he was hospitalized secondary to fall but was unable to provide medical history and does not know what he is being treated for in hospital; patient reported he intends to return home upon discharge and wants to be treated medically to maintain life  Patient believes he is able to return home today and care for himself    Cognitive Examination    General Cognitive Functioning MMSE = Deficits in orientation, working memory and recall    Attention/Concentration Auditory Selective Attention = Borderline;     Frontal Systems/Executive Functioning Mental Flexibility/Cognitive Control = Impaired; Working Memory = Impaired Abstract Reasoning = Impaired; Generative Ability = Impaired, Commonsense Reasoning and Judgement = Impaired    Language Functioning Phonemic Fluency = Impaired; Semantic Retrieval = Impaired;     Memory Functioning three word recall = Impaired    Visuo-Spatial Abilities Not Assessed    Functional Knowledge  Health & Safety Knowledge = Impaired;     Summary/Impression:  Results of Neuropsychological Exam revealed diffuse cognitive dysfunction and on a measure assessing awareness of personal health status and ability to evaluate health problems, handle medical emergencies and take safety precautions, patient performed in the IMPAIRED range of functioning  During this encounter, patient does not appear to have capacity to make fully informed medical decisions

## 2022-10-28 NOTE — PROGRESS NOTES
[facility]  Progress Note - Garnette Scheuermann 1935, 82 y o  male MRN: [id number]  Unit/Bed#:  Encounter: [id number]  Primary Care Provider: Dmitry Candelaria DO   Date and time admitted to hospital: 10/24/2022  9:04 PM    * Cardiogenic shock [facility])  Assessment & Plan  · Continue pressors for MAP>65, presently on norepinephrine and vasopressin  · Milrinone off 10/27  · Trend labs q6  · Continue Lasix infusion  · Consider 24 hours of Diamox  · Appreciate cardiology recommendations    Chronic respiratory failure with hypoxia [facility])  Assessment & Plan  · Chronically wears 5 L nasal cannula at home  · Initially placed on Bipap, transitioned back to home O2 10/25  · Maintain SpO2 > 88%    Coag negative Staphylococcus bacteremia  Assessment & Plan  · Day 4 of vancomycin/cefepime  · Can likely discontinue cefepime  · Awaiting susceptibilities  · Send repeat blood cultures after 48 hours of appropriate antimicrobial therapy  · ECHO performed on 10/25 without vegetation, however poor study  · If repeat blood cultures positive, will need DENNY    SHREYA (acute kidney injury) ([facility] [address] )  Assessment & Plan  · Baseline creatinine 1 4-1 5, 2 12 on admission  · Continue to follow renal function  · Likely in the setting of volume overload  · Continue with Lasix infusion  · Chacon catheter with strict I&O    Chronic atrial fibrillation (HCC)  Assessment & Plan  · Currently on 2 5 Eliquis b i d , will continue  · Follows with cardiology outpatient  · Currently rate controlled  · Holding home beta-blocker 2/2 hypotension    Hypothyroidism  Assessment & Plan  · Continue home Synthroid when tolerating PO intake    Fall  Assessment & Plan  · Daughter reports she called EMS today after her father fell at home, denies hitting his head   · Daughter reports frequent falls at home  · Patient appears very deconditioned and per family has essentially been bed bound for the last month or so  · PT/OT when medically appropriate      ----------------------------------------------------------------------------------------  HPI/24hr events: Patient was able to come off milrinone during the day yesterday  He remains on significant doses of norepinephrine and vasopressin  Ongoing [address] conversations with the family  Patient appropriate for transfer out of the ICU today?: No  Disposition: Continue Critical Care   Code Status: Level 3 - DNAR and DNI  ---------------------------------------------------------------------------------------  SUBJECTIVE  "Do you have any milk?"    Review of Systems   Unable to perform ROS: Mental status change     Review of systems was unable to be performed secondary to altered mental status  ---------------------------------------------------------------------------------------  OBJECTIVE    Vitals   Vitals:    10/28/22 0200 10/28/22 0230 10/28/22 0300 10/28/22 0400   BP: 110/74 102/66 109/74 108/70   BP Location:  Right arm     Pulse: 104 (!) 107 (!) 108 103   Resp:  17 (!) 10   Temp:  98 3 °F (36 8 °C)     TempSrc:  Oral     SpO2: 92% 92% 91% 92%   Weight:       Height:         Temp (24hrs), Av °F (36 7 °C), Min:97 8 °F (36 6 °C), Max:98 3 °F (36 8 °C)  Current: Temperature: 98 3 °F (36 8 °C)  Arterial Line BP: 88/59  Arterial Line MAP (mmHg): 71 mmHg    Respiratory:  SpO2: SpO2: 92 %  Nasal Cannula O2 Flow Rate (L/min): 6 L/min    Invasive/non-invasive ventilation settings   Respiratory  Report   Lab Data (Last 4 hours)    None         O2/Vent Data (Last 4 hours)    None                Physical Exam  Constitutional:       Appearance: He is ill-appearing (chronically)  HENT:      Head: Normocephalic and atraumatic  Cardiovascular:      Rate and Rhythm: Tachycardia present  Rhythm irregular  Pulses: Normal pulses  Heart sounds: Normal heart sounds  Pulmonary:      Effort: Pulmonary effort is normal       Breath sounds: Decreased breath sounds present        Comments: Coarse breath sounds B/L  Abdominal:      Palpations: Abdomen is soft  Musculoskeletal:      Right lower leg: Edema present  Left lower leg: Edema present  Skin:     General: Skin is warm and dry  Neurological:      General: No focal deficit present  Mental Status: He is alert  He is confused  GCS: GCS eye subscore is 4  GCS verbal subscore is 4  GCS motor subscore is 6  Laboratory and Diagnostics:  Results from last 7 days   Lab Units 10/27/22  0546 10/26/22  0616 10/25/22  0437 10/24/22  2128   WBC Thousand/uL 10 10 11 41* 7 82 6 09   HEMOGLOBIN g/dL 12 8 12 1 12 3 13 1   HEMATOCRIT % 39 7 37 4 37 2 40 9   PLATELETS Thousands/uL 127* 156 170 149   NEUTROS PCT %  --   --  76* 65   MONOS PCT %  --   --  11 12     Results from last 7 days   Lab Units 10/28/22  0017 10/27/22  1832 10/27/22  1303 10/27/22  0546 10/27/22  0005 10/26/22  1752 10/26/22  1259 10/26/22  0616 10/25/22  1231 10/25/22  0437 10/24/22  2128   SODIUM mmol/L 137 137 137 137 136 136 135 135   < > 128* 130*   POTASSIUM mmol/L 3 7 3 4* 3 2* 3 4* 3 5 3 9 3 0* 3 2*   < > 4 3 5 0   CHLORIDE mmol/L 98 97 98 97 97 97 97 98   < > 97 96   CO2 mmol/L 33* 29 28 30 28 27 26 25   < > 21 23   ANION GAP mmol/L 6 11 11 10 11 12 12 12   < > 10 11   BUN mg/dL 27* 27* 27* 29* 29* 30* 32* 33*   < > 42* 42*   CREATININE mg/dL 1 75* 1 80* 1 93* 1 82* 2 04* 2 09* 2 03* 1 97*   < > 2 02* 2 12*   CALCIUM mg/dL 8 4 7 2* 7 5* 8 1* 8 0* 8 1* 7 8* 8 0*   < > 9 0 8 6   GLUCOSE RANDOM mg/dL 127 149* 167* 120 161* 169* 174* 131   < > 113 94   ALT U/L  --   --   --   --   --   --   --  9*  --  16 16   AST U/L  --   --   --   --   --   --   --  31  --  40 41   ALK PHOS U/L  --   --   --   --   --   --   --  130*  --  133* 143*   ALBUMIN g/dL  --   --   --   --   --   --   --  2 3*  --  2 3* 2 5*   TOTAL BILIRUBIN mg/dL  --   --   --   --   --   --   --  3 90*  --  2 90* 3 35*    < > = values in this interval not displayed       Results from last 7 days Lab Units 10/28/22  0017 10/27/22  1832 10/27/22  1303 10/27/22  0546 10/27/22  0005 10/26/22  1752 10/26/22  1259 10/25/22  1231 10/25/22  0437 10/24/22  2128   MAGNESIUM mg/dL 2 0 2 0 1 9 2 2 2 3 2 4 2 5   < > 2 1 2 0   PHOSPHORUS mg/dL  --   --   --   --   --   --   --   --  4 3* 4 3*    < > = values in this interval not displayed  Results from last 7 days   Lab Units 10/25/22  0437 10/24/22  2128   INR  1 75* 1 77*   PTT seconds  --  45*          Results from last 7 days   Lab Units 10/24/22  2128   LACTIC ACID mmol/L 1 9     ABG:  Results from last 7 days   Lab Units 10/25/22  0220   PH ART  7 361   PCO2 ART mm Hg 34 4*   PO2 ART mm Hg 135 0*   HCO3 ART mmol/L 19 0*   BASE EXC ART mmol/L -5 5   ABG SOURCE  Line, Arterial     VBG:  Results from last 7 days   Lab Units 10/28/22  0017 10/25/22  0443 10/25/22  0220   PH OLIMPIA  7 392   < >  --    PCO2 OLIMPIA mm Hg 53 6*   < >  --    PO2 OLIMPIA mm Hg 48 4*   < >  --    HCO3 OLIMPIA mmol/L 31 9*   < >  --    BASE EXC OLIMPIA mmol/L 5 5   < >  --    ABG SOURCE   --   --  Line, Arterial    < > = values in this interval not displayed  Results from last 7 days   Lab Units 10/24/22  2128   PROCALCITONIN ng/ml 0 17       Micro  Results from last 7 days   Lab Units 10/25/22  2323 10/25/22  0121 10/24/22  2128   BLOOD CULTURE   --   --  Staphylococcus epidermidis*  Staphylococcus capitis*   GRAM STAIN RESULT   --   --  Gram positive cocci in clusters*  Gram positive cocci in clusters*   URINE CULTURE   --  >100,000 cfu/ml Enterococcus faecalis*  --    MRSA CULTURE ONLY  No Methicillin Resistant Staphlyococcus aureus (MRSA) isolated  --   --        EKG: Atrial fibrillation in the 100s on tele  Imaging: I have personally reviewed pertinent reports  Intake and Output  I/O       10/26 0701  10/27 0700 10/27 0701  10/28 0700    P  O  360     I V  (mL/kg) 1898 8 (21 9) 1132 1 (13 1)    IV Piggyback 380 490    Total Intake(mL/kg) 2638 8 (30 5) 1622 1 (18 7)    Urine (mL/kg/hr) 0203 (3 7) 1210 (2 5)    Total Output 7391 8771    Net -5036 7 -1289 9                Height and Weights   Height: 5' 10" (177 8 cm)  IBW (Ideal Body Weight): 73 kg  Body mass index is 27 39 kg/m²  Weight (last 2 days)     Date/Time Weight    10/27/22 0546 86 6 (190 92)    10/26/22 0600 92 (202 82)            Nutrition       Diet Orders   (From admission, onward)             Start     Ordered    10/26/22 0845  Diet Dysphagia/Modified Consistency; Dysphagia 1-Pureed; Honey Thick Liquid  Diet effective now        References:    Nutrtion Support Algorithm Enteral vs  Parenteral   Question Answer Comment   Diet Type Dysphagia/Modified Consistency    Dysphagia/Modified Consistency Dysphagia 1-Pureed    Liquid Modifier Honey Thick Liquid    RD to adjust diet per protocol?  Yes        10/26/22 0845                  Active Medications  Scheduled Meds:  Current Facility-Administered Medications   Medication Dose Route Frequency Provider Last Rate   • albumin human  12 5 g Intravenous Q6H ANNA MARIE Espana     • apixaban  2 5 mg Oral BID ANNA MARIE Marie     • bisacodyl  10 mg Rectal Daily PRN ANNA MARIE Marie     • cefepime  2,000 mg Intravenous Q12H Darlina Emmett, CRNP 2,000 mg (10/27/22 2305)   • famotidine  20 mg Intravenous Q24H [address] ANNA MARIE Etienne     • furosemide  10 mg/hr Intravenous Continuous Rolylina Emmett, CRNP 10 mg/hr (10/27/22 3401)   • levothyroxine  75 mcg Oral Daily Before Breakfast ANNA MARIE Marie     • norepinephrine  1-30 mcg/min Intravenous Titrated ANNA MARIE Etienne 16 mcg/min (10/28/22 0500)   • senna-docusate sodium  2 tablet Oral BID WILLEM EspanaNP     • vancomycin  15 mg/kg (Adjusted) Intravenous Q24H Darlina Emmett, CRNP 1,250 mg (10/28/22 0007)   • vasopressin  0 08 Units/min Intravenous Continuous Darlina Emmett, CRNP 0 08 Units/min (10/28/22 0500)     Continuous Infusions:  furosemide, 10 mg/hr, Last Rate: 10 mg/hr (10/27/22 0718)  norepinephrine, 1-30 mcg/min, Last Rate: 16 mcg/min (10/28/22 0500)  vasopressin, 0 08 Units/min, Last Rate: 0 08 Units/min (10/28/22 0500)      PRN Meds:   bisacodyl, 10 mg, Daily PRN        Invasive Devices Review  Invasive Devices  Report    Central Venous Catheter Line  Duration           CVC Central Lines 10/25/22 Triple 20cm 3 days          Peripheral Intravenous Line  Duration           Peripheral IV 10/24/22 Distal;Right;Upper;Ventral (anterior) Arm 3 days          Arterial Line  Duration           Arterial Line 10/25/22 Radial 3 days          Drain  Duration           Open Drain Anterior; Left Hand 362 days    Open Drain Anterior; Left Hand 362 days    Closed/Suction Drain Right  Bulb 7 Fr  246 days    Urethral Catheter 3 days                Rationale for remaining devices:   · CVC: vasopressor  · Arterial: BP monitoring  ---------------------------------------------------------------------------------------  Advance Directive and Living Will:      Power of :    POLST:    ---------------------------------------------------------------------------------------  Care Time Delivered:   No Critical Care time spent       ANNA MARIE Peres      Portions of the record may have been created with voice recognition software  Occasional wrong word or "sound a like" substitutions may have occurred due to the inherent limitations of voice recognition software    Read the chart carefully and recognize, using context, where substitutions have occurred

## 2022-10-28 NOTE — ASSESSMENT & PLAN NOTE
· Continue pressors for MAP>65, presently on norepinephrine and vasopressin  · Milrinone off 10/27  · Trend labs q6  · Continue Lasix infusion  · Consider 24 hours of Diamox  · Appreciate cardiology recommendations

## 2022-10-28 NOTE — PLAN OF CARE
Problem: Nutrition/Hydration-ADULT  Goal: Nutrient/Hydration intake appropriate for improving, restoring or maintaining nutritional needs  Description: Monitor and assess patient's nutrition/hydration status for malnutrition  Collaborate with interdisciplinary team and initiate plan and interventions as ordered  Monitor patient's weight and dietary intake as ordered or per policy  Utilize nutrition screening tool and intervene as necessary  Determine patient's food preferences and provide high-protein, high-caloric foods as appropriate       INTERVENTIONS:  - Monitor oral intake, urinary output, labs, and treatment plans  - Assess nutrition and hydration status and recommend course of action  - Evaluate amount of meals eaten  - Assist patient with eating if necessary   - Allow adequate time for meals  - Recommend/ encourage appropriate diets, oral nutritional supplements, and vitamin/mineral supplements  - Order, calculate, and assess calorie counts as needed  - Recommend, monitor, and adjust tube feedings and TPN/PPN based on assessed needs  - Assess need for intravenous fluids  - Provide specific nutrition/hydration education as appropriate  - Include patient/family/caregiver in decisions related to nutrition  Outcome: Progressing     Problem: MOBILITY - ADULT  Goal: Maintain or return to baseline ADL function  Description: INTERVENTIONS:  -  Assess patient's ability to carry out ADLs; assess patient's baseline for ADL function and identify physical deficits which impact ability to perform ADLs (bathing, care of mouth/teeth, toileting, grooming, dressing, etc )  - Assess/evaluate cause of self-care deficits   - Assess range of motion  - Assess patient's mobility; develop plan if impaired  - Assess patient's need for assistive devices and provide as appropriate  - Encourage maximum independence but intervene and supervise when necessary  - Involve family in performance of ADLs  - Assess for home care needs following discharge   - Consider OT consult to assist with ADL evaluation and planning for discharge  - Provide patient education as appropriate  Outcome: Progressing  Goal: Maintains/Returns to pre admission functional level  Description: INTERVENTIONS:  - Perform BMAT or MOVE assessment daily    - Set and communicate daily mobility goal to care team and patient/family/caregiver  - Collaborate with rehabilitation services on mobility goals if consulted  - Perform Range of Motion 2 times a day  - Reposition patient every 2 hours    - Out of bed for toileting  - Record patient progress and toleration of activity level   Outcome: Progressing     Problem: Potential for Falls  Goal: Patient will remain free of falls  Description: INTERVENTIONS:  - Educate patient/family on patient safety including physical limitations  - Instruct patient to call for assistance with activity   - Consult OT/PT to assist with strengthening/mobility   - Keep Call bell within reach  - Keep bed low and locked with side rails adjusted as appropriate  - Keep care items and personal belongings within reach  - Initiate and maintain comfort rounds  - Make Fall Risk Sign visible to staff  - Apply yellow socks and bracelet for high fall risk patients  - Consider moving patient to room near nurses station  Outcome: Progressing     Problem: Prexisting or High Potential for Compromised Skin Integrity  Goal: Skin integrity is maintained or improved  Description: INTERVENTIONS:  - Identify patients at risk for skin breakdown  - Assess and monitor skin integrity  - Assess and monitor nutrition and hydration status  - Monitor labs   - Assess for incontinence   - Turn and reposition patient  - Assist with mobility/ambulation  - Relieve pressure over bony prominences  - Avoid friction and shearing  - Provide appropriate hygiene as needed including keeping skin clean and dry  - Evaluate need for skin moisturizer/barrier cream  - Collaborate with interdisciplinary team   - Patient/family teaching  - Consider wound care consult   Outcome: Progressing

## 2022-10-28 NOTE — CASE MANAGEMENT
Case Management Progress Note    Patient name Mackenzie Nelson  Location / MRN [id number]  : 1935 Date 10/28/2022       LOS (days): 4  Geometric Mean LOS (GMLOS) (days): 3 90  Days to GMLOS:0 3        OBJECTIVE:        Current admission status: Inpatient  Preferred Pharmacy:   [address] [address]  Phone: [phone] Fax: [phone]    Primary Care Provider: Michael Young DO    Primary Insurance: [facility]  Secondary Insurance:     PROGRESS NOTE:    CM met with daughters Piper Cain and Juany Bravo at bedside  At this time they do not have POA documentation - they report they are looking for it but unsure where it is right now  But report they are a "united front "  The asked that happens if they cannot produce POA documentation: CM explained   Pt does not have capacity per neuro psych  Piper Cain (Daughter)   [phone] (M) Juany Bravo (Daughter)   [phone] (M)     There are several SNF's able to accept pt, CM was prepared to offer Aidin choice list, however family seems quite upset right now and reports their emotions are high as their father's prognosis is poor  They report they do not anticipate him discharging to a SNF  CM did discuss hospice care but they said they have not made a decision about this  They asked about  homes and the hospital's role in this matter  They believe patient may have a prepaid  plan and they are also looking for documentation of this  CM explained CM role in care and will be following

## 2022-10-28 NOTE — PROGRESS NOTES
S-Alert no complaints    O-BP improved on  1 less inotrope  Good output  Stil has rales  HR 90, irregular  X-ray slightly improved but still CHF    A- minimal but definite progress    P-Continue current therapy

## 2022-10-29 ENCOUNTER — APPOINTMENT (INPATIENT)
Dept: RADIOLOGY | Facility: HOSPITAL | Age: 87
End: 2022-10-29

## 2022-10-29 PROBLEM — B95.8 BACTEREMIA DUE TO STAPHYLOCOCCUS: Status: ACTIVE | Noted: 2021-10-28

## 2022-10-29 LAB
ANION GAP SERPL CALCULATED.3IONS-SCNC: 4 MMOL/L (ref 4–13)
ANION GAP SERPL CALCULATED.3IONS-SCNC: 5 MMOL/L (ref 4–13)
ANION GAP SERPL CALCULATED.3IONS-SCNC: 6 MMOL/L (ref 4–13)
ANION GAP SERPL CALCULATED.3IONS-SCNC: 8 MMOL/L (ref 4–13)
BACTERIA BLD CULT: ABNORMAL
BASE EX.OXY STD BLDV CALC-SCNC: 69.9 % (ref 60–80)
BASE EX.OXY STD BLDV CALC-SCNC: 70.7 % (ref 60–80)
BASE EX.OXY STD BLDV CALC-SCNC: 71.7 % (ref 60–80)
BASE EX.OXY STD BLDV CALC-SCNC: 73.7 % (ref 60–80)
BASE EX.OXY STD BLDV CALC-SCNC: 73.7 % (ref 60–80)
BASE EXCESS BLDV CALC-SCNC: 11.4 MMOL/L
BASE EXCESS BLDV CALC-SCNC: 12.1 MMOL/L
BASE EXCESS BLDV CALC-SCNC: 13.2 MMOL/L
BASE EXCESS BLDV CALC-SCNC: 13.5 MMOL/L
BASE EXCESS BLDV CALC-SCNC: 14.5 MMOL/L
BASOPHILS # BLD AUTO: 0.09 THOUSANDS/ÂΜL (ref 0–0.1)
BASOPHILS NFR BLD AUTO: 1 % (ref 0–1)
BUN SERPL-MCNC: 23 MG/DL (ref 5–25)
BUN SERPL-MCNC: 23 MG/DL (ref 5–25)
BUN SERPL-MCNC: 24 MG/DL (ref 5–25)
BUN SERPL-MCNC: 25 MG/DL (ref 5–25)
CA-I BLD-SCNC: 1.02 MMOL/L (ref 1.12–1.32)
CALCIUM SERPL-MCNC: 7.7 MG/DL (ref 8.3–10.1)
CALCIUM SERPL-MCNC: 8.2 MG/DL (ref 8.3–10.1)
CALCIUM SERPL-MCNC: 8.3 MG/DL (ref 8.3–10.1)
CALCIUM SERPL-MCNC: 8.3 MG/DL (ref 8.3–10.1)
CHLORIDE SERPL-SCNC: 102 MMOL/L (ref 96–108)
CHLORIDE SERPL-SCNC: 99 MMOL/L (ref 96–108)
CO2 SERPL-SCNC: 35 MMOL/L (ref 21–32)
CO2 SERPL-SCNC: 37 MMOL/L (ref 21–32)
CO2 SERPL-SCNC: 37 MMOL/L (ref 21–32)
CO2 SERPL-SCNC: 39 MMOL/L (ref 21–32)
CREAT SERPL-MCNC: 1.41 MG/DL (ref 0.6–1.3)
CREAT SERPL-MCNC: 1.59 MG/DL (ref 0.6–1.3)
CREAT SERPL-MCNC: 1.62 MG/DL (ref 0.6–1.3)
CREAT SERPL-MCNC: 1.7 MG/DL (ref 0.6–1.3)
DIGOXIN SERPL-MCNC: 0.3 NG/ML (ref 0.8–2)
EOSINOPHIL # BLD AUTO: 0.58 THOUSAND/ÂΜL (ref 0–0.61)
EOSINOPHIL NFR BLD AUTO: 8 % (ref 0–6)
ERYTHROCYTE [DISTWIDTH] IN BLOOD BY AUTOMATED COUNT: 22.2 % (ref 11.6–15.1)
GFR SERPL CREATININE-BSD FRML MDRD: 35 ML/MIN/1.73SQ M
GFR SERPL CREATININE-BSD FRML MDRD: 38 ML/MIN/1.73SQ M
GFR SERPL CREATININE-BSD FRML MDRD: 39 ML/MIN/1.73SQ M
GFR SERPL CREATININE-BSD FRML MDRD: 45 ML/MIN/1.73SQ M
GLUCOSE SERPL-MCNC: 104 MG/DL (ref 65–140)
GLUCOSE SERPL-MCNC: 113 MG/DL (ref 65–140)
GLUCOSE SERPL-MCNC: 117 MG/DL (ref 65–140)
GLUCOSE SERPL-MCNC: 117 MG/DL (ref 65–140)
GLUCOSE SERPL-MCNC: 135 MG/DL (ref 65–140)
GLUCOSE SERPL-MCNC: 139 MG/DL (ref 65–140)
GLUCOSE SERPL-MCNC: 99 MG/DL (ref 65–140)
GRAM STN SPEC: ABNORMAL
GRAM STN SPEC: ABNORMAL
HCO3 BLDV-SCNC: 38.9 MMOL/L (ref 24–30)
HCO3 BLDV-SCNC: 39.7 MMOL/L (ref 24–30)
HCO3 BLDV-SCNC: 39.8 MMOL/L (ref 24–30)
HCO3 BLDV-SCNC: 41.1 MMOL/L (ref 24–30)
HCO3 BLDV-SCNC: 41.5 MMOL/L (ref 24–30)
HCT VFR BLD AUTO: 39.8 % (ref 36.5–49.3)
HGB BLD-MCNC: 12.2 G/DL (ref 12–17)
IMM GRANULOCYTES # BLD AUTO: 0.02 THOUSAND/UL (ref 0–0.2)
IMM GRANULOCYTES NFR BLD AUTO: 0 % (ref 0–2)
LYMPHOCYTES # BLD AUTO: 1.04 THOUSANDS/ÂΜL (ref 0.6–4.47)
LYMPHOCYTES NFR BLD AUTO: 14 % (ref 14–44)
MAGNESIUM SERPL-MCNC: 1.7 MG/DL (ref 1.6–2.6)
MAGNESIUM SERPL-MCNC: 1.7 MG/DL (ref 1.6–2.6)
MAGNESIUM SERPL-MCNC: 2.2 MG/DL (ref 1.6–2.6)
MAGNESIUM SERPL-MCNC: 2.2 MG/DL (ref 1.6–2.6)
MCH RBC QN AUTO: 26.8 PG (ref 26.8–34.3)
MCHC RBC AUTO-ENTMCNC: 30.7 G/DL (ref 31.4–37.4)
MCV RBC AUTO: 87 FL (ref 82–98)
MONOCYTES # BLD AUTO: 1.07 THOUSAND/ÂΜL (ref 0.17–1.22)
MONOCYTES NFR BLD AUTO: 14 % (ref 4–12)
NASAL CANNULA: 2
NASAL CANNULA: 4
NEUTROPHILS # BLD AUTO: 4.71 THOUSANDS/ÂΜL (ref 1.85–7.62)
NEUTS SEG NFR BLD AUTO: 63 % (ref 43–75)
NRBC BLD AUTO-RTO: 0 /100 WBCS
O2 CT BLDV-SCNC: 12.7 ML/DL
O2 CT BLDV-SCNC: 13.4 ML/DL
O2 CT BLDV-SCNC: 13.8 ML/DL
O2 CT BLDV-SCNC: 13.9 ML/DL
O2 CT BLDV-SCNC: 14 ML/DL
PCO2 BLDV: 58.6 MM HG (ref 42–50)
PCO2 BLDV: 62 MM HG (ref 42–50)
PCO2 BLDV: 65.2 MM HG (ref 42–50)
PCO2 BLDV: 65.5 MM HG (ref 42–50)
PCO2 BLDV: 65.8 MM HG (ref 42–50)
PH BLDV: 7.39 [PH] (ref 7.3–7.4)
PH BLDV: 7.4 [PH] (ref 7.3–7.4)
PH BLDV: 7.41 [PH] (ref 7.3–7.4)
PH BLDV: 7.44 [PH] (ref 7.3–7.4)
PH BLDV: 7.45 [PH] (ref 7.3–7.4)
PHOSPHATE SERPL-MCNC: 2.5 MG/DL (ref 2.3–4.1)
PLATELET # BLD AUTO: 115 THOUSANDS/UL (ref 149–390)
PMV BLD AUTO: 9.4 FL (ref 8.9–12.7)
PO2 BLDV: 35.6 MM HG (ref 35–45)
PO2 BLDV: 37 MM HG (ref 35–45)
PO2 BLDV: 37.1 MM HG (ref 35–45)
PO2 BLDV: 39.2 MM HG (ref 35–45)
PO2 BLDV: 39.4 MM HG (ref 35–45)
POTASSIUM SERPL-SCNC: 3 MMOL/L (ref 3.5–5.3)
POTASSIUM SERPL-SCNC: 3.1 MMOL/L (ref 3.5–5.3)
POTASSIUM SERPL-SCNC: 3.2 MMOL/L (ref 3.5–5.3)
POTASSIUM SERPL-SCNC: 3.3 MMOL/L (ref 3.5–5.3)
RBC # BLD AUTO: 4.56 MILLION/UL (ref 3.88–5.62)
S AUREUS+CONS DNA BLD POS NAA+NON-PROBE: DETECTED
SODIUM SERPL-SCNC: 140 MMOL/L (ref 135–147)
SODIUM SERPL-SCNC: 141 MMOL/L (ref 135–147)
SODIUM SERPL-SCNC: 144 MMOL/L (ref 135–147)
SODIUM SERPL-SCNC: 145 MMOL/L (ref 135–147)
WBC # BLD AUTO: 7.51 THOUSAND/UL (ref 4.31–10.16)

## 2022-10-29 RX ORDER — POTASSIUM CHLORIDE 20MEQ/15ML
40 LIQUID (ML) ORAL ONCE
Status: COMPLETED | OUTPATIENT
Start: 2022-10-29 | End: 2022-10-29

## 2022-10-29 RX ORDER — POTASSIUM CHLORIDE 29.8 MG/ML
40 INJECTION INTRAVENOUS ONCE
Status: COMPLETED | OUTPATIENT
Start: 2022-10-29 | End: 2022-10-29

## 2022-10-29 RX ORDER — CALCIUM GLUCONATE 20 MG/ML
1 INJECTION, SOLUTION INTRAVENOUS ONCE
Status: COMPLETED | OUTPATIENT
Start: 2022-10-29 | End: 2022-10-29

## 2022-10-29 RX ORDER — DIGOXIN 125 MCG
125 TABLET ORAL ONCE
Status: COMPLETED | OUTPATIENT
Start: 2022-10-29 | End: 2022-10-29

## 2022-10-29 RX ORDER — MAGNESIUM SULFATE HEPTAHYDRATE 40 MG/ML
2 INJECTION, SOLUTION INTRAVENOUS ONCE
Status: COMPLETED | OUTPATIENT
Start: 2022-10-29 | End: 2022-10-29

## 2022-10-29 RX ADMIN — FAMOTIDINE 20 MG: 10 INJECTION, SOLUTION INTRAVENOUS at 08:08

## 2022-10-29 RX ADMIN — VANCOMYCIN HYDROCHLORIDE 1250 MG: 5 INJECTION, POWDER, LYOPHILIZED, FOR SOLUTION INTRAVENOUS at 23:36

## 2022-10-29 RX ADMIN — APIXABAN 2.5 MG: 2.5 TABLET, FILM COATED ORAL at 18:12

## 2022-10-29 RX ADMIN — CEFEPIME 2000 MG: 2 INJECTION, POWDER, FOR SOLUTION INTRAVENOUS at 23:36

## 2022-10-29 RX ADMIN — LEVOTHYROXINE SODIUM 75 MCG: 75 TABLET ORAL at 08:08

## 2022-10-29 RX ADMIN — POTASSIUM CHLORIDE 40 MEQ: 29.8 INJECTION, SOLUTION INTRAVENOUS at 20:13

## 2022-10-29 RX ADMIN — POTASSIUM CHLORIDE 40 MEQ: 29.8 INJECTION, SOLUTION INTRAVENOUS at 07:58

## 2022-10-29 RX ADMIN — APIXABAN 2.5 MG: 2.5 TABLET, FILM COATED ORAL at 08:08

## 2022-10-29 RX ADMIN — POTASSIUM CHLORIDE 40 MEQ: 29.8 INJECTION, SOLUTION INTRAVENOUS at 00:32

## 2022-10-29 RX ADMIN — MAGNESIUM SULFATE HEPTAHYDRATE 2 G: 40 INJECTION, SOLUTION INTRAVENOUS at 07:59

## 2022-10-29 RX ADMIN — VASOPRESSIN 0.08 UNITS/MIN: 20 INJECTION INTRAVENOUS at 03:45

## 2022-10-29 RX ADMIN — CEFEPIME 2000 MG: 2 INJECTION, POWDER, FOR SOLUTION INTRAVENOUS at 11:29

## 2022-10-29 RX ADMIN — POTASSIUM CHLORIDE 40 MEQ: 1.5 SOLUTION ORAL at 20:23

## 2022-10-29 RX ADMIN — DIGOXIN 125 MCG: 125 TABLET ORAL at 11:29

## 2022-10-29 RX ADMIN — CALCIUM GLUCONATE 1 G: 20 INJECTION, SOLUTION INTRAVENOUS at 07:58

## 2022-10-29 NOTE — QUICK NOTE
Brief update of clinical status with patient's daughters Umair Link and Yeny Shannon  Discussed improving hemodynamics and decreased reliance on vasopressors  Also discussed digoxin for atrial fibrillation and improved heart rate  Explained that although patient is improving in his current decompensated heart failure episode, the concerns regarding his overall clinical status, goals of care, and health remain the same and need to be decided upon by the family, now that we have determined that the patient does not have capacity for decision making  Re-addressed code status and confirmed that patient's daughter's do not want intubation or resuscitation in the event of a cardiac arrest due to concerns for patient's quality of life  All questions answered and concerns addressed       Gosia Taylor MD

## 2022-10-29 NOTE — PROGRESS NOTES
Vancomycin IV Pharmacy-to-Dose Consultation    Javier Cedeño is a 82 y o  male who is currently receiving Vancomycin IV with management by the Pharmacy Consult service  Assessment/Plan:  The patient was reviewed  Renal function is stable and no signs or symptoms of nephrotoxicity and/or infusion reactions were documented in the chart  Based on today’s assessment, continue current vancomycin (day # 5) dosing of 1250mg IV q24h, with a plan for trough to be drawn in a week on 11/5/22 if patient remains on vancomycin therapy at that time  10/28 trough level was 17 7 and within current goal of 15-20  We will continue to follow the patient’s culture results and clinical progress daily      [employer]

## 2022-10-29 NOTE — SPEECH THERAPY NOTE
Speech Language/Pathology Missed Visit Note    Dysphagia tx attempted, however pt's poor level of arousal precludes safe PO trails at this time  Patient unable to follow commands to complete safe swallowing strategies; lunch held  Will re-attempt as patient status permits  Nursing aware          Beto Gallegos [address], [address] [facility]  Speech-Language Pathologist  [place] #[id number]  [place] #[id number]

## 2022-10-29 NOTE — ASSESSMENT & PLAN NOTE
· 1/2 BC + staph capitis, 1/2 + staph epidermidis  · Day 5 of vancomycin/cefepime  · Can likely discontinue cefepime  · Awaiting susceptibilities  · Send repeat blood cultures after 48 hours of appropriate antimicrobial therapy  · ECHO performed on 10/25 without vegetation, however poor study  · If repeat blood cultures positive, will need DENNY

## 2022-10-29 NOTE — ASSESSMENT & PLAN NOTE
· Currently on 2 5 Eliquis b i d , will continue  · Follows with cardiology outpatient  · Currently rate controlled  · Holding home beta-blocker 2/2 hypotension  · Responded well to initial dose of digoxin  · Continue digoxin every other day

## 2022-10-29 NOTE — PROGRESS NOTES
[address] [facility]  Progress Note - Garnette Scheuermann 1935, 82 y o  male MRN: [id number]  Unit/Bed#:  Encounter: [id number]  Primary Care Provider: Dmitry Candelaria DO   Date and time admitted to hospital: 10/24/2022  9:04 PM    * Cardiogenic shock [facility])  Assessment & Plan  · Continue pressors for MAP>65, presently on norepinephrine and vasopressin  · Milrinone off 10/27  · ScVO2 slightly decreased to 61 on most recent VBG  · Trend VBG Q4 and restart milrinone if downtrending  · Trend BMP/electolytes q6  · Continue Lasix infusion  · Repeat CXR in AM  · Appreciate cardiology recommendations    Chronic respiratory failure with hypoxia [facility])  Assessment & Plan  · Chronically wears 5 L nasal cannula at home  · Initially placed on Bipap, transitioned back to home O2 10/25  · Maintain SpO2 > 88%    Coag negative Staphylococcus bacteremia  Assessment & Plan  · 1/2 BC + staph capitis, 1/2 + staph epidermidis  · Day 5 of vancomycin/cefepime  · Can likely discontinue cefepime  · Awaiting susceptibilities  · Send repeat blood cultures after 48 hours of appropriate antimicrobial therapy  · ECHO performed on 10/25 without vegetation, however poor study  · If repeat blood cultures positive, will need DENNY    SHREYA (acute kidney injury) ([facility] [address] )  Assessment & Plan  · Baseline creatinine 1 4-1 5, 2 12 on admission  · Continue to follow renal function  · Likely in the setting of volume overload  · Continue with Lasix infusion  · Chacon catheter with strict I&O    Chronic atrial fibrillation (HCC)  Assessment & Plan  · Currently on 2 5 Eliquis b i d , will continue  · Follows with cardiology outpatient  · Currently rate controlled  · Holding home beta-blocker 2/2 hypotension  · Responded well to initial dose of digoxin  · Continue digoxin every other day    Hypothyroidism  Assessment & Plan  · Continue home Synthroid    Fall  Assessment & Plan  · Daughter reports she called EMS today after her father fell at home, denies hitting his head   · Daughter reports frequent falls at home  · Patient appears very deconditioned and per family has essentially been bed bound for the last month or so  · PT/OT when medically appropriate    ----------------------------------------------------------------------------------------  HPI/24hr events: Patient has been able to be weaned down on vasopressor requirements  He remains on vaso at 0 08 but is down to norepinephrine 3 mcg/min  He was deemed incompetent by neruopsychiatry and [address] discussions with his daughters are ongoing  No overnight events  Patient appropriate for transfer out of the ICU today?: No  Disposition: Continue Critical Care   Code Status: Level 3 - DNAR and DNI  ---------------------------------------------------------------------------------------  SUBJECTIVE  "Isn't there any milk around here?"    Review of Systems   Unable to perform ROS: Mental status change     Review of systems was unable to be performed secondary to encephalopathy   ---------------------------------------------------------------------------------------  OBJECTIVE    Vitals   Vitals:    10/29/22 0100 10/29/22 0130 10/29/22 0215 10/29/22 030   BP: 106/62 96/65 99/70 96/65   Pulse: 96 95 100 103   Resp:  18   Temp:       TempSrc:       SpO2: 95% 95% 94% 95%   Weight:       Height:         Temp (24hrs), Av 1 °F (36 7 °C), Min:98 °F (36 7 °C), Max:98 2 °F (36 8 °C)  Current: Temperature: 98 2 °F (36 8 °C)  Arterial Line BP: 89/50  Arterial Line MAP (mmHg): 66 mmHg    Respiratory:  SpO2: SpO2: 95 %  Nasal Cannula O2 Flow Rate (L/min): 6 L/min    Invasive/non-invasive ventilation settings   Respiratory  Report   Lab Data (Last 4 hours)    None         O2/Vent Data (Last 4 hours)    None                Physical Exam  Constitutional:       Appearance: He is ill-appearing  HENT:      Head: Normocephalic and atraumatic        Mouth/Throat:      Mouth: Mucous membranes are moist    Eyes: Conjunctiva/sclera: Conjunctivae normal    Cardiovascular:      Rate and Rhythm: Tachycardia present  Rhythm irregular  Pulses: Normal pulses  Heart sounds: Normal heart sounds  Pulmonary:      Effort: Pulmonary effort is normal  No respiratory distress  Breath sounds: Decreased breath sounds present  Abdominal:      Palpations: Abdomen is soft  Genitourinary:     Comments: Chacon in place  Skin:     General: Skin is warm and dry  Neurological:      General: No focal deficit present  Mental Status: He is alert and oriented to person, place, and time        Comments: Forgetful, disoriented to situation             Laboratory and Diagnostics:  Results from last 7 days   Lab Units 10/29/22  0520 10/28/22  0600 10/27/22  0546 10/26/22  0616 10/25/22  0437 10/24/22  2128   WBC Thousand/uL 7 51 8 20 10 10 11 41* 7 82 6 09   HEMOGLOBIN g/dL 12 2 12 7 12 8 12 1 12 3 13 1   HEMATOCRIT % 39 8 40 2 39 7 37 4 37 2 40 9   PLATELETS Thousands/uL 115* 128* 127* 156 170 149   NEUTROS PCT % 63  --   --   --  76* 65   MONOS PCT % 14*  --   --   --  11 12     Results from last 7 days   Lab Units 10/29/22  0520 10/28/22  2352 10/28/22  1955 10/28/22  1747 10/28/22  1139 10/28/22  0600 10/28/22  0017 10/26/22  1259 10/26/22  0616 10/25/22  1231 10/25/22  0437 10/24/22  2128   SODIUM mmol/L 141 140 140 138 139 139 137   < > 135   < > 128* 130*   POTASSIUM mmol/L 3 1* 3 3* 3 1* 5 7* 3 4* 3 2* 3 7   < > 3 2*   < > 4 3 5 0   CHLORIDE mmol/L 99 99 97 100 98 98 98   < > 98   < > 97 96   CO2 mmol/L 37* 37* 36* 35* 34* 33* 33*   < > 25   < > 21 23   ANION GAP mmol/L 5 4 7 3* 7 8 6   < > 12   < > 10 11   BUN mg/dL 23 24 24 24 25 26* 27*   < > 33*   < > 42* 42*   CREATININE mg/dL 1 62* 1 70* 1 78* 1 73* 1 85* 1 75* 1 75*   < > 1 97*   < > 2 02* 2 12*   CALCIUM mg/dL 8 3 8 3 8 0* 7 8* 8 1* 8 0* 8 4   < > 8 0*   < > 9 0 8 6   GLUCOSE RANDOM mg/dL 104 139 142* 122 139 115 127   < > 131   < > 113 94   ALT U/L  --   --   -- --   --   --   --   --  9*  --  16 16   AST U/L  --   --   --   --   --   --   --   --  31  --  40 41   ALK PHOS U/L  --   --   --   --   --   --   --   --  130*  --  133* 143*   ALBUMIN g/dL  --   --   --   --   --   --   --   --  2 3*  --  2 3* 2 5*   TOTAL BILIRUBIN mg/dL  --   --   --   --   --   --   --   --  3 90*  --  2 90* 3 35*    < > = values in this interval not displayed  Results from last 7 days   Lab Units 10/29/22  0520 10/28/22  2352 10/28/22  1955 10/28/22  1747 10/28/22  1139 10/28/22  0600 10/28/22  0017 10/25/22  1231 10/25/22  0437 10/24/22  2128   MAGNESIUM mg/dL 1 7 1 7 1 9 1 8 2 0 1 9 2 0   < > 2 1 2 0   PHOSPHORUS mg/dL  --   --   --   --   --   --   --   --  4 3* 4 3*    < > = values in this interval not displayed  Results from last 7 days   Lab Units 10/25/22  0437 10/24/22  2128   INR  1 75* 1 77*   PTT seconds  --  45*          Results from last 7 days   Lab Units 10/24/22  2128   LACTIC ACID mmol/L 1 9     ABG:  Results from last 7 days   Lab Units 10/25/22  0220   PH ART  7 361   PCO2 ART mm Hg 34 4*   PO2 ART mm Hg 135 0*   HCO3 ART mmol/L 19 0*   BASE EXC ART mmol/L -5 5   ABG SOURCE  Line, Arterial     VBG:  Results from last 7 days   Lab Units 10/29/22  0520 10/25/22  0443 10/25/22  0220   PH OLIMPIA  7 394   < >  --    PCO2 OLIMPIA mm Hg 65 2*   < >  --    PO2 OLIMPIA mm Hg 39 2   < >  --    HCO3 OLIMPIA mmol/L 38 9*   < >  --    BASE EXC OLIMPIA mmol/L 11 4   < >  --    ABG SOURCE   --   --  Line, Arterial    < > = values in this interval not displayed       Results from last 7 days   Lab Units 10/24/22  2128   PROCALCITONIN ng/ml 0 17       Micro  Results from last 7 days   Lab Units 10/25/22  2323 10/25/22  0121 10/24/22  2128   BLOOD CULTURE   --   --  Staphylococcus epidermidis*  Staphylococcus capitis*   GRAM STAIN RESULT   --   --  Gram positive cocci in clusters*  Gram positive cocci in clusters*   URINE CULTURE   --  >100,000 cfu/ml Enterococcus faecalis*  --    MRSA CULTURE ONLY No Methicillin Resistant Staphlyococcus aureus (MRSA) isolated  --   --        EKG: Afib rate around 100 on tele  Imaging: I have personally reviewed pertinent reports  Intake and Output  I/O       10/27 0701  10/28 0700 10/28 0701  10/29 0700    I V  (mL/kg) 1367 6 (16 2) 1127 2 (13 4)    IV Piggyback 490 297 1    Total Intake(mL/kg) 1857 6 (22) 1424 3 (16 9)    Urine (mL/kg/hr) 5150 (2 5) 3900 (1 9)    Stool  0    Total Output 5150 3900    Net -3292 5 -2475 7          Unmeasured Stool Occurrence  2 x          Height and Weights   Height: 5' 10" (177 8 cm)  IBW (Ideal Body Weight): 73 kg  Body mass index is 26 67 kg/m²  Weight (last 2 days)     Date/Time Weight    10/28/22 0600 84 3 (185 85)    10/27/22 0546 86 6 (190 92)            Nutrition       Diet Orders   (From admission, onward)             Start     Ordered    10/28/22 1455  Dietary nutrition supplements  Once        Question Answer Comment   Select Supplement: MightyShake    Frequency Breakfast        10/28/22 1455    10/28/22 1455  Dietary nutrition supplements  Once        Question Answer Comment   Select Supplement: Magic Cup [place]    Frequency Lunch        10/28/22 1455    10/28/22 1455  Dietary nutrition supplements  Once        Question Answer Comment   Select Supplement: Magic Cup Chocolate    Frequency Dinner        10/28/22 1455    10/26/22 0845  Diet Dysphagia/Modified Consistency; Dysphagia 1-Pureed; Honey Thick Liquid  Diet effective now        References:    Nutrtion Support Algorithm Enteral vs  Parenteral   Question Answer Comment   Diet Type Dysphagia/Modified Consistency    Dysphagia/Modified Consistency Dysphagia 1-Pureed    Liquid Modifier Honey Thick Liquid    RD to adjust diet per protocol?  Yes        10/26/22 0845                  Active Medications  Scheduled Meds:  Current Facility-Administered Medications   Medication Dose Route Frequency Provider Last Rate   • apixaban  2 5 mg Oral BID ANNA MARIE Ramirez     • bisacodyl  10 mg Rectal Daily PRN ANNA MARIE Huber     • cefepime  2,000 mg Intravenous Q12H WILLEM ElliottNP 2,000 mg (10/28/22 2317)   • digoxin  125 mcg Oral Every Other Day ANNA MARIE Rogers     • famotidine  20 mg Intravenous Q24H [facility] ANNA MARIE Etienne     • furosemide  10 mg/hr Intravenous Continuous WILLEM ElliottNP 10 mg/hr (10/28/22 2354)   • levothyroxine  75 mcg Oral Daily Before Breakfast ANNA MARIE Huber     • norepinephrine  1-30 mcg/min Intravenous Titrated ANNA MARIE Etienne 3 mcg/min (10/29/22 0304)   • senna-docusate sodium  2 tablet Oral BID ANNA MARIE Elliott     • vancomycin  15 mg/kg (Adjusted) Intravenous Q24H WILLEM ElliottNP 1,250 mg (10/28/22 2331)   • vasopressin  0 08 Units/min Intravenous Continuous WILLEM ElliottNP 0 08 Units/min (10/29/22 0345)     Continuous Infusions:  furosemide, 10 mg/hr, Last Rate: 10 mg/hr (10/28/22 2354)  norepinephrine, 1-30 mcg/min, Last Rate: 3 mcg/min (10/29/22 0304)  vasopressin, 0 08 Units/min, Last Rate: 0 08 Units/min (10/29/22 0345)      PRN Meds:   bisacodyl, 10 mg, Daily PRN        Invasive Devices Review  Invasive Devices  Report    Central Venous Catheter Line  Duration           CVC Central Lines 10/25/22 Triple 20cm 4 days          Peripheral Intravenous Line  Duration           Peripheral IV 10/24/22 Distal;Right;Upper;Ventral (anterior) Arm 4 days          Arterial Line  Duration           Arterial Line 10/25/22 Radial 4 days          Drain  Duration           Open Drain Anterior; Left Hand 363 days    Open Drain Anterior; Left Hand 363 days    Closed/Suction Drain Right  Bulb 7 Fr   247 days    Urethral Catheter 4 days                Rationale for remaining devices: CVC: vasopressor requirements  Arterial line: vasopressors still running  Chacon: accurate I&O while on lasix gtt  ---------------------------------------------------------------------------------------  Advance Directive and Living Will:      Power of : POLST:    ---------------------------------------------------------------------------------------  Care Time Delivered:   No Critical Care time spent       ANNA MARIE Plummer      Portions of the record may have been created with voice recognition software  Occasional wrong word or "sound a like" substitutions may have occurred due to the inherent limitations of voice recognition software    Read the chart carefully and recognize, using context, where substitutions have occurred

## 2022-10-29 NOTE — ASSESSMENT & PLAN NOTE
· Continue pressors for MAP>65, presently on norepinephrine and vasopressin  · Milrinone off 10/27  · ScVO2 slightly decreased to 61 on most recent VBG  · Trend VBG Q4 and restart milrinone if downtrending  · Trend BMP/electolytes q6  · Continue Lasix infusion  · Repeat CXR in AM  · Appreciate cardiology recommendations

## 2022-10-30 PROBLEM — D69.6 THROMBOCYTOPENIA (HCC): Status: ACTIVE | Noted: 2022-10-30

## 2022-10-30 LAB
ANION GAP SERPL CALCULATED.3IONS-SCNC: 2 MMOL/L (ref 4–13)
ANION GAP SERPL CALCULATED.3IONS-SCNC: 3 MMOL/L (ref 4–13)
ANION GAP SERPL CALCULATED.3IONS-SCNC: 3 MMOL/L (ref 4–13)
ANION GAP SERPL CALCULATED.3IONS-SCNC: 6 MMOL/L (ref 4–13)
ATRIAL RATE: 65 BPM
BASE EX.OXY STD BLDV CALC-SCNC: 62.5 % (ref 60–80)
BASE EX.OXY STD BLDV CALC-SCNC: 78.2 % (ref 60–80)
BASE EX.OXY STD BLDV CALC-SCNC: 86.7 % (ref 60–80)
BASE EXCESS BLDV CALC-SCNC: 11.5 MMOL/L
BASE EXCESS BLDV CALC-SCNC: 12.9 MMOL/L
BASE EXCESS BLDV CALC-SCNC: 16 MMOL/L
BUN SERPL-MCNC: 24 MG/DL (ref 5–25)
BUN SERPL-MCNC: 24 MG/DL (ref 5–25)
BUN SERPL-MCNC: 25 MG/DL (ref 5–25)
BUN SERPL-MCNC: 25 MG/DL (ref 5–25)
CA-I BLD-SCNC: 1.04 MMOL/L (ref 1.12–1.32)
CALCIUM SERPL-MCNC: 8.3 MG/DL (ref 8.3–10.1)
CALCIUM SERPL-MCNC: 8.4 MG/DL (ref 8.3–10.1)
CALCIUM SERPL-MCNC: 8.7 MG/DL (ref 8.3–10.1)
CALCIUM SERPL-MCNC: 9.2 MG/DL (ref 8.3–10.1)
CHLORIDE SERPL-SCNC: 100 MMOL/L (ref 96–108)
CHLORIDE SERPL-SCNC: 100 MMOL/L (ref 96–108)
CHLORIDE SERPL-SCNC: 98 MMOL/L (ref 96–108)
CHLORIDE SERPL-SCNC: 99 MMOL/L (ref 96–108)
CO2 SERPL-SCNC: 38 MMOL/L (ref 21–32)
CO2 SERPL-SCNC: 39 MMOL/L (ref 21–32)
CO2 SERPL-SCNC: 40 MMOL/L (ref 21–32)
CO2 SERPL-SCNC: 40 MMOL/L (ref 21–32)
CREAT SERPL-MCNC: 1.57 MG/DL (ref 0.6–1.3)
CREAT SERPL-MCNC: 1.57 MG/DL (ref 0.6–1.3)
CREAT SERPL-MCNC: 1.58 MG/DL (ref 0.6–1.3)
CREAT SERPL-MCNC: 1.63 MG/DL (ref 0.6–1.3)
DIGOXIN SERPL-MCNC: 0.4 NG/ML (ref 0.8–2)
ERYTHROCYTE [DISTWIDTH] IN BLOOD BY AUTOMATED COUNT: 22.5 % (ref 11.6–15.1)
GFR SERPL CREATININE-BSD FRML MDRD: 37 ML/MIN/1.73SQ M
GFR SERPL CREATININE-BSD FRML MDRD: 39 ML/MIN/1.73SQ M
GLUCOSE SERPL-MCNC: 103 MG/DL (ref 65–140)
GLUCOSE SERPL-MCNC: 112 MG/DL (ref 65–140)
GLUCOSE SERPL-MCNC: 116 MG/DL (ref 65–140)
GLUCOSE SERPL-MCNC: 94 MG/DL (ref 65–140)
HCO3 BLDV-SCNC: 38.6 MMOL/L (ref 24–30)
HCO3 BLDV-SCNC: 40.2 MMOL/L (ref 24–30)
HCO3 BLDV-SCNC: 42.6 MMOL/L (ref 24–30)
HCT VFR BLD AUTO: 40.5 % (ref 36.5–49.3)
HGB BLD-MCNC: 12.5 G/DL (ref 12–17)
MAGNESIUM SERPL-MCNC: 2.1 MG/DL (ref 1.6–2.6)
MAGNESIUM SERPL-MCNC: 2.2 MG/DL (ref 1.6–2.6)
MAGNESIUM SERPL-MCNC: 2.4 MG/DL (ref 1.6–2.6)
MAGNESIUM SERPL-MCNC: 2.5 MG/DL (ref 1.6–2.6)
MCH RBC QN AUTO: 27.1 PG (ref 26.8–34.3)
MCHC RBC AUTO-ENTMCNC: 30.9 G/DL (ref 31.4–37.4)
MCV RBC AUTO: 88 FL (ref 82–98)
NASAL CANNULA: 4
O2 CT BLDV-SCNC: 11.8 ML/DL
O2 CT BLDV-SCNC: 14.7 ML/DL
O2 CT BLDV-SCNC: 16.3 ML/DL
PCO2 BLDV: 59.7 MM HG (ref 42–50)
PCO2 BLDV: 61.6 MM HG (ref 42–50)
PCO2 BLDV: 64.2 MM HG (ref 42–50)
PH BLDV: 7.42 [PH] (ref 7.3–7.4)
PH BLDV: 7.42 [PH] (ref 7.3–7.4)
PH BLDV: 7.47 [PH] (ref 7.3–7.4)
PLATELET # BLD AUTO: 96 THOUSANDS/UL (ref 149–390)
PMV BLD AUTO: 9.3 FL (ref 8.9–12.7)
PO2 BLDV: 32.2 MM HG (ref 35–45)
PO2 BLDV: 41.2 MM HG (ref 35–45)
PO2 BLDV: 52.1 MM HG (ref 35–45)
POTASSIUM SERPL-SCNC: 3.4 MMOL/L (ref 3.5–5.3)
POTASSIUM SERPL-SCNC: 3.4 MMOL/L (ref 3.5–5.3)
POTASSIUM SERPL-SCNC: 3.6 MMOL/L (ref 3.5–5.3)
POTASSIUM SERPL-SCNC: 4.1 MMOL/L (ref 3.5–5.3)
QRS AXIS: 108 DEGREES
QRSD INTERVAL: 96 MS
QT INTERVAL: 448 MS
QTC INTERVAL: 504 MS
RBC # BLD AUTO: 4.62 MILLION/UL (ref 3.88–5.62)
SODIUM SERPL-SCNC: 139 MMOL/L (ref 135–147)
SODIUM SERPL-SCNC: 141 MMOL/L (ref 135–147)
SODIUM SERPL-SCNC: 143 MMOL/L (ref 135–147)
SODIUM SERPL-SCNC: 145 MMOL/L (ref 135–147)
T WAVE AXIS: 250 DEGREES
VENTRICULAR RATE: 76 BPM
WBC # BLD AUTO: 9.08 THOUSAND/UL (ref 4.31–10.16)

## 2022-10-30 RX ORDER — POTASSIUM CHLORIDE 14.9 MG/ML
20 INJECTION INTRAVENOUS ONCE
Status: COMPLETED | OUTPATIENT
Start: 2022-10-30 | End: 2022-10-31

## 2022-10-30 RX ORDER — MAGNESIUM SULFATE HEPTAHYDRATE 40 MG/ML
2 INJECTION, SOLUTION INTRAVENOUS ONCE
Status: COMPLETED | OUTPATIENT
Start: 2022-10-30 | End: 2022-10-31

## 2022-10-30 RX ORDER — QUETIAPINE FUMARATE 25 MG/1
25 TABLET, FILM COATED ORAL
Status: DISCONTINUED | OUTPATIENT
Start: 2022-10-30 | End: 2022-10-30

## 2022-10-30 RX ORDER — OLANZAPINE 5 MG/1
2.5 TABLET, ORALLY DISINTEGRATING ORAL
Status: DISCONTINUED | OUTPATIENT
Start: 2022-10-30 | End: 2022-11-01

## 2022-10-30 RX ORDER — MIDODRINE HYDROCHLORIDE 5 MG/1
5 TABLET ORAL
Status: DISCONTINUED | OUTPATIENT
Start: 2022-10-30 | End: 2022-10-31

## 2022-10-30 RX ORDER — CALCIUM GLUCONATE 20 MG/ML
2 INJECTION, SOLUTION INTRAVENOUS ONCE
Status: COMPLETED | OUTPATIENT
Start: 2022-10-30 | End: 2022-10-30

## 2022-10-30 RX ORDER — POTASSIUM CHLORIDE 29.8 MG/ML
40 INJECTION INTRAVENOUS ONCE
Status: COMPLETED | OUTPATIENT
Start: 2022-10-30 | End: 2022-10-30

## 2022-10-30 RX ORDER — POTASSIUM CHLORIDE 20MEQ/15ML
40 LIQUID (ML) ORAL ONCE
Status: COMPLETED | OUTPATIENT
Start: 2022-10-30 | End: 2022-10-30

## 2022-10-30 RX ADMIN — MIDODRINE HYDROCHLORIDE 5 MG: 5 TABLET ORAL at 15:34

## 2022-10-30 RX ADMIN — POTASSIUM CHLORIDE 40 MEQ: 29.8 INJECTION, SOLUTION INTRAVENOUS at 05:30

## 2022-10-30 RX ADMIN — CALCIUM GLUCONATE 2 G: 20 INJECTION, SOLUTION INTRAVENOUS at 05:30

## 2022-10-30 RX ADMIN — POTASSIUM CHLORIDE 40 MEQ: 1.5 SOLUTION ORAL at 19:19

## 2022-10-30 RX ADMIN — VANCOMYCIN HYDROCHLORIDE 1250 MG: 5 INJECTION, POWDER, LYOPHILIZED, FOR SOLUTION INTRAVENOUS at 23:34

## 2022-10-30 RX ADMIN — FAMOTIDINE 20 MG: 10 INJECTION, SOLUTION INTRAVENOUS at 08:44

## 2022-10-30 RX ADMIN — OLANZAPINE 2.5 MG: 5 TABLET, ORALLY DISINTEGRATING ORAL at 21:51

## 2022-10-30 RX ADMIN — APIXABAN 2.5 MG: 2.5 TABLET, FILM COATED ORAL at 08:44

## 2022-10-30 RX ADMIN — CEFEPIME 2000 MG: 2 INJECTION, POWDER, FOR SOLUTION INTRAVENOUS at 23:34

## 2022-10-30 RX ADMIN — DIGOXIN 125 MCG: 125 TABLET ORAL at 08:44

## 2022-10-30 RX ADMIN — Medication 10 MG/HR: at 21:22

## 2022-10-30 RX ADMIN — LEVOTHYROXINE SODIUM 75 MCG: 75 TABLET ORAL at 08:45

## 2022-10-30 RX ADMIN — CEFEPIME 2000 MG: 2 INJECTION, POWDER, FOR SOLUTION INTRAVENOUS at 11:21

## 2022-10-30 RX ADMIN — VASOPRESSIN 0.04 UNITS/MIN: 20 INJECTION INTRAVENOUS at 00:34

## 2022-10-30 RX ADMIN — POTASSIUM CHLORIDE 40 MEQ: 1.5 SOLUTION ORAL at 08:43

## 2022-10-30 RX ADMIN — MAGNESIUM SULFATE HEPTAHYDRATE 2 G: 40 INJECTION, SOLUTION INTRAVENOUS at 09:40

## 2022-10-30 RX ADMIN — APIXABAN 2.5 MG: 2.5 TABLET, FILM COATED ORAL at 18:01

## 2022-10-30 RX ADMIN — MIDODRINE HYDROCHLORIDE 5 MG: 5 TABLET ORAL at 11:21

## 2022-10-30 RX ADMIN — VASOPRESSIN 0.04 UNITS/MIN: 20 INJECTION INTRAVENOUS at 19:06

## 2022-10-30 RX ADMIN — POTASSIUM CHLORIDE 20 MEQ: 14.9 INJECTION, SOLUTION INTRAVENOUS at 19:18

## 2022-10-30 NOTE — ASSESSMENT & PLAN NOTE
· Daughter reports she called EMS after her father fell at home, denies hitting his head   · Daughter reports frequent falls at home  · Patient appears very deconditioned and per family has essentially been bed bound for the last month or so  · PT/OT when medically appropriate

## 2022-10-30 NOTE — PROGRESS NOTES
Subjective:    Denies shortness of breath    Objective:    Atrial fibrillation with borderline rapid ventricular response  Increased venous pressure  Diffuse rales  Systolic ejection murmur    No edema    Remains on inotropes, Lasix drip    Assessment:    Slow progress and  Tenuous prognosis    Plan:    Continue extensive therapy for persistent congestive failure, possibly on basis of aortic stenosis , moderate depression of ejection fraction

## 2022-10-30 NOTE — CASE MANAGEMENT
Case Management Discharge Planning Note    Patient name Brittani Esparza  Location / MRN [id number]  : 1935 Date 10/30/2022       Current Admission Date: 10/24/2022  Current Admission Diagnosis:Cardiogenic shock [facility])   Patient Active Problem List    Diagnosis Date Noted   • Fall 10/25/2022   • Chronic respiratory failure with hypoxia ([address] ) 10/24/2022   • Hypothyroidism 10/24/2022   • Cardiogenic shock ([address] ) 10/24/2022   • Status post incision and drainage 2022   • Hematoma of face, initial encounter 2022   • Skin cancer of face 2022   • Bacteremia due to Staphylococcus 10/28/2021   • Wound of left foot 10/28/2021   • CKD (chronic kidney disease) stage 3, GFR 30-59 ml/min ([address] ) 10/28/2021   • Hypoxia 10/28/2021   • Flexor tenosynovitis of finger 10/27/2021   • Localized swelling on left hand 10/27/2021   • Cellulitis of hand, left 10/27/2021   • Volume overload 2019   • Pleural effusion 2019   • Moderate protein-calorie malnutrition ([address] ) 2019   • Acute respiratory failure with hypoxia ([address] ) 2019   • SHREYA (acute kidney injury) ([facility] [address] ) 2019   • Chronic atrial fibrillation ([address] ) 2019   • Transaminitis 2019      LOS (days): 6  Geometric Mean LOS (GMLOS) (days): 3 90  Days to GMLOS:-1 8     OBJECTIVE:  Risk of Unplanned Readmission Score: 19 23         Current admission status: Inpatient   Preferred Pharmacy:   [address] [place] [address]  Phone: [phone] Fax: [phone]    Primary Care Provider: Desiree Headley DO    Primary Insurance: Rigo Moreno [facility]  Secondary Insurance:     DISCHARGE DETAILS:    Discharge planning discussed with[de-identified] patient's daughter Shari Chungsin via phone  Freedom of Choice: Yes  Comments - Freedom of Choice: CM received consult for hospice services  CM attemtped to reach daughter Jose David Blakely but she did not answer   CM spoke with daughter Shari Martini who reports they are interested in inpatient hospice, as they feel patient requires 24/7 care, and would like whatever is closest to home  As [facility] has the closest [address] asked for a referral to them to see if he's appropriate and meets criteria  She asked that the liaison reach out tomorrow as she and her sister will be together tomorrow to discuss  CM contacted family/caregiver?: Yes  Were Treatment Team discharge recommendations reviewed with patient/caregiver?: Yes  Did patient/caregiver verbalize understanding of patient care needs?: Yes  Were patient/caregiver advised of the risks associated with not following Treatment Team discharge recommendations?: Yes    Contacts  Patient Contacts: Neli Lin (Daughter)   [phone] [address]) Nabil Fatoumata (Daughter)   [phone] (M)  Relationship to Patient[de-identified] Family  Contact Method: Phone  Reason/Outcome: Continuity of Care, Referral, Discharge Planning    Other Referral/Resources/Interventions Provided:  Interventions: Hospice  Referral Comments: Referral sent to [facility] via [facility]       Treatment Team Recommendation: Hospice  Discharge Destination Plan[de-identified] Hospice

## 2022-10-30 NOTE — PLAN OF CARE
Problem: Nutrition/Hydration-ADULT  Goal: Nutrient/Hydration intake appropriate for improving, restoring or maintaining nutritional needs  Description: Monitor and assess patient's nutrition/hydration status for malnutrition  Collaborate with interdisciplinary team and initiate plan and interventions as ordered  Monitor patient's weight and dietary intake as ordered or per policy  Utilize nutrition screening tool and intervene as necessary  Determine patient's food preferences and provide high-protein, high-caloric foods as appropriate       INTERVENTIONS:  - Monitor oral intake, urinary output, labs, and treatment plans  - Assess nutrition and hydration status and recommend course of action  - Evaluate amount of meals eaten  - Assist patient with eating if necessary   - Allow adequate time for meals  - Recommend/ encourage appropriate diets, oral nutritional supplements, and vitamin/mineral supplements  - Order, calculate, and assess calorie counts as needed  - Recommend, monitor, and adjust tube feedings and TPN/PPN based on assessed needs  - Assess need for intravenous fluids  - Provide specific nutrition/hydration education as appropriate  - Include patient/family/caregiver in decisions related to nutrition  Outcome: Progressing     Problem: MOBILITY - ADULT  Goal: Maintain or return to baseline ADL function  Description: INTERVENTIONS:  -  Assess patient's ability to carry out ADLs; assess patient's baseline for ADL function and identify physical deficits which impact ability to perform ADLs (bathing, care of mouth/teeth, toileting, grooming, dressing, etc )  - Assess/evaluate cause of self-care deficits   - Assess range of motion  - Assess patient's mobility; develop plan if impaired  - Assess patient's need for assistive devices and provide as appropriate  - Encourage maximum independence but intervene and supervise when necessary  - Involve family in performance of ADLs  - Assess for home care needs following discharge   - Consider OT consult to assist with ADL evaluation and planning for discharge  - Provide patient education as appropriate  Outcome: Progressing  Goal: Maintains/Returns to pre admission functional level  Description: INTERVENTIONS:  - Perform BMAT or MOVE assessment daily    - Set and communicate daily mobility goal to care team and patient/family/caregiver     - Collaborate with rehabilitation services on mobility goals if consulted  - Perform Range of Motion   - Reposition patient  - Dangle patient   - Stand patient   - Ambulate patient   - Out of bed to chair    - Out of bed for meals   - Out of bed for toileting  - Record patient progress and toleration of activity level   Outcome: Progressing     Problem: Potential for Falls  Goal: Patient will remain free of falls  Description: INTERVENTIONS:  - Educate patient/family on patient safety including physical limitations  - Instruct patient to call for assistance with activity   - Consult OT/PT to assist with strengthening/mobility   - Keep Call bell within reach  - Keep bed low and locked with side rails adjusted as appropriate  - Keep care items and personal belongings within reach  - Initiate and maintain comfort rounds  - Make Fall Risk Sign visible to staff  - Offer Toileting   - Initiate/Maintain alarm  - Obtain necessary fall risk management equipment:   - Apply yellow socks and bracelet for high fall risk patients  - Consider moving patient to room near nurses station  Outcome: Progressing     Problem: Prexisting or High Potential for Compromised Skin Integrity  Goal: Skin integrity is maintained or improved  Description: INTERVENTIONS:  - Identify patients at risk for skin breakdown  - Assess and monitor skin integrity  - Assess and monitor nutrition and hydration status  - Monitor labs   - Assess for incontinence   - Turn and reposition patient  - Assist with mobility/ambulation  - Relieve pressure over bony prominences  - Avoid friction and shearing  - Provide appropriate hygiene as needed including keeping skin clean and dry  - Evaluate need for skin moisturizer/barrier cream  - Collaborate with interdisciplinary team   - Patient/family teaching  - Consider wound care consult   Outcome: Progressing     Problem: SAFETY,RESTRAINT: NV/NON-SELF DESTRUCTIVE BEHAVIOR  Goal: Remains free of harm/injury (restraint for non violent/non self-detsructive behavior)  Description: INTERVENTIONS:  - Instruct patient/family regarding restraint use   - Assess and monitor physiologic and psychological status   - Provide interventions and comfort measures to meet assessed patient needs   - Identify and implement measures to help patient regain control  - Assess readiness for release of restraint   Outcome: Progressing  Goal: Returns to optimal restraint-free functioning  Description: INTERVENTIONS:  - Assess the patient's behavior and symptoms that indicate continued need for restraint  - Identify and implement measures to help patient regain control  - Assess readiness for release of restraint   Outcome: Progressing

## 2022-10-30 NOTE — ASSESSMENT & PLAN NOTE
· Final result: 1/2 BC + staph epidermidis and staph capitis, 1/2 + staph capitis  · Day 7 cefepime/Day 6 vancomycin  · Send repeat blood cultures after 48 hours of appropriate antimicrobial therapy  · ECHO performed on 10/25 without vegetation, however poor study  · If repeat blood cultures positive, will need DENNY

## 2022-10-30 NOTE — ACP (ADVANCE CARE PLANNING)
Family meeting with patient's daughter Eugene Aldrich (and Edward Atkins by phone) to discuss next steps for their father  They explained that they would like to focus on his quality of life and end of life with the full understanding that his clinical issues are clinically and functionally irreversible  They explained that the patient himself has expressed a desire for home and the comforts of home/food that he enjoys  We discussed hospice care (regardless of patient's vasopressor or other medicinal requirements) and have agreed to obtain a hospice consultation to be discussed tomorrow  All questions answered and concerns addressed       Crystal Mejia MD  45 minutes

## 2022-10-30 NOTE — PROGRESS NOTES
Vancomycin IV Pharmacy-to-Dose Consultation    Mj Cesar is a 82 y o  male who is currently receiving Vancomycin IV with management by the Pharmacy Consult service  Assessment/Plan:  The patient was reviewed  Renal function is stable and no signs or symptoms of nephrotoxicity and/or infusion reactions were documented in the chart  Based on today’s assessment, continue current vancomycin (day # 6) dosing of 1250mg IV q24h, with a plan for trough to be drawn on 11/5  We will continue to follow the patient’s culture results and clinical progress daily      Judythe Boast

## 2022-10-30 NOTE — ASSESSMENT & PLAN NOTE
· Currently on 2 5 Eliquis b i d , will continue  · Follows with cardiology outpatient  · Currently rate controlled  · Holding home beta-blocker 2/2 hypotension  · Responded well to initial dose of digoxin  · Continue digoxin every other day  · Check daily digoxin level

## 2022-10-30 NOTE — ASSESSMENT & PLAN NOTE
· Continue pressors for MAP>65, presently on norepinephrine and vasopressin  · Milrinone off 10/27  · Trend VBG, BMP/electolytes q6  · Continue Lasix infusion  · Appreciate cardiology recommendations

## 2022-10-31 ENCOUNTER — HOME CARE VISIT (OUTPATIENT)
Dept: HOME HEALTH SERVICES | Facility: HOME HEALTHCARE | Age: 87
End: 2022-10-31

## 2022-10-31 LAB
ALBUMIN SERPL BCP-MCNC: 2.5 G/DL (ref 3.5–5)
ALP SERPL-CCNC: 117 U/L (ref 46–116)
ALT SERPL W P-5'-P-CCNC: 13 U/L (ref 12–78)
ANION GAP SERPL CALCULATED.3IONS-SCNC: 1 MMOL/L (ref 4–13)
ANION GAP SERPL CALCULATED.3IONS-SCNC: 3 MMOL/L (ref 4–13)
ANION GAP SERPL CALCULATED.3IONS-SCNC: 6 MMOL/L (ref 4–13)
AST SERPL W P-5'-P-CCNC: 23 U/L (ref 5–45)
ATRIAL RATE: 69 BPM
BASE EX.OXY STD BLDV CALC-SCNC: 80.3 % (ref 60–80)
BASE EXCESS BLDV CALC-SCNC: 18.2 MMOL/L
BILIRUB DIRECT SERPL-MCNC: 2.4 MG/DL (ref 0–0.2)
BILIRUB SERPL-MCNC: 3.59 MG/DL (ref 0.2–1)
BUN SERPL-MCNC: 23 MG/DL (ref 5–25)
BUN SERPL-MCNC: 25 MG/DL (ref 5–25)
BUN SERPL-MCNC: 25 MG/DL (ref 5–25)
CA-I BLD-SCNC: 1.03 MMOL/L (ref 1.12–1.32)
CALCIUM SERPL-MCNC: 8.9 MG/DL (ref 8.3–10.1)
CALCIUM SERPL-MCNC: 8.9 MG/DL (ref 8.3–10.1)
CALCIUM SERPL-MCNC: 9.3 MG/DL (ref 8.3–10.1)
CHLORIDE SERPL-SCNC: 100 MMOL/L (ref 96–108)
CHLORIDE SERPL-SCNC: 100 MMOL/L (ref 96–108)
CHLORIDE SERPL-SCNC: 98 MMOL/L (ref 96–108)
CO2 SERPL-SCNC: 41 MMOL/L (ref 21–32)
CO2 SERPL-SCNC: 42 MMOL/L (ref 21–32)
CO2 SERPL-SCNC: 42 MMOL/L (ref 21–32)
CREAT SERPL-MCNC: 1.57 MG/DL (ref 0.6–1.3)
CREAT SERPL-MCNC: 1.59 MG/DL (ref 0.6–1.3)
CREAT SERPL-MCNC: 1.62 MG/DL (ref 0.6–1.3)
ERYTHROCYTE [DISTWIDTH] IN BLOOD BY AUTOMATED COUNT: 22.7 % (ref 11.6–15.1)
GFR SERPL CREATININE-BSD FRML MDRD: 38 ML/MIN/1.73SQ M
GFR SERPL CREATININE-BSD FRML MDRD: 39 ML/MIN/1.73SQ M
GFR SERPL CREATININE-BSD FRML MDRD: 39 ML/MIN/1.73SQ M
GLUCOSE SERPL-MCNC: 108 MG/DL (ref 65–140)
GLUCOSE SERPL-MCNC: 77 MG/DL (ref 65–140)
GLUCOSE SERPL-MCNC: 89 MG/DL (ref 65–140)
GLUCOSE SERPL-MCNC: 91 MG/DL (ref 65–140)
GLUCOSE SERPL-MCNC: 91 MG/DL (ref 65–140)
GLUCOSE SERPL-MCNC: 98 MG/DL (ref 65–140)
GLUCOSE SERPL-MCNC: 99 MG/DL (ref 65–140)
HCO3 BLDV-SCNC: 47.2 MMOL/L (ref 24–30)
HCT VFR BLD AUTO: 41.5 % (ref 36.5–49.3)
HGB BLD-MCNC: 12.7 G/DL (ref 12–17)
MAGNESIUM SERPL-MCNC: 2.4 MG/DL (ref 1.6–2.6)
MAGNESIUM SERPL-MCNC: 2.5 MG/DL (ref 1.6–2.6)
MCH RBC QN AUTO: 26.9 PG (ref 26.8–34.3)
MCHC RBC AUTO-ENTMCNC: 30.6 G/DL (ref 31.4–37.4)
MCV RBC AUTO: 88 FL (ref 82–98)
NASAL CANNULA: 4
O2 CT BLDV-SCNC: 15.3 ML/DL
PCO2 BLDV: 77 MM HG (ref 42–50)
PH BLDV: 7.41 [PH] (ref 7.3–7.4)
PLATELET # BLD AUTO: 106 THOUSANDS/UL (ref 149–390)
PMV BLD AUTO: 9.1 FL (ref 8.9–12.7)
PO2 BLDV: 45.8 MM HG (ref 35–45)
POTASSIUM SERPL-SCNC: 3.3 MMOL/L (ref 3.5–5.3)
POTASSIUM SERPL-SCNC: 3.6 MMOL/L (ref 3.5–5.3)
POTASSIUM SERPL-SCNC: 4 MMOL/L (ref 3.5–5.3)
PROT SERPL-MCNC: 8 G/DL (ref 6.4–8.4)
QRS AXIS: 106 DEGREES
QRSD INTERVAL: 92 MS
QT INTERVAL: 374 MS
QTC INTERVAL: 457 MS
RBC # BLD AUTO: 4.72 MILLION/UL (ref 3.88–5.62)
SODIUM SERPL-SCNC: 143 MMOL/L (ref 135–147)
SODIUM SERPL-SCNC: 144 MMOL/L (ref 135–147)
SODIUM SERPL-SCNC: 146 MMOL/L (ref 135–147)
T WAVE AXIS: 252 DEGREES
VENTRICULAR RATE: 90 BPM
WBC # BLD AUTO: 10.18 THOUSAND/UL (ref 4.31–10.16)

## 2022-10-31 RX ORDER — POTASSIUM CHLORIDE 20MEQ/15ML
40 LIQUID (ML) ORAL ONCE
Status: COMPLETED | OUTPATIENT
Start: 2022-10-31 | End: 2022-10-31

## 2022-10-31 RX ORDER — MIDODRINE HYDROCHLORIDE 5 MG/1
10 TABLET ORAL
Status: DISCONTINUED | OUTPATIENT
Start: 2022-10-31 | End: 2022-11-01

## 2022-10-31 RX ORDER — POTASSIUM CHLORIDE 14.9 MG/ML
20 INJECTION INTRAVENOUS ONCE
Status: COMPLETED | OUTPATIENT
Start: 2022-10-31 | End: 2022-10-31

## 2022-10-31 RX ORDER — FUROSEMIDE 10 MG/ML
80 INJECTION INTRAMUSCULAR; INTRAVENOUS
Status: DISCONTINUED | OUTPATIENT
Start: 2022-10-31 | End: 2022-10-31

## 2022-10-31 RX ORDER — CALCIUM GLUCONATE 20 MG/ML
2 INJECTION, SOLUTION INTRAVENOUS ONCE
Status: COMPLETED | OUTPATIENT
Start: 2022-10-31 | End: 2022-10-31

## 2022-10-31 RX ORDER — MAGNESIUM SULFATE HEPTAHYDRATE 40 MG/ML
2 INJECTION, SOLUTION INTRAVENOUS ONCE
Status: COMPLETED | OUTPATIENT
Start: 2022-10-31 | End: 2022-10-31

## 2022-10-31 RX ORDER — FUROSEMIDE 10 MG/ML
80 INJECTION INTRAMUSCULAR; INTRAVENOUS
Status: DISCONTINUED | OUTPATIENT
Start: 2022-10-31 | End: 2022-11-01

## 2022-10-31 RX ADMIN — POTASSIUM CHLORIDE 20 MEQ: 14.9 INJECTION, SOLUTION INTRAVENOUS at 21:46

## 2022-10-31 RX ADMIN — CALCIUM GLUCONATE 2 G: 20 INJECTION, SOLUTION INTRAVENOUS at 06:21

## 2022-10-31 RX ADMIN — VASOPRESSIN 0.04 UNITS/MIN: 20 INJECTION INTRAVENOUS at 23:53

## 2022-10-31 RX ADMIN — VASOPRESSIN 0.04 UNITS/MIN: 20 INJECTION INTRAVENOUS at 11:06

## 2022-10-31 RX ADMIN — POTASSIUM CHLORIDE 20 MEQ: 14.9 INJECTION, SOLUTION INTRAVENOUS at 06:21

## 2022-10-31 RX ADMIN — POTASSIUM CHLORIDE 40 MEQ: 1.5 SOLUTION ORAL at 07:47

## 2022-10-31 RX ADMIN — APIXABAN 2.5 MG: 2.5 TABLET, FILM COATED ORAL at 09:46

## 2022-10-31 RX ADMIN — LEVOTHYROXINE SODIUM 75 MCG: 75 TABLET ORAL at 07:47

## 2022-10-31 RX ADMIN — FUROSEMIDE 80 MG: 10 INJECTION, SOLUTION INTRAMUSCULAR; INTRAVENOUS at 13:45

## 2022-10-31 RX ADMIN — VASOPRESSIN 0.04 UNITS/MIN: 20 INJECTION INTRAVENOUS at 03:50

## 2022-10-31 RX ADMIN — MAGNESIUM SULFATE HEPTAHYDRATE 2 G: 40 INJECTION, SOLUTION INTRAVENOUS at 11:51

## 2022-10-31 RX ADMIN — SENNOSIDES AND DOCUSATE SODIUM 2 TABLET: 50; 8.6 TABLET ORAL at 09:45

## 2022-10-31 RX ADMIN — OLANZAPINE 2.5 MG: 5 TABLET, ORALLY DISINTEGRATING ORAL at 22:13

## 2022-10-31 RX ADMIN — MIDODRINE HYDROCHLORIDE 5 MG: 5 TABLET ORAL at 07:47

## 2022-10-31 RX ADMIN — POTASSIUM CHLORIDE 20 MEQ: 14.9 INJECTION, SOLUTION INTRAVENOUS at 20:11

## 2022-10-31 RX ADMIN — VASOPRESSIN 0.04 UNITS/MIN: 20 INJECTION INTRAVENOUS at 17:18

## 2022-10-31 NOTE — ASSESSMENT & PLAN NOTE
· Continue pressors for MAP>65, presently on norepinephrine and vasopressin  · Milrinone off 10/27  · Trend VBG, BMP/electolytes q6  · Continue Lasix infusion  · Appreciate cardiology recommendations  · Consult to hospice  · Likely transitioning to comfort measures only in the next few days  · Continue treatment until that time

## 2022-10-31 NOTE — PROGRESS NOTES
[facility]  Progress Note - James Nino 1935, 82 y o  male MRN: [id number]  Unit/Bed#:  Encounter: [id number]  Primary Care Provider: Josette Fatima DO   Date and time admitted to hospital: 10/24/2022  9:04 PM    * Cardiogenic shock [facility])  Assessment & Plan  · Continue pressors for MAP>65, presently on norepinephrine and vasopressin  · Milrinone off 10/27  · Trend VBG, BMP/electolytes q6  · Continue Lasix infusion  · Appreciate cardiology recommendations  · Consult to hospice  · Likely transitioning to comfort measures only in the next few days  · Continue treatment until that time    Chronic respiratory failure with hypoxia ([facility] [address] )  Assessment & Plan  · Chronically wears 5 L nasal cannula at home  · Initially placed on Bipap, transitioned back to home O2 10/25  · Maintain SpO2 > 88%    Bacteremia due to Staphylococcus  Assessment & Plan  · Final result: 1/2 BC + staph epidermidis and staph capitis, 1/2 + staph capitis  · Completed 7 day course of cefepime/vanco  · Meeting with hospice tomorrow and likely transitioning towards comfort    SHREYA (acute kidney injury) ([facility] [address] )  Assessment & Plan  · Baseline creatinine 1 4-1 5, 2 12 on admission  · Creatinine back to baseline  · Continue to follow renal function  · Likely in the setting of volume overload  · Continue with Lasix infusion  · Chacon catheter with strict I&O    Chronic atrial fibrillation (HCC)  Assessment & Plan  · Currently on 2 5 Eliquis b i d , will continue  · Follows with cardiology outpatient  · Currently rate controlled  · Holding home beta-blocker 2/2 hypotension  · Responded well to initial dose of digoxin  · Continue digoxin every other day  · Check daily digoxin level    Hypothyroidism  Assessment & Plan  · Continue home Synthroid    Thrombocytopenia ([facility] [address] )  Assessment & Plan  · Platelets downtrending since 10/25  · Platelets 985 on 19/15, now 96  · No evidence of bleeding  · Monitor daily    Fall  Assessment & Plan  · Daughter reports she called EMS after her father fell at home, denies hitting his head   · Daughter reports frequent falls at home  · Patient appears very deconditioned and per family has essentially been bed bound for the last month or so  · PT/OT when medically appropriate    ----------------------------------------------------------------------------------------  HPI/24hr events: Patient's daughters expressed a desire for patient to be seen by hospice and to eventually transition to comfort measures only  Continuing treatment at this time  Patient's central line was self discontinued during the day today  No adverse effect  No overnight events  Patient appropriate for transfer out of the ICU today?: No  Disposition: Continue Critical Care   Code Status: Level 3 - DNAR and DNI  ---------------------------------------------------------------------------------------  SUBJECTIVE  "I'm good "    Review of Systems   Unable to perform ROS: Mental status change     Review of systems was unable to be performed secondary to altered mental status   ---------------------------------------------------------------------------------------  OBJECTIVE    Vitals   Vitals:    10/31/22 0400 10/31/22 0420 10/31/22 0430 10/31/22 0450   BP: 108/68 105/58 108/73 113/59   Pulse: 99 98 95 94   Resp: 19 (!) 23 (!) 23 20   Temp:       TempSrc:       SpO2: 94% 93% 95% 97%   Weight:       Height:         Temp (24hrs), Av 3 °F (36 8 °C), Min:98 2 °F (36 8 °C), Max:98 3 °F (36 8 °C)  Current: Temperature: 98 3 °F (36 8 °C)  Arterial Line BP: 94/46  Arterial Line MAP (mmHg): (!) 64 mmHg    Respiratory:  SpO2: SpO2: 97 %  Nasal Cannula O2 Flow Rate (L/min): 4 L/min    Invasive/non-invasive ventilation settings   Respiratory  Report   Lab Data (Last 4 hours)    None         O2/Vent Data (Last 4 hours)    None                Physical Exam  Vitals reviewed  Constitutional:       Appearance: He is ill-appearing (chronically)     HENT: Head: Normocephalic and atraumatic  Mouth/Throat:      Mouth: Mucous membranes are dry  Eyes:      Conjunctiva/sclera: Conjunctivae normal    Cardiovascular:      Rate and Rhythm: Tachycardia present  Rhythm irregular  Pulses: Normal pulses  Heart sounds: Murmur heard  Pulmonary:      Effort: Pulmonary effort is normal       Breath sounds: Decreased breath sounds present  Abdominal:      Palpations: Abdomen is soft  Musculoskeletal:      Right lower leg: No edema  Left lower leg: No edema  Skin:     General: Skin is warm and dry  Coloration: Skin is pale  Neurological:      General: No focal deficit present  Mental Status: He is alert        Comments: Oriented to self             Laboratory and Diagnostics:  Results from last 7 days   Lab Units 10/31/22  0444 10/30/22  0434 10/29/22  0520 10/28/22  0600 10/27/22  0546 10/26/22  0616 10/25/22  0437 10/24/22  2128   WBC Thousand/uL 10 18* 9 08 7 51 8 20 10 10 11 41* 7 82 6 09   HEMOGLOBIN g/dL 12 7 12 5 12 2 12 7 12 8 12 1 12 3 13 1   HEMATOCRIT % 41 5 40 5 39 8 40 2 39 7 37 4 37 2 40 9   PLATELETS Thousands/uL 106* 96* 115* 128* 127* 156 170 149   NEUTROS PCT %  --   --  63  --   --   --  76* 65   MONOS PCT %  --   --  14*  --   --   --  11 12     Results from last 7 days   Lab Units 10/31/22  0444 10/31/22  0011 10/30/22  1806 10/30/22  1157 10/30/22  0434 10/29/22  2343 10/29/22  1931 10/26/22  1259 10/26/22  0616 10/25/22  1231 10/25/22  0437 10/24/22  2128   SODIUM mmol/L 144 143 143 141 145 139 144   < > 135   < > 128* 130*   POTASSIUM mmol/L 3 6 4 0 3 4* 3 6 3 4* 4 1 3 0*   < > 3 2*   < > 4 3 5 0   CHLORIDE mmol/L 100 100 100 98 100 99 99   < > 98   < > 97 96   CO2 mmol/L 41* 42* 40* 40* 39* 38* 39*   < > 25   < > 21 23   ANION GAP mmol/L 3* 1* 3* 3* 6 2* 6   < > 12   < > 10 11   BUN mg/dL 23 25 24 25 24 25 25   < > 33*   < > 42* 42*   CREATININE mg/dL 1 57* 1 62* 1 57* 1 63* 1 57* 1 58* 1 59*   < > 1 97*   < > 2 02* 2 12*   CALCIUM mg/dL 8 9 8 9 8 7 9 2 8 3 8 4 8 2*   < > 8 0*   < > 9 0 8 6   GLUCOSE RANDOM mg/dL 99 108 112 94 103 116 117   < > 131   < > 113 94   ALT U/L 13  --   --   --   --   --   --   --  9*  --  16 16   AST U/L 23  --   --   --   --   --   --   --  31  --  40 41   ALK PHOS U/L 117*  --   --   --   --   --   --   --  130*  --  133* 143*   ALBUMIN g/dL 2 5*  --   --   --   --   --   --   --  2 3*  --  2 3* 2 5*   TOTAL BILIRUBIN mg/dL 3 59*  --   --   --   --   --   --   --  3 90*  --  2 90* 3 35*    < > = values in this interval not displayed  Results from last 7 days   Lab Units 10/31/22  0444 10/31/22  0011 10/30/22  1806 10/30/22  1458 10/30/22  0434 10/29/22  2343 10/29/22  1931 10/29/22  1237 10/29/22  0520 10/25/22  1231 10/25/22  0437 10/24/22  2128   MAGNESIUM mg/dL 2 4 2 5 2 4 2 5 2 2 2 1 2 2   < > 1 7   < > 2 1 2 0   PHOSPHORUS mg/dL  --   --   --   --   --   --   --   --  2 5  --  4 3* 4 3*    < > = values in this interval not displayed  Results from last 7 days   Lab Units 10/25/22  0437 10/24/22  2128   INR  1 75* 1 77*   PTT seconds  --  45*          Results from last 7 days   Lab Units 10/24/22  2128   LACTIC ACID mmol/L 1 9     ABG:  Results from last 7 days   Lab Units 10/25/22  0220   PH ART  7 361   PCO2 ART mm Hg 34 4*   PO2 ART mm Hg 135 0*   HCO3 ART mmol/L 19 0*   BASE EXC ART mmol/L -5 5   ABG SOURCE  Line, Arterial     VBG:  Results from last 7 days   Lab Units 10/31/22  0011 10/25/22  0443 10/25/22  0220   PH OLIMPIA  7 405*   < >  --    PCO2 OLIMPIA mm Hg 77 0*   < >  --    PO2 OLIMPIA mm Hg 45 8*   < >  --    HCO3 OLIMPIA mmol/L 47 2*   < >  --    BASE EXC OLIMPIA mmol/L 18 2   < >  --    ABG SOURCE   --   --  Line, Arterial    < > = values in this interval not displayed       Results from last 7 days   Lab Units 10/24/22  2128   PROCALCITONIN ng/ml 0 17       Micro  Results from last 7 days   Lab Units 10/25/22  2323 10/25/22  0121 10/24/22  2128   BLOOD CULTURE   --   -- Staphylococcus capitis*  Staphylococcus epidermidis*  Staphylococcus capitis*  Staphylococcus epidermidis*   GRAM STAIN RESULT   --   --  Gram positive cocci in clusters*  Gram positive cocci in clusters*   URINE CULTURE   --  >100,000 cfu/ml Enterococcus faecalis*  --    MRSA CULTURE ONLY  No Methicillin Resistant Staphlyococcus aureus (MRSA) isolated  --   --        EKG: Atrial fibrillation in the 90s on telemetry   Imaging: I have personally reviewed pertinent reports  Intake and Output  I/O       10/29 0701  10/30 0700 10/30 0701  10/31 0700    P  O   200    I V  (mL/kg) 503 4 (6 2) 460 2 (5 6)    IV Piggyback 300 100    Total Intake(mL/kg) 803 4 (9 8) 760 2 (9 3)    Urine (mL/kg/hr) 2060 (1 1) 3250 (1 7)    Stool  0    Total Output 2060 3250    Net -1256 6 -2489 8          Unmeasured Stool Occurrence  2 x          Height and Weights   Height: 5' 10" (177 8 cm)  IBW (Ideal Body Weight): 73 kg  Body mass index is 25 81 kg/m²  Weight (last 2 days)     Date/Time Weight    10/30/22 0600 81 6 (179 9)    10/29/22 1000 83 1 (183 2)            Nutrition       Diet Orders   (From admission, onward)             Start     Ordered    10/28/22 1455  Dietary nutrition supplements  Once        Question Answer Comment   Select Supplement: MightyShake    Frequency Breakfast        10/28/22 1455    10/28/22 1455  Dietary nutrition supplements  Once        Question Answer Comment   Select Supplement: Magic Cup [place]    Frequency Lunch        10/28/22 1455    10/28/22 1455  Dietary nutrition supplements  Once        Question Answer Comment   Select Supplement: Magic Cup Chocolate    Frequency Dinner        10/28/22 1455    10/26/22 0845  Diet Dysphagia/Modified Consistency; Dysphagia 1-Pureed;  Honey Thick Liquid  Diet effective now        References:    Nutrtion Support Algorithm Enteral vs  Parenteral   Question Answer Comment   Diet Type Dysphagia/Modified Consistency    Dysphagia/Modified Consistency Dysphagia 1-Pureed Liquid Modifier Honey Thick Liquid    RD to adjust diet per protocol? Yes        10/26/22 0845                  Active Medications  Scheduled Meds:  Current Facility-Administered Medications   Medication Dose Route Frequency Provider Last Rate   • apixaban  2 5 mg Oral BID Blima Never, CRNP     • bisacodyl  10 mg Rectal Daily PRN Blima Never, CRNP     • digoxin  125 mcg Oral Every Other Day ANNA MARIE Medellin     • famotidine  20 mg Intravenous Q24H [facility] ANNA MARIE Etienne     • furosemide  10 mg/hr Intravenous Continuous Margie Levels, CRNP 10 mg/hr (10/30/22 2122)   • levothyroxine  75 mcg Oral Daily Before Breakfast Blima Never, CRNP     • midodrine  5 mg Oral TID AC ANNA MARIE Medellin     • norepinephrine  1-30 mcg/min Intravenous Titrated Wilmon Handsome, CRNP 2 mcg/min (10/30/22 1221)   • OLANZapine  2 5 mg Oral HS ANNA MARIE Medellin     • senna-docusate sodium  2 tablet Oral BID Margie Levels, CRNP     • vasopressin  0 04 Units/min Intravenous Continuous [place] Cleaves, CRNP 0 04 Units/min (10/31/22 0350)     Continuous Infusions:  furosemide, 10 mg/hr, Last Rate: 10 mg/hr (10/30/22 2122)  norepinephrine, 1-30 mcg/min, Last Rate: 2 mcg/min (10/30/22 1221)  vasopressin, 0 04 Units/min, Last Rate: 0 04 Units/min (10/31/22 0350)      PRN Meds:   bisacodyl, 10 mg, Daily PRN        Invasive Devices Review  Invasive Devices  Report    Peripheral Intravenous Line  Duration           Peripheral IV 10/30/22 Dorsal (posterior); Right Forearm <1 day    Peripheral IV 10/30/22 Left;Upper;Ventral (anterior) Arm <1 day          Drain  Duration           Open Drain Anterior; Left Hand 365 days    Open Drain Anterior; Left Hand 365 days    Closed/Suction Drain Right  Bulb 7 Fr  249 days    Urethral Catheter 6 days                Rationale for remaining devices: N/A  ---------------------------------------------------------------------------------------  Advance Directive and Living Will:      Power of Attorney:    POLST:    ---------------------------------------------------------------------------------------  Care Time Delivered:   No Critical Care time spent       ANNA MARIE Campbell      Portions of the record may have been created with voice recognition software  Occasional wrong word or "sound a like" substitutions may have occurred due to the inherent limitations of voice recognition software    Read the chart carefully and recognize, using context, where substitutions have occurred

## 2022-10-31 NOTE — PROGRESS NOTES
Progress Note - Palliative & Supportive Care  Yeny Spain  82 y o   male  /   MRN: [id number]  Encounter: [id number]   Patient Active Problem List   Diagnosis   • Acute respiratory failure with hypoxia (HCC)   • SHREYA (acute kidney injury) ([facility] [address] )   • Chronic atrial fibrillation (HCC)   • Transaminitis   • Moderate protein-calorie malnutrition (HCC)   • Pleural effusion   • Volume overload   • Flexor tenosynovitis of finger   • Localized swelling on left hand   • Cellulitis of hand, left   • Bacteremia due to Staphylococcus   • Wound of left foot   • CKD (chronic kidney disease) stage 3, GFR 30-59 ml/min (HCC)   • Hypoxia   • Skin cancer of face   • Hematoma of face, initial encounter   • Status post incision and drainage   • Chronic respiratory failure with hypoxia (HCC)   • Hypothyroidism   • Cardiogenic shock (HCC)   • Fall   • Thrombocytopenia ([facility] [address] )     Assessment  Goals of care    Goals of Care  Level 3 code status    Plan  Symptom Management- per CCM team    24 Hour History  Chart reviewed before visit  Spoke to patient's daughter Gisela Perez, she did speak to [facility], plan is to discharge to [facility]  Patient is resting comfortably in bed, no acute distress        Review of Systems   Unable to perform ROS: Other         Medications    Current Facility-Administered Medications:   •  apixaban (ELIQUIS) tablet 2 5 mg, 2 5 mg, Oral, BID, ANNA MARIE Garcia, 2 5 mg at 10/31/22 8875  •  bisacodyl (DULCOLAX) rectal suppository 10 mg, 10 mg, Rectal, Daily PRN, WILLEM WellerNP  •  digoxin (LANOXIN) tablet 125 mcg, 125 mcg, Oral, Every Other Day, WILLEM PaulinoNP, 125 mcg at 10/30/22 2984  •  furosemide (LASIX) injection 80 mg, 80 mg, Intravenous, BID (diuretic), WILLEM WellerNP  •  levothyroxine tablet 75 mcg, 75 mcg, Oral, Daily Before Breakfast, WILLEM WellerNP, 75 mcg at 10/31/22 0322  •  midodrine (PROAMATINE) tablet 10 mg, 10 mg, Oral, TID AC, Angela Torres, WILLEMNP  •  norepinephrine (LEVOPHED) 8 mg (DOUBLE CONCENTRATION) IV in sodium chloride 0 9% 250 mL, 1-30 mcg/min, Intravenous, Titrated, ANNA MARIE Etienne, Last Rate: 11 3 mL/hr at 10/31/22 0933, 6 mcg/min at 10/31/22 0933  •  OLANZapine (ZyPREXA ZYDIS) dispersible tablet 2 5 mg, 2 5 mg, Oral, HS, Jackie Miu, CRNP, 2 5 mg at 10/30/22 2151  •  senna-docusate sodium (SENOKOT S) 8 6-50 mg per tablet 2 tablet, 2 tablet, Oral, BID, Detra Saint, CRNP, 2 tablet at 10/31/22 0945    Objective  /72   Pulse 98   Temp 98 6 °F (37 °C) (Oral)   Resp (!) 35   Ht 5' 10" (1 778 m)   Wt 81 6 kg (179 lb 14 3 oz)   SpO2 93%   BMI 25 81 kg/m²   Physical Exam:   Constitutional: Appears comfortable and in no acute distress  Head: Normocephalic and atraumatic  Cardiac: Normal rate   Respiratory: Effort normal  No stridor  No respiratory distress  No cough  Neurological: sleeping, did not assess  Skin: Dry, no diaphoresis  Psychiatric: sleeping, did not assess     Lab Results: no labs for review from today  Imaging Studies: no reports for review from today      Counseling / Coordination of Care  Total floor / unit time spent today 30 minutes  Greater than 50% of total time was spent with the patient and / or family counseling and / or coordinating of care  A description of the counseling / coordination of care: Chart reviewed,  provided medical updates, determined goals of care, discussed palliative care and symptom management, discussed code status, discussed comfort care and hospice, determined competency and POA/HCA, determined social/family support, provided psychosocial support  Reviewed with  ICU team, RN and DOMINIQUE Gordillo, MSN, ANNA MARIE  Palliative & Supportive Care    Portions of this document may have been created using dictation software and as such some "sound alike" terms may have been generated by the system   Do not hesitate to contact me with any questions or clarifications

## 2022-10-31 NOTE — PLAN OF CARE
Problem: Nutrition/Hydration-ADULT  Goal: Nutrient/Hydration intake appropriate for improving, restoring or maintaining nutritional needs  Description: Monitor and assess patient's nutrition/hydration status for malnutrition  Collaborate with interdisciplinary team and initiate plan and interventions as ordered  Monitor patient's weight and dietary intake as ordered or per policy  Utilize nutrition screening tool and intervene as necessary  Determine patient's food preferences and provide high-protein, high-caloric foods as appropriate       INTERVENTIONS:  - Monitor oral intake, urinary output, labs, and treatment plans  - Assess nutrition and hydration status and recommend course of action  - Evaluate amount of meals eaten  - Assist patient with eating if necessary   - Allow adequate time for meals  - Recommend/ encourage appropriate diets, oral nutritional supplements, and vitamin/mineral supplements  - Order, calculate, and assess calorie counts as needed  - Recommend, monitor, and adjust tube feedings and TPN/PPN based on assessed needs  - Assess need for intravenous fluids  - Provide specific nutrition/hydration education as appropriate  - Include patient/family/caregiver in decisions related to nutrition  Outcome: Progressing     Problem: MOBILITY - ADULT  Goal: Maintain or return to baseline ADL function  Description: INTERVENTIONS:  -  Assess patient's ability to carry out ADLs; assess patient's baseline for ADL function and identify physical deficits which impact ability to perform ADLs (bathing, care of mouth/teeth, toileting, grooming, dressing, etc )  - Assess/evaluate cause of self-care deficits   - Assess range of motion  - Assess patient's mobility; develop plan if impaired  - Assess patient's need for assistive devices and provide as appropriate  - Encourage maximum independence but intervene and supervise when necessary  - Involve family in performance of ADLs  - Assess for home care needs following discharge   - Consider OT consult to assist with ADL evaluation and planning for discharge  - Provide patient education as appropriate  Outcome: Progressing  Goal: Maintains/Returns to pre admission functional level  Description: INTERVENTIONS:  - Perform BMAT or MOVE assessment daily    - Set and communicate daily mobility goal to care team and patient/family/caregiver  - Collaborate with rehabilitation services on mobility goals if consulted  - Perform Range of Motion 3 times a day  - Reposition patient every 2 hours    Outcome: Progressing     Problem: Potential for Falls  Goal: Patient will remain free of falls  Description: INTERVENTIONS:  - Educate patient/family on patient safety including physical limitations  - Instruct patient to call for assistance with activity   - Consult OT/PT to assist with strengthening/mobility   - Keep Call bell within reach  - Keep bed low and locked with side rails adjusted as appropriate  - Keep care items and personal belongings within reach  - Initiate and maintain comfort rounds  - Make Fall Risk Sign visible to staff  - Offer Toileting every 2 Hours, in advance of need  - Initiate/Maintain bed alarm  - Apply yellow socks and bracelet for high fall risk patients  - Consider moving patient to room near nurses station  Outcome: Progressing     Problem: Prexisting or High Potential for Compromised Skin Integrity  Goal: Skin integrity is maintained or improved  Description: INTERVENTIONS:  - Identify patients at risk for skin breakdown  - Assess and monitor skin integrity  - Assess and monitor nutrition and hydration status  - Monitor labs   - Assess for incontinence   - Turn and reposition patient  - Assist with mobility/ambulation  - Relieve pressure over bony prominences  - Avoid friction and shearing  - Provide appropriate hygiene as needed including keeping skin clean and dry  - Evaluate need for skin moisturizer/barrier cream  - Collaborate with interdisciplinary team   - Patient/family teaching  - Consider wound care consult   Outcome: Progressing     Problem: SAFETY,RESTRAINT: NV/NON-SELF DESTRUCTIVE BEHAVIOR  Goal: Remains free of harm/injury (restraint for non violent/non self-detsructive behavior)  Description: INTERVENTIONS:  - Instruct patient/family regarding restraint use   - Assess and monitor physiologic and psychological status   - Provide interventions and comfort measures to meet assessed patient needs   - Identify and implement measures to help patient regain control  - Assess readiness for release of restraint   Outcome: Progressing  Goal: Returns to optimal restraint-free functioning  Description: INTERVENTIONS:  - Assess the patient's behavior and symptoms that indicate continued need for restraint  - Identify and implement measures to help patient regain control  - Assess readiness for release of restraint   Outcome: Progressing

## 2022-10-31 NOTE — PROGRESS NOTES
Cardiology Progress Note - Chaparro Boyer 82 y o  male MRN: [id number]    Unit/Bed#:  Encounter: [id number]      Assessment/Plan:  1  Cardiogenic shock  · Blood pressure 100/72; continue to monitor  · Currently receiving norepinephrine and Vasopressin  · Milrinone was discontinued 10/27  · Continue midodrine 10 mg t i d   · Echo reveals EF 40-45% with visibly severe AS  · Paliative care spoke to patient's daughter Hood Mak; plan for discharge to [address]     2  Severe aortic stenosis  · Revealed on echocardiogram  · Given significant comorbidities and clinical status patient is likely not a candidate for TAVR at this time  · Paliative care spoke to patient's daughter Hood Mak; plan for discharge to [address]     3  Acute on chronic HFrEF  § Hypervolemic status improving; I/O net -17,376 8 mL  § Echo EF 40-45% with visibly severe AS  § Continue IV Lasix 80 mg b i d  § Strict I/O's and daily weights     4  Chronic atrial fibrillation  · Rate controlled at 98 bpm; continue to monitor  · Rate control on hold at this time due to hypotension  · Continue digoxin 125 mcg every other day  · Continue Eliquis 2 5 mg b i d  for anticoagulation     5  Acute on chronic hypoxic respiratory failure  · Management per CCU     6   chronic kidney disease  · Creatinine 1 57; trending down; continue to monitor  · Management per CCU     7  Frequent falls  § Management per CCU    Cardiology will sign off of patient at this time  Thank you for this consult  Subjective:   Patient seen and examined  No significant events overnight  Denies new complaints at this time  Objective:     Vitals: Blood pressure 100/72, pulse 98, temperature 98 6 °F (37 °C), temperature source Oral, resp  rate (!) 35, height 5' 10" (1 778 m), weight 81 6 kg (179 lb 14 3 oz), SpO2 93 %  , Body mass index is 25 81 kg/m² ,   Orthostatic Blood Pressures    Flowsheet Row Most Recent Value   Blood Pressure 100/72 filed at 10/31/2022 9963 Patient Position - Orthostatic VS Lying filed at 10/28/2022 0230            Intake/Output Summary (Last 24 hours) at 10/31/2022 1057  Last data filed at 10/31/2022 0751  Gross per 24 hour   Intake 945 32 ml   Output 4075 ml   Net -3129 68 ml         Physical Exam:  Physical Exam  Vitals and nursing note reviewed  Constitutional:       General: He is sleeping  Appearance: He is well-developed  He is ill-appearing  HENT:      Head: Normocephalic and atraumatic  Nose: Nose normal    Eyes:      Conjunctiva/sclera: Conjunctivae normal    Cardiovascular:      Rate and Rhythm: Normal rate  Rhythm irregularly irregular  Heart sounds: Murmur heard  Systolic (ejection) murmur is present with a grade of 2/6  Pulmonary:      Effort: Pulmonary effort is normal  No respiratory distress  Breath sounds: Normal breath sounds  No wheezing or rales  Abdominal:      Palpations: Abdomen is soft  Tenderness: There is no abdominal tenderness  Musculoskeletal:      Cervical back: Neck supple  Right lower leg: No edema  Left lower leg: No edema  Skin:     General: Skin is warm and dry  Neurological:      Mental Status: He is lethargic              Medications:      Current Facility-Administered Medications:   •  apixaban (ELIQUIS) tablet 2 5 mg, 2 5 mg, Oral, BID, ANNA MARIE Zepeda, 2 5 mg at 10/31/22 4943  •  bisacodyl (DULCOLAX) rectal suppository 10 mg, 10 mg, Rectal, Daily PRN, ANNA MARIE Shah  •  digoxin (LANOXIN) tablet 125 mcg, 125 mcg, Oral, Every Other Day, ANNA MARIE Willis, 125 mcg at 10/30/22 4305  •  furosemide (LASIX) injection 80 mg, 80 mg, Intravenous, BID (diuretic), ANNA MARIE Shah  •  levothyroxine tablet 75 mcg, 75 mcg, Oral, Daily Before Breakfast, ANNA MARIE Shah, 75 mcg at 10/31/22 0747  •  midodrine (PROAMATINE) tablet 10 mg, 10 mg, Oral, TID MELVIN, ANNA MARIE Rodriguez  •  norepinephrine (LEVOPHED) 8 mg (DOUBLE CONCENTRATION) IV in sodium chloride 0 9% 250 mL, 1-30 mcg/min, Intravenous, Titrated, Maci Leone CRNP, Last Rate: 11 3 mL/hr at 10/31/22 0933, 6 mcg/min at 10/31/22 0933  •  OLANZapine (ZyPREXA ZYDIS) dispersible tablet 2 5 mg, 2 5 mg, Oral, HS, iRa Patton, CRNP, 2 5 mg at 10/30/22 2151  •  senna-docusate sodium (SENOKOT S) 8 6-50 mg per tablet 2 tablet, 2 tablet, Oral, BID, Farzana Coats, CRNP, 2 tablet at 10/31/22 0945     Labs & Results:     Results from last 7 days   Lab Units 10/25/22  1231 10/25/22  0410 10/25/22  0114 10/24/22  2128   CK TOTAL U/L 66  --   --  65   HS TNI 0HR ng/L  --   --   --  28   HS TNI 2HR ng/L  --   --  25  --    HSTNI D2 ng/L  --   --  -3  --    HS TNI 4HR ng/L  --  25  --   --    HSTNI D4 ng/L  --  -3  --   --    NT-PRO BNP pg/mL  --   --   --  4,222*     Results from last 7 days   Lab Units 10/31/22  0444 10/30/22  0434 10/29/22  0520   WBC Thousand/uL 10 18* 9 08 7 51   HEMOGLOBIN g/dL 12 7 12 5 12 2   HEMATOCRIT % 41 5 40 5 39 8   PLATELETS Thousands/uL 106* 96* 115*     Results from last 7 days   Lab Units 10/25/22  0437   TRIGLYCERIDES mg/dL 71   HDL mg/dL 15*     Results from last 7 days   Lab Units 10/31/22  0444 10/31/22  0011 10/30/22  1806 10/26/22  1259 10/26/22  0616 10/25/22  1231 10/25/22  0437   POTASSIUM mmol/L 3 6 4 0 3 4*   < > 3 2*   < > 4 3   CHLORIDE mmol/L 100 100 100   < > 98   < > 97   CO2 mmol/L 41* 42* 40*   < > 25   < > 21   BUN mg/dL 23 25 24   < > 33*   < > 42*   CREATININE mg/dL 1 57* 1 62* 1 57*   < > 1 97*   < > 2 02*   CALCIUM mg/dL 8 9 8 9 8 7   < > 8 0*   < > 9 0   ALK PHOS U/L 117*  --   --   --  130*  --  133*   ALT U/L 13  --   --   --  9*  --  16   AST U/L 23  --   --   --  31  --  40    < > = values in this interval not displayed       Results from last 7 days   Lab Units 10/25/22  0437 10/24/22  2128   INR  1 75* 1 77*   PTT seconds  --  45*     Results from last 7 days   Lab Units 10/31/22  0444 10/31/22  0011 10/30/22  1806   MAGNESIUM mg/dL 2 4 2 5 2 4 Vitals: Blood pressure 100/72, pulse 98, temperature 98 6 °F (37 °C), temperature source Oral, resp  rate (!) 35, height 5' 10" (1 778 m), weight 81 6 kg (179 lb 14 3 oz), SpO2 93 %  , Body mass index is 25 81 kg/m² ,   Orthostatic Blood Pressures    Flowsheet Row Most Recent Value   Blood Pressure 100/72 filed at 10/31/2022 6642   Patient Position - Orthostatic VS Lying filed at 10/28/2022 6026          Systolic (96VLP), IKM:175 , Min:72 , ULR:853     Diastolic (87MDB), YDE:53, Min:50, Max:78        Intake/Output Summary (Last 24 hours) at 10/31/2022 1057  Last data filed at 10/31/2022 0751  Gross per 24 hour   Intake 945 32 ml   Output 4075 ml   Net -3129 68 ml       Invasive Devices  Report    Peripheral Intravenous Line  Duration           Peripheral IV 10/30/22 Dorsal (posterior); Right Forearm 1 day    Peripheral IV 10/30/22 Left;Upper;Ventral (anterior) Arm 1 day          Drain  Duration           Open Drain Anterior; Left Hand 366 days    Open Drain Anterior; Left Hand 366 days    Closed/Suction Drain Right  Bulb 7 Fr  249 days    Urethral Catheter 6 days                  EKG: Atrial fibrillation; Incomplete right bundle branch block; Possible Right ventricular hypertrophy; T wave abnormality, consider anterior ischemia    Telemetry:    Telemetry Reviewed: Normal Sinus Rhythm and Atrial fibrillation   HR averaging 95  Indication for Continued Telemetry Use: Arrthymias requiring medical therapy    BP Readings from Last 3 Encounters:   10/31/22 100/72   02/26/22 123/79   11/02/21 102/58      Wt Readings from Last 3 Encounters:   10/30/22 81 6 kg (179 lb 14 3 oz)   02/25/22 92 3 kg (203 lb 7 8 oz)   11/02/21 96 6 kg (212 lb 14 4 oz)

## 2022-10-31 NOTE — CASE MANAGEMENT
Case Management Discharge Planning Note    Patient name Katherin Mcdonnell  Location / MRN [id number]  : 1935 Date 10/31/2022       Current Admission Date: 10/24/2022  Current Admission Diagnosis:Cardiogenic shock [facility])   Patient Active Problem List    Diagnosis Date Noted   • Thrombocytopenia ([address] ) 10/30/2022   • Fall 10/25/2022   • Chronic respiratory failure with hypoxia ([address] ) 10/24/2022   • Hypothyroidism 10/24/2022   • Cardiogenic shock ([address] ) 10/24/2022   • Status post incision and drainage 2022   • Hematoma of face, initial encounter 2022   • Skin cancer of face 2022   • Bacteremia due to Staphylococcus 10/28/2021   • Wound of left foot 10/28/2021   • CKD (chronic kidney disease) stage 3, GFR 30-59 ml/min ([facility] [address] [address] ) 10/28/2021   • Hypoxia 10/28/2021   • Flexor tenosynovitis of finger 10/27/2021   • Localized swelling on left hand 10/27/2021   • Cellulitis of hand, left 10/27/2021   • Volume overload 2019   • Pleural effusion 2019   • Moderate protein-calorie malnutrition ([address] ) 2019   • Acute respiratory failure with hypoxia ([address] ) 2019   • SHREYA (acute kidney injury) ([facility] [address] ) 2019   • Chronic atrial fibrillation ([facility] [address] ) 2019   • Transaminitis 2019      LOS (days): 7  Geometric Mean LOS (GMLOS) (days): 3 90  Days to GMLOS:-2 6     OBJECTIVE:  Risk of Unplanned Readmission Score: 19 07         Current admission status: Inpatient   Preferred Pharmacy:   [place] [address]  Phone: [phone] Fax: [phone]    Primary Care Provider: Sheryl Gray DO    Primary Insurance: Felice Goodwin [facility]  Secondary Insurance:     DISCHARGE DETAILS:                                          Other Referral/Resources/Interventions Provided:  Interventions: Hospice  Referral Comments: Call from [facility], [phone]    They do not have beds today but patient does meet criteria for In-Pt hospice at this time  They will follow and let us know when bed opens  CM will also send ref to [facility]  In addition, Mony Bustillos from -Hospice they are trying to find pt's other children - she reports there are a total of 9 children and daughter Sean Monet has been unable to produce POA, however family reports pt's other children are estranged  Daughter Sean Monet continues to say that she has POA and is looking for the documentation           Treatment Team Recommendation: Hospice  Discharge Destination Plan[de-identified] Hospice  Transport at Discharge : BLS Ambulance

## 2022-10-31 NOTE — CASE MANAGEMENT
Case Management Progress Note    Patient name Garnette Scheuermann  Location / MRN [id number]  : 1935 Date 10/31/2022       LOS (days): 7  Geometric Mean LOS (GMLOS) (days): 3 90  Days to GMLOS:-2 7        OBJECTIVE:        Current admission status: Inpatient  Preferred Pharmacy:   [address] [place] [address]  Phone: [phone] Fax: [phone]    Primary Care Provider: Dmitry Candelaria DO    Primary Insurance: [employer] [facility]  Secondary Insurance:     PROGRESS NOTE:    [id number] [facility] reviewed case - they can accept for routine level hospice (home hospice)  CM knows family [facility]

## 2022-10-31 NOTE — ASSESSMENT & PLAN NOTE
· Final result: 1/2 BC + staph epidermidis and staph capitis, 1/2 + staph capitis  · Completed 7 day course of cefepime/vanco  · Meeting with hospice tomorrow and likely transitioning towards comfort

## 2022-10-31 NOTE — HOSPICE NOTE
Received referral and was asked to evaluate patient for IPU  Patient was reviewed with Dr Arti Self and was approved for [facility] (Hospice at home or SNF), not approved for IPU  I did relay this to Jose Neff, [address]  She asked that I not reach out to family at this time  She will update me if she wants me to reach out to them

## 2022-10-31 NOTE — PROGRESS NOTES
Spiritual Care Progress Note    10/31/2022  Patient: Garnette Scheuermann : 1935  Admission Date & Time: 10/24/2022 2104  MRN: [id number] CSN: [id number]     attempted to visit pt per CM referral  Pt resting at this time, no visitors at bedside   will remain available if requested       10/31/22 1300   Clinical Encounter Type   Visited With Patient not available   Routine Visit Follow-up   Referral From

## 2022-10-31 NOTE — ASSESSMENT & PLAN NOTE
· Platelets downtrending since 10/25  · Platelets 370 on 57/86, now 96  · No evidence of bleeding  · Monitor daily

## 2022-11-01 VITALS
OXYGEN SATURATION: 98 % | HEART RATE: 97 BPM | TEMPERATURE: 97.5 F | DIASTOLIC BLOOD PRESSURE: 50 MMHG | HEIGHT: 70 IN | BODY MASS INDEX: 25.75 KG/M2 | RESPIRATION RATE: 25 BRPM | WEIGHT: 179.9 LBS | SYSTOLIC BLOOD PRESSURE: 81 MMHG

## 2022-11-01 LAB
ALBUMIN SERPL BCP-MCNC: 2.5 G/DL (ref 3.5–5)
ALP SERPL-CCNC: 116 U/L (ref 46–116)
ALT SERPL W P-5'-P-CCNC: 10 U/L (ref 12–78)
ANION GAP SERPL CALCULATED.3IONS-SCNC: 10 MMOL/L (ref 4–13)
AST SERPL W P-5'-P-CCNC: 29 U/L (ref 5–45)
BASOPHILS # BLD AUTO: 0.11 THOUSANDS/ÂΜL (ref 0–0.1)
BASOPHILS NFR BLD AUTO: 1 % (ref 0–1)
BILIRUB SERPL-MCNC: 4.27 MG/DL (ref 0.2–1)
BUN SERPL-MCNC: 24 MG/DL (ref 5–25)
CA-I BLD-SCNC: 1.07 MMOL/L (ref 1.12–1.32)
CALCIUM ALBUM COR SERPL-MCNC: 10.3 MG/DL (ref 8.3–10.1)
CALCIUM SERPL-MCNC: 9.1 MG/DL (ref 8.3–10.1)
CHLORIDE SERPL-SCNC: 101 MMOL/L (ref 96–108)
CO2 SERPL-SCNC: 39 MMOL/L (ref 21–32)
CREAT SERPL-MCNC: 1.64 MG/DL (ref 0.6–1.3)
DIGOXIN SERPL-MCNC: 0.5 NG/ML (ref 0.8–2)
EOSINOPHIL # BLD AUTO: 0.46 THOUSAND/ÂΜL (ref 0–0.61)
EOSINOPHIL NFR BLD AUTO: 4 % (ref 0–6)
ERYTHROCYTE [DISTWIDTH] IN BLOOD BY AUTOMATED COUNT: 23.2 % (ref 11.6–15.1)
FLUAV RNA RESP QL NAA+PROBE: NEGATIVE
FLUBV RNA RESP QL NAA+PROBE: NEGATIVE
GFR SERPL CREATININE-BSD FRML MDRD: 37 ML/MIN/1.73SQ M
GLUCOSE SERPL-MCNC: 91 MG/DL (ref 65–140)
GLUCOSE SERPL-MCNC: 92 MG/DL (ref 65–140)
GLUCOSE SERPL-MCNC: 97 MG/DL (ref 65–140)
HCT VFR BLD AUTO: 44.1 % (ref 36.5–49.3)
HGB BLD-MCNC: 13.6 G/DL (ref 12–17)
IMM GRANULOCYTES # BLD AUTO: 0.07 THOUSAND/UL (ref 0–0.2)
IMM GRANULOCYTES NFR BLD AUTO: 1 % (ref 0–2)
LYMPHOCYTES # BLD AUTO: 1.9 THOUSANDS/ÂΜL (ref 0.6–4.47)
LYMPHOCYTES NFR BLD AUTO: 17 % (ref 14–44)
MAGNESIUM SERPL-MCNC: 2.6 MG/DL (ref 1.6–2.6)
MCH RBC QN AUTO: 27.5 PG (ref 26.8–34.3)
MCHC RBC AUTO-ENTMCNC: 30.8 G/DL (ref 31.4–37.4)
MCV RBC AUTO: 89 FL (ref 82–98)
MONOCYTES # BLD AUTO: 1.13 THOUSAND/ÂΜL (ref 0.17–1.22)
MONOCYTES NFR BLD AUTO: 10 % (ref 4–12)
NEUTROPHILS # BLD AUTO: 7.41 THOUSANDS/ÂΜL (ref 1.85–7.62)
NEUTS SEG NFR BLD AUTO: 67 % (ref 43–75)
NRBC BLD AUTO-RTO: 0 /100 WBCS
PHOSPHATE SERPL-MCNC: 2.5 MG/DL (ref 2.3–4.1)
PLATELET # BLD AUTO: 120 THOUSANDS/UL (ref 149–390)
PMV BLD AUTO: 9.3 FL (ref 8.9–12.7)
POTASSIUM SERPL-SCNC: 3.7 MMOL/L (ref 3.5–5.3)
PROT SERPL-MCNC: 8.1 G/DL (ref 6.4–8.4)
RBC # BLD AUTO: 4.94 MILLION/UL (ref 3.88–5.62)
RSV RNA RESP QL NAA+PROBE: NEGATIVE
SARS-COV-2 RNA RESP QL NAA+PROBE: NEGATIVE
SODIUM SERPL-SCNC: 150 MMOL/L (ref 135–147)
WBC # BLD AUTO: 11.08 THOUSAND/UL (ref 4.31–10.16)

## 2022-11-01 RX ORDER — MIDODRINE HYDROCHLORIDE 5 MG/1
15 TABLET ORAL
Status: DISCONTINUED | OUTPATIENT
Start: 2022-11-01 | End: 2022-11-01

## 2022-11-01 RX ORDER — CALCIUM GLUCONATE 20 MG/ML
2 INJECTION, SOLUTION INTRAVENOUS ONCE
Status: COMPLETED | OUTPATIENT
Start: 2022-11-01 | End: 2022-11-01

## 2022-11-01 RX ORDER — ALBUMIN (HUMAN) 12.5 G/50ML
12.5 SOLUTION INTRAVENOUS ONCE
Status: COMPLETED | OUTPATIENT
Start: 2022-11-01 | End: 2022-11-01

## 2022-11-01 RX ORDER — POTASSIUM CHLORIDE 14.9 MG/ML
20 INJECTION INTRAVENOUS ONCE
Status: DISCONTINUED | OUTPATIENT
Start: 2022-11-01 | End: 2022-11-01

## 2022-11-01 RX ADMIN — MIDODRINE HYDROCHLORIDE 15 MG: 5 TABLET ORAL at 16:08

## 2022-11-01 RX ADMIN — MIDODRINE HYDROCHLORIDE 15 MG: 5 TABLET ORAL at 11:25

## 2022-11-01 RX ADMIN — NOREPINEPHRINE BITARTRATE 3 MCG/MIN: 1 INJECTION, SOLUTION, CONCENTRATE INTRAVENOUS at 10:18

## 2022-11-01 RX ADMIN — CALCIUM GLUCONATE 2 G: 20 INJECTION, SOLUTION INTRAVENOUS at 12:41

## 2022-11-01 RX ADMIN — VASOPRESSIN 0.04 UNITS/MIN: 20 INJECTION INTRAVENOUS at 08:41

## 2022-11-01 RX ADMIN — FUROSEMIDE 80 MG: 10 INJECTION, SOLUTION INTRAMUSCULAR; INTRAVENOUS at 08:37

## 2022-11-01 RX ADMIN — ALBUMIN (HUMAN) 12.5 G: 0.25 INJECTION, SOLUTION INTRAVENOUS at 12:47

## 2022-11-01 NOTE — CASE MANAGEMENT
Case Management Discharge Planning Note    Patient name Jerod Viera  Location / MRN [id number]  : 1935 Date 2022       Current Admission Date: 10/24/2022  Current Admission Diagnosis:Cardiogenic shock [facility])   Patient Active Problem List    Diagnosis Date Noted   • Thrombocytopenia ([address] ) 10/30/2022   • Fall 10/25/2022   • Chronic respiratory failure with hypoxia ([address] ) 10/24/2022   • Hypothyroidism 10/24/2022   • Cardiogenic shock ([address] ) 10/24/2022   • Status post incision and drainage 2022   • Hematoma of face, initial encounter 2022   • Skin cancer of face 2022   • Bacteremia due to Staphylococcus 10/28/2021   • Wound of left foot 10/28/2021   • CKD (chronic kidney disease) stage 3, GFR 30-59 ml/min ([facility] [address] ) 10/28/2021   • Hypoxia 10/28/2021   • Flexor tenosynovitis of finger 10/27/2021   • Localized swelling on left hand 10/27/2021   • Cellulitis of hand, left 10/27/2021   • Volume overload 2019   • Pleural effusion 2019   • Moderate protein-calorie malnutrition ([address] ) 2019   • Acute respiratory failure with hypoxia ([facility] [address] ) 2019   • SHREYA (acute kidney injury) ([facility] [address] ) 2019   • Chronic atrial fibrillation ([facility] [address] ) 2019   • Transaminitis 2019      LOS (days): 8  Geometric Mean LOS (GMLOS) (days): 3 90  Days to GMLOS:-3 8     OBJECTIVE:  Risk of Unplanned Readmission Score: 19 13      Current admission status: Inpatient   Preferred Pharmacy:   [place] [address]  Phone: [phone] Fax: [phone]    Primary Care Provider: Arcenio Kern DO    Primary Insurance: Pia Case [facility]  Secondary Insurance:     DISCHARGE DETAILS:     IMM Given (Date):: 22  IMM Given to[de-identified] Family (Pt daughter at the bedside)     [address] [facility] Kaleb Allison [address]  Receiving Facility/Agency Phone Number: [phone]

## 2022-11-01 NOTE — ASSESSMENT & PLAN NOTE
· Continue pressors for MAP>65, presently on norepinephrine and vasopressin  · Milrinone off 10/27  · Lasix switched to bolus dosing  · Appreciate cardiology recommendations  · Consult to hospice pending  · Likely transitioning to comfort measures only in the next few days  · Continue treatment until that time

## 2022-11-01 NOTE — ASSESSMENT & PLAN NOTE
· Currently on 2 5 Eliquis b i d , will continue-unable to take overnight since patient unable to take po  · Follows with cardiology outpatient  · Currently rate controlled  · Holding home beta-blocker 2/2 hypotension  · Responded well to initial dose of digoxin  · Continue digoxin every other day  · Check daily digoxin level

## 2022-11-01 NOTE — PROGRESS NOTES
[facility]  Progress Note - Zoe Johnson 1935, 82 y o  male MRN: [id number]  Unit/Bed#:  Encounter: [id number]  Primary Care Provider: Lance Mejia DO   Date and time admitted to hospital: 10/24/2022  9:04 PM    * Cardiogenic shock [facility])  Assessment & Plan  · Continue pressors for MAP>65, presently on norepinephrine and vasopressin  · Milrinone off 10/27  · Lasix switched to bolus dosing  · Appreciate cardiology recommendations  · Consult to hospice pending  · Likely transitioning to comfort measures only in the next few days  · Continue treatment until that time    Chronic respiratory failure with hypoxia [facility])  Assessment & Plan  · Chronically wears 5 L nasal cannula at home  · Initially placed on Bipap, transitioned back to home O2 10/25  · Maintain SpO2 > 88%    Bacteremia due to Staphylococcus  Assessment & Plan  · Final result: 1/2 BC + staph epidermidis and staph capitis, 1/2 + staph capitis  · Completed 7 day course of cefepime/vanco  · Likely transitioning towards hospice-awaiting acceptance/bed    SHREYA (acute kidney injury) ([facility] )  Assessment & Plan  · Baseline creatinine 1 4-1 5, 2 12 on admission  · Creatinine back to baseline  · Continue to follow renal function  · Likely in the setting of volume overload  · Continue with Lasix dosing  · Chacon catheter with strict I&O    Chronic atrial fibrillation (HCC)  Assessment & Plan  · Currently on 2 5 Eliquis b i d , will continue-unable to take overnight since patient unable to take po  · Follows with cardiology outpatient  · Currently rate controlled  · Holding home beta-blocker 2/2 hypotension  · Responded well to initial dose of digoxin  · Continue digoxin every other day  · Check daily digoxin level    Hypothyroidism  Assessment & Plan  · Continue home Synthroid    Thrombocytopenia ([facility] )  Assessment & Plan  · Platelets downtrending since 10/25  · Platelets 950 on 14/34, back up to 106 on 10/31  · No evidence of bleeding  · Monitor daily    Fall  Assessment & Plan  · Daughter reports she called EMS after her father fell at home, denies hitting his head   · Daughter reports frequent falls at home  · Patient appears very deconditioned and per family has essentially been bed bound for the last month or so  · PT/OT when medically appropriate      ----------------------------------------------------------------------------------------  HPI/24hr events: Remains on low dose vasopressors, as patient is unable to take po meds (midodrine had been started) given his worsening clinical status  Awaiting hospice determination/placement  Patient appropriate for transfer out of the ICU today?: No  Disposition: Continue Critical Care   Code Status: Level 3 - DNAR and DNI  ---------------------------------------------------------------------------------------  SUBJECTIVE  Unable to assess 2/2 worsening clinical status    Review of Systems   Unable to perform ROS: Acuity of condition     Review of systems was unable to be performed secondary to acuity of condition  ---------------------------------------------------------------------------------------  OBJECTIVE    Vitals   Vitals:    10/31/22 1600 10/31/22 1700 10/31/22 1800 10/31/22 1900   BP: 100/59 99/59 112/56 104/61   Pulse: 82 81 78 84   Resp: 17 17 22 20   Temp:    97 5 °F (36 4 °C)   TempSrc:    Oral   SpO2: 96% 96% 95% 96%   Weight:       Height:         Temp (24hrs), Av 1 °F (36 7 °C), Min:97 5 °F (36 4 °C), Max:98 6 °F (37 °C)  Current: Temperature: 97 5 °F (36 4 °C)  Arterial Line BP: 94/46  Arterial Line MAP (mmHg): (!) 64 mmHg    Respiratory:  SpO2: SpO2: 96 %  Nasal Cannula O2 Flow Rate (L/min): 4 L/min    Invasive/non-invasive ventilation settings   Respiratory  Report   Lab Data (Last 4 hours)    None         O2/Vent Data (Last 4 hours)    None                Physical Exam  Constitutional:       Appearance: He is ill-appearing     HENT:      Head: Normocephalic and atraumatic  Eyes:      Pupils: Pupils are equal, round, and reactive to light  Neck:      Vascular: No JVD  Cardiovascular:      Rate and Rhythm: Tachycardia present  Rhythm regularly irregular  Heart sounds: Normal heart sounds  Pulmonary:      Breath sounds: Decreased breath sounds present  Abdominal:      General: Bowel sounds are decreased  Palpations: Abdomen is soft  Musculoskeletal:      Right lower leg: No edema  Left lower leg: No edema  Skin:     General: Skin is warm and dry  Neurological:      Mental Status: He is easily aroused  He is lethargic  GCS: GCS eye subscore is 3  GCS verbal subscore is 3  GCS motor subscore is 6                Laboratory and Diagnostics:  Results from last 7 days   Lab Units 10/31/22  0444 10/30/22  0434 10/29/22  0520 10/28/22  0600 10/27/22  0546 10/26/22  0616 10/25/22  0437   WBC Thousand/uL 10 18* 9 08 7 51 8 20 10 10 11 41* 7 82   HEMOGLOBIN g/dL 12 7 12 5 12 2 12 7 12 8 12 1 12 3   HEMATOCRIT % 41 5 40 5 39 8 40 2 39 7 37 4 37 2   PLATELETS Thousands/uL 106* 96* 115* 128* 127* 156 170   NEUTROS PCT %  --   --  63  --   --   --  76*   MONOS PCT %  --   --  14*  --   --   --  11     Results from last 7 days   Lab Units 10/31/22  1802 10/31/22  0444 10/31/22  0011 10/30/22  1806 10/30/22  1157 10/30/22  0434 10/29/22  2343 10/26/22  1259 10/26/22  0616 10/25/22  1231 10/25/22  0437   SODIUM mmol/L 146 144 143 143 141 145 139   < > 135   < > 128*   POTASSIUM mmol/L 3 3* 3 6 4 0 3 4* 3 6 3 4* 4 1   < > 3 2*   < > 4 3   CHLORIDE mmol/L 98 100 100 100 98 100 99   < > 98   < > 97   CO2 mmol/L 42* 41* 42* 40* 40* 39* 38*   < > 25   < > 21   ANION GAP mmol/L 6 3* 1* 3* 3* 6 2*   < > 12   < > 10   BUN mg/dL 25 23 25 24 25 24 25   < > 33*   < > 42*   CREATININE mg/dL 1 59* 1 57* 1 62* 1 57* 1 63* 1 57* 1 58*   < > 1 97*   < > 2 02*   CALCIUM mg/dL 9 3 8 9 8 9 8 7 9 2 8 3 8 4   < > 8 0*   < > 9 0   GLUCOSE RANDOM mg/dL 91 99 108 112 94 103 116   < > 131   < > 113   ALT U/L  --  13  --   --   --   --   --   --  9*  --  16   AST U/L  --  23  --   --   --   --   --   --  31  --  40   ALK PHOS U/L  --  117*  --   --   --   --   --   --  130*  --  133*   ALBUMIN g/dL  --  2 5*  --   --   --   --   --   --  2 3*  --  2 3*   TOTAL BILIRUBIN mg/dL  --  3 59*  --   --   --   --   --   --  3 90*  --  2 90*    < > = values in this interval not displayed  Results from last 7 days   Lab Units 10/31/22  0444 10/31/22  0011 10/30/22  1806 10/30/22  1458 10/30/22  0434 10/29/22  2343 10/29/22  1931 10/29/22  1237 10/29/22  0520 10/25/22  1231 10/25/22  0437   MAGNESIUM mg/dL 2 4 2 5 2 4 2 5 2 2 2 1 2 2   < > 1 7   < > 2 1   PHOSPHORUS mg/dL  --   --   --   --   --   --   --   --  2 5  --  4 3*    < > = values in this interval not displayed  Results from last 7 days   Lab Units 10/25/22  0437   INR  1 75*              ABG:  Results from last 7 days   Lab Units 10/25/22  0220   PH ART  7 361   PCO2 ART mm Hg 34 4*   PO2 ART mm Hg 135 0*   HCO3 ART mmol/L 19 0*   BASE EXC ART mmol/L -5 5   ABG SOURCE  Line, Arterial     VBG:  Results from last 7 days   Lab Units 10/31/22  0011 10/25/22  0443 10/25/22  0220   PH OLIMPIA  7 405*   < >  --    PCO2 OLIMPIA mm Hg 77 0*   < >  --    PO2 OLIMPIA mm Hg 45 8*   < >  --    HCO3 OLIMPIA mmol/L 47 2*   < >  --    BASE EXC OLIMPIA mmol/L 18 2   < >  --    ABG SOURCE   --   --  Line, Arterial    < > = values in this interval not displayed  Micro  Results from last 7 days   Lab Units 10/25/22  2323 10/25/22  0121   URINE CULTURE   --  >100,000 cfu/ml Enterococcus faecalis*   MRSA CULTURE ONLY  No Methicillin Resistant Staphlyococcus aureus (MRSA) isolated  --        EKG: afib  Imaging: I have personally reviewed pertinent reports  Intake and Output  I/O       10/30 0701  10/31 0700 10/31 0701  11/01 0700    P  O  200     I V  (mL/kg) 521 (6 4) 62 8 (0 8)    IV Piggyback 100 250    Total Intake(mL/kg) 821 (10 1) 312 8 (3 8)    Urine (mL/kg/hr) 3875 (2) 925 (0 5)    Stool 0     Total Output 3875 925    Net -3054 -612 2          Unmeasured Stool Occurrence 2 x           Height and Weights   Height: 5' 10" (177 8 cm)  IBW (Ideal Body Weight): 73 kg  Body mass index is 25 81 kg/m²  Weight (last 2 days)     Date/Time Weight    10/30/22 0600 81 6 (179 9)            Nutrition       Diet Orders   (From admission, onward)             Start     Ordered    10/28/22 1455  Dietary nutrition supplements  Once        Question Answer Comment   Select Supplement: MightyShake    Frequency Breakfast        10/28/22 1455    10/28/22 1455  Dietary nutrition supplements  Once        Question Answer Comment   Select Supplement: Magic Cup [place]    Frequency Lunch        10/28/22 1455    10/28/22 1455  Dietary nutrition supplements  Once        Question Answer Comment   Select Supplement: Magic Cup Chocolate    Frequency Dinner        10/28/22 1455    10/26/22 0845  Diet Dysphagia/Modified Consistency; Dysphagia 1-Pureed; Honey Thick Liquid  Diet effective now        References:    Nutrtion Support Algorithm Enteral vs  Parenteral   Question Answer Comment   Diet Type Dysphagia/Modified Consistency    Dysphagia/Modified Consistency Dysphagia 1-Pureed    Liquid Modifier Honey Thick Liquid    RD to adjust diet per protocol?  Yes        10/26/22 0845                  Active Medications  Scheduled Meds:  Current Facility-Administered Medications   Medication Dose Route Frequency Provider Last Rate   • apixaban  2 5 mg Oral BID ANNA MARIE Shah     • bisacodyl  10 mg Rectal Daily PRN ANNA MARIE Shah     • digoxin  125 mcg Oral Every Other Day ANNA MARIE Willis     • furosemide  80 mg Intravenous BID (diuretic) ANNA MARIE Shah     • levothyroxine  75 mcg Oral Daily Before Breakfast ANNA MARIE Shah     • midodrine  10 mg Oral TID AC ANNA MARIE Rodriguez     • norepinephrine  1-30 mcg/min Intravenous Titrated AguayoANNA MARIE Brady 4 mcg/min (10/31/22 1156)   • OLANZapine  2 5 mg Oral HS ANNA MARIE Dey     • senna-docusate sodium  2 tablet Oral BID ANNA MARIE Patino     • vasopressin  0 04 Units/min Intravenous Continuous ANNA MARIE Steele 0 04 Units/min (10/31/22 2353)     Continuous Infusions:  norepinephrine, 1-30 mcg/min, Last Rate: 4 mcg/min (10/31/22 1156)  vasopressin, 0 04 Units/min, Last Rate: 0 04 Units/min (10/31/22 2353)      PRN Meds:   bisacodyl, 10 mg, Daily PRN        Invasive Devices Review  Invasive Devices  Report    Peripheral Intravenous Line  Duration           Peripheral IV 10/30/22 Dorsal (posterior); Right Forearm 1 day    Peripheral IV 10/30/22 Left;Upper;Ventral (anterior) Arm 1 day          Drain  Duration           Open Drain Anterior; Left Hand 366 days    Open Drain Anterior; Left Hand 366 days    Closed/Suction Drain Right  Bulb 7 Fr  250 days    Urethral Catheter 6 days                Rationale for remaining devices: n/a  ---------------------------------------------------------------------------------------  Advance Directive and Living Will:      Power of :    POLST:    ---------------------------------------------------------------------------------------  Care Time Delivered:   No Critical Care time spent       [facility], CRNP      Portions of the record may have been created with voice recognition software  Occasional wrong word or "sound a like" substitutions may have occurred due to the inherent limitations of voice recognition software    Read the chart carefully and recognize, using context, where substitutions have occurred

## 2022-11-01 NOTE — PLAN OF CARE
Problem: Nutrition/Hydration-ADULT  Goal: Nutrient/Hydration intake appropriate for improving, restoring or maintaining nutritional needs  Description: Monitor and assess patient's nutrition/hydration status for malnutrition  Collaborate with interdisciplinary team and initiate plan and interventions as ordered  Monitor patient's weight and dietary intake as ordered or per policy  Utilize nutrition screening tool and intervene as necessary  Determine patient's food preferences and provide high-protein, high-caloric foods as appropriate       INTERVENTIONS:  - Monitor oral intake, urinary output, labs, and treatment plans  - Assess nutrition and hydration status and recommend course of action  - Evaluate amount of meals eaten  - Assist patient with eating if necessary   - Allow adequate time for meals  - Recommend/ encourage appropriate diets, oral nutritional supplements, and vitamin/mineral supplements  - Order, calculate, and assess calorie counts as needed  - Recommend, monitor, and adjust tube feedings and TPN/PPN based on assessed needs  - Assess need for intravenous fluids  - Provide specific nutrition/hydration education as appropriate  - Include patient/family/caregiver in decisions related to nutrition  Outcome: Progressing     Problem: MOBILITY - ADULT  Goal: Maintain or return to baseline ADL function  Description: INTERVENTIONS:  -  Assess patient's ability to carry out ADLs; assess patient's baseline for ADL function and identify physical deficits which impact ability to perform ADLs (bathing, care of mouth/teeth, toileting, grooming, dressing, etc )  - Assess/evaluate cause of self-care deficits   - Assess range of motion  - Assess patient's mobility; develop plan if impaired  - Assess patient's need for assistive devices and provide as appropriate  - Encourage maximum independence but intervene and supervise when necessary  - Involve family in performance of ADLs  - Assess for home care needs following discharge   - Consider OT consult to assist with ADL evaluation and planning for discharge  - Provide patient education as appropriate  Outcome: Progressing  Goal: Maintains/Returns to pre admission functional level  Description: INTERVENTIONS:  - Perform BMAT or MOVE assessment daily    - Set and communicate daily mobility goal to care team and patient/family/caregiver     - Collaborate with rehabilitation services on mobility goals if consulted  - Out of bed for toileting  - Record patient progress and toleration of activity level   Outcome: Progressing     Problem: Potential for Falls  Goal: Patient will remain free of falls  Description: INTERVENTIONS:  - Educate patient/family on patient safety including physical limitations  - Instruct patient to call for assistance with activity   - Consult OT/PT to assist with strengthening/mobility   - Keep Call bell within reach  - Keep bed low and locked with side rails adjusted as appropriate  - Keep care items and personal belongings within reach  - Initiate and maintain comfort rounds  - Make Fall Risk Sign visible to staff  - Apply yellow socks and bracelet for high fall risk patients  - Consider moving patient to room near nurses station  Outcome: Progressing     Problem: Prexisting or High Potential for Compromised Skin Integrity  Goal: Skin integrity is maintained or improved  Description: INTERVENTIONS:  - Identify patients at risk for skin breakdown  - Assess and monitor skin integrity  - Assess and monitor nutrition and hydration status  - Monitor labs   - Assess for incontinence   - Turn and reposition patient  - Assist with mobility/ambulation  - Relieve pressure over bony prominences  - Avoid friction and shearing  - Provide appropriate hygiene as needed including keeping skin clean and dry  - Evaluate need for skin moisturizer/barrier cream  - Collaborate with interdisciplinary team   - Patient/family teaching  - Consider wound care consult Outcome: Progressing     Problem: SAFETY,RESTRAINT: NV/NON-SELF DESTRUCTIVE BEHAVIOR  Goal: Remains free of harm/injury (restraint for non violent/non self-detsructive behavior)  Description: INTERVENTIONS:  - Instruct patient/family regarding restraint use   - Assess and monitor physiologic and psychological status   - Provide interventions and comfort measures to meet assessed patient needs   - Identify and implement measures to help patient regain control  - Assess readiness for release of restraint   Outcome: Progressing  Goal: Returns to optimal restraint-free functioning  Description: INTERVENTIONS:  - Assess the patient's behavior and symptoms that indicate continued need for restraint  - Identify and implement measures to help patient regain control  - Assess readiness for release of restraint   Outcome: Progressing

## 2022-11-01 NOTE — CASE MANAGEMENT
Case Management Discharge Planning Note    Patient name Doris Gutierrez  Location / MRN [id number]  : 1935 Date 2022       Current Admission Date: 10/24/2022  Current Admission Diagnosis:Cardiogenic shock [facility])   Patient Active Problem List    Diagnosis Date Noted   • Thrombocytopenia ([address] ) 10/30/2022   • Fall 10/25/2022   • Chronic respiratory failure with hypoxia ([address] ) 10/24/2022   • Hypothyroidism 10/24/2022   • Cardiogenic shock ([address] ) 10/24/2022   • Status post incision and drainage 2022   • Hematoma of face, initial encounter 2022   • Skin cancer of face 2022   • Bacteremia due to Staphylococcus 10/28/2021   • Wound of left foot 10/28/2021   • CKD (chronic kidney disease) stage 3, GFR 30-59 ml/min ([facility] ) 10/28/2021   • Hypoxia 10/28/2021   • Flexor tenosynovitis of finger 10/27/2021   • Localized swelling on left hand 10/27/2021   • Cellulitis of hand, left 10/27/2021   • Volume overload 2019   • Pleural effusion 2019   • Moderate protein-calorie malnutrition ([address] ) 2019   • Acute respiratory failure with hypoxia ([facility] ) 2019   • SHREYA (acute kidney injury) ([facility] [facility] ) 2019   • Chronic atrial fibrillation ([facility] [facility] ) 2019   • Transaminitis 2019      LOS (days): 8  Geometric Mean LOS (GMLOS) (days): 3 90  Days to GMLOS:-3 8     OBJECTIVE:  Risk of Unplanned Readmission Score: 19 13      Current admission status: Inpatient   Preferred Pharmacy:   [place] [address]  Phone: [phone] Fax: [phone]    Primary Care Provider: Corrie Edwards DO    Primary Insurance: Tommy Loera [facility]  Secondary Insurance:     DISCHARGE DETAILS:    Other Referral/Resources/Interventions Provided:  Referral Comments: Per the medical team, the patient is medically ready to discharge to inpatient hospice  [facility] will be the accepting facility   The CM requested transportation for 4:30pm and the  time has been confirmed  The CM also explained and provided a copy of the IMM  The patient's daughter verbalized understanding      Accepting Facility Name, [facility] : [facility] [address]  Receiving Facility/Agency Phone Number: [phone]

## 2022-11-01 NOTE — ASSESSMENT & PLAN NOTE
· Platelets downtrending since 10/25  · Platelets 869 on 22/99, back up to 106 on 10/31  · No evidence of bleeding  · Monitor daily

## 2022-11-01 NOTE — ASSESSMENT & PLAN NOTE
· Final result: 1/2 BC + staph epidermidis and staph capitis, 1/2 + staph capitis  · Completed 7 day course of cefepime/vanco  · Likely transitioning towards hospice-awaiting acceptance/bed

## 2022-11-01 NOTE — ASSESSMENT & PLAN NOTE
· Baseline creatinine 1 4-1 5, 2 12 on admission  · Creatinine back to baseline  · Continue to follow renal function  · Likely in the setting of volume overload  · Continue with Lasix dosing  · Chacon catheter with strict I&O

## 2023-03-31 NOTE — PROCEDURES
Central Line Insertion    Date/Time: 10/25/2022 2:19 AM  Performed by: ANNA MARIE Rodriguze  Authorized by: ANNA MARIE Rodriguez     Patient location:  ICU  Consent:     Consent obtained:  Verbal    Consent given by:  Patient    Risks discussed:  Arterial puncture, incorrect placement, bleeding, infection, pneumothorax and nerve damage    Alternatives discussed:  Observation  Universal protocol:     Procedure explained and questions answered to patient or proxy's satisfaction: yes      Relevant documents present and verified: yes      Test results available and properly labeled: yes      Radiology Images displayed and confirmed  If images not available, report reviewed: yes      Required blood products, implants, devices, and special equipment available: yes      Site/side marked: yes      Immediately prior to procedure, a time out was called: yes      Patient identity confirmed:  Verbally with patient, hospital-assigned identification number and arm band  Pre-procedure details:     Hand hygiene: Hand hygiene performed prior to insertion      Sterile barrier technique: All elements of maximal sterile technique followed      Skin preparation:  ChloraPrep    Skin preparation agent: Skin preparation agent completely dried prior to procedure    Indications:     Central line indications: medications requiring central line and hemodynamic monitoring    Anesthesia (see MAR for exact dosages):      Anesthesia method:  None  Procedure details:     Location:  Left internal jugular    Vessel type: vein      Catheter type:  Triple lumen 20cm    Catheter size:  7 Fr    Landmarks identified: yes      Ultrasound guidance: yes      Ultrasound image availability:  Images available in PACS and still images obtained    Sterile ultrasound techniques: Sterile gel and sterile probe covers were used      Manometry confirmation: yes      Number of attempts:  1    Successful placement: yes    Post-procedure details: Has The Growth Been Previously Biopsied?: has been previously biopsied Post-procedure:  Dressing applied    Assessment:  Blood return through all ports, no pneumothorax on x-ray, free fluid flow and placement verified by x-ray    Post-procedure complications: none      Patient tolerance of procedure:   Tolerated well, no immediate complications

## 2024-02-24 NOTE — ASSESSMENT & PLAN NOTE
· Baseline creatinine 1 4-1 5, 2 12 on admission  · Creatinine back to baseline  · Continue to follow renal function  · Likely in the setting of volume overload  · Continue with Lasix infusion  · Chacon catheter with strict I&O [facility] (immediate)...

## (undated) DEVICE — VIAL DECANTER

## (undated) DEVICE — GAUZE SPONGES,16 PLY: Brand: CURITY

## (undated) DEVICE — 3M™ STERI-STRIP™ REINFORCED ADHESIVE SKIN CLOSURES, R1547, 1/2 IN X 4 IN (12 MM X 100 MM), 6 STRIPS/ENVELOPE: Brand: 3M™ STERI-STRIP™

## (undated) DEVICE — INTENDED FOR TISSUE SEPARATION, AND OTHER PROCEDURES THAT REQUIRE A SHARP SURGICAL BLADE TO PUNCTURE OR CUT.: Brand: BARD-PARKER ® CARBON RIB-BACK BLADES

## (undated) DEVICE — GLOVE INDICATOR PI UNDERGLOVE SZ 8 BLUE

## (undated) DEVICE — GLOVE SRG BIOGEL ECLIPSE 7.5

## (undated) DEVICE — NEEDLE 25G X 1 1/2

## (undated) DEVICE — PAD GROUNDING ADULT

## (undated) DEVICE — INTENDED FOR TISSUE SEPARATION, AND OTHER PROCEDURES THAT REQUIRE A SHARP SURGICAL BLADE TO PUNCTURE OR CUT.: Brand: BARD-PARKER SAFETY BLADES SIZE 15, STERILE

## (undated) DEVICE — SUT VICRYL 0 CT-1 27 IN J260H

## (undated) DEVICE — STERILE BETHLEHEM PLASTIC HAND: Brand: CARDINAL HEALTH

## (undated) DEVICE — INTENDED FOR TISSUE SEPARATION, AND OTHER PROCEDURES THAT REQUIRE A SHARP SURGICAL BLADE TO PUNCTURE OR CUT.: Brand: BARD-PARKER SAFETY BLADES SIZE 10, STERILE

## (undated) DEVICE — ANTI-FOG SOLUTION WITH FOAM PAD: Brand: DEVON

## (undated) DEVICE — SUT PROLENE 0 CT-1 30 IN 8424H

## (undated) DEVICE — GLOVE SRG BIOGEL 7

## (undated) DEVICE — MEDI-VAC NON-CONDUCTIVE SUCTION TUBING 6MM X 1.8M (6FT.) L: Brand: CARDINAL HEALTH

## (undated) DEVICE — SUT PDS II 4-0 SH 27 IN Z315H

## (undated) DEVICE — CHLORAPREP HI-LITE 26ML ORANGE

## (undated) DEVICE — NEEDLE 22 G X 1 1/2 SAFETY

## (undated) DEVICE — PENCIL ELECTROSURG E-Z CLEAN -0035H

## (undated) DEVICE — DRAPE FLUID WARMER (BIRD BATH)

## (undated) DEVICE — 3000CC GUARDIAN II: Brand: GUARDIAN

## (undated) DEVICE — 2000CC GUARDIAN II: Brand: GUARDIAN

## (undated) DEVICE — COTTON BALLS: Brand: DEROYAL

## (undated) DEVICE — JACKSON-PRATT 100CC BULB RESERVOIR: Brand: CARDINAL HEALTH

## (undated) DEVICE — STERILE THORACIC PACK: Brand: CARDINAL HEALTH

## (undated) DEVICE — SUT VICRYL 5-0 RB-1 27 IN J213H

## (undated) DEVICE — KERLIX BANDAGE ROLL: Brand: KERLIX

## (undated) DEVICE — SUT VICRYL 2-0 SH 27 IN UNDYED J417H

## (undated) DEVICE — GLOVE INDICATOR PI UNDERGLOVE SZ 7 BLUE

## (undated) DEVICE — SEALANT FIBRIN VISTASEAL 4ML

## (undated) DEVICE — SUT MONOCRYL 4-0 PS-2 18 IN Y496G

## (undated) DEVICE — OASIS DRAIN, SINGLE, INLINE & ATS COMPATIBLE: Brand: OASIS

## (undated) DEVICE — ELECTRODE BLADE MOD E-Z CLEAN  2.75IN 7CM -0012AM

## (undated) DEVICE — LIGHT HANDLE COVER SLEEVE DISP BLUE STELLAR

## (undated) DEVICE — TIBURON SPLIT SHEET: Brand: CONVERTORS

## (undated) DEVICE — ACE WRAP 3 IN STERILE

## (undated) DEVICE — MEDI-VAC YANKAUER SUCTION HANDLE W/STRAIGHT TIP & CONTROL VENT: Brand: CARDINAL HEALTH

## (undated) DEVICE — GLOVE INDICATOR PI UNDERGLOVE SZ 8.5 BLUE

## (undated) DEVICE — GLOVE SRG BIOGEL ECLIPSE 8

## (undated) DEVICE — ADHESIVE SKIN HIGH VISCOSITY EXOFIN 1ML

## (undated) DEVICE — JP PERF DRN SIL FLT 7MM FULL: Brand: CARDINAL HEALTH

## (undated) DEVICE — OCCLUSIVE GAUZE STRIP,3% BISMUTH TRIBROMOPHENATE IN PETROLATUM BLEND: Brand: XEROFORM

## (undated) DEVICE — SUT VICRYL 4-0 PS-2 18 IN J496G

## (undated) DEVICE — BETHLEHEM UNIVERSAL MINOR GEN: Brand: CARDINAL HEALTH

## (undated) DEVICE — ELECTRODE NEEDLE MEGAFINE 2IN E-Z CLEAN MEGADYNE -0118

## (undated) DEVICE — SUT VICRYL 3-0 SH 27 IN J416H

## (undated) DEVICE — SUT PROLENE 5-0 RB-1/RB-1 36 IN 8556H